# Patient Record
Sex: MALE | Race: BLACK OR AFRICAN AMERICAN | NOT HISPANIC OR LATINO | Employment: OTHER | URBAN - METROPOLITAN AREA
[De-identification: names, ages, dates, MRNs, and addresses within clinical notes are randomized per-mention and may not be internally consistent; named-entity substitution may affect disease eponyms.]

---

## 2019-12-03 ENCOUNTER — TELEPHONE (OUTPATIENT)
Dept: FAMILY MEDICINE | Facility: CLINIC | Age: 64
End: 2019-12-03

## 2019-12-03 NOTE — TELEPHONE ENCOUNTER
Rescheduled patient for 12/10/19 at 1pm due to transportation issues.  Patient verbalized understanding.

## 2019-12-03 NOTE — TELEPHONE ENCOUNTER
----- Message from Analisa Kwan sent at 12/2/2019  1:17 PM CST -----  Contact: [phone]/self  Patient is requesting to come to the office at 9am. Transportation doesn't  until 8:30. Please call him to confirm or reschedule a sooner appointment. Thanks

## 2019-12-10 ENCOUNTER — OFFICE VISIT (OUTPATIENT)
Dept: FAMILY MEDICINE | Facility: CLINIC | Age: 64
End: 2019-12-10
Payer: MEDICARE

## 2019-12-10 VITALS
WEIGHT: 180.75 LBS | BODY MASS INDEX: 26.77 KG/M2 | DIASTOLIC BLOOD PRESSURE: 80 MMHG | HEIGHT: 69 IN | OXYGEN SATURATION: 96 % | SYSTOLIC BLOOD PRESSURE: 128 MMHG | HEART RATE: 75 BPM

## 2019-12-10 DIAGNOSIS — I10 ESSENTIAL HYPERTENSION: ICD-10-CM

## 2019-12-10 DIAGNOSIS — Z11.4 SCREENING FOR HIV (HUMAN IMMUNODEFICIENCY VIRUS): ICD-10-CM

## 2019-12-10 DIAGNOSIS — K21.9 GASTROESOPHAGEAL REFLUX DISEASE WITHOUT ESOPHAGITIS: ICD-10-CM

## 2019-12-10 DIAGNOSIS — I25.10 CORONARY ARTERY DISEASE INVOLVING NATIVE HEART WITHOUT ANGINA PECTORIS, UNSPECIFIED VESSEL OR LESION TYPE: Primary | ICD-10-CM

## 2019-12-10 DIAGNOSIS — Z72.0 TOBACCO ABUSE: ICD-10-CM

## 2019-12-10 DIAGNOSIS — E11.65 TYPE 2 DIABETES MELLITUS WITH HYPERGLYCEMIA, WITHOUT LONG-TERM CURRENT USE OF INSULIN: ICD-10-CM

## 2019-12-10 DIAGNOSIS — E78.49 OTHER HYPERLIPIDEMIA: ICD-10-CM

## 2019-12-10 DIAGNOSIS — B19.10 HEPATITIS B INFECTION WITHOUT DELTA AGENT WITHOUT HEPATIC COMA, UNSPECIFIED CHRONICITY: ICD-10-CM

## 2019-12-10 DIAGNOSIS — F20.9 SCHIZOPHRENIA, UNSPECIFIED TYPE: ICD-10-CM

## 2019-12-10 DIAGNOSIS — I42.9 CARDIOMYOPATHY, UNSPECIFIED TYPE: ICD-10-CM

## 2019-12-10 DIAGNOSIS — Z79.899 MEDICATION MANAGEMENT: ICD-10-CM

## 2019-12-10 DIAGNOSIS — Z12.5 SCREENING FOR MALIGNANT NEOPLASM OF PROSTATE: ICD-10-CM

## 2019-12-10 DIAGNOSIS — J43.2 CENTRILOBULAR EMPHYSEMA: ICD-10-CM

## 2019-12-10 DIAGNOSIS — Z11.59 ENCOUNTER FOR HEPATITIS C SCREENING TEST FOR LOW RISK PATIENT: ICD-10-CM

## 2019-12-10 DIAGNOSIS — I50.9 CONGESTIVE HEART FAILURE, UNSPECIFIED HF CHRONICITY, UNSPECIFIED HEART FAILURE TYPE: ICD-10-CM

## 2019-12-10 DIAGNOSIS — F12.10 MARIJUANA ABUSE: ICD-10-CM

## 2019-12-10 DIAGNOSIS — Z99.81 REQUIRES CONTINUOUS AT HOME SUPPLEMENTAL OXYGEN: ICD-10-CM

## 2019-12-10 PROCEDURE — 99499 UNLISTED E&M SERVICE: CPT | Mod: S$GLB,,, | Performed by: FAMILY MEDICINE

## 2019-12-10 PROCEDURE — 99499 RISK ADDL DX/OHS AUDIT: ICD-10-PCS | Mod: S$GLB,,, | Performed by: FAMILY MEDICINE

## 2019-12-10 PROCEDURE — 3008F PR BODY MASS INDEX (BMI) DOCUMENTED: ICD-10-PCS | Mod: CPTII,S$GLB,, | Performed by: FAMILY MEDICINE

## 2019-12-10 PROCEDURE — 99204 PR OFFICE/OUTPT VISIT, NEW, LEVL IV, 45-59 MIN: ICD-10-PCS | Mod: S$GLB,,, | Performed by: FAMILY MEDICINE

## 2019-12-10 PROCEDURE — 3074F SYST BP LT 130 MM HG: CPT | Mod: CPTII,S$GLB,, | Performed by: FAMILY MEDICINE

## 2019-12-10 PROCEDURE — 3079F DIAST BP 80-89 MM HG: CPT | Mod: CPTII,S$GLB,, | Performed by: FAMILY MEDICINE

## 2019-12-10 PROCEDURE — 3008F BODY MASS INDEX DOCD: CPT | Mod: CPTII,S$GLB,, | Performed by: FAMILY MEDICINE

## 2019-12-10 PROCEDURE — 3079F PR MOST RECENT DIASTOLIC BLOOD PRESSURE 80-89 MM HG: ICD-10-PCS | Mod: CPTII,S$GLB,, | Performed by: FAMILY MEDICINE

## 2019-12-10 PROCEDURE — 99999 PR PBB SHADOW E&M-EST. PATIENT-LVL IV: ICD-10-PCS | Mod: PBBFAC,,, | Performed by: FAMILY MEDICINE

## 2019-12-10 PROCEDURE — 3074F PR MOST RECENT SYSTOLIC BLOOD PRESSURE < 130 MM HG: ICD-10-PCS | Mod: CPTII,S$GLB,, | Performed by: FAMILY MEDICINE

## 2019-12-10 PROCEDURE — 99204 OFFICE O/P NEW MOD 45 MIN: CPT | Mod: S$GLB,,, | Performed by: FAMILY MEDICINE

## 2019-12-10 PROCEDURE — 99999 PR PBB SHADOW E&M-EST. PATIENT-LVL IV: CPT | Mod: PBBFAC,,, | Performed by: FAMILY MEDICINE

## 2019-12-10 RX ORDER — RISPERIDONE 2 MG/1
2 TABLET ORAL 2 TIMES DAILY
COMMUNITY
Start: 2018-12-27 | End: 2020-03-09 | Stop reason: SDUPTHER

## 2019-12-10 RX ORDER — BUDESONIDE AND FORMOTEROL FUMARATE DIHYDRATE 160; 4.5 UG/1; UG/1
1 AEROSOL RESPIRATORY (INHALATION) EVERY 12 HOURS
COMMUNITY
End: 2019-12-17 | Stop reason: ALTCHOICE

## 2019-12-10 RX ORDER — TIOTROPIUM BROMIDE 18 UG/1
18 CAPSULE ORAL; RESPIRATORY (INHALATION) DAILY
Qty: 1 BOX | Refills: 5 | Status: SHIPPED | OUTPATIENT
Start: 2019-12-10 | End: 2019-12-17 | Stop reason: ALTCHOICE

## 2019-12-10 RX ORDER — BISOPROLOL FUMARATE 5 MG/1
2.5 TABLET, FILM COATED ORAL DAILY
COMMUNITY
Start: 2018-02-26 | End: 2019-12-16 | Stop reason: SDUPTHER

## 2019-12-10 RX ORDER — ALBUTEROL SULFATE 0.83 MG/ML
2.5 SOLUTION RESPIRATORY (INHALATION) EVERY 6 HOURS PRN
COMMUNITY
End: 2020-03-26 | Stop reason: SDUPTHER

## 2019-12-10 RX ORDER — IPRATROPIUM BROMIDE 0.5 MG/2.5ML
SOLUTION RESPIRATORY (INHALATION)
COMMUNITY
Start: 2017-11-08 | End: 2020-02-28

## 2019-12-10 RX ORDER — TIOTROPIUM BROMIDE 18 UG/1
18 CAPSULE ORAL; RESPIRATORY (INHALATION) DAILY
COMMUNITY
End: 2019-12-10 | Stop reason: SDUPTHER

## 2019-12-10 RX ORDER — LISINOPRIL AND HYDROCHLOROTHIAZIDE 20; 25 MG/1; MG/1
TABLET ORAL
COMMUNITY
Start: 2019-01-22 | End: 2019-12-16 | Stop reason: SDUPTHER

## 2019-12-10 RX ORDER — ALBUTEROL SULFATE 90 UG/1
2 AEROSOL, METERED RESPIRATORY (INHALATION) EVERY 6 HOURS PRN
Qty: 18 G | Refills: 5 | Status: SHIPPED | OUTPATIENT
Start: 2019-12-10 | End: 2020-03-30 | Stop reason: SDUPTHER

## 2019-12-10 RX ORDER — ASPIRIN 81 MG/1
81 TABLET ORAL DAILY
COMMUNITY
End: 2019-12-16 | Stop reason: SDUPTHER

## 2019-12-10 RX ORDER — GLIPIZIDE 10 MG/1
10 TABLET ORAL 2 TIMES DAILY WITH MEALS
COMMUNITY
Start: 2017-04-13 | End: 2020-01-20 | Stop reason: SDUPTHER

## 2019-12-10 RX ORDER — NITROGLYCERIN 0.4 MG/1
TABLET SUBLINGUAL
COMMUNITY
Start: 2017-04-13 | End: 2019-12-10

## 2019-12-10 RX ORDER — ISOSORBIDE MONONITRATE 30 MG/1
TABLET, EXTENDED RELEASE ORAL
COMMUNITY
Start: 2017-11-08 | End: 2019-12-16 | Stop reason: SDUPTHER

## 2019-12-10 RX ORDER — TRAZODONE HYDROCHLORIDE 100 MG/1
100 TABLET ORAL NIGHTLY
COMMUNITY
End: 2020-03-09 | Stop reason: SDUPTHER

## 2019-12-10 NOTE — PROGRESS NOTES
"Subjective:       Patient ID: Paresh Senior is a 64 y.o. male.    Chief Complaint: Establish Care    65 yo M pt, new to me, with PMH significant for CAD, CHF, Cardiomyopathy, COPD on supplemental home O2 at 2L NC, HTN, HLD, DM-2, Schizophrenia,GERD, Tobacco abuse, and Marijuana abuse. He presents to [facility]. He was previously followed at [facility]; last visit 6-8 weeks ago. [facility]. States that he was looking for "a new face" in medicine. He is not followed by any medical specialists. He reports that he needs medication refills today, but he is unsure of current medication regiment. He does know for sure that he needs ventolin and tiotropium filled.     Review of Systems   Constitutional: Negative for activity change, appetite change, chills, fever and unexpected weight change.   HENT: Negative for congestion, postnasal drip, rhinorrhea, sneezing and sore throat.    Eyes: Negative for redness, itching and visual disturbance.   Respiratory: Negative for cough, shortness of breath and wheezing.    Cardiovascular: Negative for chest pain.   Gastrointestinal: Positive for abdominal pain (Groin pain). Negative for constipation, diarrhea, nausea and vomiting.   Genitourinary: Negative for difficulty urinating, discharge, dysuria, frequency and urgency.   Skin: Negative for rash.   Neurological: Negative for dizziness, syncope, weakness and headaches.   Psychiatric/Behavioral: Negative for dysphoric mood and suicidal ideas. The patient is not nervous/anxious.          Past Medical History:   Diagnosis Date    COPD (chronic obstructive pulmonary disease)     Coronary artery disease     Hypercholesterolemia     Hypertension        Patient Active Problem List   Diagnosis    Syncope    Marijuana abuse    Tobacco abuse    CAD (coronary artery disease)    HTN (hypertension)    Cardiomyopathy    Congestive heart failure    Centrilobular emphysema    Type 2 " "diabetes mellitus with hyperglycemia, without long-term current use of insulin    Gastroesophageal reflux disease without esophagitis    Schizophrenia       Past Surgical History:   Procedure Laterality Date    COLONOSCOPY  02/2016    Advised repeat in 3 years    CORONARY STENT PLACEMENT  2013       No family history on file.    Social History     Tobacco Use   Smoking Status Current Every Day Smoker    Packs/day: 0.75    Years: 50.00    Pack years: 37.50       Social History     Social History Narrative    Tobacco Use: Currently smoking 1 cigar every other day. Former cigarette use; initiated at age 8yo, last use 11/2019 (age 65 yo), using 0.5ppd       Objective:        Vitals:    12/10/19 1313   BP: 128/80   Pulse: 75   SpO2: 96%   Weight: 82 kg (180 lb 12.4 oz)   Height: 5' 9" (1.753 m)       Physical Exam   Constitutional: He is oriented to person, place, and time. He appears well-developed and well-nourished. No distress.   O2 nasal cannula in place   HENT:   Head: Normocephalic and atraumatic.   Right Ear: External ear normal.   Left Ear: External ear normal.   Mouth/Throat: Mucous membranes are normal.   Eyes: Conjunctivae and EOM are normal. No scleral icterus.   Neck: Normal range of motion.   Cardiovascular: Normal rate, regular rhythm and normal heart sounds. Exam reveals no gallop and no friction rub.   No murmur heard.  Pulmonary/Chest: Effort normal and breath sounds normal. No respiratory distress. He has no wheezes. He has no rales.   Musculoskeletal: Normal range of motion. He exhibits no edema, tenderness or deformity.   Neurological: He is alert and oriented to person, place, and time. No cranial nerve deficit. Gait normal.   Skin: Skin is warm and dry. No rash noted. No erythema.   Psychiatric: He has a normal mood and affect. His behavior is normal.       Assessment:       1. Coronary artery disease involving native heart without angina pectoris, unspecified vessel or lesion type    2. " Cardiomyopathy, unspecified type    3. Congestive heart failure, unspecified HF chronicity, unspecified heart failure type    4. Centrilobular emphysema    5. Requires continuous at home supplemental oxygen    6. Essential hypertension    7. Other hyperlipidemia    8. Type 2 diabetes mellitus with hyperglycemia, without long-term current use of insulin    9. Gastroesophageal reflux disease without esophagitis    10. Schizophrenia, unspecified type    11. Tobacco abuse    12. Marijuana abuse    13. Hepatitis B infection without delta agent without hepatic coma, unspecified chronicity    14. Screening for HIV (human immunodeficiency virus)    15. Encounter for hepatitis C screening test for low risk patient    16. Medication management    17. Screening for malignant neoplasm of prostate        Plan:       Paresh was seen today for \[facility] [place] [facility]\"" care.    Diagnoses and all orders for this visit:    Coronary artery disease involving native heart without angina pectoris, unspecified vessel or lesion type  -     Ambulatory referral to Cardiology    Cardiomyopathy, unspecified type    Congestive heart failure, unspecified HF chronicity, unspecified heart failure type    Centrilobular emphysema  -     albuterol (VENTOLIN HFA) 90 mcg/actuation inhaler; Inhale 2 puffs into the lungs every 6 (six) hours as needed for Wheezing. Rescue  -     Ambulatory referral to Pulmonology  -     tiotropium (SPIRIVA) 18 mcg inhalation capsule; Inhale 1 capsule (18 mcg total) into the lungs once daily. Controller    Requires continuous at home supplemental oxygen    Essential hypertension  -     CBC auto differential; Future  -     Comprehensive metabolic panel; Future  -     Lipid panel; Future    Other hyperlipidemia    Type 2 diabetes mellitus with hyperglycemia, without long-term current use of insulin  -     CBC auto differential; Future  -     Comprehensive metabolic panel; Future  -     Hemoglobin A1c; Future    Gastroesophageal reflux  disease without esophagitis    Schizophrenia, unspecified type  -     Ambulatory referral to Psychiatry    Tobacco abuse    Marijuana abuse    Hepatitis B infection without delta agent without hepatic coma, unspecified chronicity  -     Hepatitis B surface antigen; Future  -     Hepatitis B surface antibody; Future  -     Hepatitis B core antibody, total; Future  -     HEPATITIS B E ANTIGEN; Future  -     HEPATITIS B VIRAL DNA, QUANTITATIVE; Future    Screening for HIV (human immunodeficiency virus)  -     HIV 1/2 Ag/Ab (4th Gen); Future    Encounter for hepatitis C screening test for low risk patient  -     Hepatitis C antibody; Future    Medication management  -     TSH; Future  -     Vitamin D; Future    Screening for malignant neoplasm of prostate  -     PSA, Screening; Future    Patient is not sure of chronic medications. Will bring in physical bottles during 2 week f/u. RTC for fasting labs as above. Address HM over subsequent visits.     CAD/CHF/Cardiomyopathy  --Med reconciliation on f/u visit   --Plan to schedule 2d echo on f/u visit  --Referred to cardiology    COPD on supplemental home O2 at 2L NC  --Previously followed by pulmonolgy (Dr. Sweta Hatch MD)--last visit 2/2018  --CT chest 2/20/2018 showed centrilobular emphysematous changes throughout upper lobe  --Continue Symbicort 160/4.5 1 puff BID, Spiriva 18 mcg daily, and albuterol prn   --Refer to pulmonology. Looks as if he was also referred to pulmonary rehab in the past.      HTN  --BP well-controlled today  --Med reconciliation on f/u visit     HLD  (?) taking rosuvastatin 20 mg hs.   --Med reconciliation on f/u visit     DM-2  Last A1c approx 9 per pt. Documented last A1c 9.0 11/2017 based on previous records   (?) taking glipizide 10 mg BID    --Med reconciliation on f/u visit     Schizophrenia  --Med reconciliation on f/u visit   --Refer to psychiatry  --Pt did express interest in lowering quetiapine dose (?200 mg vs 300 mg) as this  effects him throughout the day    GERD  (?) untreated  --Med reconciliation on f/u visit     Tobacco abuse  Pt previously smoking 0.5ppd x 57 years; quit cigarette use 11/2019  Now smoking 1 cigar every other day.  Will discuss referral to tobacco cessation program    Marijuana abuse  Assess h/o on f/u. Also documented h/o crack use in previous records.        Flu vaccine UTD for 2019/2020 season  [facility]; recommended following  1.  Colonoscopy due.  Most recent was to 2016; 3 polyps resected.  Advised repeat colonoscopy in 3 years (2019)  2.  Diabetic eye exam due.  3.  Evaluated by Dr. Skelton at [facility] on 01/16/2018 for hep B surface antigen positive. (?due for f/u visit)---obtaining serologies with fasting labs.    Follow up in about 2 weeks (around 12/24/2019) for Med reconciliation with med bottles in office; lab review; assess groin pain. .

## 2019-12-12 ENCOUNTER — TELEPHONE (OUTPATIENT)
Dept: PULMONOLOGY | Facility: CLINIC | Age: 64
End: 2019-12-12

## 2019-12-12 DIAGNOSIS — J44.9 CHRONIC OBSTRUCTIVE PULMONARY DISEASE, UNSPECIFIED COPD TYPE: Primary | ICD-10-CM

## 2019-12-13 ENCOUNTER — LAB VISIT (OUTPATIENT)
Dept: LAB | Facility: HOSPITAL | Age: 64
End: 2019-12-13
Attending: FAMILY MEDICINE
Payer: MEDICARE

## 2019-12-13 DIAGNOSIS — Z11.59 ENCOUNTER FOR HEPATITIS C SCREENING TEST FOR LOW RISK PATIENT: ICD-10-CM

## 2019-12-13 DIAGNOSIS — Z79.899 MEDICATION MANAGEMENT: ICD-10-CM

## 2019-12-13 DIAGNOSIS — Z12.11 COLON CANCER SCREENING: ICD-10-CM

## 2019-12-13 DIAGNOSIS — Z12.5 SCREENING FOR MALIGNANT NEOPLASM OF PROSTATE: ICD-10-CM

## 2019-12-13 DIAGNOSIS — Z11.4 SCREENING FOR HIV (HUMAN IMMUNODEFICIENCY VIRUS): ICD-10-CM

## 2019-12-13 DIAGNOSIS — E11.9 TYPE 2 DIABETES MELLITUS WITHOUT COMPLICATION, UNSPECIFIED WHETHER LONG TERM INSULIN USE: ICD-10-CM

## 2019-12-13 DIAGNOSIS — E11.65 TYPE 2 DIABETES MELLITUS WITH HYPERGLYCEMIA, WITHOUT LONG-TERM CURRENT USE OF INSULIN: ICD-10-CM

## 2019-12-13 DIAGNOSIS — I10 ESSENTIAL HYPERTENSION: ICD-10-CM

## 2019-12-13 DIAGNOSIS — B19.10 HEPATITIS B INFECTION WITHOUT DELTA AGENT WITHOUT HEPATIC COMA, UNSPECIFIED CHRONICITY: ICD-10-CM

## 2019-12-13 LAB
25(OH)D3+25(OH)D2 SERPL-MCNC: 27 NG/ML (ref 30–96)
ALBUMIN SERPL BCP-MCNC: 3.7 G/DL (ref 3.5–5.2)
ALP SERPL-CCNC: 72 U/L (ref 55–135)
ALT SERPL W/O P-5'-P-CCNC: 21 U/L (ref 10–44)
ANION GAP SERPL CALC-SCNC: 10 MMOL/L (ref 8–16)
AST SERPL-CCNC: 23 U/L (ref 10–40)
BASOPHILS # BLD AUTO: 0.04 K/UL (ref 0–0.2)
BASOPHILS NFR BLD: 0.5 % (ref 0–1.9)
BILIRUB SERPL-MCNC: 0.4 MG/DL (ref 0.1–1)
BUN SERPL-MCNC: 13 MG/DL (ref 8–23)
CALCIUM SERPL-MCNC: 9.7 MG/DL (ref 8.7–10.5)
CHLORIDE SERPL-SCNC: 107 MMOL/L (ref 95–110)
CHOLEST SERPL-MCNC: 228 MG/DL (ref 120–199)
CHOLEST/HDLC SERPL: 6.5 {RATIO} (ref 2–5)
CO2 SERPL-SCNC: 25 MMOL/L (ref 23–29)
COMPLEXED PSA SERPL-MCNC: 3.2 NG/ML (ref 0–4)
CREAT SERPL-MCNC: 1.6 MG/DL (ref 0.5–1.4)
DIFFERENTIAL METHOD: NORMAL
EOSINOPHIL # BLD AUTO: 0.2 K/UL (ref 0–0.5)
EOSINOPHIL NFR BLD: 2.6 % (ref 0–8)
ERYTHROCYTE [DISTWIDTH] IN BLOOD BY AUTOMATED COUNT: 12.8 % (ref 11.5–14.5)
EST. GFR  (AFRICAN AMERICAN): 52 ML/MIN/1.73 M^2
EST. GFR  (NON AFRICAN AMERICAN): 45 ML/MIN/1.73 M^2
ESTIMATED AVG GLUCOSE: 140 MG/DL (ref 68–131)
GLUCOSE SERPL-MCNC: 130 MG/DL (ref 70–110)
HBA1C MFR BLD HPLC: 6.5 % (ref 4–5.6)
HBV CORE AB SERPL QL IA: NEGATIVE
HBV SURFACE AB SER-ACNC: NEGATIVE M[IU]/ML
HBV SURFACE AG SERPL QL IA: NEGATIVE
HCT VFR BLD AUTO: 43.6 % (ref 40–54)
HCV AB SERPL QL IA: NEGATIVE
HDLC SERPL-MCNC: 35 MG/DL (ref 40–75)
HDLC SERPL: 15.4 % (ref 20–50)
HGB BLD-MCNC: 14.9 G/DL (ref 14–18)
HIV 1+2 AB+HIV1 P24 AG SERPL QL IA: NEGATIVE
LDLC SERPL CALC-MCNC: 154.8 MG/DL (ref 63–159)
LYMPHOCYTES # BLD AUTO: 1.7 K/UL (ref 1–4.8)
LYMPHOCYTES NFR BLD: 19.8 % (ref 18–48)
MCH RBC QN AUTO: 30.8 PG (ref 27–31)
MCHC RBC AUTO-ENTMCNC: 34.2 G/DL (ref 32–36)
MCV RBC AUTO: 90 FL (ref 82–98)
MONOCYTES # BLD AUTO: 0.5 K/UL (ref 0.3–1)
MONOCYTES NFR BLD: 6 % (ref 4–15)
NEUTROPHILS # BLD AUTO: 6.2 K/UL (ref 1.8–7.7)
NEUTROPHILS NFR BLD: 71.1 % (ref 38–73)
NONHDLC SERPL-MCNC: 193 MG/DL
PLATELET # BLD AUTO: 276 K/UL (ref 150–350)
PMV BLD AUTO: 9.3 FL (ref 9.2–12.9)
POTASSIUM SERPL-SCNC: 4.7 MMOL/L (ref 3.5–5.1)
PROT SERPL-MCNC: 7.1 G/DL (ref 6–8.4)
RBC # BLD AUTO: 4.84 M/UL (ref 4.6–6.2)
SODIUM SERPL-SCNC: 142 MMOL/L (ref 136–145)
TRIGL SERPL-MCNC: 191 MG/DL (ref 30–150)
TSH SERPL DL<=0.005 MIU/L-ACNC: 2.14 UIU/ML (ref 0.4–4)
WBC # BLD AUTO: 8.68 K/UL (ref 3.9–12.7)

## 2019-12-13 PROCEDURE — 83036 HEMOGLOBIN GLYCOSYLATED A1C: CPT

## 2019-12-13 PROCEDURE — 86704 HEP B CORE ANTIBODY TOTAL: CPT

## 2019-12-13 PROCEDURE — 85025 COMPLETE CBC W/AUTO DIFF WBC: CPT

## 2019-12-13 PROCEDURE — 87517 HEPATITIS B DNA QUANT: CPT

## 2019-12-13 PROCEDURE — 86703 HIV-1/HIV-2 1 RESULT ANTBDY: CPT

## 2019-12-13 PROCEDURE — 87340 HEPATITIS B SURFACE AG IA: CPT

## 2019-12-13 PROCEDURE — 80061 LIPID PANEL: CPT

## 2019-12-13 PROCEDURE — 86803 HEPATITIS C AB TEST: CPT

## 2019-12-13 PROCEDURE — 80053 COMPREHEN METABOLIC PANEL: CPT

## 2019-12-13 PROCEDURE — 84443 ASSAY THYROID STIM HORMONE: CPT

## 2019-12-13 PROCEDURE — 82306 VITAMIN D 25 HYDROXY: CPT

## 2019-12-13 PROCEDURE — 86706 HEP B SURFACE ANTIBODY: CPT

## 2019-12-13 PROCEDURE — 87350 HEPATITIS BE AG IA: CPT

## 2019-12-13 PROCEDURE — 36415 COLL VENOUS BLD VENIPUNCTURE: CPT

## 2019-12-13 PROCEDURE — 84153 ASSAY OF PSA TOTAL: CPT

## 2019-12-16 ENCOUNTER — OFFICE VISIT (OUTPATIENT)
Dept: CARDIOLOGY | Facility: CLINIC | Age: 64
End: 2019-12-16
Payer: MEDICARE

## 2019-12-16 VITALS
DIASTOLIC BLOOD PRESSURE: 68 MMHG | WEIGHT: 180 LBS | OXYGEN SATURATION: 95 % | BODY MASS INDEX: 26.66 KG/M2 | HEIGHT: 69 IN | SYSTOLIC BLOOD PRESSURE: 112 MMHG | HEART RATE: 98 BPM

## 2019-12-16 DIAGNOSIS — E78.2 MIXED HYPERLIPIDEMIA: ICD-10-CM

## 2019-12-16 DIAGNOSIS — I34.0 MITRAL VALVE INSUFFICIENCY, UNSPECIFIED ETIOLOGY: ICD-10-CM

## 2019-12-16 DIAGNOSIS — Z72.0 TOBACCO ABUSE: ICD-10-CM

## 2019-12-16 DIAGNOSIS — I73.9 CLAUDICATION: ICD-10-CM

## 2019-12-16 DIAGNOSIS — J43.2 CENTRILOBULAR EMPHYSEMA: ICD-10-CM

## 2019-12-16 DIAGNOSIS — I25.10 CORONARY ARTERY DISEASE INVOLVING NATIVE HEART WITHOUT ANGINA PECTORIS, UNSPECIFIED VESSEL OR LESION TYPE: Primary | ICD-10-CM

## 2019-12-16 DIAGNOSIS — I10 ESSENTIAL HYPERTENSION: ICD-10-CM

## 2019-12-16 PROCEDURE — 3074F SYST BP LT 130 MM HG: CPT | Mod: CPTII,S$GLB,, | Performed by: INTERNAL MEDICINE

## 2019-12-16 PROCEDURE — 99499 UNLISTED E&M SERVICE: CPT | Mod: S$GLB,,, | Performed by: INTERNAL MEDICINE

## 2019-12-16 PROCEDURE — 99999 PR PBB SHADOW E&M-EST. PATIENT-LVL IV: ICD-10-PCS | Mod: PBBFAC,,, | Performed by: INTERNAL MEDICINE

## 2019-12-16 PROCEDURE — 99214 OFFICE O/P EST MOD 30 MIN: CPT | Mod: 25,S$GLB,, | Performed by: INTERNAL MEDICINE

## 2019-12-16 PROCEDURE — 99999 PR PBB SHADOW E&M-EST. PATIENT-LVL IV: CPT | Mod: PBBFAC,,, | Performed by: INTERNAL MEDICINE

## 2019-12-16 PROCEDURE — 93000 ELECTROCARDIOGRAM COMPLETE: CPT | Mod: S$GLB,,, | Performed by: INTERNAL MEDICINE

## 2019-12-16 PROCEDURE — 93000 EKG 12-LEAD: ICD-10-PCS | Mod: S$GLB,,, | Performed by: INTERNAL MEDICINE

## 2019-12-16 PROCEDURE — 3078F DIAST BP <80 MM HG: CPT | Mod: CPTII,S$GLB,, | Performed by: INTERNAL MEDICINE

## 2019-12-16 PROCEDURE — 3008F BODY MASS INDEX DOCD: CPT | Mod: CPTII,S$GLB,, | Performed by: INTERNAL MEDICINE

## 2019-12-16 PROCEDURE — 3078F PR MOST RECENT DIASTOLIC BLOOD PRESSURE < 80 MM HG: ICD-10-PCS | Mod: CPTII,S$GLB,, | Performed by: INTERNAL MEDICINE

## 2019-12-16 PROCEDURE — 99499 RISK ADDL DX/OHS AUDIT: ICD-10-PCS | Mod: S$GLB,,, | Performed by: INTERNAL MEDICINE

## 2019-12-16 PROCEDURE — 3008F PR BODY MASS INDEX (BMI) DOCUMENTED: ICD-10-PCS | Mod: CPTII,S$GLB,, | Performed by: INTERNAL MEDICINE

## 2019-12-16 PROCEDURE — 99214 PR OFFICE/OUTPT VISIT, EST, LEVL IV, 30-39 MIN: ICD-10-PCS | Mod: 25,S$GLB,, | Performed by: INTERNAL MEDICINE

## 2019-12-16 PROCEDURE — 3074F PR MOST RECENT SYSTOLIC BLOOD PRESSURE < 130 MM HG: ICD-10-PCS | Mod: CPTII,S$GLB,, | Performed by: INTERNAL MEDICINE

## 2019-12-16 RX ORDER — ASPIRIN 81 MG/1
81 TABLET ORAL DAILY
Qty: 90 TABLET | Refills: 3 | Status: SHIPPED | OUTPATIENT
Start: 2019-12-16 | End: 2020-12-15

## 2019-12-16 RX ORDER — ISOSORBIDE MONONITRATE 30 MG/1
30 TABLET, EXTENDED RELEASE ORAL DAILY
Qty: 90 TABLET | Refills: 3 | Status: SHIPPED | OUTPATIENT
Start: 2019-12-16 | End: 2020-03-26 | Stop reason: SDUPTHER

## 2019-12-16 RX ORDER — ROSUVASTATIN CALCIUM 40 MG/1
40 TABLET, COATED ORAL NIGHTLY
Qty: 90 TABLET | Refills: 3 | Status: SHIPPED | OUTPATIENT
Start: 2019-12-16 | End: 2020-03-26 | Stop reason: SDUPTHER

## 2019-12-16 RX ORDER — BISOPROLOL FUMARATE 5 MG/1
2.5 TABLET, FILM COATED ORAL DAILY
Qty: 45 TABLET | Refills: 3 | Status: SHIPPED | OUTPATIENT
Start: 2019-12-16 | End: 2020-03-26 | Stop reason: SDUPTHER

## 2019-12-16 RX ORDER — LISINOPRIL AND HYDROCHLOROTHIAZIDE 20; 25 MG/1; MG/1
1 TABLET ORAL DAILY
Qty: 90 TABLET | Refills: 3 | Status: ON HOLD | OUTPATIENT
Start: 2019-12-16 | End: 2020-03-05 | Stop reason: HOSPADM

## 2019-12-16 NOTE — PROGRESS NOTES
Subjective:   @Patient ID:  Paresh Senior is a 64 y.o. male who presents for evaluation of CAD, HTN, HLP.      HPI:     Patient here for first visit. He haven't seen a cardiologist for long time.   He denies any chest pain or palpitations.   He stated that he is compliant with his medication but he ran out of his medication and hasn't been taking his statin.     Has COPD and on home O2 24/7 also uses walker while walking. His legs gets tired and fatigue.     He lives by himself. He is cutting down smoking and now smokes Cigar, one a day.     Pertinent medical hx: CAD s/p PCI, DM type II, COPD on home O2 2l, HLP, reported Cardiomyopathy, Schizophrenia,GERD, Tobacco abuse, and Marijuana abuse.      Prior cardiovascular  Hx  --------------------------------    - Hx of PCI to LCX/OM2/LPLS in 1/2014 at [facility] for  MI thrombotic 100% LCX with BMS 2.25x18 stent with final kissing balloon angioplasty.  Co-dominant.  LM normal, LAD luminal irregularities, RCA luminal irregularities.         - ECHO 1/2014   EF 50%, Moderate MR, Left atrial enlargement.     - EKG 11/2019  SR, no acute ST-T wave changes       Patient Active Problem List    Diagnosis Date Noted    Mixed hyperlipidemia 12/16/2019    Cardiomyopathy 12/10/2019    Congestive heart failure 12/10/2019    Centrilobular emphysema 12/10/2019    Type 2 diabetes mellitus with hyperglycemia, without long-term current use of insulin 12/10/2019    Gastroesophageal reflux disease without esophagitis 12/10/2019    Schizophrenia 12/10/2019    Syncope 11/25/2014    Marijuana abuse 11/25/2014    Tobacco abuse 11/25/2014    CAD (coronary artery disease) 11/25/2014    HTN (hypertension) 11/25/2014           Left Arm BP - Sitting: (P) 112/67        LAST HbA1c  Lab Results   Component Value Date    HGBA1C 6.5 (H) 12/13/2019       Lipid panel  Lab Results   Component Value Date    CHOL 228 (H) 12/13/2019    CHOL 143 11/25/2014     Lab Results   Component Value Date    HDL 35  (L) 12/13/2019    HDL 26 (L) 11/25/2014     Lab Results   Component Value Date    LDLCALC 154.8 12/13/2019    LDLCALC 82.6 11/25/2014     Lab Results   Component Value Date    TRIG 191 (H) 12/13/2019    TRIG 172 (H) 11/25/2014     Lab Results   Component Value Date    CHOLHDL 15.4 (L) 12/13/2019    CHOLHDL 18.2 (L) 11/25/2014            Review of Systems   Constitution: Negative for chills and fever.   HENT: Negative for hearing loss and nosebleeds.    Eyes: Negative for blurred vision.   Cardiovascular: Negative for chest pain and palpitations.   Respiratory:        As in HPI   Hematologic/Lymphatic: Negative for bleeding problem.   Skin: Negative for itching.   Musculoskeletal: Positive for muscle weakness. Negative for falls.   Gastrointestinal: Negative for abdominal pain and hematochezia.   Genitourinary: Negative for hematuria.   Neurological: Negative for dizziness and loss of balance.   Psychiatric/Behavioral: Negative for altered mental status and depression.       Objective:   Physical Exam   Constitutional: He is oriented to person, place, and time. He appears well-developed and well-nourished.   HENT:   Head: Normocephalic and atraumatic.   Eyes: Conjunctivae are normal.   Neck: Neck supple. Carotid bruit is not present.   Cardiovascular: Normal rate, regular rhythm and normal heart sounds. Exam reveals no gallop and no friction rub.   No murmur heard.  Pulmonary/Chest: Effort normal and breath sounds normal. No stridor. No respiratory distress. He has no wheezes.   Neurological: He is alert and oriented to person, place, and time.   Skin: Skin is warm and dry.   Psychiatric: He has a normal mood and affect. His behavior is normal.       Assessment:     1. Coronary artery disease involving native heart without angina pectoris, unspecified vessel or lesion type    2. Tobacco abuse    3. Claudication    4. Mitral valve insufficiency, unspecified etiology    5. Essential hypertension    6. Centrilobular  emphysema    7. Mixed hyperlipidemia        Plan:   - Check Echo  - Check JOHN  - Increase Crestor to 40 mg qhs and repeat level next visit.  - Continue BB/ACEI  - Lifestyle modification  - Refer to smoking cessation program.     Pertinent cardiac images and EKG reviewed independently.    Continue with current medical plan and lifestyle changes.  Return sooner for concerns or questions. If symptoms persist go to the ED  I have reviewed all pertinent data including patient's medical history in detail and updated the computerized patient record.     Orders Placed This Encounter   Procedures    Ambulatory referral to Smoking Cessation Program     Referral Priority:   Routine     Referral Type:   Consultation     Referral Reason:   Specialty Services Required     Requested Specialty:   CTTS     Number of Visits Requested:   1    CV Ankle Brachial Indices W Stress W Toe Tracings     Standing Status:   Future     Standing Expiration Date:   12/16/2020    CV Segmental Pressures Low Ext W Stress     Standing Status:   Future     Standing Expiration Date:   12/16/2020    IN OFFICE EKG 12-LEAD (to Muse)     Order Specific Question:   Diagnosis     Answer:   CAD (coronary artery disease) [390797]    Echo Color Flow Doppler? Yes     Standing Status:   Future     Standing Expiration Date:   12/16/2020     Order Specific Question:   Color Flow Doppler?     Answer:   Yes       Follow up as scheduled.     He expressed verbal understanding and agreed with the plan    Patient's Medications   New Prescriptions    ROSUVASTATIN (CRESTOR) 40 MG TAB    Take 1 tablet (40 mg total) by mouth every evening.   Previous Medications    ALBUTEROL (PROVENTIL) 2.5 MG /3 ML (0.083 %) NEBULIZER SOLUTION    Take 2.5 mg by nebulization every 6 (six) hours as needed for Wheezing or Shortness of Breath. Rescue    ALBUTEROL (VENTOLIN HFA) 90 MCG/ACTUATION INHALER    Inhale 2 puffs into the lungs every 6 (six) hours as needed for Wheezing. Rescue     BENZTROPINE (COGENTIN) 0.5 MG TABLET    Take 0.5 mg by mouth 2 (two) times daily.     BUDESONIDE-FORMOTEROL 160-4.5 MCG (SYMBICORT) 160-4.5 MCG/ACTUATION HFAA    Inhale 1 puff into the lungs every 12 (twelve) hours. Controller    GLIPIZIDE (GLUCOTROL) 10 MG TABLET    10 mg 2 (two) times daily with meals.     IPRATROPIUM (ATROVENT) 0.02 % NEBULIZER SOLUTION    Take by nebulization.     MAG HYDROX/ALUMINUM HYD/SIMETH (ANTACID ORAL)    Take by mouth.    QUETIAPINE (SEROQUEL) 300 MG TAB    Take 300 mg by mouth every evening.     RISPERIDONE (RISPERDAL) 2 MG TABLET    Take 2 mg by mouth 2 (two) times daily.     TIOTROPIUM (SPIRIVA) 18 MCG INHALATION CAPSULE    Inhale 1 capsule (18 mcg total) into the lungs once daily. Controller    TRAZODONE (DESYREL) 100 MG TABLET    Take 100 mg by mouth every evening.   Modified Medications    Modified Medication Previous Medication    ASPIRIN (ECOTRIN) 81 MG EC TABLET aspirin (ECOTRIN) 81 MG EC tablet       Take 1 tablet (81 mg total) by mouth once daily.    Take 81 mg by mouth once daily.    BISOPROLOL (ZEBETA) 5 MG TABLET bisoprolol (ZEBETA) 5 MG tablet       Take 0.5 tablets (2.5 mg total) by mouth once daily.    Take 2.5 mg by mouth once daily.     ISOSORBIDE MONONITRATE (IMDUR) 30 MG 24 HR TABLET isosorbide mononitrate (IMDUR) 30 MG 24 hr tablet       Take 1 tablet (30 mg total) by mouth once daily.    Take by mouth.    LISINOPRIL-HYDROCHLOROTHIAZIDE (PRINZIDE,ZESTORETIC) 20-25 MG TAB lisinopril-hydrochlorothiazide (PRINZIDE,ZESTORETIC) 20-25 mg Tab       Take 1 tablet by mouth once daily.    Take by mouth.   Discontinued Medications    METOPROLOL TARTRATE (LOPRESSOR) 25 MG TABLET    Take 25 mg by mouth 2 (two) times daily.    ROSUVASTATIN 20 MG CPSP    Take 40 mg by mouth every evening.

## 2019-12-16 NOTE — LETTER
December 16, 2019      Fernando Carmona MD  [address]  [address]  [place] [address]           [facility]  [address]  [facility] [place] [address]  Phone: [phone]          Patient: Paresh Senior   MR Number: [id number]   YOB: 1955   Date of Visit: 12/16/2019       Dear Dr. Fernando Carmona:    Thank you for referring Paresh Senior to me for evaluation. Attached you will find relevant portions of my assessment and plan of care.    If you have questions, please do not hesitate to call me. I look forward to following Paresh Senior along with you.    Sincerely,    Gibran Thrasher MD    Enclosure  CC:  No Recipients    If you would like to receive this communication electronically, please contact [email or web address] or [phone] to request more information on [employer] access.    For providers and/or their staff who would like to refer a patient to [facility], please contact us through our one-stop-shop provider referral line, [facility], at [phone].    If you feel you have received this communication in error or would no longer like to receive these types of communications, please e-mail [email or web address]

## 2019-12-17 ENCOUNTER — HOSPITAL ENCOUNTER (OUTPATIENT)
Dept: PULMONOLOGY | Facility: CLINIC | Age: 64
Discharge: HOME | End: 2019-12-17
Payer: MEDICARE

## 2019-12-17 ENCOUNTER — HOSPITAL ENCOUNTER (OUTPATIENT)
Dept: RADIOLOGY | Facility: HOSPITAL | Age: 64
Discharge: HOME | End: 2019-12-17
Attending: INTERNAL MEDICINE
Payer: MEDICARE

## 2019-12-17 ENCOUNTER — OFFICE VISIT (OUTPATIENT)
Dept: PULMONOLOGY | Facility: CLINIC | Age: 64
End: 2019-12-17
Payer: MEDICARE

## 2019-12-17 ENCOUNTER — TELEPHONE (OUTPATIENT)
Dept: CARDIOLOGY | Facility: CLINIC | Age: 64
End: 2019-12-17

## 2019-12-17 VITALS
HEART RATE: 94 BPM | SYSTOLIC BLOOD PRESSURE: 126 MMHG | OXYGEN SATURATION: 96 % | HEIGHT: 68 IN | BODY MASS INDEX: 27.28 KG/M2 | DIASTOLIC BLOOD PRESSURE: 68 MMHG | WEIGHT: 180 LBS

## 2019-12-17 DIAGNOSIS — Z72.0 TOBACCO ABUSE: ICD-10-CM

## 2019-12-17 DIAGNOSIS — J44.9 CHRONIC OBSTRUCTIVE PULMONARY DISEASE, UNSPECIFIED COPD TYPE: ICD-10-CM

## 2019-12-17 DIAGNOSIS — J44.9 CHRONIC OBSTRUCTIVE PULMONARY DISEASE, UNSPECIFIED COPD TYPE: Primary | ICD-10-CM

## 2019-12-17 LAB
DLCO ADJ PRE: 10.83 ML/(MIN*MMHG) (ref 19.85–33.7)
DLCO SINGLE BREATH LLN: 19.85
DLCO SINGLE BREATH PRE REF: 40.8 %
DLCO SINGLE BREATH REF: 26.77
DLCOC SBVA LLN: 2.74
DLCOC SBVA PRE REF: 57.7 %
DLCOC SBVA REF: 3.97
DLCOC SINGLE BREATH LLN: 19.85
DLCOC SINGLE BREATH PRE REF: 40.4 %
DLCOC SINGLE BREATH REF: 26.77
DLCOCSBVAULN: 5.2
DLCOCSINGLEBREATHULN: 33.7
DLCOSINGLEBREATHULN: 33.7
DLCOVA LLN: 2.74
DLCOVA PRE REF: 58.2 %
DLCOVA PRE: 2.31 ML/(MIN*MMHG*L) (ref 2.74–5.2)
DLCOVA REF: 3.97
DLCOVAULN: 5.2
DLVAADJ PRE: 2.29 ML/(MIN*MMHG*L) (ref 2.74–5.2)
FEF 25 75 LLN: 0.89
FEF 25 75 PRE REF: 21.7 %
FEF 25 75 REF: 2.27
FEV05 LLN: 1.43
FEV05 REF: 2.57
FEV1 FVC LLN: 65
FEV1 FVC PRE REF: 62.2 %
FEV1 FVC REF: 78
FEV1 LLN: 1.95
FEV1 PRE REF: 45.6 %
FEV1 REF: 2.74
FVC LLN: 2.59
FVC PRE REF: 73.3 %
FVC REF: 3.53
HBV DNA SERPL NAA+PROBE-ACNC: <10 IU/ML
HBV DNA SERPL NAA+PROBE-LOG IU: <1 LOG (10) IU/ML
HBV DNA SERPL QL NAA+PROBE: NOT DETECTED
IVC PRE: 2.74 L (ref 2.59–4.47)
IVC SINGLE BREATH LLN: 2.59
IVC SINGLE BREATH PRE REF: 77.5 %
IVC SINGLE BREATH REF: 3.53
IVCSINGLEBREATHULN: 4.47
PEF LLN: 5.28
PEF PRE REF: 50.5 %
PEF REF: 7.79
PHYSICIAN COMMENT: ABNORMAL
PRE DLCO: 10.92 ML/(MIN*MMHG) (ref 19.85–33.7)
PRE FEF 25 75: 0.49 L/S (ref 0.89–3.65)
PRE FET 100: 7.28 SEC
PRE FEV05 REF: 32.6 %
PRE FEV1 FVC: 48.38 % (ref 65.49–89.99)
PRE FEV1: 1.25 L (ref 1.95–3.54)
PRE FEV5: 0.84 L (ref 1.43–3.7)
PRE FVC: 2.59 L (ref 2.59–4.47)
PRE PEF: 3.93 L/S (ref 5.28–10.31)
VA PRE: 4.74 L (ref 6.59–6.59)
VA SINGLE BREATH LLN: 6.59
VA SINGLE BREATH PRE REF: 71.9 %
VA SINGLE BREATH REF: 6.59
VASINGLEBREATHULN: 6.59

## 2019-12-17 PROCEDURE — 99204 PR OFFICE/OUTPT VISIT, NEW, LEVL IV, 45-59 MIN: ICD-10-PCS | Mod: 25,S$GLB,, | Performed by: NURSE PRACTITIONER

## 2019-12-17 PROCEDURE — 99999 PR PBB SHADOW E&M-EST. PATIENT-LVL III: CPT | Mod: PBBFAC,,, | Performed by: NURSE PRACTITIONER

## 2019-12-17 PROCEDURE — 99999 PR PBB SHADOW E&M-EST. PATIENT-LVL III: ICD-10-PCS | Mod: PBBFAC,,, | Performed by: NURSE PRACTITIONER

## 2019-12-17 PROCEDURE — 94729 DIFFUSING CAPACITY: CPT | Mod: S$GLB,,, | Performed by: INTERNAL MEDICINE

## 2019-12-17 PROCEDURE — 94010 BREATHING CAPACITY TEST: CPT | Mod: S$GLB,,, | Performed by: INTERNAL MEDICINE

## 2019-12-17 PROCEDURE — 94729 PR C02/MEMBANE DIFFUSE CAPACITY: ICD-10-PCS | Mod: S$GLB,,, | Performed by: INTERNAL MEDICINE

## 2019-12-17 PROCEDURE — 71046 XR CHEST PA AND LATERAL: ICD-10-PCS | Mod: 26,,, | Performed by: RADIOLOGY

## 2019-12-17 PROCEDURE — 71046 X-RAY EXAM CHEST 2 VIEWS: CPT | Mod: TC,FY

## 2019-12-17 PROCEDURE — 99204 OFFICE O/P NEW MOD 45 MIN: CPT | Mod: 25,S$GLB,, | Performed by: NURSE PRACTITIONER

## 2019-12-17 PROCEDURE — 71046 X-RAY EXAM CHEST 2 VIEWS: CPT | Mod: 26,,, | Performed by: RADIOLOGY

## 2019-12-17 PROCEDURE — 94010 BREATHING CAPACITY TEST: ICD-10-PCS | Mod: S$GLB,,, | Performed by: INTERNAL MEDICINE

## 2019-12-17 NOTE — PROGRESS NOTES
Subjective:       Patient ID: Paresh Senior is a 64 y.o. male.    Chief Complaint: Emphysema    HPI:   Paresh Senior is a 64 y.o. male with hx of DM II, HTN, CAD , HLD, CHF, Schizopherina, COPD and active smoker who present for COPD evaluation and est care within the [facility] system. He explains previously  following at [facility].     Patient  experiences chronic mMRC 3 symptoms of dyspnea. Notes to have no chronic cough. Explains had 1 COPD exacerbations in the last one year; 1 hospitalizations for respiratory problems. Denies hx of intubation. He reports taking Symbicort 2 times a week and not using Spiriva. Reports daily use of albuterol nebs or HFA pump. For appx 1 year, he reports using oxygen to assist in breathing efforts. He tells has not followed with pulmonary rehab. He explains has followed with Cardiology yesterday and has upcoming procedures including ECHO.     Currently, he reports smoking 1 cigar a day. Explains quit smoking cigarettes 3 months ago. Discloses having 30-40 pack year hx. HE wears supplemental oxygen with activity and he express aware never to smoke while using oxygen. Tells he follows with smoking cessation at the [facility].     Review of Systems   Constitutional: Negative for fever, chills, weight loss and night sweats.   HENT: Negative for postnasal drip, rhinorrhea, trouble swallowing and congestion.    Respiratory: Positive for shortness of breath. Negative for cough, sputum production, choking, chest tightness, wheezing, dyspnea on extertion and use of rescue inhaler.    Cardiovascular: Negative for chest pain and leg swelling.   Musculoskeletal: Negative for joint swelling.   Skin: Negative for rash.   Gastrointestinal: Negative for acid reflux.   Neurological: Negative for dizziness and light-headedness.   Hematological: Negative for adenopathy.   Psychiatric/Behavioral: The patient is nervous/anxious.        Social History     Tobacco Use    Smoking status: Current Every Day  Smoker     Packs/day: 0.75     Years: 50.00     Pack years: 37.50   Substance Use Topics    Alcohol use: Yes     Comment: 2-3beers/day     Review of patient's allergies indicates:  No Known Allergies  Past Medical History:   Diagnosis Date    COPD (chronic obstructive pulmonary disease)     Coronary artery disease     Hypercholesterolemia     Hypertension      Past Surgical History:   Procedure Laterality Date    COLONOSCOPY  02/2016    Advised repeat in 3 years    CORONARY STENT PLACEMENT  2013     Current Outpatient Medications on File Prior to Visit   Medication Sig    albuterol (PROVENTIL) 2.5 mg /3 mL (0.083 %) nebulizer solution Take 2.5 mg by nebulization every 6 (six) hours as needed for Wheezing or Shortness of Breath. Rescue    albuterol (VENTOLIN HFA) 90 mcg/actuation inhaler Inhale 2 puffs into the lungs every 6 (six) hours as needed for Wheezing. Rescue    aspirin (ECOTRIN) 81 MG EC tablet Take 1 tablet (81 mg total) by mouth once daily.    benztropine (COGENTIN) 0.5 MG tablet Take 0.5 mg by mouth 2 (two) times daily.     bisoprolol (ZEBETA) 5 MG tablet Take 0.5 tablets (2.5 mg total) by mouth once daily.    glipiZIDE (GLUCOTROL) 10 MG tablet 10 mg 2 (two) times daily with meals.     ipratropium (ATROVENT) 0.02 % nebulizer solution Take by nebulization.     isosorbide mononitrate (IMDUR) 30 MG 24 hr tablet Take 1 tablet (30 mg total) by mouth once daily.    lisinopril-hydrochlorothiazide (PRINZIDE,ZESTORETIC) 20-25 mg Tab Take 1 tablet by mouth once daily.    mag hydrox/aluminum hyd/simeth (ANTACID ORAL) Take by mouth.    QUEtiapine (SEROQUEL) 300 MG Tab Take 300 mg by mouth every evening.     risperiDONE (RISPERDAL) 2 MG tablet Take 2 mg by mouth 2 (two) times daily.     rosuvastatin (CRESTOR) 40 MG Tab Take 1 tablet (40 mg total) by mouth every evening.    traZODone (DESYREL) 100 MG tablet Take 100 mg by mouth every evening.    [DISCONTINUED] budesonide-formoterol 160-4.5 mcg  "(SYMBICORT) 160-4.5 mcg/actuation HFAA Inhale 1 puff into the lungs every 12 (twelve) hours. Controller    [DISCONTINUED] tiotropium (SPIRIVA) 18 mcg inhalation capsule Inhale 1 capsule (18 mcg total) into the lungs once daily. Controller     No current facility-administered medications on file prior to visit.      Objective:      Vitals:    12/17/19 1029   BP: 126/68   BP Location: Left arm   Patient Position: Sitting   Pulse: 94   SpO2: 96%  Comment: 2.0   Weight: 81.6 kg (180 lb)   Height: 5' 8" (1.727 m)     Physical Exam   Constitutional: He is oriented to person, place, and time. He appears well-developed and well-nourished. No distress.   HENT:   Head: Normocephalic.   Neck: Normal range of motion. Neck supple.   Cardiovascular: Normal rate, regular rhythm and normal heart sounds.   Pulmonary/Chest: Normal expansion, effort normal and breath sounds normal. No respiratory distress. He has no wheezes. He has no rhonchi.   Using portable oxygen.    Abdominal: Soft. Bowel sounds are normal. There is no hepatosplenomegaly.   Musculoskeletal: Normal range of motion. He exhibits no edema.   Lymphadenopathy: No supraclavicular adenopathy is present.     He has no cervical adenopathy.   Neurological: He is alert and oriented to person, place, and time.   Using walker with ambulation.    Skin: Skin is warm and dry. Nails show no clubbing.   Psychiatric: He has a normal mood and affect. His behavior is normal.   Vitals reviewed.    Personal Diagnostic Review    Pulmonary function tests 12/17/2019: FEV1: 1.25  (46 % predicted), FVC:  2.56 (73 % predicted), FEV1/FVC:  48, DLCO: 10.9 (41 % predicted)    CXR 12/17/2019-  FINDINGS:  The cardiac silhouette is normal in size.  Trachea is midline.  Calcification at the aortic arch.  The hilar and mediastinal contours are unremarkable.    Mild hyperexpansion of bilateral lungs, possibly reflecting underlying COPD.  Band like opacities in the medial lower lung zones, likely " atelectasis versus scarring.  No new consolidation.  Normal appearance of pulmonary vasculature.  No pneumothorax.    Multilevel degenerative changes the spine with a few prominent anterior bridging osteophytes.  No acute fracture.      Assessment:     Problem List Items Addressed This Visit        Pulmonary    Chronic obstructive pulmonary disease - Primary    Overview     Pulmonary function tests 12/17/2019: FEV1: 1.25  (46 % predicted), FVC:  2.56 (73 % predicted), FEV1/FVC:  48, DLCO: 10.9 (41 % predicted)           Current Assessment & Plan     Will need to optimize COPD- not well controlled. Concerns patient having difficulty adhering to inhaler regimen. He is not using daily controller inhaler. GOLD- D MMRC-3. FEV1 1.25 (46% of predicted). Has decreased DLCO-ECHO has been order per cardiology.     Plan:  -Discussed at length on smoking cessation including cigars  -Will obtain previous records from [facility]  -Stop Spiriva (LAMA)  and Symbicort (ICS/LABA) because of difficult inhaler regimen.   -Start Trelegy (ICS/LABA/LAMA) one puff once a day. This medication is easier for compliance.   -Continue supplemental oxygen as prescribed. We discussed the dangers of actively smoking and wearing oxygen. Risk could include burns, combustion or death- he verbalizes understating.   -Will refer to pulmonary rehab. Patient is willing and agrees.   -Will discuss obtaining CT of chest on next visitation after patient has optimized therapy and followed with cardiology testing including Echo.   -Follow up in 2 months.            Relevant Medications    fluticasone-umeclidin-vilanter (TRELEGY ELLIPTA) 100-62.5-25 mcg DsDv    Other Relevant Orders    Ambulatory Referral to Pulmonary Rehab       Other    Tobacco abuse    Current Assessment & Plan     Cigarette Counseling    I have counseled pt for 3-5 minutes regarding cigarette cessation.  This has included the need to stop smoking as well as strategies, including but not  "limited to "cold turkey", CHANTIX (including risks and benefits of deanna drug), nicotine replacement and WELLBUTRIN.             Follow up in 2 months.       "

## 2019-12-17 NOTE — LETTER
December 17, 2019      Fernando Carmona MD  [address]  [address]  [facility] [address]           [facility]  [address]  [place] [address]  Phone: [phone]          Patient: Paresh Senior   MR Number: [id number]   YOB: 1955   Date of Visit: 12/17/2019       Dear Dr. Fernando Carmona:    Thank you for referring Paresh Senior to me for evaluation. Attached you will find relevant portions of my assessment and plan of care.    If you have questions, please do not hesitate to call me. I look forward to following Paresh Senior along with you.    Sincerely,    Nadine Raza, NP    Enclosure  CC:  No Recipients    If you would like to receive this communication electronically, please contact [email or web address] or [phone] to request more information on [employer] access.    For providers and/or their staff who would like to refer a patient to [facility], please contact us through our one-stop-shop provider referral line, [facility], at [phone].    If you feel you have received this communication in error or would no longer like to receive these types of communications, please e-mail [email or web address]

## 2019-12-17 NOTE — ASSESSMENT & PLAN NOTE
Will need to optimize COPD- not well controlled. Concerns patient having difficulty adhering to inhaler regimen. He is not using daily controller inhaler. GOLD- D MMRC-3. FEV1 1.25 (46% of predicted). Has decreased DLCO-ECHO has been order per cardiology.     Plan:  -Discussed at length on smoking cessation including cigars  -Will obtain previous records from [facility] [place]  -Stop Spiriva (LAMA)  and Symbicort (ICS/LABA) because of difficult inhaler regimen.   -Start Trelegy (ICS/LABA/LAMA) one puff once a day. This medication is easier for compliance.   -Continue supplemental oxygen as prescribed. We discussed the dangers of actively smoking and wearing oxygen. Risk could include burns, combustion or death- he verbalizes understating.   -Will refer to pulmonary rehab. Patient is willing and agrees.   -Will discuss obtaining CT of chest on next visitation after patient has optimized therapy and followed with cardiology testing including Echo.   -Follow up in 2 months.

## 2019-12-17 NOTE — ASSESSMENT & PLAN NOTE
"Cigarette Counseling    I have counseled pt for 3-5 minutes regarding cigarette cessation.  This has included the need to stop smoking as well as strategies, including but not limited to "cold turkey", CHANTIX (including risks and benefits of deanna drug), nicotine replacement and WELLBUTRIN.    "

## 2019-12-17 NOTE — PROGRESS NOTES
Subjective:       Patient ID: Paresh Senior is a 64 y.o. male.    Chief Complaint: Emphysema    HPI  Review of Systems    Objective:      Physical Exam  Personal Diagnostic Review  {Pulmonary diagnostics:95986}  No flowsheet data found.      Assessment:       No diagnosis found.    Outpatient Encounter Medications as of 12/17/2019   Medication Sig Dispense Refill    albuterol (PROVENTIL) 2.5 mg /3 mL (0.083 %) nebulizer solution Take 2.5 mg by nebulization every 6 (six) hours as needed for Wheezing or Shortness of Breath. Rescue      albuterol (VENTOLIN HFA) 90 mcg/actuation inhaler Inhale 2 puffs into the lungs every 6 (six) hours as needed for Wheezing. Rescue 18 g 5    aspirin (ECOTRIN) 81 MG EC tablet Take 1 tablet (81 mg total) by mouth once daily. 90 tablet 3    benztropine (COGENTIN) 0.5 MG tablet Take 0.5 mg by mouth 2 (two) times daily.       bisoprolol (ZEBETA) 5 MG tablet Take 0.5 tablets (2.5 mg total) by mouth once daily. 45 tablet 3    budesonide-formoterol 160-4.5 mcg (SYMBICORT) 160-4.5 mcg/actuation HFAA Inhale 1 puff into the lungs every 12 (twelve) hours. Controller      glipiZIDE (GLUCOTROL) 10 MG tablet 10 mg 2 (two) times daily with meals.       ipratropium (ATROVENT) 0.02 % nebulizer solution Take by nebulization.       isosorbide mononitrate (IMDUR) 30 MG 24 hr tablet Take 1 tablet (30 mg total) by mouth once daily. 90 tablet 3    lisinopril-hydrochlorothiazide (PRINZIDE,ZESTORETIC) 20-25 mg Tab Take 1 tablet by mouth once daily. 90 tablet 3    mag hydrox/aluminum hyd/simeth (ANTACID ORAL) Take by mouth.      QUEtiapine (SEROQUEL) 300 MG Tab Take 300 mg by mouth every evening.       risperiDONE (RISPERDAL) 2 MG tablet Take 2 mg by mouth 2 (two) times daily.       rosuvastatin (CRESTOR) 40 MG Tab Take 1 tablet (40 mg total) by mouth every evening. 90 tablet 3    tiotropium (SPIRIVA) 18 mcg inhalation capsule Inhale 1 capsule (18 mcg total) into the lungs once daily. Controller  1 Box 5    traZODone (DESYREL) 100 MG tablet Take 100 mg by mouth every evening.       No facility-administered encounter medications on file as of 12/17/2019.      No orders of the defined types were placed in this encounter.      Plan:       ***

## 2019-12-18 LAB — HBV E AG SERPL QL IA: NORMAL

## 2019-12-18 NOTE — PROGRESS NOTES
Hep B serologies negative. Offer Hep B immunization on f/u visit  HIV NR   Hep C NR  Vit D 27  --Advised OTC Vitamin D3 1000 IU daily  eGFR 52. CKD III range. Plan to repeat in 3 months to determine if this is an acute vs chronic change  , , HDL 35, .8. Continue rosuvastatin 40 mg qhs   PSA 3.2--normal   TSH 2.140 (N)   A1c 6.5--DM-2 at target  CBC wnl     D/W pt in office  MD Reynaldo

## 2019-12-20 DIAGNOSIS — Z12.11 COLON CANCER SCREENING: ICD-10-CM

## 2019-12-31 ENCOUNTER — HOSPITAL ENCOUNTER (OUTPATIENT)
Dept: CARDIOLOGY | Facility: HOSPITAL | Age: 64
Discharge: HOME | End: 2019-12-31
Attending: INTERNAL MEDICINE
Payer: MEDICARE

## 2019-12-31 ENCOUNTER — TELEPHONE (OUTPATIENT)
Dept: CARDIOLOGY | Facility: CLINIC | Age: 64
End: 2019-12-31

## 2019-12-31 DIAGNOSIS — I34.0 MITRAL VALVE INSUFFICIENCY, UNSPECIFIED ETIOLOGY: ICD-10-CM

## 2019-12-31 DIAGNOSIS — I73.9 CLAUDICATION: ICD-10-CM

## 2019-12-31 DIAGNOSIS — I25.10 CORONARY ARTERY DISEASE INVOLVING NATIVE HEART WITHOUT ANGINA PECTORIS, UNSPECIFIED VESSEL OR LESION TYPE: ICD-10-CM

## 2019-12-31 LAB
AORTIC ROOT ANNULUS: 2.34 CM
AORTIC VALVE CUSP SEPERATION: 2.02 CM
ASCENDING AORTA: 2.37 CM
AV INDEX (PROSTH): 0.8
AV MEAN GRADIENT: 3 MMHG
AV PEAK GRADIENT: 6 MMHG
AV VALVE AREA: 2.85 CM2
AV VELOCITY RATIO: 0.84
CV ECHO LV RWT: 0.37 CM
DOP CALC AO PEAK VEL: 1.2 M/S
DOP CALC AO VTI: 24.28 CM
DOP CALC LVOT AREA: 3.6 CM2
DOP CALC LVOT DIAMETER: 2.13 CM
DOP CALC LVOT PEAK VEL: 1.01 M/S
DOP CALC LVOT STROKE VOLUME: 69.31 CM3
DOP CALCLVOT PEAK VEL VTI: 19.46 CM
E WAVE DECELERATION TIME: 135.73 MSEC
E/A RATIO: 1.05
ECHO LV POSTERIOR WALL: 0.97 CM (ref 0.6–1.1)
FRACTIONAL SHORTENING: 10 % (ref 28–44)
INTERVENTRICULAR SEPTUM: 1.18 CM (ref 0.6–1.1)
LA MAJOR: 4.62 CM
LA MINOR: 5.11 CM
LA WIDTH: 3.49 CM
LEFT ATRIUM SIZE: 3.85 CM
LEFT ATRIUM VOLUME: 55.42 CM3
LEFT INTERNAL DIMENSION IN SYSTOLE: 4.74 CM (ref 2.1–4)
LEFT VENTRICLE DIASTOLIC VOLUME: 133.44 ML
LEFT VENTRICLE SYSTOLIC VOLUME: 104.45 ML
LEFT VENTRICULAR INTERNAL DIMENSION IN DIASTOLE: 5.27 CM (ref 3.5–6)
LEFT VENTRICULAR MASS: 218.71 G
MV PEAK A VEL: 0.86 M/S
MV PEAK E VEL: 0.9 M/S
PULM VEIN S/D RATIO: 1.26
PV PEAK D VEL: 0.43 M/S
PV PEAK S VEL: 0.54 M/S
RA MAJOR: 4.18 CM
RA PRESSURE: 3 MMHG
RA WIDTH: 3.29 CM
RIGHT VENTRICULAR END-DIASTOLIC DIMENSION: 2.81 CM
STJ: 3 CM
TRICUSPID ANNULAR PLANE SYSTOLIC EXCURSION: 1.89 CM

## 2019-12-31 PROCEDURE — 93924 LWR XTR VASC STDY BILAT: CPT | Mod: 26,,, | Performed by: INTERNAL MEDICINE

## 2019-12-31 PROCEDURE — 93924 CV US ANKLE BRACHIAL INDICES W STRESS W TOE TRACINGS (CUPID ONLY): ICD-10-PCS | Mod: 26,,, | Performed by: INTERNAL MEDICINE

## 2019-12-31 PROCEDURE — 93306 ECHO (CUPID ONLY): ICD-10-PCS | Mod: 26,,, | Performed by: INTERNAL MEDICINE

## 2019-12-31 PROCEDURE — 93306 TTE W/DOPPLER COMPLETE: CPT

## 2019-12-31 PROCEDURE — 93306 TTE W/DOPPLER COMPLETE: CPT | Mod: 26,,, | Performed by: INTERNAL MEDICINE

## 2019-12-31 PROCEDURE — 93924 LWR XTR VASC STDY BILAT: CPT | Mod: 50

## 2019-12-31 NOTE — TELEPHONE ENCOUNTER
I called patient to let him know that the echo result is ok. No major findings. Patient understood

## 2019-12-31 NOTE — TELEPHONE ENCOUNTER
----- Message from Gibran Thrasher MD sent at 12/31/2019  1:32 PM CST -----  Please let him know that the echo result is ok. No major findings

## 2020-01-07 LAB
ABI POST MINUTES1: 2 MIN
ABI POST MINUTES2: 3 MIN
IMMEDIATE ARM BP: 165 MMHG
IMMEDIATE LEFT ABI: 1.18
IMMEDIATE LEFT TIBIAL: 195 MMHG
IMMEDIATE RIGHT ABI: 1.09
IMMEDIATE RIGHT TIBIAL: 180 MMHG
LEFT ABI: 1.1
LEFT ARM BP: 141 MMHG
LEFT DORSALIS PEDIS: 155 MMHG
LEFT POSTERIOR TIBIAL: 153 MMHG
LEFT TBI: 0.84
LEFT TOE PRESSURE: 118 MMHG
POST1 ARM BP: 165 MMHG
POST1 LEFT ABI: 1.05
POST1 LEFT TIBIAL: 174 MMHG
POST1 RIGHT ABI: 1.06
POST1 RIGHT TIBIAL: 175 MMHG
POST2 ARM BP: 130 MMHG
POST2 LEFT ABI: 1.35
POST2 LEFT TIBIAL: 175 MMHG
POST2 RIGHT ABI: 1.28
POST2 RIGHT TIBIAL: 166 MMHG
RIGHT ABI: 1.09
RIGHT ARM BP: 138 MMHG
RIGHT DORSALIS PEDIS: 154 MMHG
RIGHT POSTERIOR TIBIAL: 144 MMHG
RIGHT TBI: 0.75
RIGHT TOE PRESSURE: 106 MMHG
TREADMILL SPEED: 1 MPH
TREADMILL TIME: 2.3 MIN

## 2020-01-08 ENCOUNTER — TELEPHONE (OUTPATIENT)
Dept: PULMONOLOGY | Facility: CLINIC | Age: 65
End: 2020-01-08

## 2020-01-08 ENCOUNTER — TELEPHONE (OUTPATIENT)
Dept: CARDIOLOGY | Facility: CLINIC | Age: 65
End: 2020-01-08

## 2020-01-08 NOTE — TELEPHONE ENCOUNTER
I called the patient to let him know  that the test on the lower extremities is ok. It shows adequate circulation. Patient understood

## 2020-01-08 NOTE — TELEPHONE ENCOUNTER
----- Message from Gibran Thrasher MD sent at 1/8/2020  7:55 AM CST -----  Please let the patient know that the test on the lower extremities is ok. It shows adequate circulation. Thanks.

## 2020-01-08 NOTE — TELEPHONE ENCOUNTER
----- Message from Mireille Farrar sent at 1/8/2020  8:34 AM CST -----  Contact: Faiza Tom [phone]  States they are waiting on the authorization for patient states they need it before scheduling apt please call back to discuss     Fax number [phone]

## 2020-01-08 NOTE — TELEPHONE ENCOUNTER
A voicemail was left at 9:09am. Faiza was informed that the MNF was completed and [facility] on 12/18 and a confirmation that it was received at 3:33pm, also informed Faiza that I would refax the Medical Necessity Form to [facility]. My name and a call back number was also left for Faiza to return my call at the office.

## 2020-01-08 NOTE — PROGRESS NOTES
Please let the patient know that the test on the lower extremities is ok. It shows adequate circulation. Thanks.

## 2020-01-13 ENCOUNTER — TELEPHONE (OUTPATIENT)
Dept: FAMILY MEDICINE | Facility: CLINIC | Age: 65
End: 2020-01-13

## 2020-01-13 NOTE — TELEPHONE ENCOUNTER
----- Message from Melony Dinh sent at 1/13/2020  3:23 PM CST -----  Contact: Self [phone]  Patient is calling to see if he can be seen this week due to a possible Lung infection and severe cough. Please advice

## 2020-01-13 NOTE — TELEPHONE ENCOUNTER
Called patient in regards to message left. Patient stated that he has been coughing and has a productive cough with green sputum. Patient stated that he can only come to the office on Friday (1/17/2020),due to transportation limitations. Patient stated that it would take 3 days for his transportation to change locations. Patient also stated would rather not go to any other location. Attempted to get patient in to see one of the physicians in office. Referred patient to urgent care. Patient verbalized understanding, and will see urgent care as soon as possible.

## 2020-01-20 ENCOUNTER — OFFICE VISIT (OUTPATIENT)
Dept: FAMILY MEDICINE | Facility: CLINIC | Age: 65
End: 2020-01-20
Payer: MEDICARE

## 2020-01-20 VITALS
OXYGEN SATURATION: 95 % | SYSTOLIC BLOOD PRESSURE: 134 MMHG | WEIGHT: 176.38 LBS | BODY MASS INDEX: 26.73 KG/M2 | DIASTOLIC BLOOD PRESSURE: 78 MMHG | HEIGHT: 68 IN | HEART RATE: 90 BPM

## 2020-01-20 DIAGNOSIS — E55.9 VITAMIN D DEFICIENCY: ICD-10-CM

## 2020-01-20 DIAGNOSIS — I50.9 CONGESTIVE HEART FAILURE, UNSPECIFIED HF CHRONICITY, UNSPECIFIED HEART FAILURE TYPE: ICD-10-CM

## 2020-01-20 DIAGNOSIS — F14.90 CRACK COCAINE USE: ICD-10-CM

## 2020-01-20 DIAGNOSIS — N28.9 RENAL INSUFFICIENCY: ICD-10-CM

## 2020-01-20 DIAGNOSIS — J44.9 CHRONIC OBSTRUCTIVE PULMONARY DISEASE, UNSPECIFIED COPD TYPE: ICD-10-CM

## 2020-01-20 DIAGNOSIS — K21.9 GASTROESOPHAGEAL REFLUX DISEASE WITHOUT ESOPHAGITIS: ICD-10-CM

## 2020-01-20 DIAGNOSIS — I42.9 CARDIOMYOPATHY, UNSPECIFIED TYPE: ICD-10-CM

## 2020-01-20 DIAGNOSIS — Z86.010 HISTORY OF COLON POLYPS: ICD-10-CM

## 2020-01-20 DIAGNOSIS — F20.9 SCHIZOPHRENIA, UNSPECIFIED TYPE: ICD-10-CM

## 2020-01-20 DIAGNOSIS — Z72.0 TOBACCO ABUSE: ICD-10-CM

## 2020-01-20 DIAGNOSIS — I25.10 CORONARY ARTERY DISEASE INVOLVING NATIVE HEART WITHOUT ANGINA PECTORIS, UNSPECIFIED VESSEL OR LESION TYPE: ICD-10-CM

## 2020-01-20 DIAGNOSIS — F12.10 MARIJUANA ABUSE: ICD-10-CM

## 2020-01-20 DIAGNOSIS — Z99.81 ON HOME OXYGEN THERAPY: ICD-10-CM

## 2020-01-20 DIAGNOSIS — E78.2 MIXED HYPERLIPIDEMIA: ICD-10-CM

## 2020-01-20 DIAGNOSIS — E11.65 TYPE 2 DIABETES MELLITUS WITH HYPERGLYCEMIA, WITHOUT LONG-TERM CURRENT USE OF INSULIN: ICD-10-CM

## 2020-01-20 DIAGNOSIS — L30.9 DERMATITIS: Primary | ICD-10-CM

## 2020-01-20 DIAGNOSIS — Z12.11 SCREEN FOR COLON CANCER: ICD-10-CM

## 2020-01-20 DIAGNOSIS — R35.0 URINARY FREQUENCY: ICD-10-CM

## 2020-01-20 DIAGNOSIS — I10 ESSENTIAL HYPERTENSION: ICD-10-CM

## 2020-01-20 PROCEDURE — 3078F DIAST BP <80 MM HG: CPT | Mod: CPTII,S$GLB,, | Performed by: FAMILY MEDICINE

## 2020-01-20 PROCEDURE — G0008 FLU VACCINE (QUAD) GREATER THAN OR EQUAL TO 3YO PRESERVATIVE FREE IM: ICD-10-PCS | Mod: S$GLB,,, | Performed by: FAMILY MEDICINE

## 2020-01-20 PROCEDURE — G0010 HEPATITIS B VACCINE ADULT IM: ICD-10-PCS | Mod: S$GLB,,, | Performed by: FAMILY MEDICINE

## 2020-01-20 PROCEDURE — 99999 PR PBB SHADOW E&M-EST. PATIENT-LVL IV: ICD-10-PCS | Mod: PBBFAC,,, | Performed by: FAMILY MEDICINE

## 2020-01-20 PROCEDURE — G0008 ADMIN INFLUENZA VIRUS VAC: HCPCS | Mod: S$GLB,,, | Performed by: FAMILY MEDICINE

## 2020-01-20 PROCEDURE — 3044F PR MOST RECENT HEMOGLOBIN A1C LEVEL <7.0%: ICD-10-PCS | Mod: CPTII,S$GLB,, | Performed by: FAMILY MEDICINE

## 2020-01-20 PROCEDURE — G0010 ADMIN HEPATITIS B VACCINE: HCPCS | Mod: S$GLB,,, | Performed by: FAMILY MEDICINE

## 2020-01-20 PROCEDURE — 3075F SYST BP GE 130 - 139MM HG: CPT | Mod: CPTII,S$GLB,, | Performed by: FAMILY MEDICINE

## 2020-01-20 PROCEDURE — 90746 HEPATITIS B VACCINE ADULT IM: ICD-10-PCS | Mod: S$GLB,,, | Performed by: FAMILY MEDICINE

## 2020-01-20 PROCEDURE — 3075F PR MOST RECENT SYSTOLIC BLOOD PRESS GE 130-139MM HG: ICD-10-PCS | Mod: CPTII,S$GLB,, | Performed by: FAMILY MEDICINE

## 2020-01-20 PROCEDURE — 90686 IIV4 VACC NO PRSV 0.5 ML IM: CPT | Mod: S$GLB,,, | Performed by: FAMILY MEDICINE

## 2020-01-20 PROCEDURE — 3008F PR BODY MASS INDEX (BMI) DOCUMENTED: ICD-10-PCS | Mod: CPTII,S$GLB,, | Performed by: FAMILY MEDICINE

## 2020-01-20 PROCEDURE — 99214 OFFICE O/P EST MOD 30 MIN: CPT | Mod: 25,S$GLB,, | Performed by: FAMILY MEDICINE

## 2020-01-20 PROCEDURE — 3008F BODY MASS INDEX DOCD: CPT | Mod: CPTII,S$GLB,, | Performed by: FAMILY MEDICINE

## 2020-01-20 PROCEDURE — 90746 HEPB VACCINE 3 DOSE ADULT IM: CPT | Mod: S$GLB,,, | Performed by: FAMILY MEDICINE

## 2020-01-20 PROCEDURE — 99999 PR PBB SHADOW E&M-EST. PATIENT-LVL IV: CPT | Mod: PBBFAC,,, | Performed by: FAMILY MEDICINE

## 2020-01-20 PROCEDURE — 99214 PR OFFICE/OUTPT VISIT, EST, LEVL IV, 30-39 MIN: ICD-10-PCS | Mod: 25,S$GLB,, | Performed by: FAMILY MEDICINE

## 2020-01-20 PROCEDURE — 3078F PR MOST RECENT DIASTOLIC BLOOD PRESSURE < 80 MM HG: ICD-10-PCS | Mod: CPTII,S$GLB,, | Performed by: FAMILY MEDICINE

## 2020-01-20 PROCEDURE — 90686 FLU VACCINE (QUAD) GREATER THAN OR EQUAL TO 3YO PRESERVATIVE FREE IM: ICD-10-PCS | Mod: S$GLB,,, | Performed by: FAMILY MEDICINE

## 2020-01-20 PROCEDURE — 3044F HG A1C LEVEL LT 7.0%: CPT | Mod: CPTII,S$GLB,, | Performed by: FAMILY MEDICINE

## 2020-01-20 RX ORDER — TRIAMCINOLONE ACETONIDE 1 MG/G
OINTMENT TOPICAL 2 TIMES DAILY
Qty: 30 G | Refills: 0 | Status: ON HOLD | OUTPATIENT
Start: 2020-01-20 | End: 2020-08-25 | Stop reason: HOSPADM

## 2020-01-20 RX ORDER — VIT C/E/ZN/COPPR/LUTEIN/ZEAXAN 250MG-90MG
1000 CAPSULE ORAL DAILY
Refills: 0 | COMMUNITY
Start: 2020-01-20

## 2020-01-20 RX ORDER — OMEPRAZOLE 20 MG/1
20 CAPSULE, DELAYED RELEASE ORAL DAILY
COMMUNITY
End: 2020-03-26 | Stop reason: SDUPTHER

## 2020-01-20 RX ORDER — GLIPIZIDE 10 MG/1
10 TABLET ORAL 2 TIMES DAILY WITH MEALS
Qty: 180 TABLET | Refills: 3 | Status: ON HOLD | OUTPATIENT
Start: 2020-01-20 | End: 2020-01-29 | Stop reason: SDUPTHER

## 2020-01-20 RX ORDER — BENZTROPINE MESYLATE 0.5 MG/1
0.5 TABLET ORAL 2 TIMES DAILY
Qty: 180 TABLET | Refills: 0 | Status: SHIPPED | OUTPATIENT
Start: 2020-01-20 | End: 2020-03-26 | Stop reason: SDUPTHER

## 2020-01-20 NOTE — PROGRESS NOTES
Subjective:       Patient ID: Paresh Senior is a 64 y.o. male.    Chief Complaint: Follow-up and Medication Refill (Glipizide, Benztropine, ipratropium Bromide 0.2%, Ventolin HFA 90mcg, Meter and supplies)    63 yo M pt, recently established with me, with PMH significant for CAD, CHF, Cardiomyopathy, COPD on supplemental home O2 at 2L NC, HTN, HLD, DM-2, Schizophrenia,GERD, Tobacco abuse, Marijuana abuse, and Crack abuse He presents for f/u visit and lab review. Since last visit he has been evaluated by Cardiology and pulmonology. He has not been scheduled with psychiatry.     Labs 12/13/2019  Hep B serologies negative.  HIV NR   Hep C NR  Vit D 27  eGFR 52.   , , HDL 35, .8.  PSA 3.2  TSH 2.140 (N)   A1c 6.5  CBC wnl     Acute complaints:  1. Reports pruritic rash to BLEs x several weeks.  Reports some improvement with over-the-counter Neosporin.    2.  Also reports urinary frequency.  Occasionally has weak stream at the start of urination.  No dribbling denies dysuria, hematuria, urinary urgency, abdominal pain, back pain, nausea, fevers, or chills    Review of Systems   Constitutional: Negative for activity change, appetite change, chills, fever and unexpected weight change.   HENT: Negative for congestion, sneezing and sore throat.    Eyes: Negative for redness, itching and visual disturbance.   Respiratory: Negative for cough, shortness of breath and wheezing.    Cardiovascular: Negative for chest pain.   Gastrointestinal: Negative for abdominal pain, constipation, diarrhea, nausea and vomiting.   Genitourinary: Positive for difficulty urinating and frequency. Negative for discharge, dysuria, enuresis, flank pain, hematuria, penile pain, penile swelling, scrotal swelling and urgency.   Skin: Positive for rash.   Neurological: Negative for dizziness, syncope, weakness and headaches.   Psychiatric/Behavioral: Negative for dysphoric mood and suicidal ideas. The patient is not nervous/anxious.   "        Past Medical History:   Diagnosis Date    COPD (chronic obstructive pulmonary disease)     Coronary artery disease     Hypercholesterolemia     Hypertension        Patient Active Problem List   Diagnosis    Marijuana abuse    Tobacco abuse    CAD (coronary artery disease)    HTN (hypertension)    Cardiomyopathy    Congestive heart failure    Centrilobular emphysema    Type 2 diabetes mellitus with hyperglycemia, without long-term current use of insulin    Gastroesophageal reflux disease without esophagitis    Schizophrenia    Mixed hyperlipidemia    Chronic obstructive pulmonary disease    On home oxygen therapy    Crack cocaine use       Past Surgical History:   Procedure Laterality Date    COLONOSCOPY  02/2016    Advised repeat in 3 years    CORONARY STENT PLACEMENT  2013       No family history on file.    Social History     Tobacco Use   Smoking Status Current Every Day Smoker    Packs/day: 0.75    Years: 50.00    Pack years: 37.50       Social History     Social History Narrative    Tobacco Use: Currently smoking 1 cigar every other day. Former cigarette use; initiated at age 6yo, last use 11/2019 (age 63 yo), using 0.5ppd       Objective:        Vitals:    01/20/20 1406   BP: 134/78   Pulse: 90   SpO2: 95%   Weight: 80 kg (176 lb 5.9 oz)   Height: 5' 8" (1.727 m)             Physical Exam   Constitutional: He is oriented to person, place, and time. He appears well-developed and well-nourished. No distress.   Pt without use of supplemental O2 today   HENT:   Head: Normocephalic and atraumatic.   Right Ear: External ear normal.   Left Ear: External ear normal.   Mouth/Throat: Mucous membranes are normal.   Eyes: Conjunctivae and EOM are normal. No scleral icterus.   Neck: Normal range of motion.   Cardiovascular: Normal rate, regular rhythm and normal heart sounds. Exam reveals no gallop and no friction rub.   Pulses:       Dorsalis pedis pulses are 2+ on the right side, and 2+ on " the left side.   No murmur heard.  Pulmonary/Chest: Effort normal and breath sounds normal. No respiratory distress. He has no wheezes. He has no rales.   Musculoskeletal: Normal range of motion. He exhibits no edema, tenderness or deformity.        Right foot: There is normal range of motion and no deformity.        Left foot: There is normal range of motion and no deformity.   Feet:   Right Foot:   Protective Sensation: 10 sites tested. 10 sites sensed.   Skin Integrity: Negative for ulcer, callus or dry skin.   Left Foot:   Protective Sensation: 10 sites tested. 10 sites sensed.   Skin Integrity: Negative for ulcer, callus or dry skin. + mild onychomycosis to all toenails. + patchy dry skin to dorsum of right foot and right tibia.   Neurological: He is alert and oriented to person, place, and time. No cranial nerve deficit. Gait normal.   Skin: Skin is warm and dry. No rash noted. No erythema.   Psychiatric: He has a normal mood and affect. His behavior is normal.     Assessment:       1. Dermatitis    2. Urinary frequency    3. Coronary artery disease involving native heart without angina pectoris, unspecified vessel or lesion type    4. Congestive heart failure, unspecified HF chronicity, unspecified heart failure type    5. Cardiomyopathy, unspecified type    6. Chronic obstructive pulmonary disease, unspecified COPD type    7. On home oxygen therapy    8. Essential hypertension    9. Mixed hyperlipidemia    10. Type 2 diabetes mellitus with hyperglycemia, without long-term current use of insulin    11. Schizophrenia, unspecified type    12. Renal insufficiency    13. Vitamin D deficiency    14. Gastroesophageal reflux disease without esophagitis    15. Tobacco abuse    16. Marijuana abuse    17. Crack cocaine use    18. Screen for colon cancer    19. History of colon polyps        Plan:       Paresh was seen today for follow-up and medication refill.    Diagnoses and all orders for this  visit:    Dermatitis  -     triamcinolone acetonide 0.1% (KENALOG) 0.1 % ointment; Apply topically 2 (two) times daily. for 14 days    Urinary frequency  -     Ambulatory referral to Urology    Coronary artery disease involving native heart without angina pectoris, unspecified vessel or lesion type    Congestive heart failure, unspecified HF chronicity, unspecified heart failure type    Cardiomyopathy, unspecified type    Chronic obstructive pulmonary disease, unspecified COPD type    On home oxygen therapy    Essential hypertension    Mixed hyperlipidemia    Type 2 diabetes mellitus with hyperglycemia, without long-term current use of insulin  -     glipiZIDE (GLUCOTROL) 10 MG tablet; Take 1 tablet (10 mg total) by mouth 2 (two) times daily with meals.  -     blood sugar diagnostic Strp; 1 strip by Misc.(Non-Drug; Combo Route) route 2 (two) times daily.  -     Ambulatory referral to Optometry  -     Foot Exam Performed    Schizophrenia, unspecified type  -     benztropine (COGENTIN) 0.5 MG tablet; Take 1 tablet (0.5 mg total) by mouth 2 (two) times daily.    Renal insufficiency    Vitamin D deficiency  -     cholecalciferol, vitamin D3, (VITAMIN D3) 25 mcg (1,000 unit) capsule; Take 1 capsule (1,000 Units total) by mouth once daily.    Gastroesophageal reflux disease without esophagitis    Tobacco abuse    Marijuana abuse    Crack cocaine use    Screen for colon cancer  -     Case request GI: COLONOSCOPY    History of colon polyps  -     Case request GI: COLONOSCOPY    Other orders  -     (In Office Administered) Hepatitis B Vaccine (Adult) (IM)  -     Influenza - Quadrivalent (PF)    Dermatitis; RLE  --Likely 2/2 dry skin   --Tx with TAC 0.1% ointment BID x 2 weeks     Urinary Frequency  --Concerning for BPH  --Refer to urology     CAD/CHF/Cardiomyopathy  --Med reconciliation on f/u visit   --2D echo 12/31/2019:   · Normal left ventricular systolic function. The estimated ejection fraction is 55%  · Mild eccentric  left ventricular hypertrophy.  · Normal LV diastolic function.  · Mild mitral regurgitation.  · Local segmental wall motion abnormalities.  · Normal right ventricular systolic function.  · Normal central venous pressure (3 mm Hg).  Established care with [facility] 12/16/2019  Advised crestor 40 mg hs;  Continue BB/ACEI  - Lifestyle modification  - Refer to smoking cessation program.      COPD on supplemental home O2 at 2L NC  --[facility]  --CT chest 2/20/2018 showed centrilobular emphysematous changes throughout upper lobe. Planning for repeat CT chest in February 2019 with pulmonology  --Now on Trelegy  -- Referred to pulmonary rehab once again  --Due for pulm f/u 2/2020  --Advised to contact pulm regarding supplement O2 rx     HTN  --BP well-controlled today  --continue lisinopril hydrochlorothiazide 20/20 5 mg daily     HLD  , , HDL 35, .8 12/13/2019  Adherent with rosuvastatin 40 mg hs.     DM-2  Last A1c 6.5 12/13/2019   Adherent with glipizide 10 mg BID    Foot exam performed today 01/20/2020 notable for mild onychomycosis bilaterally, otherwise within normal limits  Optometry referral today 1/20/2020   Flu vaccine today 1/20/2020  Pneumovax-23 administered 7/28/2016  Continue ACEI, Statin, ASA     Schizophrenia  --Referred to psychiatry 12/10/2019. Advised to call to schedule intake/appt  --Continue quetiapine dose 300 mg hs + condition 0.5 mg b.i.d. + risperidone 2 mg b.i.d. + trazodone 100 mg HS    Renal Insufficiency   eGFR 52 12/13/2019    CKD III range. Plan to repeat in 3 months (3/2020) to determine if this is an acute vs chronic change    Vitamin D deficiency   Vit D 27 12/13/2019  --Advised OTC Vitamin D3 1000 IU daily     GERD  --Tx'd with omeprazole 20 mg daily     Tobacco abuse  Pt previously smoking 0.5ppd x 57 years; quit cigarette use 11/2019  Now smoking 1 cigar every other day.  Briefly counseled by pulmonology during  12/17/2019 visit  Discuss referral to tobacco cessation program over subsequent visit(s)      Substance abuse  --Marijuana, Smoking Crack   --reports substance use secondary to chronic bilateral hip pain. Will need to evaluate over subsequent visits     HM   Flu vaccine UTD for 2019/2020 season  Colonoscopy Referral Today 1/20/2020.  Most recent was to 2016; 3 polyps resected.  Advised repeat colonoscopy in 3 years (2019)  Diabetic eye exam due. Optometry referral today 1/20/2020   Evaluated by Dr. Skelton at [facility] on 01/16/2018 for hep B surface antigen positive. All hep B serologies including DNA viral load negative 12/13/2019. Will start Hep B vaccine series today 1/20/2020. Hep B #2 due in 4 weeks (2/20/2020). Hep B#3 due in 5/20/2020     PSA 3.2--normal 12/13/19  TSH 2.140 (N)  12/13/19  HIV NR  12/13/19  Hep C NR  12/13/19  CBC wnl  12/13/19  Needs tdap and shingrix from pharmacy    Follow up in about 4 weeks (around 2/17/2020).

## 2020-01-21 ENCOUNTER — TELEPHONE (OUTPATIENT)
Dept: FAMILY MEDICINE | Facility: CLINIC | Age: 65
End: 2020-01-21

## 2020-01-21 PROBLEM — Z99.81 ON HOME OXYGEN THERAPY: Status: ACTIVE | Noted: 2020-01-21

## 2020-01-21 PROBLEM — F14.90 CRACK COCAINE USE: Status: ACTIVE | Noted: 2020-01-21

## 2020-01-21 NOTE — TELEPHONE ENCOUNTER
Called patient to schedule 40 minute follow up appointment.  Will mail copy of appointment to address on file.

## 2020-01-24 ENCOUNTER — TELEPHONE (OUTPATIENT)
Dept: PULMONOLOGY | Facility: CLINIC | Age: 65
End: 2020-01-24

## 2020-01-24 NOTE — TELEPHONE ENCOUNTER
----- Message from Porsche Hart sent at 1/24/2020  9:29 AM CST -----  Contact: Faiza golden/ [facility]  t el:    [phone]    Caller is asking what is the status of the Authorization for this pt..    Requested it on 12/18.   Pls call.

## 2020-01-24 NOTE — TELEPHONE ENCOUNTER
Spoke with Faiza [facility], I have informed her that on 12/18/19 patient Mr. Parrish MANTILLA was faxed over to [facility] and I've received a confirmation time that it was received. She has verbalize that she understand and will contact [facility]

## 2020-01-27 ENCOUNTER — HOSPITAL ENCOUNTER (INPATIENT)
Facility: HOSPITAL | Age: 65
LOS: 4 days | Discharge: HOME HEALTH | DRG: 639 | End: 2020-01-31
Attending: EMERGENCY MEDICINE | Admitting: INTERNAL MEDICINE
Payer: MEDICARE

## 2020-01-27 ENCOUNTER — PATIENT OUTREACH (OUTPATIENT)
Dept: ADMINISTRATIVE | Facility: OTHER | Age: 65
End: 2020-01-27

## 2020-01-27 DIAGNOSIS — E11.65 TYPE 2 DIABETES MELLITUS WITH HYPERGLYCEMIA, WITHOUT LONG-TERM CURRENT USE OF INSULIN: ICD-10-CM

## 2020-01-27 DIAGNOSIS — R33.9 URINE RETENTION: ICD-10-CM

## 2020-01-27 DIAGNOSIS — M25.562 PAIN IN BOTH KNEES, UNSPECIFIED CHRONICITY: ICD-10-CM

## 2020-01-27 DIAGNOSIS — E16.2 HYPOGLYCEMIA: Primary | ICD-10-CM

## 2020-01-27 DIAGNOSIS — N40.0 BENIGN PROSTATIC HYPERPLASIA, UNSPECIFIED WHETHER LOWER URINARY TRACT SYMPTOMS PRESENT: ICD-10-CM

## 2020-01-27 DIAGNOSIS — M25.561 PAIN IN BOTH KNEES, UNSPECIFIED CHRONICITY: ICD-10-CM

## 2020-01-27 PROBLEM — N18.2 STAGE 2 CHRONIC KIDNEY DISEASE: Status: ACTIVE | Noted: 2020-01-27

## 2020-01-27 LAB
ALBUMIN SERPL BCP-MCNC: 4 G/DL (ref 3.5–5.2)
ALP SERPL-CCNC: 74 U/L (ref 55–135)
ALT SERPL W/O P-5'-P-CCNC: 28 U/L (ref 10–44)
ANION GAP SERPL CALC-SCNC: 10 MMOL/L (ref 8–16)
AST SERPL-CCNC: 51 U/L (ref 10–40)
BACTERIA #/AREA URNS AUTO: NORMAL /HPF
BASOPHILS # BLD AUTO: 0.05 K/UL (ref 0–0.2)
BASOPHILS NFR BLD: 0.3 % (ref 0–1.9)
BILIRUB SERPL-MCNC: 0.4 MG/DL (ref 0.1–1)
BILIRUB UR QL STRIP: NEGATIVE
BUN SERPL-MCNC: 26 MG/DL (ref 8–23)
CALCIUM SERPL-MCNC: 9.6 MG/DL (ref 8.7–10.5)
CHLORIDE SERPL-SCNC: 107 MMOL/L (ref 95–110)
CLARITY UR REFRACT.AUTO: CLEAR
CO2 SERPL-SCNC: 22 MMOL/L (ref 23–29)
COLOR UR AUTO: YELLOW
CREAT SERPL-MCNC: 1.7 MG/DL (ref 0.5–1.4)
DIFFERENTIAL METHOD: ABNORMAL
EOSINOPHIL # BLD AUTO: 0.2 K/UL (ref 0–0.5)
EOSINOPHIL NFR BLD: 1.7 % (ref 0–8)
ERYTHROCYTE [DISTWIDTH] IN BLOOD BY AUTOMATED COUNT: 12.8 % (ref 11.5–14.5)
EST. GFR  (AFRICAN AMERICAN): 48.2 ML/MIN/1.73 M^2
EST. GFR  (NON AFRICAN AMERICAN): 41.7 ML/MIN/1.73 M^2
ESTIMATED AVG GLUCOSE: 140 MG/DL (ref 68–131)
GLUCOSE SERPL-MCNC: 88 MG/DL (ref 70–110)
GLUCOSE UR QL STRIP: NEGATIVE
HBA1C MFR BLD HPLC: 6.5 % (ref 4–5.6)
HCT VFR BLD AUTO: 43.1 % (ref 40–54)
HGB BLD-MCNC: 14.3 G/DL (ref 14–18)
HGB UR QL STRIP: ABNORMAL
HYALINE CASTS UR QL AUTO: 1 /LPF
IMM GRANULOCYTES # BLD AUTO: 0.04 K/UL (ref 0–0.04)
IMM GRANULOCYTES NFR BLD AUTO: 0.3 % (ref 0–0.5)
KETONES UR QL STRIP: NEGATIVE
LEUKOCYTE ESTERASE UR QL STRIP: NEGATIVE
LYMPHOCYTES # BLD AUTO: 1.3 K/UL (ref 1–4.8)
LYMPHOCYTES NFR BLD: 9.4 % (ref 18–48)
MAGNESIUM SERPL-MCNC: 2.4 MG/DL (ref 1.6–2.6)
MCH RBC QN AUTO: 30.5 PG (ref 27–31)
MCHC RBC AUTO-ENTMCNC: 33.2 G/DL (ref 32–36)
MCV RBC AUTO: 92 FL (ref 82–98)
MICROSCOPIC COMMENT: NORMAL
MONOCYTES # BLD AUTO: 1 K/UL (ref 0.3–1)
MONOCYTES NFR BLD: 7 % (ref 4–15)
NEUTROPHILS # BLD AUTO: 11.6 K/UL (ref 1.8–7.7)
NEUTROPHILS NFR BLD: 81.3 % (ref 38–73)
NITRITE UR QL STRIP: NEGATIVE
NRBC BLD-RTO: 0 /100 WBC
PH UR STRIP: 5 [PH] (ref 5–8)
PLATELET # BLD AUTO: 273 K/UL (ref 150–350)
PMV BLD AUTO: 10 FL (ref 9.2–12.9)
POCT GLUCOSE: 112 MG/DL (ref 70–110)
POCT GLUCOSE: 121 MG/DL (ref 70–110)
POCT GLUCOSE: 180 MG/DL (ref 70–110)
POCT GLUCOSE: 41 MG/DL (ref 70–110)
POCT GLUCOSE: 41 MG/DL (ref 70–110)
POCT GLUCOSE: 59 MG/DL (ref 70–110)
POCT GLUCOSE: 59 MG/DL (ref 70–110)
POCT GLUCOSE: 62 MG/DL (ref 70–110)
POCT GLUCOSE: 78 MG/DL (ref 70–110)
POCT GLUCOSE: 92 MG/DL (ref 70–110)
POTASSIUM SERPL-SCNC: 3.6 MMOL/L (ref 3.5–5.1)
PROT SERPL-MCNC: 7.5 G/DL (ref 6–8.4)
PROT UR QL STRIP: ABNORMAL
RBC # BLD AUTO: 4.69 M/UL (ref 4.6–6.2)
RBC #/AREA URNS AUTO: 2 /HPF (ref 0–4)
SODIUM SERPL-SCNC: 139 MMOL/L (ref 136–145)
SP GR UR STRIP: 1.01 (ref 1–1.03)
URN SPEC COLLECT METH UR: ABNORMAL
WBC # BLD AUTO: 14.3 K/UL (ref 3.9–12.7)
WBC #/AREA URNS AUTO: 1 /HPF (ref 0–5)

## 2020-01-27 PROCEDURE — 85025 COMPLETE CBC W/AUTO DIFF WBC: CPT

## 2020-01-27 PROCEDURE — 83036 HEMOGLOBIN GLYCOSYLATED A1C: CPT

## 2020-01-27 PROCEDURE — 82962 GLUCOSE BLOOD TEST: CPT

## 2020-01-27 PROCEDURE — 96365 THER/PROPH/DIAG IV INF INIT: CPT

## 2020-01-27 PROCEDURE — 94761 N-INVAS EAR/PLS OXIMETRY MLT: CPT

## 2020-01-27 PROCEDURE — 25000003 PHARM REV CODE 250: Performed by: INTERNAL MEDICINE

## 2020-01-27 PROCEDURE — 80053 COMPREHEN METABOLIC PANEL: CPT

## 2020-01-27 PROCEDURE — 99223 1ST HOSP IP/OBS HIGH 75: CPT | Mod: ,,, | Performed by: INTERNAL MEDICINE

## 2020-01-27 PROCEDURE — 99285 PR EMERGENCY DEPT VISIT,LEVEL V: ICD-10-PCS | Mod: ,,, | Performed by: EMERGENCY MEDICINE

## 2020-01-27 PROCEDURE — 94640 AIRWAY INHALATION TREATMENT: CPT

## 2020-01-27 PROCEDURE — 27000221 HC OXYGEN, UP TO 24 HOURS

## 2020-01-27 PROCEDURE — 99223 PR INITIAL HOSPITAL CARE,LEVL III: ICD-10-PCS | Mod: ,,, | Performed by: INTERNAL MEDICINE

## 2020-01-27 PROCEDURE — 83735 ASSAY OF MAGNESIUM: CPT

## 2020-01-27 PROCEDURE — 99285 EMERGENCY DEPT VISIT HI MDM: CPT | Mod: 25

## 2020-01-27 PROCEDURE — 25000242 PHARM REV CODE 250 ALT 637 W/ HCPCS: Performed by: INTERNAL MEDICINE

## 2020-01-27 PROCEDURE — 81001 URINALYSIS AUTO W/SCOPE: CPT

## 2020-01-27 PROCEDURE — 11000001 HC ACUTE MED/SURG PRIVATE ROOM

## 2020-01-27 PROCEDURE — 25000003 PHARM REV CODE 250: Performed by: PHYSICIAN ASSISTANT

## 2020-01-27 PROCEDURE — 99285 EMERGENCY DEPT VISIT HI MDM: CPT | Mod: ,,, | Performed by: EMERGENCY MEDICINE

## 2020-01-27 RX ORDER — DEXTROSE MONOHYDRATE 100 MG/ML
1000 INJECTION, SOLUTION INTRAVENOUS
Status: COMPLETED | OUTPATIENT
Start: 2020-01-27 | End: 2020-01-27

## 2020-01-27 RX ORDER — IBUPROFEN 200 MG
24 TABLET ORAL
Status: DISCONTINUED | OUTPATIENT
Start: 2020-01-27 | End: 2020-01-28

## 2020-01-27 RX ORDER — GLUCAGON 1 MG
1 KIT INJECTION
Status: DISCONTINUED | OUTPATIENT
Start: 2020-01-27 | End: 2020-01-28

## 2020-01-27 RX ORDER — ROSUVASTATIN CALCIUM 20 MG/1
40 TABLET, COATED ORAL NIGHTLY
Status: DISCONTINUED | OUTPATIENT
Start: 2020-01-27 | End: 2020-01-31 | Stop reason: HOSPADM

## 2020-01-27 RX ORDER — BENZTROPINE MESYLATE 0.5 MG/1
0.5 TABLET ORAL 2 TIMES DAILY
Status: DISCONTINUED | OUTPATIENT
Start: 2020-01-27 | End: 2020-01-31 | Stop reason: HOSPADM

## 2020-01-27 RX ORDER — PANTOPRAZOLE SODIUM 40 MG/1
40 TABLET, DELAYED RELEASE ORAL DAILY
Status: DISCONTINUED | OUTPATIENT
Start: 2020-01-28 | End: 2020-01-31 | Stop reason: HOSPADM

## 2020-01-27 RX ORDER — ONDANSETRON 4 MG/1
4 TABLET, ORALLY DISINTEGRATING ORAL EVERY 6 HOURS PRN
Status: DISCONTINUED | OUTPATIENT
Start: 2020-01-27 | End: 2020-01-31 | Stop reason: HOSPADM

## 2020-01-27 RX ORDER — TIOTROPIUM BROMIDE 18 UG/1
1 CAPSULE ORAL; RESPIRATORY (INHALATION) DAILY
Status: DISCONTINUED | OUTPATIENT
Start: 2020-01-27 | End: 2020-01-31 | Stop reason: HOSPADM

## 2020-01-27 RX ORDER — IBUPROFEN 200 MG
16 TABLET ORAL
Status: DISCONTINUED | OUTPATIENT
Start: 2020-01-27 | End: 2020-01-28

## 2020-01-27 RX ORDER — SODIUM CHLORIDE 0.9 % (FLUSH) 0.9 %
10 SYRINGE (ML) INJECTION
Status: DISCONTINUED | OUTPATIENT
Start: 2020-01-27 | End: 2020-01-31 | Stop reason: HOSPADM

## 2020-01-27 RX ORDER — ISOSORBIDE MONONITRATE 30 MG/1
30 TABLET, EXTENDED RELEASE ORAL DAILY
Status: DISCONTINUED | OUTPATIENT
Start: 2020-01-28 | End: 2020-01-31 | Stop reason: HOSPADM

## 2020-01-27 RX ORDER — ACETAMINOPHEN 325 MG/1
650 TABLET ORAL EVERY 8 HOURS PRN
Status: DISCONTINUED | OUTPATIENT
Start: 2020-01-27 | End: 2020-01-27

## 2020-01-27 RX ORDER — IPRATROPIUM BROMIDE AND ALBUTEROL SULFATE 2.5; .5 MG/3ML; MG/3ML
3 SOLUTION RESPIRATORY (INHALATION)
Status: DISCONTINUED | OUTPATIENT
Start: 2020-01-27 | End: 2020-01-31 | Stop reason: HOSPADM

## 2020-01-27 RX ORDER — FLUTICASONE FUROATE AND VILANTEROL 100; 25 UG/1; UG/1
1 POWDER RESPIRATORY (INHALATION) DAILY
Status: DISCONTINUED | OUTPATIENT
Start: 2020-01-27 | End: 2020-01-31 | Stop reason: HOSPADM

## 2020-01-27 RX ORDER — ALBUTEROL SULFATE 90 UG/1
2 AEROSOL, METERED RESPIRATORY (INHALATION) EVERY 6 HOURS PRN
Status: DISCONTINUED | OUTPATIENT
Start: 2020-01-27 | End: 2020-01-27

## 2020-01-27 RX ORDER — ASPIRIN 81 MG/1
81 TABLET ORAL DAILY
Status: DISCONTINUED | OUTPATIENT
Start: 2020-01-28 | End: 2020-01-31 | Stop reason: HOSPADM

## 2020-01-27 RX ORDER — ACETAMINOPHEN 500 MG
500 TABLET ORAL EVERY 6 HOURS PRN
Status: DISCONTINUED | OUTPATIENT
Start: 2020-01-27 | End: 2020-01-31 | Stop reason: HOSPADM

## 2020-01-27 RX ORDER — IBUPROFEN 200 MG
16 TABLET ORAL
Status: COMPLETED | OUTPATIENT
Start: 2020-01-27 | End: 2020-01-27

## 2020-01-27 RX ORDER — DEXTROSE MONOHYDRATE 100 MG/ML
INJECTION, SOLUTION INTRAVENOUS CONTINUOUS
Status: DISCONTINUED | OUTPATIENT
Start: 2020-01-27 | End: 2020-01-28

## 2020-01-27 RX ORDER — AMOXICILLIN 250 MG
1 CAPSULE ORAL 2 TIMES DAILY
Status: DISCONTINUED | OUTPATIENT
Start: 2020-01-27 | End: 2020-01-31 | Stop reason: HOSPADM

## 2020-01-27 RX ORDER — LISINOPRIL AND HYDROCHLOROTHIAZIDE 10; 12.5 MG/1; MG/1
2 TABLET ORAL DAILY
Status: DISCONTINUED | OUTPATIENT
Start: 2020-01-27 | End: 2020-01-29

## 2020-01-27 RX ORDER — RISPERIDONE 1 MG/1
2 TABLET ORAL 2 TIMES DAILY
Status: DISCONTINUED | OUTPATIENT
Start: 2020-01-27 | End: 2020-01-31 | Stop reason: HOSPADM

## 2020-01-27 RX ORDER — SODIUM CHLORIDE 0.9 % (FLUSH) 0.9 %
5 SYRINGE (ML) INJECTION
Status: DISCONTINUED | OUTPATIENT
Start: 2020-01-27 | End: 2020-01-31 | Stop reason: HOSPADM

## 2020-01-27 RX ORDER — LISINOPRIL AND HYDROCHLOROTHIAZIDE 10; 12.5 MG/1; MG/1
2 TABLET ORAL DAILY
Status: DISCONTINUED | OUTPATIENT
Start: 2020-01-28 | End: 2020-01-27

## 2020-01-27 RX ADMIN — Medication 16 G: at 06:01

## 2020-01-27 RX ADMIN — Medication 16 G: at 03:01

## 2020-01-27 RX ADMIN — ROSUVASTATIN CALCIUM 40 MG: 20 TABLET, FILM COATED ORAL at 10:01

## 2020-01-27 RX ADMIN — DEXTROSE: 10 SOLUTION INTRAVENOUS at 05:01

## 2020-01-27 RX ADMIN — DEXTROSE 250 ML: 10 SOLUTION INTRAVENOUS at 02:01

## 2020-01-27 RX ADMIN — IPRATROPIUM BROMIDE AND ALBUTEROL SULFATE 3 ML: .5; 3 SOLUTION RESPIRATORY (INHALATION) at 06:01

## 2020-01-27 RX ADMIN — BENZTROPINE MESYLATE 0.5 MG: 0.5 TABLET ORAL at 10:01

## 2020-01-27 RX ADMIN — LISINOPRIL AND HYDROCHLOROTHIAZIDE 2 TABLET: 12.5; 1 TABLET ORAL at 10:01

## 2020-01-27 RX ADMIN — SENNOSIDES AND DOCUSATE SODIUM 1 TABLET: 8.6; 5 TABLET ORAL at 10:01

## 2020-01-27 RX ADMIN — RISPERIDONE 2 MG: 1 TABLET ORAL at 10:01

## 2020-01-27 RX ADMIN — DEXTROSE 1000 ML: 10 SOLUTION INTRAVENOUS at 02:01

## 2020-01-27 NOTE — ED NOTES
Pt sitting up eating food, Nannette, PA aware pt cbg 41, pt awake and talking able to eat fine. Will let pt eat and recheck cbg. Call light within reach will continue to monitor.

## 2020-01-27 NOTE — PROGRESS NOTES
Care Everywhere: updated  Immunization: updated  Health Maintenance: updated  Media Review: reviewed for possible outside colonoscopy report  Legacy Review: n/a  Order placed: n/a  Upcoming appts:optometry [facility] 1/28/2020

## 2020-01-27 NOTE — ED PROVIDER NOTES
Encounter Date: 1/27/2020       History     Chief Complaint   Patient presents with    Hypoglycemia     CBG 40 on EMS arrival amp of D50 given repeat      64-year-old male with a history of diabetes and COPD presents after feeling off this morning at home.  His mother called EMS and they found him to have a low blood glucose.  He was given IV glucose and improved immediately.  He feels better now.  Patient states he took his glipizide last night and this morning, but did not eat.  This has happened to him previously.  Associated dysuria.  Patient denies nausea, vomiting, diarrhea, fever, cough, shortness of breath, chest pain, or abdominal pain.  A ten point review of systems was completed and is negative except as documented above.  Patient denies any other acute medical complaint.  The patients available PMH, PSH, Social History, medications, allergies, and triage vital signs were reviewed just prior to their medical evaluation.        Review of patient's allergies indicates:  No Known Allergies  Past Medical History:   Diagnosis Date    COPD (chronic obstructive pulmonary disease)     Coronary artery disease     Hypercholesterolemia     Hypertension      Past Surgical History:   Procedure Laterality Date    COLONOSCOPY  02/2016    Advised repeat in 3 years    CORONARY STENT PLACEMENT  2013     No family history on file.  Social History     Tobacco Use    Smoking status: Current Every Day Smoker     Packs/day: 0.75     Years: 50.00     Pack years: 37.50   Substance Use Topics    Alcohol use: Yes     Comment: 2-3beers/day    Drug use: Yes     Comment: 3-4 marijuana joints per day     Review of Systems   Constitutional: Negative for fever.   HENT: Negative for sore throat.    Eyes: Negative for visual disturbance.   Respiratory: Negative for cough and shortness of breath.    Cardiovascular: Negative for chest pain.   Gastrointestinal: Negative for abdominal pain, diarrhea, nausea and vomiting.    Genitourinary: Positive for dysuria.   Musculoskeletal: Negative for neck pain.   Skin: Negative for rash and wound.   Allergic/Immunologic: Negative for immunocompromised state.   Neurological: Negative for syncope.   Psychiatric/Behavioral: Positive for confusion.       Physical Exam     Initial Vitals [01/27/20 1009]   BP Pulse Resp Temp SpO2   (!) 176/96 95 18 98.8 °F (37.1 °C) 99 %      MAP       --         Physical Exam    Nursing note and vitals reviewed.  Constitutional: He appears well-developed and well-nourished. He is not diaphoretic. No distress.   HENT:   Head: Normocephalic and atraumatic.   Nose: Nose normal.   Eyes: Conjunctivae are normal. Right eye exhibits no discharge. Left eye exhibits no discharge.   Neck: Normal range of motion. Neck supple.   Cardiovascular: Normal rate, regular rhythm and normal heart sounds. Exam reveals no gallop and no friction rub.    No murmur heard.  Pulmonary/Chest: Breath sounds normal. No respiratory distress. He has no wheezes. He has no rhonchi. He has no rales.   Abdominal: Soft. He exhibits no distension. There is no tenderness. There is no rebound and no guarding.   Musculoskeletal: Normal range of motion. He exhibits no edema or tenderness.   Neurological: He is alert and oriented to person, place, and time. GCS score is 15. GCS eye subscore is 4. GCS verbal subscore is 5. GCS motor subscore is 6.   Skin: Skin is warm and dry. No rash noted. No erythema.   Psychiatric: He has a normal mood and affect. His behavior is normal. Judgment and thought content normal.         ED Course   Procedures  Labs Reviewed   CBC W/ AUTO DIFFERENTIAL - Abnormal; Notable for the following components:       Result Value    WBC 14.30 (*)     Gran # (ANC) 11.6 (*)     Gran% 81.3 (*)     Lymph% 9.4 (*)     All other components within normal limits   COMPREHENSIVE METABOLIC PANEL - Abnormal; Notable for the following components:    CO2 22 (*)     BUN, Bld 26 (*)     Creatinine 1.7  (*)     AST 51 (*)     eGFR if  48.2 (*)     eGFR if non  41.7 (*)     All other components within normal limits   URINALYSIS, REFLEX TO URINE CULTURE - Abnormal; Notable for the following components:    Protein, UA 1+ (*)     Occult Blood UA 1+ (*)     All other components within normal limits    Narrative:     Preferred Collection Type->Urine, Clean Catch   MAGNESIUM   URINALYSIS MICROSCOPIC    Narrative:     Preferred Collection Type->Urine, Clean Catch   POCT GLUCOSE MONITORING CONTINUOUS          Imaging Results    None          Medical Decision Making:   History:   Old Medical Records: I decided to obtain old medical records.  Clinical Tests:   Lab Tests: Ordered and Reviewed  ED Management:  64-year-old male with diabetes presents with hypoglycemia.  Vitals with hypertension.  Physical exam benign.  Labs unremarkable.  Serial glucose checks in the ED with levels of 88 and 92.  He tolerated meal.  Given long acting oral hypoglycemic will admit to ED OU for at least 8 hours of glucose monitoring.  Treat with IV dextrose if he drops below 70.  Patient should be given a full meal.  He may be discharged if no further hypoglycemic episodes occur.  Did bedside teaching.  All questions answered.  Patient acknowledges understanding.  Other:   I have discussed this case with another health care provider.       <> Summary of the Discussion: ED OU PA                                 Clinical Impression:   Final diagnoses:  [E16.2] Hypoglycemia (Primary)    Disposition:   Disposition: Placed in Observation  Condition: Stable      Level of Complexity:  High, level 5.               Pawan Cedeño MD  01/27/20 2345

## 2020-01-27 NOTE — ED NOTES
Respiratory Therapy called and notified of nebulizer treatment ordered, pt is not short of breath right now.

## 2020-01-27 NOTE — ED NOTES
Pt to ED observation 2 for continued monitoring, pt meal tray ordered. Provider at bedside. Call light within reach.

## 2020-01-27 NOTE — ED TRIAGE NOTES
Pt brought in [facility]  For  Hypoglycemia on ems arrival pt cbg was 40. Pt denies eating this morning. Pt received amp d50 prior to arrival.

## 2020-01-27 NOTE — H&P
ED Observation Unit  History and Physical      I assumed care of this patient from the Main ED at onset of observation time, 12:12p on 01/27/2020.       History of Present Illness:    64-year-old male with a history of diabetes and COPD presents after feeling off this morning at home.  His mother called EMS and they found him to have a low blood glucose of 40.  He was given IV glucose and improved immediately.  He feels better now.  Patient states he took his glipizide last night and this morning, but did not eat.  This has happened to him previously.  Associated dysuria.  Patient denies nausea, vomiting, diarrhea, fever, cough, shortness of breath, chest pain, or abdominal pain.      I reviewed the ED Provider Note dated 1/27/20 prior to my evaluation of this patient.  I reviewed all labs and imaging performed in the Main ED, prior to patient being placed in Observation. Patient was placed in the ED Observation Unit for Hypoglycemia.  Plan for q1hr glucose checks.    PMHx   Past Medical History:   Diagnosis Date    COPD (chronic obstructive pulmonary disease)     Coronary artery disease     Hypercholesterolemia     Hypertension       Past Surgical History:   Procedure Laterality Date    COLONOSCOPY  02/2016    Advised repeat in 3 years    CORONARY STENT PLACEMENT  2013        Family Hx   Family History   Problem Relation Age of Onset    No Known Problems Mother     Mental illness Brother     Diabetes Grandchild         Social Hx   Social History     Socioeconomic History    Marital status: Single     Spouse name: Not on file    Number of children: Not on file    Years of education: Not on file    Highest education level: Not on file   Occupational History    Not on file   Social Needs    Financial resource strain: Not on file    Food insecurity:     Worry: Not on file     Inability: Not on file    Transportation needs:     Medical: Not on file     Non-medical: Not on file   Tobacco Use    Smoking  status: Current Every Day Smoker     Packs/day: 0.75     Years: 50.00     Pack years: 37.50   Substance and Sexual Activity    Alcohol use: Yes     Comment: 2-3beers/day    Drug use: Yes     Comment: 3-4 marijuana joints per day    Sexual activity: Not on file   Lifestyle    Physical activity:     Days per week: Not on file     Minutes per session: Not on file    Stress: Not on file   Relationships    Social connections:     Talks on phone: Not on file     Gets together: Not on file     Attends Buddhist service: Not on file     Active member of club or organization: Not on file     Attends meetings of clubs or organizations: Not on file     Relationship status: Not on file   Other Topics Concern    Not on file   Social History Narrative    Tobacco Use: Currently smoking 1 cigar every other day. Former cigarette use; initiated at age 8yo, last use 11/2019 (age 65 yo), using 0.5ppd        Vital Signs   Vitals:    01/27/20 1054 01/27/20 1154 01/27/20 1219 01/27/20 1223   BP: (!) 185/88 (!) 186/93  (!) 179/85   Pulse: 96 84 84 90   Resp:       Temp:       TempSrc:       SpO2: 95% 98% 98% 98%        Review of Systems  Review of Systems   Constitutional: Negative for chills, fever and malaise/fatigue.   HENT: Negative for sore throat.    Respiratory: Negative for cough and shortness of breath.    Cardiovascular: Negative for chest pain.   Gastrointestinal: Negative for abdominal pain, constipation, diarrhea, nausea and vomiting.   Genitourinary: Positive for frequency.   Musculoskeletal: Negative for myalgias.   Neurological: Negative for headaches.        +altered mental status (resolved)   Psychiatric/Behavioral: The patient is not nervous/anxious.        Physical Exam  Physical Exam   Constitutional: He is oriented to person, place, and time and well-developed, well-nourished, and in no distress. No distress.   HENT:   Head: Normocephalic and atraumatic.   Neck: Normal range of motion. Neck supple.    Cardiovascular: Normal rate and regular rhythm. Exam reveals no gallop and no friction rub.   No murmur heard.  Pulmonary/Chest: Effort normal and breath sounds normal. No respiratory distress. He has no wheezes. He has no rales.   Abdominal: Soft. Bowel sounds are normal. He exhibits no distension. There is no tenderness. There is no rebound and no guarding.   Musculoskeletal: Normal range of motion.   Neurological: He is alert and oriented to person, place, and time. GCS score is 15.   Skin: Skin is warm and dry. He is not diaphoretic.   Psychiatric: Affect normal.   Nursing note and vitals reviewed.      Medications:   Scheduled Meds:   [START ON 1/28/2020] aspirin  81 mg Oral Daily    [START ON 1/28/2020] bisoprolol  2.5 mg Oral Daily    dextrose 10 % in water (D10W)  1,000 mL Intravenous ED 1 Time    [START ON 1/28/2020] isosorbide mononitrate  30 mg Oral Daily    [START ON 1/28/2020] lisinopril-hydrochlorothiazide  2 tablet Oral Daily    risperiDONE  2 mg Oral BID    rosuvastatin  40 mg Oral QHS     Continuous Infusions:  PRN Meds:.      Assessment/Plan:  65 y/o M with history of DM, HTN, CAD, COPD placed in the EDOU for hypoglycemia. Took his glipizide last night and this morning, did not eat. Became altered this morning, was noted to be hypoglycemic. Given amp D50 by EMS, mental status returned to baseline. While in the ED he was stable glucose was normal. Placed in the EDOU for glucose monitoring.    UA with no evidence of infection.  Leukocytosis noted with WBC 14.30. Cr 1.7 (was 1.6 when last checked). Mag normal.     Upon arrival to the EDOU Glucose 41. He was awake and alert. Given lunch, crackers, juice.    Repeat glucose 61.     Glucose repeated 30minutes later and was 41 again. Given D10 bolus, glucose tablets. Will start on D10 drip for recurrent hypoglycemia.     Will upgrade to inpatient on  service.     Case was discussed with the ED provider, Dr Cedeño and [facility].

## 2020-01-27 NOTE — ED NOTES
Pt placed on cardiac, bp and o2 monitor.    LOC: The patient is awake, alert, and aware of environment. The patient is oriented x 3 and speaking appropriately.   APPEARANCE: No acute distress noted.   PSYCHOSOCIAL: Patient is calm and cooperative.   SKIN: The skin is warm, dry.   RESPIRATORY: Airway is open and patent. Bilateral chest rise and fall. Respirations are spontaneous, even and unlabored. Normal effort and rate noted. No accessory muscle use noted.   CARDIAC: Patient has a normal rate and rhythm. Denies chest pain or SOB.   ABDOMEN: Soft and non tender to palpation. No distention noted.   URINARY:  Voids independently.   NEUROLOGIC: Eyes open spontaneously. Speech clear. Tolerating saliva secretions well. Able to follow commands, demonstrating ability to actively and appropriately communicate within context of current conversation. Symmetrical facial muscles. Moving all extremities well. Movement is purposeful.   MUSCULOSKELETAL: No obvious deformities noted.

## 2020-01-28 LAB
ALBUMIN SERPL BCP-MCNC: 3.7 G/DL (ref 3.5–5.2)
ANION GAP SERPL CALC-SCNC: 9 MMOL/L (ref 8–16)
BASOPHILS # BLD AUTO: 0.06 K/UL (ref 0–0.2)
BASOPHILS NFR BLD: 0.4 % (ref 0–1.9)
BUN SERPL-MCNC: 27 MG/DL (ref 8–23)
CALCIUM SERPL-MCNC: 9 MG/DL (ref 8.7–10.5)
CHLORIDE SERPL-SCNC: 103 MMOL/L (ref 95–110)
CO2 SERPL-SCNC: 25 MMOL/L (ref 23–29)
CREAT SERPL-MCNC: 1.7 MG/DL (ref 0.5–1.4)
DIFFERENTIAL METHOD: ABNORMAL
EOSINOPHIL # BLD AUTO: 0.5 K/UL (ref 0–0.5)
EOSINOPHIL NFR BLD: 3.7 % (ref 0–8)
ERYTHROCYTE [DISTWIDTH] IN BLOOD BY AUTOMATED COUNT: 12.7 % (ref 11.5–14.5)
EST. GFR  (AFRICAN AMERICAN): 48.2 ML/MIN/1.73 M^2
EST. GFR  (NON AFRICAN AMERICAN): 41.7 ML/MIN/1.73 M^2
GLUCOSE SERPL-MCNC: 170 MG/DL (ref 70–110)
HCT VFR BLD AUTO: 43.7 % (ref 40–54)
HGB BLD-MCNC: 15.1 G/DL (ref 14–18)
IMM GRANULOCYTES # BLD AUTO: 0.06 K/UL (ref 0–0.04)
IMM GRANULOCYTES NFR BLD AUTO: 0.4 % (ref 0–0.5)
LYMPHOCYTES # BLD AUTO: 2.6 K/UL (ref 1–4.8)
LYMPHOCYTES NFR BLD: 18.1 % (ref 18–48)
MAGNESIUM SERPL-MCNC: 2.3 MG/DL (ref 1.6–2.6)
MCH RBC QN AUTO: 31.3 PG (ref 27–31)
MCHC RBC AUTO-ENTMCNC: 34.6 G/DL (ref 32–36)
MCV RBC AUTO: 91 FL (ref 82–98)
MONOCYTES # BLD AUTO: 1.4 K/UL (ref 0.3–1)
MONOCYTES NFR BLD: 9.3 % (ref 4–15)
NEUTROPHILS # BLD AUTO: 9.9 K/UL (ref 1.8–7.7)
NEUTROPHILS NFR BLD: 68.1 % (ref 38–73)
NRBC BLD-RTO: 0 /100 WBC
PHOSPHATE SERPL-MCNC: 3.1 MG/DL (ref 2.7–4.5)
PLATELET # BLD AUTO: 281 K/UL (ref 150–350)
PMV BLD AUTO: 9.7 FL (ref 9.2–12.9)
POCT GLUCOSE: 188 MG/DL (ref 70–110)
POCT GLUCOSE: 208 MG/DL (ref 70–110)
POCT GLUCOSE: 219 MG/DL (ref 70–110)
POCT GLUCOSE: 81 MG/DL (ref 70–110)
POTASSIUM SERPL-SCNC: 4.1 MMOL/L (ref 3.5–5.1)
RBC # BLD AUTO: 4.83 M/UL (ref 4.6–6.2)
SODIUM SERPL-SCNC: 137 MMOL/L (ref 136–145)
WBC # BLD AUTO: 14.55 K/UL (ref 3.9–12.7)

## 2020-01-28 PROCEDURE — 85025 COMPLETE CBC W/AUTO DIFF WBC: CPT

## 2020-01-28 PROCEDURE — 11000001 HC ACUTE MED/SURG PRIVATE ROOM

## 2020-01-28 PROCEDURE — 99232 SBSQ HOSP IP/OBS MODERATE 35: CPT | Mod: ,,, | Performed by: INTERNAL MEDICINE

## 2020-01-28 PROCEDURE — 83735 ASSAY OF MAGNESIUM: CPT

## 2020-01-28 PROCEDURE — 99223 1ST HOSP IP/OBS HIGH 75: CPT | Mod: ,,, | Performed by: PSYCHIATRY & NEUROLOGY

## 2020-01-28 PROCEDURE — 36415 COLL VENOUS BLD VENIPUNCTURE: CPT

## 2020-01-28 PROCEDURE — 80069 RENAL FUNCTION PANEL: CPT

## 2020-01-28 PROCEDURE — 99232 PR SUBSEQUENT HOSPITAL CARE,LEVL II: ICD-10-PCS | Mod: ,,, | Performed by: INTERNAL MEDICINE

## 2020-01-28 PROCEDURE — 25000242 PHARM REV CODE 250 ALT 637 W/ HCPCS: Performed by: INTERNAL MEDICINE

## 2020-01-28 PROCEDURE — 25000003 PHARM REV CODE 250: Performed by: INTERNAL MEDICINE

## 2020-01-28 PROCEDURE — 94640 AIRWAY INHALATION TREATMENT: CPT

## 2020-01-28 PROCEDURE — 94761 N-INVAS EAR/PLS OXIMETRY MLT: CPT

## 2020-01-28 PROCEDURE — 99223 PR INITIAL HOSPITAL CARE,LEVL III: ICD-10-PCS | Mod: ,,, | Performed by: PSYCHIATRY & NEUROLOGY

## 2020-01-28 RX ORDER — BISACODYL 5 MG
10 TABLET, DELAYED RELEASE (ENTERIC COATED) ORAL 3 TIMES DAILY
Status: DISCONTINUED | OUTPATIENT
Start: 2020-01-28 | End: 2020-01-29

## 2020-01-28 RX ORDER — POLYETHYLENE GLYCOL 3350 17 G/17G
17 POWDER, FOR SOLUTION ORAL 2 TIMES DAILY
Status: DISCONTINUED | OUTPATIENT
Start: 2020-01-28 | End: 2020-01-31 | Stop reason: HOSPADM

## 2020-01-28 RX ORDER — IBUPROFEN 200 MG
16 TABLET ORAL
Status: DISCONTINUED | OUTPATIENT
Start: 2020-01-28 | End: 2020-01-31 | Stop reason: HOSPADM

## 2020-01-28 RX ORDER — GLUCAGON 1 MG
1 KIT INJECTION
Status: DISCONTINUED | OUTPATIENT
Start: 2020-01-28 | End: 2020-01-31 | Stop reason: HOSPADM

## 2020-01-28 RX ORDER — IBUPROFEN 200 MG
24 TABLET ORAL
Status: DISCONTINUED | OUTPATIENT
Start: 2020-01-28 | End: 2020-01-31 | Stop reason: HOSPADM

## 2020-01-28 RX ORDER — INSULIN ASPART 100 [IU]/ML
0-5 INJECTION, SOLUTION INTRAVENOUS; SUBCUTANEOUS
Status: DISCONTINUED | OUTPATIENT
Start: 2020-01-28 | End: 2020-01-31 | Stop reason: HOSPADM

## 2020-01-28 RX ORDER — TRAZODONE HYDROCHLORIDE 100 MG/1
100 TABLET ORAL NIGHTLY
Status: DISCONTINUED | OUTPATIENT
Start: 2020-01-28 | End: 2020-01-31 | Stop reason: HOSPADM

## 2020-01-28 RX ADMIN — BENZTROPINE MESYLATE 0.5 MG: 0.5 TABLET ORAL at 09:01

## 2020-01-28 RX ADMIN — RISPERIDONE 2 MG: 1 TABLET ORAL at 09:01

## 2020-01-28 RX ADMIN — TRAZODONE HYDROCHLORIDE 100 MG: 100 TABLET ORAL at 09:01

## 2020-01-28 RX ADMIN — PANTOPRAZOLE SODIUM 40 MG: 40 TABLET, DELAYED RELEASE ORAL at 09:01

## 2020-01-28 RX ADMIN — LISINOPRIL AND HYDROCHLOROTHIAZIDE 2 TABLET: 12.5; 1 TABLET ORAL at 09:01

## 2020-01-28 RX ADMIN — ACETAMINOPHEN 500 MG: 500 TABLET ORAL at 10:01

## 2020-01-28 RX ADMIN — ACETAMINOPHEN 500 MG: 500 TABLET ORAL at 09:01

## 2020-01-28 RX ADMIN — ROSUVASTATIN CALCIUM 40 MG: 20 TABLET, FILM COATED ORAL at 09:01

## 2020-01-28 RX ADMIN — SENNOSIDES AND DOCUSATE SODIUM 1 TABLET: 8.6; 5 TABLET ORAL at 09:01

## 2020-01-28 RX ADMIN — BISOPROLOL FUMARATE 2.5 MG: 5 TABLET ORAL at 11:01

## 2020-01-28 RX ADMIN — DEXTROSE: 10 SOLUTION INTRAVENOUS at 05:01

## 2020-01-28 RX ADMIN — QUETIAPINE FUMARATE 150 MG: 100 TABLET ORAL at 09:01

## 2020-01-28 RX ADMIN — BISACODYL 10 MG: 5 TABLET, COATED ORAL at 10:01

## 2020-01-28 RX ADMIN — POLYETHYLENE GLYCOL 3350 17 G: 17 POWDER, FOR SOLUTION ORAL at 10:01

## 2020-01-28 RX ADMIN — ISOSORBIDE MONONITRATE 30 MG: 30 TABLET, EXTENDED RELEASE ORAL at 09:01

## 2020-01-28 RX ADMIN — IPRATROPIUM BROMIDE AND ALBUTEROL SULFATE 3 ML: .5; 3 SOLUTION RESPIRATORY (INHALATION) at 11:01

## 2020-01-28 RX ADMIN — ASPIRIN 81 MG: 81 TABLET, COATED ORAL at 09:01

## 2020-01-28 RX ADMIN — POLYETHYLENE GLYCOL 3350 17 G: 17 POWDER, FOR SOLUTION ORAL at 05:01

## 2020-01-28 RX ADMIN — IPRATROPIUM BROMIDE AND ALBUTEROL SULFATE 3 ML: .5; 3 SOLUTION RESPIRATORY (INHALATION) at 03:01

## 2020-01-28 RX ADMIN — BISACODYL 10 MG: 5 TABLET, COATED ORAL at 02:01

## 2020-01-28 RX ADMIN — IPRATROPIUM BROMIDE AND ALBUTEROL SULFATE 3 ML: .5; 3 SOLUTION RESPIRATORY (INHALATION) at 07:01

## 2020-01-28 NOTE — PLAN OF CARE
01/28/20 1017   Discharge Assessment   Assessment Type Discharge Planning Assessment   Confirmed/corrected address and phone number on facesheet? Yes   Assessment information obtained from? Patient   Expected Length of Stay (days) 3   Communicated expected length of stay with patient/caregiver yes   Prior to hospitilization cognitive status: Alert/Oriented   Prior to hospitalization functional status: Independent   Current cognitive status: Alert/Oriented   Current Functional Status: Independent   Lives With alone   Able to Return to Prior Arrangements yes   Is patient able to care for self after discharge? Yes   Patient's perception of discharge disposition home or selfcare   Readmission Within the Last 30 Days no previous admission in last 30 days   Patient currently being followed by outpatient case management? No   Patient currently receives any other outside agency services? No   Equipment Currently Used at Home none   Do you have any problems affording any of your prescribed medications? No   Is the patient taking medications as prescribed? yes   Does the patient have transportation home? Yes   Transportation Anticipated family or friend will provide   Discharge Plan A Home   Discharge Plan B Home Health   DME Needed Upon Discharge  oxygen   Patient/Family in Agreement with Plan yes   Discussed discharge planning with patient. He states that he had home O2 until approx 2 months ago but company picked up machine and he no longer has home O2. He states that he believes he does need home O2. Discussed need for testing in order to qualify and O2 will be ordered prior to dc if he does qualify.

## 2020-01-28 NOTE — PROGRESS NOTES
[facility] Hospital Medicine  Progress Note    Patient Name: Paresh Senior  MRN: [id number]  Patient Class: IP- Inpatient   Admission Date: 1/27/2020  Length of Stay: 1 days  Attending Physician: Renetta Davenport MD  Primary Care Provider: Fernando Carmona MD    [facility] Medicine Team: [facility] HOSP MED D Renetta Davenport MD    Subjective:     Principal Problem:Hypoglycemia    Interval History: Patient complaints of bilateral LE pain. Patient missed Seroquel and Trazodone doses last night.    Review of Systems   Constitutional: Negative for fever.   Respiratory: Negative for shortness of breath.    Gastrointestinal: Positive for constipation. Negative for abdominal pain.     Objective:     Vital Signs (Most Recent):  Temp: 97.8 °F (36.6 °C) (01/28/20 1236)  Pulse: 95 (01/28/20 1236)  Resp: 18 (01/28/20 1236)  BP: 115/67 (01/28/20 1236)  SpO2: 95 % (01/28/20 1236) Vital Signs (24h Range):  Temp:  [97.8 °F (36.6 °C)-98.6 °F (37 °C)] 97.8 °F (36.6 °C)  Pulse:  [86-98] 95  Resp:  [16-18] 18  SpO2:  [92 %-99 %] 95 %  BP: (115-225)/() 115/67     Weight: 78.9 kg (173 lb 15.1 oz)  Body mass index is 26.45 kg/m².    Intake/Output Summary (Last 24 hours) at 1/28/2020 1321  Last data filed at 1/27/2020 2100  Gross per 24 hour   Intake 490 ml   Output 400 ml   Net 90 ml      Physical Exam   Eyes: Conjunctivae and lids are normal.   Cardiovascular: S1 normal and S2 normal.   Pulmonary/Chest: Effort normal and breath sounds normal.   Abdominal: Soft. Bowel sounds are normal. He exhibits distension. There is no tenderness.   Musculoskeletal: He exhibits no edema.   Neurological: He is alert. He is not disoriented.     Significant Labs:   A1C:   Recent Labs   Lab 12/13/19  0810 01/27/20  1020   HGBA1C 6.5* 6.5*     CBC:   Recent Labs   Lab 01/27/20  1020 01/28/20  0354   WBC 14.30* 14.55*   HGB 14.3 15.1   HCT 43.1 43.7    281     CMP:   Recent Labs   Lab 01/27/20  1020 01/28/20  0354    137    K 3.6 4.1    103   CO2 22* 25   GLU 88 170*   BUN 26* 27*   CREATININE 1.7* 1.7*   CALCIUM 9.6 9.0   PROT 7.5  --    ALBUMIN 4.0 3.7   BILITOT 0.4  --    ALKPHOS 74  --    AST 51*  --    ALT 28  --    ANIONGAP 10 9   EGFRNONAA 41.7* 41.7*     Magnesium:   Recent Labs   Lab 01/27/20  1020 01/28/20  0354   MG 2.4 2.3     POCT Glucose:   Recent Labs   Lab 01/27/20  2240 01/28/20  0920 01/28/20  1717   POCTGLUCOSE 112* 219* 208*     TSH:   Recent Labs   Lab 12/13/19  0810   TSH 2.140     Urine Studies:   Recent Labs   Lab 01/27/20  1029   COLORU Yellow   APPEARANCEUA Clear   PHUR 5.0   SPECGRAV 1.015   PROTEINUA 1+*   GLUCUA Negative   KETONESU Negative   BILIRUBINUA Negative   OCCULTUA 1+*   NITRITE Negative   LEUKOCYTESUR Negative   RBCUA 2   WBCUA 1   BACTERIA Rare   HYALINECASTS 1       Significant Imaging: CXR: I have reviewed all pertinent results/findings within the past 24 hours and my personal findings are:  No acute process    Assessment/Plan:      Active Diagnoses:    Diagnosis Date Noted POA    PRINCIPAL PROBLEM:  Hypoglycemia [E16.2] 01/27/2020 Yes    Stage 2 chronic kidney disease [N18.2] 01/27/2020 Yes    On home oxygen therapy [Z99.81] 01/21/2020 Not Applicable    Chronic obstructive pulmonary disease [J44.9]  Yes    Centrilobular emphysema [J43.2] 12/10/2019 Yes    Type 2 diabetes mellitus with hyperglycemia, without long-term current use of insulin [E11.65] 12/10/2019 Yes    Gastroesophageal reflux disease without esophagitis [K21.9] 12/10/2019 Yes    Schizophrenia [F20.9] 12/10/2019 Yes    Tobacco abuse [Z72.0] 11/25/2014 Yes    CAD (coronary artery disease) [I25.10] 11/25/2014 Yes    HTN (hypertension) [I10] 11/25/2014 Yes      Problems Resolved During this Admission:     Inpatient Medications prescribed for management of Current Problems:   Scheduled Meds:    albuterol-ipratropium  3 mL Nebulization Q4H WAKE    aspirin  81 mg Oral Daily    benztropine  0.5 mg Oral BID     bisoprolol  2.5 mg Oral Daily    fluticasone furoate-vilanterol  1 puff Inhalation Daily    isosorbide mononitrate  30 mg Oral Daily    lisinopril-hydrochlorothiazide  2 tablet Oral Daily    pantoprazole  40 mg Oral Daily    QUEtiapine  150 mg Oral QHS    risperiDONE  2 mg Oral BID    rosuvastatin  40 mg Oral QHS    senna-docusate 8.6-50 mg  1 tablet Oral BID    tiotropium  1 capsule Inhalation Daily    traZODone  100 mg Oral QHS     Continuous Infusions:   As Needed: acetaminophen, Dextrose 10% Bolus, Dextrose 10% Bolus, glucagon (human recombinant), glucose, glucose, insulin aspart U-100, ondansetron, sodium chloride 0.9%, sodium chloride 0.9%    Assessment and Plan by Problem     Hypoglycemia   Type 2 diabetes mellitus with hyperglycemia, without long-term current use of insulin   Stage 2 chronic kidney disease  Patient treated with SANDOVAL at home. sCr near baseline.  Concern for possible accidental SANDOVAL overdose or prolonged avoidance of food.  Continued current management with D10 and hypoglycemia protocol. BGs q 2 hours.  BGs stable on D10; discontinue D10 and monitor.      Schizophrenia  Concern for delusions interfering with normal eating pattern.  Psychiatry consulted.  Continuing home medications.      CAD (coronary artery disease)   HTN (hypertension)  Continuing home medications.      Chronic obstructive pulmonary disease   Centrilobular emphysema   Tobacco abuse  Takes only Trelogy for COPD control  Needs 6 min walk test for home O2 prior to discharge.      Gastroesophageal reflux disease without esophagitis  Continuing home PPI    Diet diabetic [facility]; 2000 Calorie    Significant LDAs:     Discharge plan and follow up  Home or Self Care    VTE Risk Mitigation (From admission, onward)         Ordered     IP VTE LOW RISK PATIENT  Once      01/27/20 1252                Renetta Davenport MD  Department of Hospital Medicine   [facility]

## 2020-01-28 NOTE — ASSESSMENT & PLAN NOTE
"ASSESSMENT     Paresh Senior is a 64 y.o. male with a past psychiatric history of Schizophrenia who presented to [facility] due to Hypoglycemia. Psychiatry was consulted for "possible sulfonylurea (glipizide) overdose, history of schizophrenia, current delusions about yeast and bread causing patient to be unable to eat".      IMPRESSION  Schizophrenia, chronic, well-controlled    RECOMMENDATION(S)    - Patient does not meet criteria for PEC or inpatient psychiatric admission at this time. Recommend to rescind PEC if one was placed. Patient is not currently an imminent danger to self or others and is not gravely disabled due to a psychiatric illness.  - Continue home medications: Risperdal 2mg BID, Cogentin 0.5mg BID, Seroquel 150mg qHS, trazodone 100mg qHS  - Will include resource sheet for pt to establish care with outpt psychiatrist        "
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
## 2020-01-28 NOTE — SUBJECTIVE & OBJECTIVE
Patient History           Medical as of 1/28/2020     Past Medical History     Diagnosis Date Comments Source    COPD (chronic obstructive pulmonary disease) -- -- Provider    Coronary artery disease -- -- Provider    Hypercholesterolemia -- -- Provider    Hypertension -- -- Provider                  Surgical as of 1/28/2020     Past Surgical History     Procedure Laterality Date Comments Source    CORONARY STENT PLACEMENT -- 2013 -- Provider    COLONOSCOPY -- 02/2016 Advised repeat in 3 years Provider                  Family as of 1/28/2020     Problem Relation Name Age of Onset Comments Source    No Known Problems Mother -- -- -- Provider    Mental illness Brother -- -- -- Provider    Diabetes Grandchild -- -- -- Provider            Tobacco Use as of 1/28/2020     Smoking Status Smoking Start Date Smoking Quit Date Packs/Day Years Used    Current Every Day Smoker -- -- 0.75 50.00    Types Comments Smokeless Tobacco Status Smokeless Tobacco Quit Date Source    -- -- Unknown -- Provider            Alcohol Use as of 1/28/2020     Alcohol Use Drinks/Week Alcohol/Week Comments Source    Yes -- -- 2-3beers/day Provider    Frequency Standard Drinks Binge Drinking        -- -- --              Drug Use as of 1/28/2020     Drug Use Types Frequency Comments Source    Yes -- -- 3-4 marijuana joints per day Provider            Sexual Activity as of 1/28/2020     Sexually Active Birth Control Partners Comments Source    Not Asked -- -- -- Provider            Activities of Daily Living as of 1/28/2020    None           Social Documentation as of 1/28/2020    Tobacco Use: Currently smoking 1 cigar every other day. Former cigarette use; initiated at age 6yo, last use 11/2019 (age 65 yo), using 0.5ppd    Source: Provider           Occupational as of 1/28/2020    None           Socioeconomic as of 1/28/2020     Marital Status Spouse Name Number of Children Years Education Education Level Preferred Language Ethnicity Race Source     Single -- -- -- -- English /Black Black or  --    Financial Resource Strain Food Insecurity: Worry Food Insecurity: Inability Transportation Needs: Medical Transportation Needs: Non-medical    -- -- -- -- --            Pertinent History     Question Response Comments    Lives with -- --    Place in Birth Order -- --    Lives in -- --    Number of Siblings -- --    Raised by -- --    Legal Involvement -- --    Childhood Trauma -- --    Criminal History of -- --    Financial Status -- --    Highest Level of Education -- --    Does patient have access to a firearm? -- --     Service -- --    Primary Leisure Activity -- --    Spirituality -- --        Past Medical History:   Diagnosis Date    COPD (chronic obstructive pulmonary disease)     Coronary artery disease     Hypercholesterolemia     Hypertension      Past Surgical History:   Procedure Laterality Date    COLONOSCOPY  02/2016    Advised repeat in 3 years    CORONARY STENT PLACEMENT  2013     Family History     Problem Relation (Age of Onset)    Diabetes Grandchild    Mental illness Brother    No Known Problems Mother        Tobacco Use    Smoking status: Current Every Day Smoker     Packs/day: 0.75     Years: 50.00     Pack years: 37.50   Substance and Sexual Activity    Alcohol use: Yes     Comment: 2-3beers/day    Drug use: Yes     Comment: 3-4 marijuana joints per day    Sexual activity: Not on file     Review of patient's allergies indicates:  No Known Allergies    No current facility-administered medications on file prior to encounter.      Current Outpatient Medications on File Prior to Encounter   Medication Sig    albuterol (PROVENTIL) 2.5 mg /3 mL (0.083 %) nebulizer solution Take 2.5 mg by nebulization every 6 (six) hours as needed for Wheezing or Shortness of Breath. Rescue    albuterol (VENTOLIN HFA) 90 mcg/actuation inhaler Inhale 2 puffs into the lungs every 6 (six) hours as needed for  Wheezing. Rescue    aspirin (ECOTRIN) 81 MG EC tablet Take 1 tablet (81 mg total) by mouth once daily.    benztropine (COGENTIN) 0.5 MG tablet Take 1 tablet (0.5 mg total) by mouth 2 (two) times daily.    bisoprolol (ZEBETA) 5 MG tablet Take 0.5 tablets (2.5 mg total) by mouth once daily.    blood sugar diagnostic Strp 1 strip by Misc.(Non-Drug; Combo Route) route 2 (two) times daily.    cholecalciferol, vitamin D3, (VITAMIN D3) 25 mcg (1,000 unit) capsule Take 1 capsule (1,000 Units total) by mouth once daily.    fluticasone-umeclidin-vilanter (TRELEGY ELLIPTA) 100-62.5-25 mcg DsDv Inhale 1 puff into the lungs once daily.    glipiZIDE (GLUCOTROL) 10 MG tablet Take 1 tablet (10 mg total) by mouth 2 (two) times daily with meals.    ipratropium (ATROVENT) 0.02 % nebulizer solution Take by nebulization.     isosorbide mononitrate (IMDUR) 30 MG 24 hr tablet Take 1 tablet (30 mg total) by mouth once daily.    lisinopril-hydrochlorothiazide (PRINZIDE,ZESTORETIC) 20-25 mg Tab Take 1 tablet by mouth once daily.    risperiDONE (RISPERDAL) 2 MG tablet Take 2 mg by mouth 2 (two) times daily.     rosuvastatin (CRESTOR) 40 MG Tab Take 1 tablet (40 mg total) by mouth every evening.    traZODone (DESYREL) 100 MG tablet Take 100 mg by mouth every evening.    mag hydrox/aluminum hyd/simeth (ANTACID ORAL) Take by mouth.    omeprazole (PRILOSEC) 20 MG capsule Take 20 mg by mouth once daily.    QUEtiapine (SEROQUEL) 300 MG Tab Take 300 mg by mouth every evening.     triamcinolone acetonide 0.1% (KENALOG) 0.1 % ointment Apply topically 2 (two) times daily. for 14 days     Psychotherapeutics (From admission, onward)    Start     Stop Route Frequency Ordered    01/27/20 2100  risperiDONE tablet 2 mg      -- Oral 2 times daily 01/27/20 2394        Review of Systems  Strengths and Liabilities: Strength: Patient accepts guidance/feedback, Strength: Patient is expressive/articulate., Strength: Patient has positive support  network., Strength: Patient has reasonable judgment., Liability: Patient has poor health.    Objective:     Vital Signs (Most Recent):  Temp: 98.6 °F (37 °C) (01/28/20 0000)  Pulse: 90 (01/28/20 0000)  Resp: 18 (01/28/20 0000)  BP: (!) 170/79 (01/28/20 0000)  SpO2: 98 % (01/28/20 0000) Vital Signs (24h Range):  Temp:  [98.2 °F (36.8 °C)-98.8 °F (37.1 °C)] 98.6 °F (37 °C)  Pulse:  [84-96] 90  Resp:  [18] 18  SpO2:  [95 %-99 %] 98 %  BP: (170-225)/() 170/79           There is no height or weight on file to calculate BMI.      Intake/Output Summary (Last 24 hours) at 1/28/2020 0136  Last data filed at 1/27/2020 2100  Gross per 24 hour   Intake 490 ml   Output 400 ml   Net 90 ml       Physical Exam   Psychiatric:   Mental Status Exam:  Appearance: unremarkable, age appropriate  Behavior/Cooperation: normal, friendly and cooperative  Speech: normal tone, normal rate, normal pitch, normal volume  Mood: fine  Affect: euthymic  Thought Process: normal and logical  Thought Content: normal, no suicidality, no homicidality, delusions, or paranoia   Orientation: grossly intact  Memory: Grossly intact  Attention Span/Concentration: Normal  Insight: fair  Judgment: fair    Truncal, hand and foot Tardive dyskinsia noted      Nursing note and vitals reviewed.       Significant Labs: All pertinent labs within the past 24 hours have been reviewed.    Significant Imaging: I have reviewed all pertinent imaging results/findings within the past 24 hours.

## 2020-01-28 NOTE — H&P
[facility]    History & Physical      Patient Name: Paresh Senior  MRN: [id number]  Admission Date: 1/27/2020  Attending Physician: Renetta Davenport MD   Primary Care Provider: Fernando Carmona MD    [facility] Medicine Team: [facility] HOSP MED D Renetta Davenport MD     Patient information was obtained from patient, past medical records and ER records.     Subjective:     Principal Problem:Hypoglycemia    Chief Complaint:   Chief Complaint   Patient presents with    Hypoglycemia     CBG 40 on EMS arrival amp of D50 given repeat         HPI:  64-year-old male with diabetes type 2, hypertension, CAD, COPD, and Schizophrenia presents after feeling off this morning at home.  His mother called EMS and they found him to have a low blood glucose.  He was given IV glucose by EMS and improved immediately. Patient reports that he took his glipizide last night and this morning, but did not eat since midnight because yeast were bothering him. This has happened to him previously, sometimes 3 times per month, but at least once per month. 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 Now sometimes, when he puts a piece of bread into his mouth, he cannot swallow it or anything else until his reaction to the yeast resolves. He reports eating bread frequently when he is not having a problem with the yeast. He also reports being out of his home O2 for 2 months.    Past Medical History:   Diagnosis Date    COPD (chronic obstructive pulmonary disease)     Coronary artery disease     Hypercholesterolemia     Hypertension        Past Surgical History:   Procedure Laterality Date    COLONOSCOPY  02/2016    Advised repeat in 3 years    CORONARY STENT PLACEMENT  2013       Review of patient's allergies indicates:  No Known Allergies    No current facility-administered medications on file prior to encounter.      Current Outpatient Medications on File Prior to Encounter    Medication Sig    albuterol (PROVENTIL) 2.5 mg /3 mL (0.083 %) nebulizer solution Take 2.5 mg by nebulization every 6 (six) hours as needed for Wheezing or Shortness of Breath. Rescue    albuterol (VENTOLIN HFA) 90 mcg/actuation inhaler Inhale 2 puffs into the lungs every 6 (six) hours as needed for Wheezing. Rescue    aspirin (ECOTRIN) 81 MG EC tablet Take 1 tablet (81 mg total) by mouth once daily.    benztropine (COGENTIN) 0.5 MG tablet Take 1 tablet (0.5 mg total) by mouth 2 (two) times daily.    bisoprolol (ZEBETA) 5 MG tablet Take 0.5 tablets (2.5 mg total) by mouth once daily.    blood sugar diagnostic Strp 1 strip by Misc.(Non-Drug; Combo Route) route 2 (two) times daily.    cholecalciferol, vitamin D3, (VITAMIN D3) 25 mcg (1,000 unit) capsule Take 1 capsule (1,000 Units total) by mouth once daily.    fluticasone-umeclidin-vilanter (TRELEGY ELLIPTA) 100-62.5-25 mcg DsDv Inhale 1 puff into the lungs once daily.    glipiZIDE (GLUCOTROL) 10 MG tablet Take 1 tablet (10 mg total) by mouth 2 (two) times daily with meals.    ipratropium (ATROVENT) 0.02 % nebulizer solution Take by nebulization.     isosorbide mononitrate (IMDUR) 30 MG 24 hr tablet Take 1 tablet (30 mg total) by mouth once daily.    lisinopril-hydrochlorothiazide (PRINZIDE,ZESTORETIC) 20-25 mg Tab Take 1 tablet by mouth once daily.    risperiDONE (RISPERDAL) 2 MG tablet Take 2 mg by mouth 2 (two) times daily.     rosuvastatin (CRESTOR) 40 MG Tab Take 1 tablet (40 mg total) by mouth every evening.    traZODone (DESYREL) 100 MG tablet Take 100 mg by mouth every evening.    mag hydrox/aluminum hyd/simeth (ANTACID ORAL) Take by mouth.    omeprazole (PRILOSEC) 20 MG capsule Take 20 mg by mouth once daily.    QUEtiapine (SEROQUEL) 300 MG Tab Take 300 mg by mouth every evening.     triamcinolone acetonide 0.1% (KENALOG) 0.1 % ointment Apply topically 2 (two) times daily. for 14 days     Family History     Problem Relation (Age of  Onset)    Diabetes Grandchild    Mental illness Brother    No Known Problems Mother        Tobacco Use    Smoking status: Current Every Day Smoker     Packs/day: 0.75     Years: 50.00     Pack years: 37.50   Substance and Sexual Activity    Alcohol use: Yes     Comment: 2-3beers/day    Drug use: Yes     Comment: 3-4 marijuana joints per day    Sexual activity: Not on file     Review of Systems   Constitutional: Positive for appetite change. Negative for fever.   HENT: Positive for trouble swallowing. Negative for dental problem and sore throat.    Eyes: Negative.    Respiratory: Negative.  Negative for shortness of breath.    Cardiovascular: Negative.    Gastrointestinal: Negative.  Negative for abdominal pain, nausea and vomiting.   Genitourinary: Positive for dysuria.   Musculoskeletal: Negative.    Skin: Negative.    Allergic/Immunologic: Negative for immunocompromised state.   Neurological: Negative.    Psychiatric/Behavioral: Negative for agitation and hallucinations.     Objective:     Vital Signs (Most Recent):  Temp: 98.2 °F (36.8 °C) (01/27/20 1847)  Pulse: 94 (01/27/20 1847)  Resp: 18 (01/27/20 1825)  BP: (!) 225/100 (01/27/20 1847)  SpO2: 97 % (01/27/20 1847) Vital Signs (24h Range):  Temp:  [98.2 °F (36.8 °C)-98.8 °F (37.1 °C)] 98.2 °F (36.8 °C)  Pulse:  [84-96] 94  Resp:  [18] 18  SpO2:  [95 %-99 %] 97 %  BP: (176-225)/() 225/100        There is no height or weight on file to calculate BMI.    Physical Exam   Constitutional: He appears well-developed. He does not appear ill.   HENT:   Head: Normocephalic.   Mouth/Throat: Mucous membranes are not pale and not cyanotic.   Eyes: Conjunctivae and lids are normal. Pupils are equal.   Neck: Neck supple.   Cardiovascular: S1 normal and S2 normal.   Pulmonary/Chest: Effort normal and breath sounds normal.   Abdominal: Soft. Bowel sounds are normal. He exhibits distension. There is no tenderness.   Musculoskeletal: He exhibits no edema.   Neurological:  He is alert. He is not disoriented.   Skin: Skin is warm and dry. No cyanosis. Nails show no clubbing.   Psychiatric: He has a normal mood and affect. His mood appears not anxious. He is not agitated. Thought content is delusional. Thought content is not paranoid.     Significant Labs:   A1C:   Recent Labs   Lab 12/13/19  0810 01/27/20  1020   HGBA1C 6.5* 6.5*     CBC:   Recent Labs   Lab 01/27/20  1020   WBC 14.30*   HGB 14.3   HCT 43.1        CMP:   Recent Labs   Lab 01/27/20  1020      K 3.6      CO2 22*   GLU 88   BUN 26*   CREATININE 1.7*   CALCIUM 9.6   PROT 7.5   ALBUMIN 4.0   BILITOT 0.4   ALKPHOS 74   AST 51*   ALT 28   ANIONGAP 10   EGFRNONAA 41.7*     Magnesium:   Recent Labs   Lab 01/27/20  1020   MG 2.4     POCT Glucose:   Recent Labs   Lab 01/27/20  1407 01/27/20  1433 01/27/20  1533   POCTGLUCOSE 41* 180* 59*     TSH:   Recent Labs   Lab 12/13/19  0810   TSH 2.140     Urine Studies:   Recent Labs   Lab 01/27/20  1029   COLORU Yellow   APPEARANCEUA Clear   PHUR 5.0   SPECGRAV 1.015   PROTEINUA 1+*   GLUCUA Negative   KETONESU Negative   BILIRUBINUA Negative   OCCULTUA 1+*   NITRITE Negative   LEUKOCYTESUR Negative   RBCUA 2   WBCUA 1   BACTERIA Rare   HYALINECASTS 1       Significant Imaging: CXR: I have reviewed all pertinent results/findings within the past 24 hours and my personal findings are:  unremarkable    Assessment/Plan:     Active Diagnoses:    Diagnosis Date Noted POA    PRINCIPAL PROBLEM:  Hypoglycemia [E16.2] 01/27/2020 Yes    Stage 2 chronic kidney disease [N18.2] 01/27/2020 Yes    On home oxygen therapy [Z99.81] 01/21/2020 Not Applicable    Chronic obstructive pulmonary disease [J44.9]  Yes    Centrilobular emphysema [J43.2] 12/10/2019 Yes    Type 2 diabetes mellitus with hyperglycemia, without long-term current use of insulin [E11.65] 12/10/2019 Yes    Gastroesophageal reflux disease without esophagitis [K21.9] 12/10/2019 Yes    Schizophrenia [F20.9]  12/10/2019 Yes    Tobacco abuse [Z72.0] 11/25/2014 Yes    CAD (coronary artery disease) [I25.10] 11/25/2014 Yes    HTN (hypertension) [I10] 11/25/2014 Yes      Problems Resolved During this Admission:     Overview / Hospital Course:       Inpatient Medications prescribed for management of Current Problems:   Scheduled Meds:    albuterol-ipratropium  3 mL Nebulization Q4H WAKE    [START ON 1/28/2020] aspirin  81 mg Oral Daily    benztropine  0.5 mg Oral BID    [START ON 1/28/2020] bisoprolol  2.5 mg Oral Daily    fluticasone furoate-vilanterol  1 puff Inhalation Daily    [START ON 1/28/2020] isosorbide mononitrate  30 mg Oral Daily    lisinopril-hydrochlorothiazide  2 tablet Oral Daily    [START ON 1/28/2020] pantoprazole  40 mg Oral Daily    risperiDONE  2 mg Oral BID    rosuvastatin  40 mg Oral QHS    senna-docusate 8.6-50 mg  1 tablet Oral BID    tiotropium  1 capsule Inhalation Daily     Continuous Infusions:    dextrose 10 % in water (D10W) 100 mL/hr at 01/27/20 1712     As Needed: acetaminophen, Dextrose 10% Bolus, Dextrose 10% Bolus, glucagon (human recombinant), glucose, glucose, ondansetron, sodium chloride 0.9%, sodium chloride 0.9%    Assessment and Plan by Problem     Hypoglycemia   Type 2 diabetes mellitus with hyperglycemia, without long-term current use of insulin   Stage 2 chronic kidney disease  Patient treated with SANDOVAL at home. sCr near baseline.  Concern for possible accidental SANDOVAL overdose or prolonged avoidance of food.  Continuing current management with D10 and hypoglycemia protocol. BGs q 2 hours.     Schizophrenia  Concern for delusions interfering with normal eating pattern.  Psychiatry consulted.  Continuing home medications.      CAD (coronary artery disease)   HTN (hypertension)  Continuing home medications.     Chronic obstructive pulmonary disease   Centrilobular emphysema   Tobacco abuse  Takes only Trelogy for COPD control  Needs 6 min walk test for home O2  prior to discharge.     Gastroesophageal reflux disease without esophagitis  Continuing home PPI    Diet diabetic [facility]; 2000 Calorie    Significant LDAs:     HIGH RISK CONDITION(S):   Patient is currently on drug therapy requiring intensive monitoring for toxicity: Glipizide     Discharge plan and follow up  Home or Self Care    VTE Risk Mitigation (From admission, onward)         Ordered     IP VTE LOW RISK PATIENT  Once      01/27/20 9080                Renetta Davenport MD  Department of Hospital Medicine   [facility]
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
## 2020-01-29 PROBLEM — R33.9 URINE RETENTION: Status: ACTIVE | Noted: 2020-01-29

## 2020-01-29 PROBLEM — E16.2 HYPOGLYCEMIA: Status: RESOLVED | Noted: 2020-01-27 | Resolved: 2020-01-29

## 2020-01-29 PROBLEM — M25.561 BILATERAL KNEE PAIN: Status: ACTIVE | Noted: 2020-01-29

## 2020-01-29 PROBLEM — M25.562 BILATERAL KNEE PAIN: Status: ACTIVE | Noted: 2020-01-29

## 2020-01-29 LAB
ALBUMIN SERPL BCP-MCNC: 3.4 G/DL (ref 3.5–5.2)
ANION GAP SERPL CALC-SCNC: 10 MMOL/L (ref 8–16)
ANION GAP SERPL CALC-SCNC: 9 MMOL/L (ref 8–16)
APPEARANCE FLD: NORMAL
BASOPHILS # BLD AUTO: 0.03 K/UL (ref 0–0.2)
BASOPHILS NFR BLD: 0.2 % (ref 0–1.9)
BODY FLD TYPE: NORMAL
BUN SERPL-MCNC: 38 MG/DL (ref 8–23)
BUN SERPL-MCNC: 58 MG/DL (ref 8–23)
CALCIUM SERPL-MCNC: 8 MG/DL (ref 8.7–10.5)
CALCIUM SERPL-MCNC: 8.8 MG/DL (ref 8.7–10.5)
CHLORIDE SERPL-SCNC: 101 MMOL/L (ref 95–110)
CHLORIDE SERPL-SCNC: 101 MMOL/L (ref 95–110)
CO2 SERPL-SCNC: 21 MMOL/L (ref 23–29)
CO2 SERPL-SCNC: 22 MMOL/L (ref 23–29)
COLOR FLD: NORMAL
CREAT SERPL-MCNC: 3 MG/DL (ref 0.5–1.4)
CREAT SERPL-MCNC: 4.3 MG/DL (ref 0.5–1.4)
CRP SERPL-MCNC: 80.5 MG/L (ref 0–8.2)
CRYSTALS FLD MICRO: NEGATIVE
CRYSTALS FLD MICRO: NEGATIVE
DIFFERENTIAL METHOD: ABNORMAL
EOSINOPHIL # BLD AUTO: 0.4 K/UL (ref 0–0.5)
EOSINOPHIL NFR BLD: 2.7 % (ref 0–8)
EOSINOPHIL NFR FLD MANUAL: 2 %
ERYTHROCYTE [DISTWIDTH] IN BLOOD BY AUTOMATED COUNT: 12.6 % (ref 11.5–14.5)
ERYTHROCYTE [SEDIMENTATION RATE] IN BLOOD BY WESTERGREN METHOD: 24 MM/HR (ref 0–23)
EST. GFR  (AFRICAN AMERICAN): 15.7 ML/MIN/1.73 M^2
EST. GFR  (AFRICAN AMERICAN): 24.3 ML/MIN/1.73 M^2
EST. GFR  (NON AFRICAN AMERICAN): 13.6 ML/MIN/1.73 M^2
EST. GFR  (NON AFRICAN AMERICAN): 21 ML/MIN/1.73 M^2
GLUCOSE SERPL-MCNC: 157 MG/DL (ref 70–110)
GLUCOSE SERPL-MCNC: 213 MG/DL (ref 70–110)
GRAM STN SPEC: NORMAL
HCT VFR BLD AUTO: 39.6 % (ref 40–54)
HGB BLD-MCNC: 12.9 G/DL (ref 14–18)
IMM GRANULOCYTES # BLD AUTO: 0.05 K/UL (ref 0–0.04)
IMM GRANULOCYTES NFR BLD AUTO: 0.4 % (ref 0–0.5)
LYMPHOCYTES # BLD AUTO: 1.6 K/UL (ref 1–4.8)
LYMPHOCYTES NFR BLD: 11.3 % (ref 18–48)
LYMPHOCYTES NFR FLD MANUAL: 30 %
MAGNESIUM SERPL-MCNC: 2.5 MG/DL (ref 1.6–2.6)
MCH RBC QN AUTO: 30.1 PG (ref 27–31)
MCHC RBC AUTO-ENTMCNC: 32.6 G/DL (ref 32–36)
MCV RBC AUTO: 93 FL (ref 82–98)
MONOCYTES # BLD AUTO: 1.2 K/UL (ref 0.3–1)
MONOCYTES NFR BLD: 8.9 % (ref 4–15)
MONOS+MACROS NFR FLD MANUAL: 39 %
NEUTROPHILS # BLD AUTO: 10.6 K/UL (ref 1.8–7.7)
NEUTROPHILS NFR BLD: 76.5 % (ref 38–73)
NEUTROPHILS NFR FLD MANUAL: 29 %
NRBC BLD-RTO: 0 /100 WBC
PHOSPHATE SERPL-MCNC: 4.8 MG/DL (ref 2.7–4.5)
PLATELET # BLD AUTO: 259 K/UL (ref 150–350)
PMV BLD AUTO: 10.3 FL (ref 9.2–12.9)
POCT GLUCOSE: 173 MG/DL (ref 70–110)
POCT GLUCOSE: 182 MG/DL (ref 70–110)
POCT GLUCOSE: 184 MG/DL (ref 70–110)
POCT GLUCOSE: 203 MG/DL (ref 70–110)
POTASSIUM SERPL-SCNC: 3.8 MMOL/L (ref 3.5–5.1)
POTASSIUM SERPL-SCNC: 4.1 MMOL/L (ref 3.5–5.1)
RBC # BLD AUTO: 4.28 M/UL (ref 4.6–6.2)
SODIUM SERPL-SCNC: 131 MMOL/L (ref 136–145)
SODIUM SERPL-SCNC: 133 MMOL/L (ref 136–145)
WBC # BLD AUTO: 13.87 K/UL (ref 3.9–12.7)
WBC # FLD: 95 /CU MM

## 2020-01-29 PROCEDURE — 87086 URINE CULTURE/COLONY COUNT: CPT

## 2020-01-29 PROCEDURE — 89051 BODY FLUID CELL COUNT: CPT

## 2020-01-29 PROCEDURE — 27000221 HC OXYGEN, UP TO 24 HOURS

## 2020-01-29 PROCEDURE — 87206 SMEAR FLUORESCENT/ACID STAI: CPT | Mod: 91

## 2020-01-29 PROCEDURE — 36415 COLL VENOUS BLD VENIPUNCTURE: CPT

## 2020-01-29 PROCEDURE — 11000001 HC ACUTE MED/SURG PRIVATE ROOM

## 2020-01-29 PROCEDURE — 87070 CULTURE OTHR SPECIMN AEROBIC: CPT

## 2020-01-29 PROCEDURE — 94761 N-INVAS EAR/PLS OXIMETRY MLT: CPT

## 2020-01-29 PROCEDURE — 89060 EXAM SYNOVIAL FLUID CRYSTALS: CPT | Mod: 91

## 2020-01-29 PROCEDURE — 63600175 PHARM REV CODE 636 W HCPCS: Performed by: INTERNAL MEDICINE

## 2020-01-29 PROCEDURE — 81001 URINALYSIS AUTO W/SCOPE: CPT

## 2020-01-29 PROCEDURE — 25000242 PHARM REV CODE 250 ALT 637 W/ HCPCS: Performed by: INTERNAL MEDICINE

## 2020-01-29 PROCEDURE — 25000003 PHARM REV CODE 250: Performed by: INTERNAL MEDICINE

## 2020-01-29 PROCEDURE — 85025 COMPLETE CBC W/AUTO DIFF WBC: CPT

## 2020-01-29 PROCEDURE — 87116 MYCOBACTERIA CULTURE: CPT

## 2020-01-29 PROCEDURE — 99233 PR SUBSEQUENT HOSPITAL CARE,LEVL III: ICD-10-PCS | Mod: ,,, | Performed by: INTERNAL MEDICINE

## 2020-01-29 PROCEDURE — 80048 BASIC METABOLIC PNL TOTAL CA: CPT

## 2020-01-29 PROCEDURE — 99233 SBSQ HOSP IP/OBS HIGH 50: CPT | Mod: ,,, | Performed by: INTERNAL MEDICINE

## 2020-01-29 PROCEDURE — 86140 C-REACTIVE PROTEIN: CPT

## 2020-01-29 PROCEDURE — 94640 AIRWAY INHALATION TREATMENT: CPT

## 2020-01-29 PROCEDURE — 80069 RENAL FUNCTION PANEL: CPT

## 2020-01-29 PROCEDURE — 87075 CULTR BACTERIA EXCEPT BLOOD: CPT

## 2020-01-29 PROCEDURE — 87205 SMEAR GRAM STAIN: CPT

## 2020-01-29 PROCEDURE — 87102 FUNGUS ISOLATION CULTURE: CPT

## 2020-01-29 PROCEDURE — 85652 RBC SED RATE AUTOMATED: CPT

## 2020-01-29 PROCEDURE — 83735 ASSAY OF MAGNESIUM: CPT

## 2020-01-29 RX ORDER — GLIPIZIDE 10 MG/1
10 TABLET ORAL
Qty: 180 TABLET | Refills: 3 | Status: SHIPPED | OUTPATIENT
Start: 2020-01-29 | End: 2020-02-04

## 2020-01-29 RX ORDER — TAMSULOSIN HYDROCHLORIDE 0.4 MG/1
0.4 CAPSULE ORAL DAILY
Status: DISCONTINUED | OUTPATIENT
Start: 2020-01-29 | End: 2020-01-31 | Stop reason: HOSPADM

## 2020-01-29 RX ORDER — SODIUM CHLORIDE 9 MG/ML
INJECTION, SOLUTION INTRAVENOUS CONTINUOUS
Status: DISCONTINUED | OUTPATIENT
Start: 2020-01-29 | End: 2020-01-29

## 2020-01-29 RX ORDER — POLYETHYLENE GLYCOL 3350 17 G/17G
17 POWDER, FOR SOLUTION ORAL 2 TIMES DAILY PRN
Qty: 510 G | Refills: 1 | Status: ON HOLD | OUTPATIENT
Start: 2020-01-29 | End: 2021-09-07 | Stop reason: SDUPTHER

## 2020-01-29 RX ORDER — HYDROCODONE BITARTRATE AND ACETAMINOPHEN 5; 325 MG/1; MG/1
1 TABLET ORAL EVERY 6 HOURS PRN
Status: DISCONTINUED | OUTPATIENT
Start: 2020-01-29 | End: 2020-01-31 | Stop reason: HOSPADM

## 2020-01-29 RX ADMIN — TAMSULOSIN HYDROCHLORIDE 0.4 MG: 0.4 CAPSULE ORAL at 03:01

## 2020-01-29 RX ADMIN — TRAZODONE HYDROCHLORIDE 100 MG: 100 TABLET ORAL at 09:01

## 2020-01-29 RX ADMIN — IPRATROPIUM BROMIDE AND ALBUTEROL SULFATE 3 ML: .5; 3 SOLUTION RESPIRATORY (INHALATION) at 01:01

## 2020-01-29 RX ADMIN — BISOPROLOL FUMARATE 2.5 MG: 5 TABLET ORAL at 09:01

## 2020-01-29 RX ADMIN — PANTOPRAZOLE SODIUM 40 MG: 40 TABLET, DELAYED RELEASE ORAL at 09:01

## 2020-01-29 RX ADMIN — IPRATROPIUM BROMIDE AND ALBUTEROL SULFATE 3 ML: .5; 3 SOLUTION RESPIRATORY (INHALATION) at 08:01

## 2020-01-29 RX ADMIN — BENZTROPINE MESYLATE 0.5 MG: 0.5 TABLET ORAL at 09:01

## 2020-01-29 RX ADMIN — TIOTROPIUM BROMIDE 18 MCG: 18 CAPSULE ORAL; RESPIRATORY (INHALATION) at 09:01

## 2020-01-29 RX ADMIN — IPRATROPIUM BROMIDE AND ALBUTEROL SULFATE 3 ML: .5; 3 SOLUTION RESPIRATORY (INHALATION) at 04:01

## 2020-01-29 RX ADMIN — SENNOSIDES AND DOCUSATE SODIUM 1 TABLET: 8.6; 5 TABLET ORAL at 09:01

## 2020-01-29 RX ADMIN — RISPERIDONE 2 MG: 1 TABLET ORAL at 09:01

## 2020-01-29 RX ADMIN — ASPIRIN 81 MG: 81 TABLET, COATED ORAL at 09:01

## 2020-01-29 RX ADMIN — LISINOPRIL AND HYDROCHLOROTHIAZIDE 2 TABLET: 12.5; 1 TABLET ORAL at 09:01

## 2020-01-29 RX ADMIN — IPRATROPIUM BROMIDE AND ALBUTEROL SULFATE 3 ML: .5; 3 SOLUTION RESPIRATORY (INHALATION) at 06:01

## 2020-01-29 RX ADMIN — FLUTICASONE FUROATE AND VILANTEROL TRIFENATATE 1 PUFF: 100; 25 POWDER RESPIRATORY (INHALATION) at 09:01

## 2020-01-29 RX ADMIN — SODIUM CHLORIDE: 0.9 INJECTION, SOLUTION INTRAVENOUS at 10:01

## 2020-01-29 RX ADMIN — ACETAMINOPHEN 500 MG: 500 TABLET ORAL at 09:01

## 2020-01-29 RX ADMIN — ROSUVASTATIN CALCIUM 20 MG: 20 TABLET, FILM COATED ORAL at 09:01

## 2020-01-29 RX ADMIN — ISOSORBIDE MONONITRATE 30 MG: 30 TABLET, EXTENDED RELEASE ORAL at 09:01

## 2020-01-29 NOTE — HPI
Paresh Senior is a 64 y.o. male with PMH COPD, CAD, HLD, HTN, and diabetes who presents with bilateral knee pain for 1 day.  Patient is currently admitted for hypoglycemia after taking too much of his self on urea medicine.  He states that he has not had any trauma to the knees.  He has not been able to ambulate without severe pain over the past day.  He states that the right knee hurts worse than the left.  He denies any numbness or tingling.  He denies any other MSK pains or paresthesias.  He does not take any anticoagulation at baseline.  He walks without a walker or any other assistive devices at baseline.

## 2020-01-29 NOTE — PROGRESS NOTES
[facility] Hospital Medicine  Progress Note    Patient Name: Paresh Senior  MRN: [id number]  Patient Class: IP- Inpatient   Admission Date: 1/27/2020  Length of Stay: 2 days  Attending Physician: Renetta Davenport MD  Primary Care Provider: Fernando Carmona MD    [facility] Medicine Team: [facility] Renetta Davenport MD    Subjective:     Principal Problem:Hypoglycemia    Interval History: Patient complaints of bilateral knee pain. Having difficulty standing.  Bladder scan with almost 1 L of urine in bladder.  Elevation in serum creatinine noted.    Review of Systems   Constitutional: Negative for fever.   Respiratory: Negative for shortness of breath.    Gastrointestinal: Negative for abdominal pain.     Objective:     Vital Signs (Most Recent):  Temp: 97.2 °F (36.2 °C) (01/29/20 1140)  Pulse: 85 (01/29/20 1140)  Resp: 18 (01/29/20 1140)  BP: (!) 93/52 (01/29/20 1140)  SpO2: (!) 93 % (01/29/20 1140) Vital Signs (24h Range):  Temp:  [96 °F (35.6 °C)-100.1 °F (37.8 °C)] 97.2 °F (36.2 °C)  Pulse:  [81-97] 85  Resp:  [16-18] 18  SpO2:  [91 %-94 %] 93 %  BP: ()/(52-67) 93/52     Weight: 78.9 kg (173 lb 15.1 oz)  Body mass index is 26.45 kg/m².    Intake/Output Summary (Last 24 hours) at 1/29/2020 1252  Last data filed at 1/29/2020 1100  Gross per 24 hour   Intake --   Output 1800 ml   Net -1800 ml      Physical Exam   Eyes: Conjunctivae and lids are normal.   Cardiovascular: S1 normal and S2 normal.   Pulmonary/Chest: Effort normal and breath sounds normal.   Abdominal: Soft. Bowel sounds are normal. He exhibits distension. There is no tenderness.   Musculoskeletal: He exhibits no edema.        Right knee: He exhibits erythema. Tenderness found.        Left knee: He exhibits swelling and erythema. Tenderness found.   Neurological: He is alert. He is not disoriented.     Significant Labs:   A1C:   Recent Labs   Lab 12/13/19  0810 01/27/20  1020   HGBA1C 6.5* 6.5*     CBC:   Recent Labs    Lab 01/28/20  0354 01/29/20  0500   WBC 14.55* 13.87*   HGB 15.1 12.9*   HCT 43.7 39.6*    259     CMP:   Recent Labs   Lab 01/28/20  0354 01/29/20  0500 01/29/20  2109    133* 131*   K 4.1 4.1 3.8    101 101   CO2 25 22* 21*   * 157* 213*   BUN 27* 38* 58*   CREATININE 1.7* 3.0* 4.3*   CALCIUM 9.0 8.8 8.0*   ALBUMIN 3.7 3.4*  --    ANIONGAP 9 10 9   EGFRNONAA 41.7* 21.0* 13.6*     Magnesium:   Recent Labs   Lab 01/28/20  0354 01/29/20  0500   MG 2.3 2.5     POCT Glucose:   Recent Labs   Lab 01/28/20  2204 01/29/20  0133 01/29/20  0820   POCTGLUCOSE 188* 173* 182*     TSH:   Recent Labs   Lab 12/13/19  0810   TSH 2.140     Significant Imaging: CXR: I have reviewed all pertinent results/findings within the past 24 hours and my personal findings are:  No acute process    Assessment/Plan:      Active Diagnoses:    Diagnosis Date Noted POA    Stage 2 chronic kidney disease [N18.2] 01/27/2020 Yes    On home oxygen therapy [Z99.81] 01/21/2020 Not Applicable    Chronic obstructive pulmonary disease [J44.9]  Yes    Centrilobular emphysema [J43.2] 12/10/2019 Yes    Type 2 diabetes mellitus with hyperglycemia, without long-term current use of insulin [E11.65] 12/10/2019 Yes    Gastroesophageal reflux disease without esophagitis [K21.9] 12/10/2019 Yes    Schizophrenia [F20.9] 12/10/2019 Yes    Tobacco abuse [Z72.0] 11/25/2014 Yes    CAD (coronary artery disease) [I25.10] 11/25/2014 Yes    HTN (hypertension) [I10] 11/25/2014 Yes      Problems Resolved During this Admission:    Diagnosis Date Noted Date Resolved POA    PRINCIPAL PROBLEM:  Hypoglycemia [E16.2] 01/27/2020 01/29/2020 Yes     Inpatient Medications prescribed for management of Current Problems:   Scheduled Meds:    albuterol-ipratropium  3 mL Nebulization Q4H WAKE    aspirin  81 mg Oral Daily    benztropine  0.5 mg Oral BID    bisoprolol  2.5 mg Oral Daily    fluticasone furoate-vilanterol  1 puff Inhalation Daily     isosorbide mononitrate  30 mg Oral Daily    lisinopril-hydrochlorothiazide  2 tablet Oral Daily    pantoprazole  40 mg Oral Daily    polyethylene glycol  17 g Oral BID    QUEtiapine  150 mg Oral QHS    risperiDONE  2 mg Oral BID    rosuvastatin  40 mg Oral QHS    senna-docusate 8.6-50 mg  1 tablet Oral BID    tamsulosin  0.4 mg Oral Daily    tiotropium  1 capsule Inhalation Daily    traZODone  100 mg Oral QHS     Continuous Infusions:   As Needed: acetaminophen, Dextrose 10% Bolus, Dextrose 10% Bolus, glucagon (human recombinant), glucose, glucose, insulin aspart U-100, ondansetron, sodium chloride 0.9%, sodium chloride 0.9%    Assessment and Plan by Problem     Hypoglycemia   Type 2 diabetes mellitus with hyperglycemia, without long-term current use of insulin   Stage 2 chronic kidney disease with acute renal insufficiency, urinary retention  Patient treated with SANDOVAL at home. sCr near baseline on admission.  Concern for possible accidental SANDOVAL overdose or prolonged avoidance of food.  Continued management with D10 and hypoglycemia protocol. BGs q 2 hours.  BGs stable on D10; discontinued D10 and monitored.  Noted to have urinary retention and elevated sCr. Saldivar placed, Flomax started, renal US ordered, Nephrology consulted, ACE-I discontinued.      Schizophrenia  Concern for delusions interfering with normal eating pattern.  Psychiatry consulted.  Continuing home medications.      CAD (coronary artery disease)   HTN (hypertension)  Continuing home medications.  Lisinopril-HCTZ held due to CHELE      Chronic obstructive pulmonary disease   Centrilobular emphysema   Tobacco abuse  Takes only Trelogy for COPD control  Needs 6 min walk test for home O2 prior to discharge.      Gastroesophageal reflux disease without esophagitis  Continuing home PPI     Bilateral Knee pain  Knees warm and erythematous.   Ortho consulted. No evidence of septic joints or gout.  Does not look like typical cellulitis.  Checking procalcitonin, CRP, ESR.  Mild leukocytosis. Afebrile. Consider antibiotics.     Diet diabetic [facility]; 2000 Calorie    Significant LDAs:     Discharge plan and follow up  Home or Self Care    VTE Risk Mitigation (From admission, onward)         Ordered     IP VTE LOW RISK PATIENT  Once      01/27/20 1252                Renetta Davenport MD  Department of Hospital Medicine   [facility]

## 2020-01-29 NOTE — SUBJECTIVE & OBJECTIVE
Past Medical History:   Diagnosis Date    COPD (chronic obstructive pulmonary disease)     Coronary artery disease     Hypercholesterolemia     Hypertension        Past Surgical History:   Procedure Laterality Date    COLONOSCOPY  02/2016    Advised repeat in 3 years    CORONARY STENT PLACEMENT  2013       Review of patient's allergies indicates:  No Known Allergies    Current Facility-Administered Medications   Medication    acetaminophen tablet 500 mg    albuterol-ipratropium 2.5 mg-0.5 mg/3 mL nebulizer solution 3 mL    aspirin EC tablet 81 mg    benztropine tablet 0.5 mg    bisoprolol split tablet 2.5 mg    dextrose 10% (D10W) Bolus    dextrose 10% (D10W) Bolus    fluticasone furoate-vilanterol 100-25 mcg/dose diskus inhaler 1 puff    glucagon (human recombinant) injection 1 mg    glucose chewable tablet 16 g    glucose chewable tablet 24 g    insulin aspart U-100 pen 0-5 Units    isosorbide mononitrate 24 hr tablet 30 mg    lisinopril-hydrochlorothiazide 10-12.5 mg per tablet 2 tablet    ondansetron disintegrating tablet 4 mg    pantoprazole EC tablet 40 mg    polyethylene glycol packet 17 g    QUEtiapine tablet 150 mg    risperiDONE tablet 2 mg    rosuvastatin tablet 40 mg    senna-docusate 8.6-50 mg per tablet 1 tablet    sodium chloride 0.9% flush 10 mL    sodium chloride 0.9% flush 5 mL    tamsulosin 24 hr capsule 0.4 mg    tiotropium inhalation capsule 18 mcg    traZODone tablet 100 mg     Family History     Problem Relation (Age of Onset)    Diabetes Grandchild    Mental illness Brother    No Known Problems Mother        Tobacco Use    Smoking status: Current Every Day Smoker     Packs/day: 0.75     Years: 50.00     Pack years: 37.50   Substance and Sexual Activity    Alcohol use: Yes     Comment: 2-3beers/day    Drug use: Yes     Comment: 3-4 marijuana joints per day    Sexual activity: Not on file     ROS   Per primary team note   Objective:     Vital Signs (Most  "Recent):  Temp: 98.6 °F (37 °C) (01/29/20 1535)  Pulse: 88 (01/29/20 1634)  Resp: 16 (01/29/20 1634)  BP: (!) 110/57 (01/29/20 1535)  SpO2: (!) 94 % (01/29/20 1634) Vital Signs (24h Range):  Temp:  [96 °F (35.6 °C)-100.1 °F (37.8 °C)] 98.6 °F (37 °C)  Pulse:  [81-97] 88  Resp:  [16-18] 16  SpO2:  [91 %-97 %] 94 %  BP: ()/(52-67) 110/57     Weight: 78.9 kg (173 lb 15.1 oz)  Height: 5' 8" (172.7 cm)  Body mass index is 26.45 kg/m².      Intake/Output Summary (Last 24 hours) at 1/29/2020 1724  Last data filed at 1/29/2020 1100  Gross per 24 hour   Intake --   Output 1300 ml   Net -1300 ml       Ortho/SPM Exam   BLE:  Skin intact throughout, various scars present over bilateral anterior knees which he states is from childhood cuts and scrapes, denies any surgeries  No effusion around the knee and suprapatellar region  No ecchymosis, erythema, or signs of cellulitis  No TTP around the knee  No joint line tenderness  No tenderness to palpation of proximal, middle, or distal aspects of femur, tibia, or fibula  No tenderness to palpation of foot  ROM at knee limited due to pain      Flexion: 80 degrees      Extension: 0 degrees  Full painless ROM of hip and ankle  No laxity with varus or valgus stress at knee  Negative Anterior/Posterior Drawer tests  Compartments soft  SILT Sa/Aguero/DP/SP/T  Motor intact EHL/FHL/TA/Gastroc  2+ DP  Brisk capillary refill       Significant Labs:   CBC:   Recent Labs   Lab 01/28/20  0354 01/29/20  0500   WBC 14.55* 13.87*   HGB 15.1 12.9*   HCT 43.7 39.6*    259     CMP:   Recent Labs   Lab 01/28/20  0354 01/29/20  0500    133*   K 4.1 4.1    101   CO2 25 22*   * 157*   BUN 27* 38*   CREATININE 1.7* 3.0*   CALCIUM 9.0 8.8   ALBUMIN 3.7 3.4*   ANIONGAP 9 10   EGFRNONAA 41.7* 21.0*     Recent Labs   Lab 01/29/20  0500 01/29/20  1312   WBC 13.87*  --    SEDRATE  --  24*   CRP  --  80.5*          Significant Imaging: I have reviewed all pertinent imaging " results/findings.   X-rays of bilateral knees show no fracture or significant signs of arthritis    Procedure Note:  Bilateral knee aspiration  Patient was explained risks, benefits, and alternatives to treatment and verbalized consent to proceed. Time out was performed and patient name, , site, and procedure were confirmed. Skin was sterilely prepared with alcohol solution. 18 gauge needle on a 60 cc syringe was used for aspiration through anterolateral approach.  To cc of clear colored fluid collected from bilateral knees. Fluid and cultures were obtained and sent for analysis. Aspiration site was covered with a bandaid.    Right knee Joint Fluid Analysis:  WBC: 59  Segs: 29%  Crystals: negative  Gram Stain: negative  Cultures pending    Left knee Joint Fluid Analysis:  WBC:  To little fluid to obtain  Segs:  To little fluid to obtain  Crystals: negative  Gram Stain: negative  Cultures pending

## 2020-01-29 NOTE — PLAN OF CARE
01/29/20 0847   Post-Acute Status   Post-Acute Authorization E   [facility] Status Awaiting Internal medical Clearance

## 2020-01-29 NOTE — ASSESSMENT & PLAN NOTE
Paresh Senior is a 64 y.o. male with bilateral atraumatic knee pain. Aspirations of bilateral knees were performed at bedside and fluid was sent for analysis.  Fluid analysis was not suggestive for gout or infection.  His inflammatory markers are slightly increased.  There is no concern at this time for septic arthritis of either knee.  Patient can weight bear as tolerated.  Focus should remain on pain control.  Suggest PT/OT daily.

## 2020-01-30 LAB
ALBUMIN SERPL BCP-MCNC: 2.9 G/DL (ref 3.5–5.2)
ANION GAP SERPL CALC-SCNC: 12 MMOL/L (ref 8–16)
BACTERIA #/AREA URNS AUTO: ABNORMAL /HPF
BASOPHILS # BLD AUTO: 0.03 K/UL (ref 0–0.2)
BASOPHILS NFR BLD: 0.3 % (ref 0–1.9)
BILIRUB UR QL STRIP: NEGATIVE
BUN SERPL-MCNC: 57 MG/DL (ref 8–23)
CALCIUM SERPL-MCNC: 8.2 MG/DL (ref 8.7–10.5)
CHLORIDE SERPL-SCNC: 101 MMOL/L (ref 95–110)
CLARITY UR REFRACT.AUTO: ABNORMAL
CO2 SERPL-SCNC: 20 MMOL/L (ref 23–29)
COLOR UR AUTO: YELLOW
CREAT SERPL-MCNC: 3.4 MG/DL (ref 0.5–1.4)
DIFFERENTIAL METHOD: ABNORMAL
EOSINOPHIL # BLD AUTO: 0.4 K/UL (ref 0–0.5)
EOSINOPHIL NFR BLD: 3.8 % (ref 0–8)
ERYTHROCYTE [DISTWIDTH] IN BLOOD BY AUTOMATED COUNT: 12.4 % (ref 11.5–14.5)
EST. GFR  (AFRICAN AMERICAN): 20.8 ML/MIN/1.73 M^2
EST. GFR  (NON AFRICAN AMERICAN): 18 ML/MIN/1.73 M^2
GLUCOSE SERPL-MCNC: 144 MG/DL (ref 70–110)
GLUCOSE UR QL STRIP: NEGATIVE
HCT VFR BLD AUTO: 34.7 % (ref 40–54)
HGB BLD-MCNC: 11.6 G/DL (ref 14–18)
HGB UR QL STRIP: ABNORMAL
HYALINE CASTS UR QL AUTO: 18 /LPF
IMM GRANULOCYTES # BLD AUTO: 0.02 K/UL (ref 0–0.04)
IMM GRANULOCYTES NFR BLD AUTO: 0.2 % (ref 0–0.5)
KETONES UR QL STRIP: NEGATIVE
LEUKOCYTE ESTERASE UR QL STRIP: ABNORMAL
LYMPHOCYTES # BLD AUTO: 1.7 K/UL (ref 1–4.8)
LYMPHOCYTES NFR BLD: 15.9 % (ref 18–48)
MAGNESIUM SERPL-MCNC: 2.5 MG/DL (ref 1.6–2.6)
MCH RBC QN AUTO: 30.7 PG (ref 27–31)
MCHC RBC AUTO-ENTMCNC: 33.4 G/DL (ref 32–36)
MCV RBC AUTO: 92 FL (ref 82–98)
MICROSCOPIC COMMENT: ABNORMAL
MONOCYTES # BLD AUTO: 1.2 K/UL (ref 0.3–1)
MONOCYTES NFR BLD: 11.1 % (ref 4–15)
NEUTROPHILS # BLD AUTO: 7.4 K/UL (ref 1.8–7.7)
NEUTROPHILS NFR BLD: 68.7 % (ref 38–73)
NITRITE UR QL STRIP: NEGATIVE
NRBC BLD-RTO: 0 /100 WBC
PATH INTERP FLD-IMP: NORMAL
PATH INTERP FLD-IMP: NORMAL
PH UR STRIP: 5 [PH] (ref 5–8)
PHOSPHATE SERPL-MCNC: 5.4 MG/DL (ref 2.7–4.5)
PLATELET # BLD AUTO: 241 K/UL (ref 150–350)
PMV BLD AUTO: 10 FL (ref 9.2–12.9)
POCT GLUCOSE: 198 MG/DL (ref 70–110)
POCT GLUCOSE: 210 MG/DL (ref 70–110)
POTASSIUM SERPL-SCNC: 3.8 MMOL/L (ref 3.5–5.1)
PROCALCITONIN SERPL IA-MCNC: 0.14 NG/ML
PROT UR QL STRIP: NEGATIVE
RBC # BLD AUTO: 3.78 M/UL (ref 4.6–6.2)
RBC #/AREA URNS AUTO: 42 /HPF (ref 0–4)
SODIUM SERPL-SCNC: 133 MMOL/L (ref 136–145)
SP GR UR STRIP: 1.01 (ref 1–1.03)
SQUAMOUS #/AREA URNS AUTO: 1 /HPF
URN SPEC COLLECT METH UR: ABNORMAL
WBC # BLD AUTO: 10.8 K/UL (ref 3.9–12.7)
WBC #/AREA URNS AUTO: 59 /HPF (ref 0–5)

## 2020-01-30 PROCEDURE — 94761 N-INVAS EAR/PLS OXIMETRY MLT: CPT

## 2020-01-30 PROCEDURE — 84145 PROCALCITONIN (PCT): CPT

## 2020-01-30 PROCEDURE — 94640 AIRWAY INHALATION TREATMENT: CPT

## 2020-01-30 PROCEDURE — 83735 ASSAY OF MAGNESIUM: CPT

## 2020-01-30 PROCEDURE — 11000001 HC ACUTE MED/SURG PRIVATE ROOM

## 2020-01-30 PROCEDURE — 36415 COLL VENOUS BLD VENIPUNCTURE: CPT

## 2020-01-30 PROCEDURE — 97165 OT EVAL LOW COMPLEX 30 MIN: CPT

## 2020-01-30 PROCEDURE — 25000242 PHARM REV CODE 250 ALT 637 W/ HCPCS: Performed by: INTERNAL MEDICINE

## 2020-01-30 PROCEDURE — 80069 RENAL FUNCTION PANEL: CPT

## 2020-01-30 PROCEDURE — 85025 COMPLETE CBC W/AUTO DIFF WBC: CPT

## 2020-01-30 PROCEDURE — 99231 SBSQ HOSP IP/OBS SF/LOW 25: CPT | Mod: ,,, | Performed by: INTERNAL MEDICINE

## 2020-01-30 PROCEDURE — 25000003 PHARM REV CODE 250: Performed by: INTERNAL MEDICINE

## 2020-01-30 PROCEDURE — 97161 PT EVAL LOW COMPLEX 20 MIN: CPT

## 2020-01-30 PROCEDURE — 99231 PR SUBSEQUENT HOSPITAL CARE,LEVL I: ICD-10-PCS | Mod: ,,, | Performed by: INTERNAL MEDICINE

## 2020-01-30 RX ADMIN — TRAZODONE HYDROCHLORIDE 100 MG: 100 TABLET ORAL at 09:01

## 2020-01-30 RX ADMIN — BISOPROLOL FUMARATE 2.5 MG: 5 TABLET ORAL at 09:01

## 2020-01-30 RX ADMIN — HYDROCODONE BITARTRATE AND ACETAMINOPHEN 1 TABLET: 5; 325 TABLET ORAL at 09:01

## 2020-01-30 RX ADMIN — TIOTROPIUM BROMIDE 18 MCG: 18 CAPSULE ORAL; RESPIRATORY (INHALATION) at 09:01

## 2020-01-30 RX ADMIN — ROSUVASTATIN CALCIUM 40 MG: 20 TABLET, FILM COATED ORAL at 09:01

## 2020-01-30 RX ADMIN — IPRATROPIUM BROMIDE AND ALBUTEROL SULFATE 3 ML: .5; 3 SOLUTION RESPIRATORY (INHALATION) at 03:01

## 2020-01-30 RX ADMIN — SENNOSIDES AND DOCUSATE SODIUM 1 TABLET: 8.6; 5 TABLET ORAL at 09:01

## 2020-01-30 RX ADMIN — IPRATROPIUM BROMIDE AND ALBUTEROL SULFATE 3 ML: .5; 3 SOLUTION RESPIRATORY (INHALATION) at 08:01

## 2020-01-30 RX ADMIN — PANTOPRAZOLE SODIUM 40 MG: 40 TABLET, DELAYED RELEASE ORAL at 09:01

## 2020-01-30 RX ADMIN — BENZTROPINE MESYLATE 0.5 MG: 0.5 TABLET ORAL at 09:01

## 2020-01-30 RX ADMIN — FLUTICASONE FUROATE AND VILANTEROL TRIFENATATE 1 PUFF: 100; 25 POWDER RESPIRATORY (INHALATION) at 09:01

## 2020-01-30 RX ADMIN — IPRATROPIUM BROMIDE AND ALBUTEROL SULFATE 3 ML: .5; 3 SOLUTION RESPIRATORY (INHALATION) at 07:01

## 2020-01-30 RX ADMIN — IPRATROPIUM BROMIDE AND ALBUTEROL SULFATE 3 ML: .5; 3 SOLUTION RESPIRATORY (INHALATION) at 01:01

## 2020-01-30 RX ADMIN — ISOSORBIDE MONONITRATE 30 MG: 30 TABLET, EXTENDED RELEASE ORAL at 09:01

## 2020-01-30 RX ADMIN — ASPIRIN 81 MG: 81 TABLET, COATED ORAL at 09:01

## 2020-01-30 RX ADMIN — RISPERIDONE 2 MG: 1 TABLET ORAL at 09:01

## 2020-01-30 RX ADMIN — HYDROCODONE BITARTRATE AND ACETAMINOPHEN 1 TABLET: 5; 325 TABLET ORAL at 12:01

## 2020-01-30 RX ADMIN — QUETIAPINE FUMARATE 150 MG: 100 TABLET ORAL at 09:01

## 2020-01-30 RX ADMIN — TAMSULOSIN HYDROCHLORIDE 0.4 MG: 0.4 CAPSULE ORAL at 09:01

## 2020-01-30 NOTE — CONSULTS
[facility]  Orthopedics  Consult Note    Patient Name: Paresh Senior  MRN: [id number]  Admission Date: 1/27/2020  Hospital Length of Stay: 2 days  Attending Provider: Renetta Davenport MD  Primary Care Provider: Fernando Carmona MD         Inpatient consult to Orthopedic Surgery  Consult performed by: Parrish Lai MD  Consult ordered by: Renetta Davenport MD        Subjective:     Principal Problem:Hypoglycemia    Chief Complaint:   Chief Complaint   Patient presents with    Hypoglycemia     CBG 40 on EMS arrival amp of D50 given repeat         HPI: Paresh Senior is a 64 y.o. male with PMH COPD, CAD, HLD, HTN, and diabetes who presents with bilateral knee pain for 1 day.  Patient is currently admitted for hypoglycemia after taking too much of his self on urea medicine.  He states that he has not had any trauma to the knees.  He has not been able to ambulate without severe pain over the past day.  He states that the right knee hurts worse than the left.  He denies any numbness or tingling.  He denies any other MSK pains or paresthesias.  He does not take any anticoagulation at baseline.  He walks without a walker or any other assistive devices at baseline.      Past Medical History:   Diagnosis Date    COPD (chronic obstructive pulmonary disease)     Coronary artery disease     Hypercholesterolemia     Hypertension        Past Surgical History:   Procedure Laterality Date    COLONOSCOPY  02/2016    Advised repeat in 3 years    CORONARY STENT PLACEMENT  2013       Review of patient's allergies indicates:  No Known Allergies    Current Facility-Administered Medications   Medication    acetaminophen tablet 500 mg    albuterol-ipratropium 2.5 mg-0.5 mg/3 mL nebulizer solution 3 mL    aspirin EC tablet 81 mg    benztropine tablet 0.5 mg    bisoprolol split tablet 2.5 mg    dextrose 10% (D10W) Bolus    dextrose 10% (D10W) Bolus    fluticasone furoate-vilanterol 100-25  "mcg/dose diskus inhaler 1 puff    glucagon (human recombinant) injection 1 mg    glucose chewable tablet 16 g    glucose chewable tablet 24 g    insulin aspart U-100 pen 0-5 Units    isosorbide mononitrate 24 hr tablet 30 mg    lisinopril-hydrochlorothiazide 10-12.5 mg per tablet 2 tablet    ondansetron disintegrating tablet 4 mg    pantoprazole EC tablet 40 mg    polyethylene glycol packet 17 g    QUEtiapine tablet 150 mg    risperiDONE tablet 2 mg    rosuvastatin tablet 40 mg    senna-docusate 8.6-50 mg per tablet 1 tablet    sodium chloride 0.9% flush 10 mL    sodium chloride 0.9% flush 5 mL    tamsulosin 24 hr capsule 0.4 mg    tiotropium inhalation capsule 18 mcg    traZODone tablet 100 mg     Family History     Problem Relation (Age of Onset)    Diabetes Grandchild    Mental illness Brother    No Known Problems Mother        Tobacco Use    Smoking status: Current Every Day Smoker     Packs/day: 0.75     Years: 50.00     Pack years: 37.50   Substance and Sexual Activity    Alcohol use: Yes     Comment: 2-3beers/day    Drug use: Yes     Comment: 3-4 marijuana joints per day    Sexual activity: Not on file     ROS   Per primary team note   Objective:     Vital Signs (Most Recent):  Temp: 98.6 °F (37 °C) (01/29/20 1535)  Pulse: 88 (01/29/20 1634)  Resp: 16 (01/29/20 1634)  BP: (!) 110/57 (01/29/20 1535)  SpO2: (!) 94 % (01/29/20 1634) Vital Signs (24h Range):  Temp:  [96 °F (35.6 °C)-100.1 °F (37.8 °C)] 98.6 °F (37 °C)  Pulse:  [81-97] 88  Resp:  [16-18] 16  SpO2:  [91 %-97 %] 94 %  BP: ()/(52-67) 110/57     Weight: 78.9 kg (173 lb 15.1 oz)  Height: 5' 8" (172.7 cm)  Body mass index is 26.45 kg/m².      Intake/Output Summary (Last 24 hours) at 1/29/2020 1724  Last data filed at 1/29/2020 1100  Gross per 24 hour   Intake --   Output 1300 ml   Net -1300 ml       Ortho/SPM Exam   BLE:  Skin intact throughout, various scars present over bilateral anterior knees which he states is from " childhood cuts and scrapes, denies any surgeries  No effusion around the knee and suprapatellar region  No ecchymosis, erythema, or signs of cellulitis  No TTP around the knee  No joint line tenderness  No tenderness to palpation of proximal, middle, or distal aspects of femur, tibia, or fibula  No tenderness to palpation of foot  ROM at knee limited due to pain      Flexion: 80 degrees      Extension: 0 degrees  Full painless ROM of hip and ankle  No laxity with varus or valgus stress at knee  Negative Anterior/Posterior Drawer tests  Compartments soft  SILT Sa/Aguero/DP/SP/T  Motor intact EHL/FHL/TA/Gastroc  2+ DP  Brisk capillary refill       Significant Labs:   CBC:   Recent Labs   Lab 20  0354 20  0500   WBC 14.55* 13.87*   HGB 15.1 12.9*   HCT 43.7 39.6*    259     CMP:   Recent Labs   Lab 20  0354 20  0500    133*   K 4.1 4.1    101   CO2 25 22*   * 157*   BUN 27* 38*   CREATININE 1.7* 3.0*   CALCIUM 9.0 8.8   ALBUMIN 3.7 3.4*   ANIONGAP 9 10   EGFRNONAA 41.7* 21.0*     Recent Labs   Lab 20  0500 20  1312   WBC 13.87*  --    SEDRATE  --  24*   CRP  --  80.5*          Significant Imaging: I have reviewed all pertinent imaging results/findings.   X-rays of bilateral knees show no fracture or significant signs of arthritis    Procedure Note:  Bilateral knee aspiration  Patient was explained risks, benefits, and alternatives to treatment and verbalized consent to proceed. Time out was performed and patient name, , site, and procedure were confirmed. Skin was sterilely prepared with alcohol solution. 18 gauge needle on a 60 cc syringe was used for aspiration through anterolateral approach.  To cc of clear colored fluid collected from bilateral knees. Fluid and cultures were obtained and sent for analysis. Aspiration site was covered with a bandaid.    Right knee Joint Fluid Analysis:  WBC: 95  Segs: 29%  Crystals: negative  Gram Stain: negative  Cultures  pending    Left knee Joint Fluid Analysis:  WBC:  To little fluid to obtain  Segs:  To little fluid to obtain  Crystals: negative  Gram Stain: negative  Cultures pending        Assessment/Plan:     Bilateral knee pain  Paresh Senior is a 64 y.o. male with bilateral atraumatic knee pain. Aspirations of bilateral knees were performed at bedside and fluid was sent for analysis.  Fluid analysis was not suggestive for gout or infection.  His inflammatory markers are slightly increased.  There is no concern at this time for septic arthritis of either knee.  Patient can weight bear as tolerated.  Focus should remain on pain control.  Suggest PT/OT daily.        Parrish Lai MD  Orthopedics  [facility]

## 2020-01-30 NOTE — PT/OT/SLP EVAL
Physical Therapy Evaluation    Patient Name:  Paresh Senior   MRN:  [id number]    Recommendations:     Discharge Recommendations:  home health PT   Discharge Equipment Recommendations: none   Barriers to discharge: None    Assessment:     Paresh Senior is a 64 y.o. male admitted with a medical diagnosis of Hypoglycemia.  He presents with the following impairments/functional limitations:  weakness, impaired endurance, gait instability. Pt performed functional mobility on this date near baseline limited primarily by decreased endurance. Pt reports at baseline he amb w/Rollator household distances and his brother assist w/IADLs. Pt would benefit from continued skilled acute PT 3x/wk to improve functional mobility.  Recommending pt receive PT services in HHPT setting following discharge from hospital once medically cleared.     Rehab Prognosis: Fair; patient would benefit from acute skilled PT services to address these deficits and reach maximum level of function.    Recent Surgery: * No surgery found *      Plan:     During this hospitalization, patient to be seen 3 x/week to address the identified rehab impairments via gait training, therapeutic activities, therapeutic exercises, neuromuscular re-education and progress toward the following goals:    · Plan of Care Expires:  02/29/20    Subjective     Chief Complaint: none noted   Patient/Family Comments/goals: Pt pleasant and willing to participate with therapy on this date.    Pain/Comfort:  ·      Patients cultural, spiritual, Jehovah's witness conflicts given the current situation: no    Living Environment:  Pt lives alone in a first floor apartment w/3 [address].   Prior to admission, patients level of function was amb w/Rollator household distances and had his brother perform IADLs.  Equipment used at home: rollator, shower chair.  DME owned (not currently used): none.  Upon discharge, patient will have assistance from brother.    Objective:     Communicated with NSG prior to  session.  Patient found amb in room with    upon PT entry to room.    General Precautions: Standard, fall   Orthopedic Precautions:N/A   Braces: N/A     Exams:  · Gross Motor Coordination:  WFL    Functional Mobility:  · Transfers:     · Sit to Stand:  stand by assistance with no AD  · Gait: 15ft SBA without AD  · Balance: SBA      Therapeutic Activities and Exercises:   - Pt educated on:   -PT roles, expectations, and POC    -Safety with mobility   -Benefits of OOB activities to increase strength and functional mobility    -Performing ther ex for increasing LE ROM and strength   -Discharge recommendations     AM-PAC 6 CLICK MOBILITY  Total Score:18     Patient left sitting EOB with all lines intact and call button in reach.    GOALS:   Multidisciplinary Problems     Physical Therapy Goals        Problem: Physical Therapy Goal    Goal Priority Disciplines Outcome Goal Variances Interventions   Physical Therapy Goal     PT, PT/OT Ongoing, Progressing     Description:  Goals to be met by: 2020    Patient will increase functional independence with mobility by performin. Supine to sit with Modified [place]  2. Sit to supine with Modified [place]  3. Sit to stand transfer with Modified [place]  4. Gait  x 30 feet with Modified [place] using LRAD  5. Lower extremity exercise program x15 reps per handout, with independence                     History:     Past Medical History:   Diagnosis Date    COPD (chronic obstructive pulmonary disease)     Coronary artery disease     Hypercholesterolemia     Hypertension        Past Surgical History:   Procedure Laterality Date    COLONOSCOPY  2016    Advised repeat in 3 years    CORONARY STENT PLACEMENT         Time Tracking:     PT Received On: 20  PT Start Time: 905     PT Stop Time: 913  PT Total Time (min): 8 min     Billable Minutes: Evaluation 8      Rj Avila, PT  2020

## 2020-01-30 NOTE — PLAN OF CARE
01/30/20 0899   Post-Acute Status   Post-Acute Authorization HME   Home Health/Hospice Status Awaiting Internal Medical Clearance

## 2020-01-30 NOTE — PLAN OF CARE
Eval completed and POC established     Irving Avila, PT, DPT  2020         Problem: Physical Therapy Goal  Goal: Physical Therapy Goal  Description  Goals to be met by: 2020    Patient will increase functional independence with mobility by performin. Supine to sit with Modified [place]  2. Sit to supine with Modified [place]  3. Sit to stand transfer with Modified [place]  4. Gait  x 30 feet with Modified [place] using LRAD  5. Lower extremity exercise program x15 reps per handout, with independence    Outcome: Ongoing, Progressing

## 2020-01-30 NOTE — PLAN OF CARE
Problem: Occupational Therapy Goal  Goal: Occupational Therapy Goal  Outcome: Met  Evaluation/Discharge only. No acute OT needs at this time. No goals established. Re-consult if situation changes.    JAMES Vogt  1/30/2020

## 2020-01-30 NOTE — PLAN OF CARE
Problem: Fall Injury Risk  Goal: Absence of Fall and Fall-Related Injury  Outcome: Ongoing, Progressing     Problem: Adult Inpatient Plan of Care  Goal: Plan of Care Review  Outcome: Ongoing, Progressing  Goal: Patient-Specific Goal (Individualization)  Outcome: Ongoing, Progressing  Goal: Absence of Hospital-Acquired Illness or Injury  Outcome: Ongoing, Progressing  Goal: Optimal Comfort and Wellbeing  Outcome: Ongoing, Progressing  Goal: Readiness for Transition of Care  Outcome: Ongoing, Progressing  Goal: Rounds/Family Conference  Outcome: Ongoing, Progressing   AAOx3. POC reviewed with family. Vitals stable. Medications given as ordered. Saldivar started after bladder scan showed urinary retention.

## 2020-01-30 NOTE — PROGRESS NOTES
[facility] Hospital Medicine  Progress Note    Patient Name: Paresh Senior  MRN: [id number]  Patient Class: IP- Inpatient   Admission Date: 1/27/2020  Length of Stay: 3 days  Attending Physician: Jhon Tucker MD  Primary Care Provider: Fernando Carmona MD    [facility] Medicine Team: [facility]       HPI:  64-year-old male with diabetes type 2, hypertension, CAD, COPD, and Schizophrenia presents after feeling off this morning at home.  His mother called EMS and they found him to have a low blood glucose.  He was given IV glucose by EMS and improved immediately. Patient reports that he took his glipizide last night and this morning, but did not eat since midnight because yeast were bothering him. This has happened to him previously, sometimes 3 times per month, but at least once per month. 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 Now sometimes, when he puts a piece of bread into his mouth, he cannot swallow it or anything else until his reaction to the yeast resolves. He reports eating bread frequently when he is not having a problem with the yeast. He also reports being out of his home O2 for 2 months.    Subjective:     Principal Problem:Hypoglycemia    Interval History: Patient lying in bed, no acute distress. Reports continued b/l knee pain. Denies fever, weakness, dizziness, chest pain, SOB.     Review of Systems   Constitutional: Negative for fever.   Eyes: Negative for visual disturbance.   Respiratory: Negative for shortness of breath.    Cardiovascular: Negative for chest pain and leg swelling.   Gastrointestinal: Negative for abdominal distention, abdominal pain, nausea and vomiting.   Genitourinary: Negative for dysuria.   Neurological: Negative for dizziness and weakness.     Objective:     Vital Signs (Most Recent):  Temp: 97.2 °F (36.2 °C) (01/30/20 0432)  Pulse: 81 (01/30/20 0822)  Resp: 18 (01/30/20 0822)  BP: 126/67  (01/30/20 0432)  SpO2: 96 % (01/30/20 0822) Vital Signs (24h Range):  Temp:  [97.2 °F (36.2 °C)-98.6 °F (37 °C)] 97.2 °F (36.2 °C)  Pulse:  [81-92] 81  Resp:  [16-18] 18  SpO2:  [90 %-100 %] 96 %  BP: ()/(52-67) 126/67     Weight: 78.9 kg (173 lb 15.1 oz)  Body mass index is 26.45 kg/m².    Intake/Output Summary (Last 24 hours) at 1/30/2020 0847  Last data filed at 1/29/2020 1100  Gross per 24 hour   Intake --   Output 1300 ml   Net -1300 ml      Physical Exam   Constitutional: He appears well-developed.   HENT:   Head: Normocephalic.   Eyes: Conjunctivae and lids are normal.   Neck: Neck supple.   Cardiovascular: Normal rate, regular rhythm, S1 normal and S2 normal.   Pulmonary/Chest: Effort normal and breath sounds normal.   Abdominal: Soft. Bowel sounds are normal. He exhibits distension. There is no tenderness.   Musculoskeletal: Normal range of motion. He exhibits no edema.        Right knee: He exhibits erythema. Tenderness found.        Left knee: He exhibits swelling and erythema. Tenderness found.   Neurological: He is alert. He is not disoriented.   Skin: Skin is warm.   Psychiatric: He has a normal mood and affect.     Significant Labs:   A1C:   Recent Labs   Lab 12/13/19  0810 01/27/20  1020   HGBA1C 6.5* 6.5*     CBC:   Recent Labs   Lab 01/29/20  0500 01/30/20  0343   WBC 13.87* 10.80   HGB 12.9* 11.6*   HCT 39.6* 34.7*    241     CMP:   Recent Labs   Lab 01/29/20  0500 01/29/20  2109 01/30/20  0343   * 131* 133*   K 4.1 3.8 3.8    101 101   CO2 22* 21* 20*   * 213* 144*   BUN 38* 58* 57*   CREATININE 3.0* 4.3* 3.4*   CALCIUM 8.8 8.0* 8.2*   ALBUMIN 3.4*  --  2.9*   ANIONGAP 10 9 12   EGFRNONAA 21.0* 13.6* 18.0*     Magnesium:   Recent Labs   Lab 01/29/20  0500 01/30/20  0343   MG 2.5 2.5     POCT Glucose:   Recent Labs   Lab 01/29/20  0820 01/29/20  1526 01/29/20  2253   POCTGLUCOSE 182* 184* 203*     TSH:   Recent Labs   Lab 12/13/19  0810   TSH 2.140     Significant  Imaging: CXR: I have reviewed all pertinent results/findings within the past 24 hours and my personal findings are:  No acute process    Assessment/Plan:      Active Diagnoses:    Diagnosis Date Noted POA    Bilateral knee pain [M25.561, M25.562] 01/29/2020 No    Urine retention [R33.9] 01/29/2020 Yes    Stage 2 chronic kidney disease [N18.2] 01/27/2020 Yes    On home oxygen therapy [Z99.81] 01/21/2020 Not Applicable    Chronic obstructive pulmonary disease [J44.9]  Yes    Centrilobular emphysema [J43.2] 12/10/2019 Yes    Type 2 diabetes mellitus with hyperglycemia, without long-term current use of insulin [E11.65] 12/10/2019 Yes    Gastroesophageal reflux disease without esophagitis [K21.9] 12/10/2019 Yes    Schizophrenia [F20.9] 12/10/2019 Yes    Tobacco abuse [Z72.0] 11/25/2014 Yes    CAD (coronary artery disease) [I25.10] 11/25/2014 Yes    HTN (hypertension) [I10] 11/25/2014 Yes      Problems Resolved During this Admission:    Diagnosis Date Noted Date Resolved POA    PRINCIPAL PROBLEM:  Hypoglycemia [E16.2] 01/27/2020 01/29/2020 Yes     Inpatient Medications prescribed for management of Current Problems:   Scheduled Meds:    albuterol-ipratropium  3 mL Nebulization Q4H WAKE    aspirin  81 mg Oral Daily    benztropine  0.5 mg Oral BID    bisoprolol  2.5 mg Oral Daily    fluticasone furoate-vilanterol  1 puff Inhalation Daily    isosorbide mononitrate  30 mg Oral Daily    pantoprazole  40 mg Oral Daily    polyethylene glycol  17 g Oral BID    QUEtiapine  150 mg Oral QHS    risperiDONE  2 mg Oral BID    rosuvastatin  40 mg Oral QHS    senna-docusate 8.6-50 mg  1 tablet Oral BID    tamsulosin  0.4 mg Oral Daily    tiotropium  1 capsule Inhalation Daily    traZODone  100 mg Oral QHS     Continuous Infusions:   As Needed: acetaminophen, Dextrose 10% Bolus, Dextrose 10% Bolus, glucagon (human recombinant), glucose, glucose, HYDROcodone-acetaminophen, insulin aspart U-100, ondansetron,  sodium chloride 0.9%, sodium chloride 0.9%    Assessment and Plan:    Hypoglycemia  Type 2 diabetes mellitus with hyperglycemia, without long-term current use of insulin  Stage 2 chronic kidney disease with acute renal insufficiency, urinary retention  - Patient treated with SANDOVAL at home. sCr near baseline on admission.  - Concern for possible accidental SANDOVAL overdose or prolonged avoidance of food.  - completed D10 and hypoglycemia protocol.   - educated patient on taking glipizide once a day instead of BID upon discharge   - Noted to have urinary retention and elevated sCr. Dillon placed  renal US showed prostamegaly and no hydro.   - on Flomax   - Scr on 1/29: 4.3. Downtrended after dillon placement   - Scr: 4.3 --> 3.4 (b/l Scr: 1.7)  - voiding trial once Scr at baseline   - resume ACE once CHELE resolves      Schizophrenia  - Concern for delusions interfering with normal eating pattern.  - Psychiatry consulted. apprec recs   - Continuing home medications.     CAD (coronary artery disease)  HTN (hypertension)  - Continuing home medications.  - Lisinopril-HCTZ held due to CHELE     Chronic obstructive pulmonary disease  Centrilobular emphysema  Tobacco abuse  - Takes only Trelogy for COPD control  - 6 min walk test showed 95% O2 sat      Gastroesophageal reflux disease without esophagitis  - Continuing home PPI    Bilateral Knee pain  - Knees warm and erythematous.   - Ortho consulted. No evidence of septic joints or gout after aspiration.  - Does not look like typical cellulitis. Normal procalcitonin, elevated CRP, ESR.  - Leukocytosis resolved  - outpatient rheumatology     Debility   - plan to discharge tomorrow with home health     Diet diabetic [facility]; 2000 Calorie    Significant LDAs:     Discharge plan and follow up  Home or Self Care    VTE Risk Mitigation (From admission, onward)         Ordered     IP VTE LOW RISK PATIENT  Once      01/27/20 1252              Time spent in care of patient: > 35 minutes      Jhon Tucker MD  Department of Hospital Medicine   [facility]

## 2020-01-31 VITALS
SYSTOLIC BLOOD PRESSURE: 114 MMHG | RESPIRATION RATE: 18 BRPM | HEIGHT: 68 IN | OXYGEN SATURATION: 93 % | DIASTOLIC BLOOD PRESSURE: 58 MMHG | TEMPERATURE: 97 F | BODY MASS INDEX: 26.8 KG/M2 | WEIGHT: 176.81 LBS | HEART RATE: 73 BPM

## 2020-01-31 LAB
ALBUMIN SERPL BCP-MCNC: 2.9 G/DL (ref 3.5–5.2)
ANION GAP SERPL CALC-SCNC: 9 MMOL/L (ref 8–16)
BACTERIA UR CULT: NO GROWTH
BASOPHILS # BLD AUTO: 0.04 K/UL (ref 0–0.2)
BASOPHILS NFR BLD: 0.5 % (ref 0–1.9)
BUN SERPL-MCNC: 37 MG/DL (ref 8–23)
CALCIUM SERPL-MCNC: 8.7 MG/DL (ref 8.7–10.5)
CHLORIDE SERPL-SCNC: 106 MMOL/L (ref 95–110)
CO2 SERPL-SCNC: 24 MMOL/L (ref 23–29)
CREAT SERPL-MCNC: 1.6 MG/DL (ref 0.5–1.4)
DIFFERENTIAL METHOD: ABNORMAL
EOSINOPHIL # BLD AUTO: 0.4 K/UL (ref 0–0.5)
EOSINOPHIL NFR BLD: 4.6 % (ref 0–8)
ERYTHROCYTE [DISTWIDTH] IN BLOOD BY AUTOMATED COUNT: 12.6 % (ref 11.5–14.5)
EST. GFR  (AFRICAN AMERICAN): 51.9 ML/MIN/1.73 M^2
EST. GFR  (NON AFRICAN AMERICAN): 44.9 ML/MIN/1.73 M^2
GLUCOSE SERPL-MCNC: 130 MG/DL (ref 70–110)
HCT VFR BLD AUTO: 35.8 % (ref 40–54)
HGB BLD-MCNC: 11.9 G/DL (ref 14–18)
IMM GRANULOCYTES # BLD AUTO: 0.02 K/UL (ref 0–0.04)
IMM GRANULOCYTES NFR BLD AUTO: 0.2 % (ref 0–0.5)
LYMPHOCYTES # BLD AUTO: 1.8 K/UL (ref 1–4.8)
LYMPHOCYTES NFR BLD: 20.2 % (ref 18–48)
MAGNESIUM SERPL-MCNC: 2.5 MG/DL (ref 1.6–2.6)
MCH RBC QN AUTO: 30.4 PG (ref 27–31)
MCHC RBC AUTO-ENTMCNC: 33.2 G/DL (ref 32–36)
MCV RBC AUTO: 92 FL (ref 82–98)
MONOCYTES # BLD AUTO: 0.9 K/UL (ref 0.3–1)
MONOCYTES NFR BLD: 10.7 % (ref 4–15)
NEUTROPHILS # BLD AUTO: 5.5 K/UL (ref 1.8–7.7)
NEUTROPHILS NFR BLD: 63.8 % (ref 38–73)
NRBC BLD-RTO: 0 /100 WBC
PHOSPHATE SERPL-MCNC: 3.9 MG/DL (ref 2.7–4.5)
PLATELET # BLD AUTO: 260 K/UL (ref 150–350)
PMV BLD AUTO: 10 FL (ref 9.2–12.9)
POCT GLUCOSE: 200 MG/DL (ref 70–110)
POCT GLUCOSE: 209 MG/DL (ref 70–110)
POCT GLUCOSE: 222 MG/DL (ref 70–110)
POCT GLUCOSE: 225 MG/DL (ref 70–110)
POTASSIUM SERPL-SCNC: 4.3 MMOL/L (ref 3.5–5.1)
RBC # BLD AUTO: 3.91 M/UL (ref 4.6–6.2)
SODIUM SERPL-SCNC: 139 MMOL/L (ref 136–145)
WBC # BLD AUTO: 8.66 K/UL (ref 3.9–12.7)

## 2020-01-31 PROCEDURE — 99239 PR HOSPITAL DISCHARGE DAY,>30 MIN: ICD-10-PCS | Mod: ,,, | Performed by: INTERNAL MEDICINE

## 2020-01-31 PROCEDURE — 99239 HOSP IP/OBS DSCHRG MGMT >30: CPT | Mod: ,,, | Performed by: INTERNAL MEDICINE

## 2020-01-31 PROCEDURE — 94761 N-INVAS EAR/PLS OXIMETRY MLT: CPT

## 2020-01-31 PROCEDURE — 94640 AIRWAY INHALATION TREATMENT: CPT

## 2020-01-31 PROCEDURE — 36415 COLL VENOUS BLD VENIPUNCTURE: CPT

## 2020-01-31 PROCEDURE — 83735 ASSAY OF MAGNESIUM: CPT

## 2020-01-31 PROCEDURE — 99900035 HC TECH TIME PER 15 MIN (STAT)

## 2020-01-31 PROCEDURE — 85025 COMPLETE CBC W/AUTO DIFF WBC: CPT

## 2020-01-31 PROCEDURE — 27000221 HC OXYGEN, UP TO 24 HOURS

## 2020-01-31 PROCEDURE — 25000242 PHARM REV CODE 250 ALT 637 W/ HCPCS: Performed by: INTERNAL MEDICINE

## 2020-01-31 PROCEDURE — 80069 RENAL FUNCTION PANEL: CPT

## 2020-01-31 PROCEDURE — 25000003 PHARM REV CODE 250: Performed by: INTERNAL MEDICINE

## 2020-01-31 RX ORDER — TAMSULOSIN HYDROCHLORIDE 0.4 MG/1
0.4 CAPSULE ORAL DAILY
Qty: 30 CAPSULE | Refills: 11 | Status: SHIPPED | OUTPATIENT
Start: 2020-02-01 | End: 2020-07-17

## 2020-01-31 RX ADMIN — SENNOSIDES AND DOCUSATE SODIUM 1 TABLET: 8.6; 5 TABLET ORAL at 09:01

## 2020-01-31 RX ADMIN — ISOSORBIDE MONONITRATE 30 MG: 30 TABLET, EXTENDED RELEASE ORAL at 09:01

## 2020-01-31 RX ADMIN — BENZTROPINE MESYLATE 0.5 MG: 0.5 TABLET ORAL at 09:01

## 2020-01-31 RX ADMIN — TIOTROPIUM BROMIDE 18 MCG: 18 CAPSULE ORAL; RESPIRATORY (INHALATION) at 09:01

## 2020-01-31 RX ADMIN — HYDROCODONE BITARTRATE AND ACETAMINOPHEN 1 TABLET: 5; 325 TABLET ORAL at 09:01

## 2020-01-31 RX ADMIN — IPRATROPIUM BROMIDE AND ALBUTEROL SULFATE 3 ML: .5; 3 SOLUTION RESPIRATORY (INHALATION) at 12:01

## 2020-01-31 RX ADMIN — TAMSULOSIN HYDROCHLORIDE 0.4 MG: 0.4 CAPSULE ORAL at 09:01

## 2020-01-31 RX ADMIN — PANTOPRAZOLE SODIUM 40 MG: 40 TABLET, DELAYED RELEASE ORAL at 09:01

## 2020-01-31 RX ADMIN — ASPIRIN 81 MG: 81 TABLET, COATED ORAL at 09:01

## 2020-01-31 RX ADMIN — FLUTICASONE FUROATE AND VILANTEROL TRIFENATATE 1 PUFF: 100; 25 POWDER RESPIRATORY (INHALATION) at 09:01

## 2020-01-31 RX ADMIN — BISOPROLOL FUMARATE 2.5 MG: 5 TABLET ORAL at 09:01

## 2020-01-31 RX ADMIN — IPRATROPIUM BROMIDE AND ALBUTEROL SULFATE 3 ML: .5; 3 SOLUTION RESPIRATORY (INHALATION) at 07:01

## 2020-01-31 RX ADMIN — RISPERIDONE 2 MG: 1 TABLET ORAL at 09:01

## 2020-01-31 NOTE — NURSING
Pt adequate for discharge. Pt to go home with dillon catheter. Dillon catheter and leg bag in place. Discharge instructions and education reviewed with patient, pt verbalized understanding. Dillon care demonstrated to patient. Pt verbalized understanding. PIV removed. Pt ambulated off unit with his brother. Pt showed no signs of distress.

## 2020-01-31 NOTE — PLAN OF CARE
Meds given as ordered tolerated well. No complaints of pain. No signs or symptoms of distress/discomfort noted. Up in chair. Ambulated independently.Aox4. Safety maintained. Vitals stable. Will continue to monitor.

## 2020-01-31 NOTE — PLAN OF CARE
[facility]    HOME HEALTH ORDERS  FACE TO FACE ENCOUNTER    Patient Name: Paresh Senior  YOB: 1955    PCP: Fernando Carmona MD   PCP Address: [address] / [place] [address]  PCP Phone Number: [phone]  PCP Fax: [phone]    Encounter Date: 01/31/2020    Admit to Home Health    Diagnoses:  Active Hospital Problems    Diagnosis  POA    Bilateral knee pain [M25.561, M25.562]  No    Urine retention [R33.9]  Yes    Stage 2 chronic kidney disease [N18.2]  Yes    On home oxygen therapy [Z99.81]  Not Applicable    Chronic obstructive pulmonary disease [J44.9]  Yes     Pulmonary function tests 12/17/2019: FEV1: 1.25  (46 % predicted), FVC:  2.56 (73 % predicted), FEV1/FVC:  48, DLCO: 10.9 (41 % predicted)        Centrilobular emphysema [J43.2]  Yes    Type 2 diabetes mellitus with hyperglycemia, without long-term current use of insulin [E11.65]  Yes    Gastroesophageal reflux disease without esophagitis [K21.9]  Yes    Schizophrenia [F20.9]  Yes    Tobacco abuse [Z72.0]  Yes    CAD (coronary artery disease) [I25.10]  Yes    HTN (hypertension) [I10]  Yes      Resolved Hospital Problems    Diagnosis Date Resolved POA    *Hypoglycemia [E16.2] 01/29/2020 Yes       Future Appointments   Date Time Provider Department Center   2/6/2020  1:20 PM Haydee Bolton MD [facility] UROLOGY [place] [facility]   2/18/2020 10:20 AM Fernando Carmona MD [facility] MED [place] [facility]     Follow-up Information     Fernando Carmona MD. Schedule an appointment as soon as possible for a visit on 2/18/2020.    Specialty:  Family Medicine  Why:  For discharge from hospital follow up appointment 10:20  Contact information:  [address]  [address]  [place] [address]  [phone]                     I have seen and examined this patient face to face today. My clinical findings that support the need for the home health skilled services and home bound status are the  following:  Weakness/numbness causing balance and gait disturbance due to Coronary Heart Disease and Weakness/Debility making it taxing to leave home.  Patient with medication mismanagement issues requiring home bound status as evidenced by  Poor understanding of medication regimen/dosage and Uncontrolled hyperglycemic/hypoglycemic events.    Allergies:Review of patient's allergies indicates:  No Known Allergies    Diet: diabetic diet: 2000 calorie    Activities: activity as tolerated    Nursing:   SN to complete comprehensive assessment including routine vital signs. Instruct on disease process and s/s of complications to report to MD. Review/verify medication list sent home with the patient at time of discharge  and instruct patient/caregiver as needed. Frequency may be adjusted depending on start of care date.    Notify MD if SBP > 160 or < 90; DBP > 90 or < 50; HR > 120 or < 50; Temp > 101    Patient will be discharge with dillon and leg bag. He will maintain dillon until he is re-evaluated by urology at his scheduled outpatient appointment on 2/6/2020.      CONSULTS:    Physical Therapy to evaluate and treat. Evaluate for home safety and equipment needs; Establish/upgrade home exercise program. Perform / instruct on therapeutic exercises, gait training, transfer training, and Range of Motion.  Occupational Therapy to evaluate and treat. Evaluate home environment for safety and equipment needs. Perform/Instruct on transfers, ADL training, ROM, and therapeutic exercises.    MISCELLANEOUS CARE:  Diabetic Care:   SN to perform and educate Diabetic management with blood glucose monitoring:, Fingerstick blood sugar a.m. and p.m. and Report CBG < 60 or > 350 to physician.  COPD: Please ensure patient has a functioning nebulizer and provide education on its usage.  If patient has increased cough or symptoms, please initiate COPD protocol including  schedule appointment with PCP or pulmonologist within 24 hours    WOUND  CARE ORDERS  n/a      Medications: Review discharge medications with patient and family and provide education.      Current Discharge Medication List      START taking these medications    Details   polyethylene glycol (GLYCOLAX) 17 gram/dose powder Mix 1 capful (17 g) with liquid and take by mouth 2 (two) times daily as needed (constipation).  Qty: 510 g, Refills: 1      tamsulosin (FLOMAX) 0.4 mg Cap Take 1 capsule (0.4 mg total) by mouth once daily.  Qty: 30 capsule, Refills: 11         CONTINUE these medications which have CHANGED    Details   glipiZIDE (GLUCOTROL) 10 MG tablet Take 1 tablet (10 mg total) by mouth daily with breakfast.  Qty: 180 tablet, Refills: 3    Associated Diagnoses: Type 2 diabetes mellitus with hyperglycemia, without long-term current use of insulin         CONTINUE these medications which have NOT CHANGED    Details   albuterol (PROVENTIL) 2.5 mg /3 mL (0.083 %) nebulizer solution Take 2.5 mg by nebulization every 6 (six) hours as needed for Wheezing or Shortness of Breath. Rescue      albuterol (VENTOLIN HFA) 90 mcg/actuation inhaler Inhale 2 puffs into the lungs every 6 (six) hours as needed for Wheezing. Rescue  Qty: 18 g, Refills: 5    Associated Diagnoses: Centrilobular emphysema      aspirin (ECOTRIN) 81 MG EC tablet Take 1 tablet (81 mg total) by mouth once daily.  Qty: 90 tablet, Refills: 3    Associated Diagnoses: Coronary artery disease involving native heart without angina pectoris, unspecified vessel or lesion type      benztropine (COGENTIN) 0.5 MG tablet Take 1 tablet (0.5 mg total) by mouth 2 (two) times daily.  Qty: 180 tablet, Refills: 0    Associated Diagnoses: Schizophrenia, unspecified type      bisoprolol (ZEBETA) 5 MG tablet Take 0.5 tablets (2.5 mg total) by mouth once daily.  Qty: 45 tablet, Refills: 3    Associated Diagnoses: Coronary artery disease involving native heart without angina pectoris, unspecified vessel or lesion type      blood sugar diagnostic Strp  1 strip by Misc.(Non-Drug; Combo Route) route 2 (two) times daily.  Qty: 100 each, Refills: 5    Associated Diagnoses: Type 2 diabetes mellitus with hyperglycemia, without long-term current use of insulin      cholecalciferol, vitamin D3, (VITAMIN D3) 25 mcg (1,000 unit) capsule Take 1 capsule (1,000 Units total) by mouth once daily.  Refills: 0    Associated Diagnoses: Vitamin D deficiency      fluticasone-umeclidin-vilanter (TRELEGY ELLIPTA) 100-62.5-25 mcg DsDv Inhale 1 puff into the lungs once daily.  Qty: 60 each, Refills: 6    Associated Diagnoses: Chronic obstructive pulmonary disease, unspecified COPD type      ipratropium (ATROVENT) 0.02 % nebulizer solution Take by nebulization.       isosorbide mononitrate (IMDUR) 30 MG 24 hr tablet Take 1 tablet (30 mg total) by mouth once daily.  Qty: 90 tablet, Refills: 3    Associated Diagnoses: Coronary artery disease involving native heart without angina pectoris, unspecified vessel or lesion type      lisinopril-hydrochlorothiazide (PRINZIDE,ZESTORETIC) 20-25 mg Tab Take 1 tablet by mouth once daily.  Qty: 90 tablet, Refills: 3    Associated Diagnoses: Coronary artery disease involving native heart without angina pectoris, unspecified vessel or lesion type      risperiDONE (RISPERDAL) 2 MG tablet Take 2 mg by mouth 2 (two) times daily.       rosuvastatin (CRESTOR) 40 MG Tab Take 1 tablet (40 mg total) by mouth every evening.  Qty: 90 tablet, Refills: 3    Associated Diagnoses: Coronary artery disease involving native heart without angina pectoris, unspecified vessel or lesion type      traZODone (DESYREL) 100 MG tablet Take 100 mg by mouth every evening.      mag hydrox/aluminum hyd/simeth (ANTACID ORAL) Take by mouth.      omeprazole (PRILOSEC) 20 MG capsule Take 20 mg by mouth once daily.      QUEtiapine (SEROQUEL) 300 MG Tab Take 150 mg by mouth every evening.       triamcinolone acetonide 0.1% (KENALOG) 0.1 % ointment Apply topically 2 (two) times daily. for 14  days  Qty: 30 g, Refills: 0    Associated Diagnoses: Dermatitis             I certify that this patient is confined to his home and needs intermittent skilled nursing care, physical therapy and occupational therapy.

## 2020-01-31 NOTE — PLAN OF CARE
Plan of care discussed with patient. Pt able to make needs known. Saldivar care provided. BG monitored, pt refused sliding scaled insulin. Pt had c/o pain, prn pain medication administered pt tolerated well. Patient is free of fall/trauma/injury. All questions addressed. Will continue to monitor

## 2020-01-31 NOTE — PLAN OF CARE
01/31/20 0841   Post-Acute Status   Post-Acute Authorization Home Health/Hospice   Home Health/Hospice Status Awaiting Internal Medical Clearance     Faxed HH orders to [facility]

## 2020-01-31 NOTE — DISCHARGE INSTRUCTIONS
[facility] Outpatient Clinics  - [facility]: [address], [phone]  - [facility]: [address], [phone]  - [facility]: [address], [phone]  - [facility]: [address]  [facility], [phone]  - [facility] ([facility]): [address], [phone]  - [facility] ([facility]): [address] [facility] [address], [phone]  - [facility]): [phone]  - [facility] [phone]     [facility] Private Outpatient Clinics  - [facility]: [address] 4th floor, [phone]  - [facility]: [address] [facility] ([facility], 3rd Floor), [address], [phone]  - [facility]: [address] [facility], [phone], [phone]  - [facility]: [address], [address], [phone]

## 2020-01-31 NOTE — DISCHARGE SUMMARY
[facility] Hospital Medicine  Discharge Summary      Patient Name: Paresh Senior  MRN: [id number]  Admission Date: 1/27/2020  Hospital Length of Stay: 4 days  Discharge Date and Time: 1/31/2020  5:24 PM  Attending Physician: Jhon Tucker MD   Discharging Provider: Jhon Tucker MD  Primary Care Provider: Fernando Carmona MD    [facility] Medicine Team: [facility] MED D Jhon Tucker MD    HPI:      64-year-old male with diabetes type 2, hypertension, CAD, COPD, and Schizophrenia presents after feeling off this morning at home.  His mother called EMS and they found him to have a low blood glucose.  He was given IV glucose by EMS and improved immediately. Patient reports that he took his glipizide last night and this morning, but did not eat since midnight because yeast were bothering him. This has happened to him previously, sometimes 3 times per month, but at least once per month. 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 Now sometimes, when he puts a piece of bread into his mouth, he cannot swallow it or anything else until his reaction to the yeast resolves. He reports eating bread frequently when he is not having a problem with the yeast. He also reports being out of his home O2 for 2 months.      * No surgery found *      Hospital Course:     Hypoglycemia  Type 2 diabetes mellitus with hyperglycemia, without long-term current use of insulin  Stage 2 chronic kidney disease with acute renal insufficiency, urinary retention  - Patient treated with SANDOVAL at home. sCr near baseline on admission.  - Concern for possible accidental SANDOVAL overdose or prolonged avoidance of food.  - completed D10 and hypoglycemia protocol.   - educated patient on taking glipizide once a day instead of BID upon discharge   - Noted to have urinary retention and elevated sCr. Saldivar placed  renal US showed prostatomegaly and no hydro.   - on Flomax   - Scr on 1/29: 4.3.  Downtrended after dillon placement   - Scr: 4.3 --> 3.4 (b/l Scr: 1.7)  - failed voiding trial (bladder scan 360 cc) on 1/31 and re-inserted dillon. Discharging patient with dillon and leg bag. Patient has scheduled outpatient urology on 2/6/20.  - resumed ACE once CHELE resolved      Schizophrenia  - Concern for delusions interfering with normal eating pattern.  - Psychiatry consulted. apprec recs   - Continuing home medications.     CAD (coronary artery disease)  HTN (hypertension)  - Continuing home medications.  - Lisinopril-HCTZ held due to CHELE     Chronic obstructive pulmonary disease  Centrilobular emphysema  Tobacco abuse  - Takes only Trelogy for COPD control  - 6 min walk test showed 95% O2 sat      Gastroesophageal reflux disease without esophagitis  - Continuing home PPI     Bilateral Knee pain  - Knees warm and erythematous.   - Ortho consulted. No evidence of septic joints or gout after aspiration.  - Does not look like typical cellulitis. Normal procalcitonin, elevated CRP, ESR.  - Leukocytosis resolved  - outpatient rheumatology      Debility   - plan to discharge tomorrow with home health        Consults:   Consults (From admission, onward)        Status Ordering Provider     Inpatient consult to Orthopedic Surgery  Once     Provider:  (Not yet assigned)    SUSAN Curtis     Inpatient consult to Psychiatry  Once     Provider:  (Not yet assigned)    SUSAN Curtis          Final Active Diagnoses:    Diagnosis Date Noted POA    Bilateral knee pain [M25.561, M25.562] 01/29/2020 No    Urine retention [R33.9] 01/29/2020 Yes    Stage 2 chronic kidney disease [N18.2] 01/27/2020 Yes    On home oxygen therapy [Z99.81] 01/21/2020 Not Applicable    Chronic obstructive pulmonary disease [J44.9]  Yes    Centrilobular emphysema [J43.2] 12/10/2019 Yes    Type 2 diabetes mellitus with hyperglycemia, without long-term current use of insulin [E11.65] 12/10/2019 Yes    Gastroesophageal  reflux disease without esophagitis [K21.9] 12/10/2019 Yes    Schizophrenia [F20.9] 12/10/2019 Yes    Tobacco abuse [Z72.0] 11/25/2014 Yes    CAD (coronary artery disease) [I25.10] 11/25/2014 Yes    HTN (hypertension) [I10] 11/25/2014 Yes      Problems Resolved During this Admission:    Diagnosis Date Noted Date Resolved POA    PRINCIPAL PROBLEM:  Hypoglycemia [E16.2] 01/27/2020 01/29/2020 Yes      Discharged Condition: good    Disposition: Home-Health Care [facility]    Follow Up:  Follow-up Information     Fernando Carmona MD. Schedule an appointment as soon as possible for a visit in 1 week.    Specialty:  Family Medicine  Why:  For discharge from hospital follow up  Contact information:  [address]  [address]  [place] [address] [phone]                 Patient Instructions:   No discharge procedures on file.  Medications:  Reconciled Home Medications:      Medication List      START taking these medications    polyethylene glycol 17 gram/dose powder  Commonly known as:  GLYCOLAX  Mix 1 capful (17 g) with liquid and take by mouth 2 (two) times daily as needed (constipation).     tamsulosin 0.4 mg Cap  Commonly known as:  FLOMAX  Take 1 capsule (0.4 mg total) by mouth once daily.        CHANGE how you take these medications    glipiZIDE 10 MG tablet  Commonly known as:  GLUCOTROL  Take 1 tablet (10 mg total) by mouth daily with breakfast.  What changed:  when to take this        CONTINUE taking these medications    * albuterol 2.5 mg /3 mL (0.083 %) nebulizer solution  Commonly known as:  PROVENTIL  Take 2.5 mg by nebulization every 6 (six) hours as needed for Wheezing or Shortness of Breath. Rescue     * albuterol 90 mcg/actuation inhaler  Commonly known as:  Ventolin HFA  Inhale 2 puffs into the lungs every 6 (six) hours as needed for Wheezing. Rescue     ANTACID ORAL  Take by mouth.     aspirin 81 MG EC tablet  Commonly known as:  ECOTRIN  Take 1 tablet (81 mg total) by mouth once daily.      benztropine 0.5 MG tablet  Commonly known as:  COGENTIN  Take 1 tablet (0.5 mg total) by mouth 2 (two) times daily.     bisoprolol 5 MG tablet  Commonly known as:  ZEBETA  Take 0.5 tablets (2.5 mg total) by mouth once daily.     blood sugar diagnostic Strp  1 strip by Misc.(Non-Drug; Combo Route) route 2 (two) times daily.     cholecalciferol (vitamin D3) 25 mcg (1,000 unit) capsule  Commonly known as:  VITAMIN D3  Take 1 capsule (1,000 Units total) by mouth once daily.     fluticasone-umeclidin-vilanter 100-62.5-25 mcg Dsdv  Commonly known as:  Trelegy Ellipta  Inhale 1 puff into the lungs once daily.     ipratropium 0.02 % nebulizer solution  Commonly known as:  ATROVENT  Take by nebulization.     isosorbide mononitrate 30 MG 24 hr tablet  Commonly known as:  IMDUR  Take 1 tablet (30 mg total) by mouth once daily.     lisinopril-hydrochlorothiazide 20-25 mg Tab  Commonly known as:  PRINZIDE,ZESTORETIC  Take 1 tablet by mouth once daily.     omeprazole 20 MG capsule  Commonly known as:  PRILOSEC  Take 20 mg by mouth once daily.     QUEtiapine 300 MG Tab  Commonly known as:  SEROQUEL  Take 150 mg by mouth every evening.     risperiDONE 2 MG tablet  Commonly known as:  RISPERDAL  Take 2 mg by mouth 2 (two) times daily.     rosuvastatin 40 MG Tab  Commonly known as:  CRESTOR  Take 1 tablet (40 mg total) by mouth every evening.     traZODone 100 MG tablet  Commonly known as:  DESYREL  Take 100 mg by mouth every evening.     triamcinolone acetonide 0.1% 0.1 % ointment  Commonly known as:  KENALOG  Apply topically 2 (two) times daily. for 14 days         * This list has 2 medication(s) that are the same as other medications prescribed for you. Read the directions carefully, and ask your doctor or other care provider to review them with you.                Significant Diagnostic Studies: Labs:   CMP   Recent Labs   Lab 01/31/20  0418      K 4.3      CO2 24   *   BUN 37*   CREATININE 1.6*   CALCIUM 8.7    ALBUMIN 2.9*   ANIONGAP 9   ESTGFRAFRICA 51.9*   EGFRNONAA 44.9*   , CBC   Recent Labs   Lab 01/31/20  0418   WBC 8.66   HGB 11.9*   HCT 35.8*      , INR No results found for: INR, PROTIME, Troponin No results for input(s): TROPONINI in the last 168 hours. and A1C:   Recent Labs   Lab 12/13/19  0810 01/27/20  1020   HGBA1C 6.5* 6.5*       CXR (1/29):    No active cardiac or pulmonary findings, chest is similar to December 17, 2019.       Renal US (1/29):  Impression       Mild loss of corticomedullary distinction which may be seen with chronic medical renal disease.    Bilateral renal simple cysts.    Prostatomegaly.       Xray knee (1/29):  FINDINGS:  Right knee:    No fracture.  Preserved tibiofemoral and patellofemoral articulations.  Minimal spurring about tibial spines and anterior superior patella.  No suprapatellar bursal effusion.  Vascular calcification noted.    Left knee:    No fracture.  Preserved tibiofemoral and patellofemoral articulations.  Minimal spurring about the anterior superior patella.  No suprapatellar bursal effusion.  Vascular calcification noted.      Impression       No acute fractures, mild arthritic findings as described.         Pending Diagnostic Studies:     None        Indwelling Lines/Drains at time of discharge:   Lines/Drains/Airways     Drain                 Urethral Catheter 01/29/20 1629 16 Fr. 1 day                Time spent on the discharge of patient: 45 minutes  Patient was seen and examined on the date of discharge and determined to be suitable for discharge.         Jhon Tucker MD  Department of Hospital Medicine  [facility]

## 2020-01-31 NOTE — NURSING
Saldivar catheter removed per MD order. 200ml urine in drainage bag upon removal. Pt tolerated well, shows no signs of distress. Will continue to monitor.

## 2020-01-31 NOTE — NURSING
Pt bladder scanned results showing 385ml. MD notified. Saldivar catheter will be re-inserted per MD order d/t urinary retention.

## 2020-01-31 NOTE — NURSING
Saldivar catheter re-inserted. 400ml output upon insertion. Pt shows no signs of distress. Will continue to monitor.

## 2020-02-01 LAB
BACTERIA SPEC AEROBE CULT: NO GROWTH
BACTERIA SPEC AEROBE CULT: NO GROWTH

## 2020-02-04 ENCOUNTER — PATIENT OUTREACH (OUTPATIENT)
Dept: ADMINISTRATIVE | Facility: CLINIC | Age: 65
End: 2020-02-04

## 2020-02-04 ENCOUNTER — TELEPHONE (OUTPATIENT)
Dept: FAMILY MEDICINE | Facility: CLINIC | Age: 65
End: 2020-02-04

## 2020-02-04 ENCOUNTER — PATIENT OUTREACH (OUTPATIENT)
Dept: ADMINISTRATIVE | Facility: HOSPITAL | Age: 65
End: 2020-02-04

## 2020-02-04 DIAGNOSIS — E11.65 TYPE 2 DIABETES MELLITUS WITH HYPERGLYCEMIA, WITHOUT LONG-TERM CURRENT USE OF INSULIN: ICD-10-CM

## 2020-02-04 RX ORDER — GLIPIZIDE 5 MG/1
TABLET ORAL
Qty: 180 TABLET | OUTPATIENT
Start: 2020-02-04

## 2020-02-04 RX ORDER — GLIPIZIDE 5 MG/1
5 TABLET ORAL 2 TIMES DAILY WITH MEALS
Qty: 60 TABLET | Refills: 0 | Status: ON HOLD | OUTPATIENT
Start: 2020-02-04 | End: 2020-03-05 | Stop reason: SDUPTHER

## 2020-02-04 NOTE — PROGRESS NOTES
Pt states that when he takes Glipizide 10 mg his blood sugars drop as low as 51 about 30 mins after taking med. States that the highest his blood sugar was on yesterday was 127+. Advised pt to monitor blood sugar and hold med if blood sugar too low until speaking to PCP. Message sent to PCP.

## 2020-02-04 NOTE — TELEPHONE ENCOUNTER
----- Message from Shira Sky LPN sent at 2/4/2020 10:34 AM CST -----  FYI. Will call patient to get further details.  ----- Message -----  From: Valery Prabhakar LPN  Sent: 2/4/2020   9:25 AM CST  To: [facility] Staff    Spoke with patient, states that when he takes Glipizide 10 mg his blood sugars drop as low as 51 about 30 mins after taking med. States that the highest his blood sugar was on yesterday was 127+. Advised pt to monitor blood sugar and hold med if blood sugar too low until speaking to PCP. Please contact patient to discuss as soon as possible.      Respectfully,  Valery Prabhakar LPN  [facility]    [email or web address]       Please do not reply to this message, as this inbox is not routinely monitored.

## 2020-02-04 NOTE — PATIENT INSTRUCTIONS
Hypoglycemia, Oral Diabetic Medicine  You have been treated for low blood sugar (hypoglycemia) caused by your diabetes medicine. Oral diabetes medicines include:  · Glyburide  · Glipizide  · Acarbose  · Metformin  · Pioglitazone  · Sitagliptin  · Canigliflozin  · Colesevelam  Low blood sugar can occur when you continue to take your usual dose of diabetes medicine but you skip meals or dont eat the amount of food that your body is used to. It can also result from taking too much diabetes medicine.  Other factors that can lower blood sugar while taking diabetes medicine include intense exercise, strong emotions, alcohol use, tobacco, caffeine, and certain medicines. These medicines include:  · Aspirin  · Haloperidol  · Propoxyphene  · Chlorpromazine  · Propranolol  · Disopyramide  · Lisinopril (and other angiotensin-converting enzyme inhibitors)  In addition, some over-the-counter cough and cold products contain alcohol, which can affect your blood sugar levels.  A class of medicines called beta-blockers is used for high blood pressure, rapid heart rate, and other conditions. Beta-blockers may prevent the early symptoms of low blood sugar. If you are taking a beta-blocker, you might not realize that your blood sugar is getting low. If you take a beta-blocker, talk to your healthcare provider about switching to a different class. The beta-blocker class includes:  · Propranolol  · Atenolol  · Metoprolol  · Nadolol  · Labetalol  · Carvedilol  Home care  · During the next 24 hours rest and eat frequent small meals. This will help prevent the return of low blood sugar.  · Learn the signals your body gives as your blood sugar drops (see below).  If symptoms of hypoglycemia return  · Keep a source of fast-acting sugar with you. At the first sign of low blood sugar, eat or drink 15 to 20 grams of fast-acting sugar. Examples include:  ¨ 3 to 4 glucose tablets (found at most drugstores)  ¨ 4 ounces of regular soda  ¨ 4  ounces of fruit juice  ¨ 2 tablespoons of raisins  ¨ 1 tablespoon of honey  · For other sources, check the sugar content on the nutrition label to figure out how much you need to eat or drink to get at least 15 grams of sugar.  · Check your blood sugar 15 minutes after treating yourself. If it is still low, take another 15 to 20 grams of fast-acting sugar. Test again in 15 minutes. If it remains low, call your healthcare provider or go to an emergency room.  · Once blood sugar returns to normal, eat a snack or meal to keep your blood sugar in a safe range.  In the future, if you are unable to eat your normal amount due to illness or vomiting, stop taking your diabetes medicine and contact your healthcare provider.  Wear a medical alert bracelet or necklace, or carry a card in your wallet explaining that you have diabetes. If you have a severe hypoglycemic reaction and cannot give this information, it will help medical personnel provide proper care.  Follow-up care  Follow up with your healthcare provider, or as advised.  If you have the equipment to monitor your blood sugar, do so at least twice a day--before breakfast and before dinner. Do this for the next 5 days. See your healthcare provider during the next week to review these records. This will help determine if you need an adjustment in your diabetes medicine.  For more information about diabetes, contact the American Diabetes Association, at www.diabetes.org.  When to seek medical advice  Call your healthcare provider right away if any of these symptoms of low blood sugar occur.  · Fatigue  · Headache  · Shakes  · Excess sweating  · Hunger  · Feeling anxious or restless  · Vision changes  · Drowsiness  · Weakness  · Confusion  · Personality changes  · Seizure or loss of consciousness  Date Last Reviewed: 6/1/2016 © 2000-2020 [employer]. [address] [place] [address], [place] [address]. All rights reserved. This information is not intended as a  substitute for professional medical care. Always follow your healthcare professional's instructions.

## 2020-02-05 ENCOUNTER — TELEPHONE (OUTPATIENT)
Dept: PULMONOLOGY | Facility: CLINIC | Age: 65
End: 2020-02-05

## 2020-02-05 ENCOUNTER — PATIENT OUTREACH (OUTPATIENT)
Dept: ADMINISTRATIVE | Facility: OTHER | Age: 65
End: 2020-02-05

## 2020-02-05 LAB
BACTERIA SPEC ANAEROBE CULT: NORMAL
BACTERIA SPEC ANAEROBE CULT: NORMAL

## 2020-02-05 PROCEDURE — G0180 MD CERTIFICATION HHA PATIENT: HCPCS | Mod: ,,, | Performed by: INTERNAL MEDICINE

## 2020-02-05 PROCEDURE — G0180 PR HOME HEALTH MD CERTIFICATION: ICD-10-PCS | Mod: ,,, | Performed by: INTERNAL MEDICINE

## 2020-02-05 NOTE — TELEPHONE ENCOUNTER
Spoke with Sondra from [facility], she was informed that Mr. Senior have not completed any of Pulmonary Rehab sessions at [facility] location. She has verbalized that she understand.

## 2020-02-05 NOTE — TELEPHONE ENCOUNTER
----- Message from Jessy Lu sent at 2/5/2020 11:57 AM CST -----  Contact: [facility]  [phone]  Need additional info on Pt Pul Rehab already before she change to EJ

## 2020-02-11 ENCOUNTER — PATIENT OUTREACH (OUTPATIENT)
Dept: ADMINISTRATIVE | Facility: OTHER | Age: 65
End: 2020-02-11

## 2020-02-12 NOTE — PROGRESS NOTES
Subjective:       Patient ID: Paresh Senior is a 64 y.o. male.    Chief Complaint: Hospital follow-up    This is a 64 y.o.  male patient that is new to me but not new to the system.  The patient is referred to me by Dr. Carmona for urinary retention. Hx of CKD2, CHF, 2DM, GERD and HTN. The patient was hospitalized on 1/27/2020 for hypoglycemia. During admission, patient had difficulty urinating, a bladder scan was completed and showed almost 1L in bladder. A dillon was placed and Flomax was started. Renal ultrasound revealed simple cysts and prostatomegaly.  A voiding trial was attempted but patient was unable to urinate, bladder scan revealed 385mL and dillon was reinserted. Patient was discharged with dillon.      Patient is here today for a voiding trial. He reports that he has never experienced urinary retention in the past. He was experiencing a weaker stream with some urgency and frequency. He has been taking Flomax since discharge. Constipation has improved with the use of stool softeners and fiber. Last BM was yesterday. He has been hydrating well.        LAST PSA  Lab Results   Component Value Date    PSA 3.2 12/13/2019       Lab Results   Component Value Date    CREATININE 1.6 (H) 01/31/2020       ---  Past Medical History:   Diagnosis Date    COPD (chronic obstructive pulmonary disease)     Coronary artery disease     Hypercholesterolemia     Hypertension        Past Surgical History:   Procedure Laterality Date    COLONOSCOPY  02/2016    Advised repeat in 3 years    CORONARY STENT PLACEMENT  2013       Family History   Problem Relation Age of Onset    No Known Problems Mother     Mental illness Brother     Diabetes Grandchild        Social History     Tobacco Use    Smoking status: Current Every Day Smoker     Packs/day: 0.75     Years: 50.00     Pack years: 37.50     Types: Cigars    Smokeless tobacco: Never Used   Substance Use Topics    Alcohol use: Yes     Comment: 2-3beers/day    Drug  "use: Yes     Types: Cocaine, "Crack" cocaine     Comment: 3-4 marijuana joints per day       Current Outpatient Medications on File Prior to Visit   Medication Sig Dispense Refill    albuterol (PROVENTIL) 2.5 mg /3 mL (0.083 %) nebulizer solution Take 2.5 mg by nebulization every 6 (six) hours as needed for Wheezing or Shortness of Breath. Rescue      albuterol (VENTOLIN HFA) 90 mcg/actuation inhaler Inhale 2 puffs into the lungs every 6 (six) hours as needed for Wheezing. Rescue 18 g 5    aspirin (ECOTRIN) 81 MG EC tablet Take 1 tablet (81 mg total) by mouth once daily. 90 tablet 3    benztropine (COGENTIN) 0.5 MG tablet Take 1 tablet (0.5 mg total) by mouth 2 (two) times daily. 180 tablet 0    bisoprolol (ZEBETA) 5 MG tablet Take 0.5 tablets (2.5 mg total) by mouth once daily. 45 tablet 3    blood sugar diagnostic Strp 1 strip by Misc.(Non-Drug; Combo Route) route 2 (two) times daily. 100 each 5    cholecalciferol, vitamin D3, (VITAMIN D3) 25 mcg (1,000 unit) capsule Take 1 capsule (1,000 Units total) by mouth once daily.  0    fluticasone-umeclidin-vilanter (TRELEGY ELLIPTA) 100-62.5-25 mcg DsDv Inhale 1 puff into the lungs once daily. 60 each 6    glipiZIDE (GLUCOTROL) 5 MG tablet Take 1 tablet (5 mg total) by mouth 2 (two) times daily with meals. 60 tablet 0    ipratropium (ATROVENT) 0.02 % nebulizer solution Take by nebulization.       isosorbide mononitrate (IMDUR) 30 MG 24 hr tablet Take 1 tablet (30 mg total) by mouth once daily. 90 tablet 3    lisinopril-hydrochlorothiazide (PRINZIDE,ZESTORETIC) 20-25 mg Tab Take 1 tablet by mouth once daily. 90 tablet 3    mag hydrox/aluminum hyd/simeth (ANTACID ORAL) Take by mouth.      omeprazole (PRILOSEC) 20 MG capsule Take 20 mg by mouth once daily.      polyethylene glycol (GLYCOLAX) 17 gram/dose powder Mix 1 capful (17 g) with liquid and take by mouth 2 (two) times daily as needed (constipation). 510 g 1    QUEtiapine (SEROQUEL) 300 MG Tab Take 150 " mg by mouth every evening.       risperiDONE (RISPERDAL) 2 MG tablet Take 2 mg by mouth 2 (two) times daily.       rosuvastatin (CRESTOR) 40 MG Tab Take 1 tablet (40 mg total) by mouth every evening. 90 tablet 3    tamsulosin (FLOMAX) 0.4 mg Cap Take 1 capsule (0.4 mg total) by mouth once daily. 30 capsule 11    traZODone (DESYREL) 100 MG tablet Take 100 mg by mouth every evening.      triamcinolone acetonide 0.1% (KENALOG) 0.1 % ointment Apply topically 2 (two) times daily. for 14 days 30 g 0     No current facility-administered medications on file prior to visit.        Review of patient's allergies indicates:  No Known Allergies    Review of Systems   Constitutional: Negative for activity change, chills and fever.   Eyes: Negative for visual disturbance.   Respiratory: Negative for shortness of breath.    Cardiovascular: Negative for chest pain.   Gastrointestinal: Negative for abdominal distention.   Genitourinary: Negative for difficulty urinating and hematuria.   Musculoskeletal: Negative for gait problem.   Skin: Negative for color change.   Neurological: Negative for facial asymmetry.   Psychiatric/Behavioral: Negative for agitation and confusion.       Objective:      Physical Exam   Constitutional: He is oriented to person, place, and time. He appears well-developed and well-nourished.   HENT:   Head: Normocephalic and atraumatic.   Eyes: EOM are normal.   Neck: Normal range of motion. Neck supple.   Cardiovascular: Intact distal pulses.   Pulmonary/Chest: Effort normal.   Abdominal: He exhibits no distension.   Genitourinary:   Genitourinary Comments: Saldivar in place draining clear yellow urine in leg bag   Musculoskeletal: Normal range of motion.   Neurological: He is alert and oriented to person, place, and time.   Skin: Skin is warm and dry.   Psychiatric: He has a normal mood and affect.       Assessment:       1. Urinary retention    2. BPH with obstruction/lower urinary tract symptoms         Plan:         1. 300 mL of sterile saline was instilled in bladder, balloon was deflated and catheter removed, pt tolerated well. Pt was unable to urinate in the clinic. He stayed around the clinic and ate and hydrated but returned to clinic a few hours later still unable to urinate. Bladder scan revealed 373mL. I gave patient the option of hanging around the clinic a few more hours to urinate vs replacing dillon. Discussed with him that I do not want to let him go home without urinating here because he uses set-up transportation, so if he is unable to urine later today, he would have to call EMS to take him the ED. Patient agreeable to plan.   2. 16 [place] dillon placed in sterile fashion without difficulty. Balloon inflated with 10cc and dillon was connected to leg bag with clear yellow urine. Patient tolerated well.   3. Started on finasteride. He will return for repeat voiding trial.   4. Discussed with patient that he is taking Benztropine which has a known side effect of urinary retention. Patient reports he has been taking this medication for many years, therefore I do not think this is causing his retention.       Urinary retention  -     POCT Bladder Scan    BPH with obstruction/lower urinary tract symptoms  -     finasteride (PROSCAR) 5 mg tablet; Take 1 tablet (5 mg total) by mouth once daily.  Dispense: 30 tablet; Refill: 0

## 2020-02-13 ENCOUNTER — OFFICE VISIT (OUTPATIENT)
Dept: UROLOGY | Facility: CLINIC | Age: 65
End: 2020-02-13
Payer: MEDICARE

## 2020-02-13 VITALS
HEIGHT: 68 IN | WEIGHT: 176 LBS | BODY MASS INDEX: 26.67 KG/M2 | SYSTOLIC BLOOD PRESSURE: 125 MMHG | DIASTOLIC BLOOD PRESSURE: 66 MMHG | TEMPERATURE: 98 F | HEART RATE: 94 BPM

## 2020-02-13 DIAGNOSIS — N40.1 BPH WITH OBSTRUCTION/LOWER URINARY TRACT SYMPTOMS: ICD-10-CM

## 2020-02-13 DIAGNOSIS — N13.8 BPH WITH OBSTRUCTION/LOWER URINARY TRACT SYMPTOMS: ICD-10-CM

## 2020-02-13 DIAGNOSIS — R33.9 URINARY RETENTION: Primary | ICD-10-CM

## 2020-02-13 LAB — POC RESIDUAL URINE VOLUME: 373 ML (ref 0–100)

## 2020-02-13 PROCEDURE — 99204 PR OFFICE/OUTPT VISIT, NEW, LEVL IV, 45-59 MIN: ICD-10-PCS | Mod: 25,S$GLB,, | Performed by: NURSE PRACTITIONER

## 2020-02-13 PROCEDURE — 99999 PR PBB SHADOW E&M-EST. PATIENT-LVL IV: CPT | Mod: PBBFAC,,, | Performed by: NURSE PRACTITIONER

## 2020-02-13 PROCEDURE — 99204 OFFICE O/P NEW MOD 45 MIN: CPT | Mod: 25,S$GLB,, | Performed by: NURSE PRACTITIONER

## 2020-02-13 PROCEDURE — 3008F BODY MASS INDEX DOCD: CPT | Mod: CPTII,S$GLB,, | Performed by: NURSE PRACTITIONER

## 2020-02-13 PROCEDURE — 3078F PR MOST RECENT DIASTOLIC BLOOD PRESSURE < 80 MM HG: ICD-10-PCS | Mod: CPTII,S$GLB,, | Performed by: NURSE PRACTITIONER

## 2020-02-13 PROCEDURE — 51702 INSERT TEMP BLADDER CATH: CPT | Mod: S$GLB,,, | Performed by: NURSE PRACTITIONER

## 2020-02-13 PROCEDURE — 51798 US URINE CAPACITY MEASURE: CPT | Mod: S$GLB,,, | Performed by: NURSE PRACTITIONER

## 2020-02-13 PROCEDURE — 51798 POCT BLADDER SCAN: ICD-10-PCS | Mod: S$GLB,,, | Performed by: NURSE PRACTITIONER

## 2020-02-13 PROCEDURE — 3074F PR MOST RECENT SYSTOLIC BLOOD PRESSURE < 130 MM HG: ICD-10-PCS | Mod: CPTII,S$GLB,, | Performed by: NURSE PRACTITIONER

## 2020-02-13 PROCEDURE — 3008F PR BODY MASS INDEX (BMI) DOCUMENTED: ICD-10-PCS | Mod: CPTII,S$GLB,, | Performed by: NURSE PRACTITIONER

## 2020-02-13 PROCEDURE — 51702 PR INSERTION OF TEMPORARY INDWELLING BLADDER CATHETER, SIMPLE: ICD-10-PCS | Mod: S$GLB,,, | Performed by: NURSE PRACTITIONER

## 2020-02-13 PROCEDURE — 3078F DIAST BP <80 MM HG: CPT | Mod: CPTII,S$GLB,, | Performed by: NURSE PRACTITIONER

## 2020-02-13 PROCEDURE — 3074F SYST BP LT 130 MM HG: CPT | Mod: CPTII,S$GLB,, | Performed by: NURSE PRACTITIONER

## 2020-02-13 PROCEDURE — 99999 PR PBB SHADOW E&M-EST. PATIENT-LVL IV: ICD-10-PCS | Mod: PBBFAC,,, | Performed by: NURSE PRACTITIONER

## 2020-02-13 RX ORDER — FINASTERIDE 5 MG/1
5 TABLET, FILM COATED ORAL DAILY
Qty: 30 TABLET | Refills: 0 | Status: SHIPPED | OUTPATIENT
Start: 2020-02-13 | End: 2020-07-17

## 2020-02-13 NOTE — PATIENT INSTRUCTIONS
1. Continue Tamsulosin (Flomax), drinking lots of fluids and working on having one soft bowel movement a day.   2. I have added in Finasteride (Proscar) to the Pharmacy here at [place] for you to start taking.  3. Will have you follow-up for a repeat voiding trial.

## 2020-02-13 NOTE — LETTER
February 13, 2020      Fernando Carmona MD  [address]  [address]  [facility] [address]           [place] [facility]  [address], [address]  [facility] [address]  Phone: [phone]          Patient: Paresh Senior   MR Number: [id number]   YOB: 1955   Date of Visit: 2/13/2020       Dear Dr. Fernando Carmona:    Thank you for referring Paresh Senior to me for evaluation. Attached you will find relevant portions of my assessment and plan of care.    If you have questions, please do not hesitate to call me. I look forward to following Paresh Senior along with you.    Sincerely,    Ella Garrett, NP    Enclosure  CC:  No Recipients    If you would like to receive this communication electronically, please contact [email or web address] or [phone] to request more information on [facility] [employer] access.    For providers and/or their staff who would like to refer a patient to [facility], please contact us through our one-stop-shop provider referral line, [facility] , at [phone].    If you feel you have received this communication in error or would no longer like to receive these types of communications, please e-mail [email or web address]

## 2020-02-14 ENCOUNTER — LAB VISIT (OUTPATIENT)
Dept: LAB | Facility: HOSPITAL | Age: 65
End: 2020-02-14
Attending: FAMILY MEDICINE
Payer: MEDICARE

## 2020-02-14 DIAGNOSIS — Z12.11 COLON CANCER SCREENING: ICD-10-CM

## 2020-02-14 PROCEDURE — 82274 ASSAY TEST FOR BLOOD FECAL: CPT

## 2020-02-18 ENCOUNTER — OFFICE VISIT (OUTPATIENT)
Dept: FAMILY MEDICINE | Facility: CLINIC | Age: 65
End: 2020-02-18
Payer: MEDICARE

## 2020-02-18 VITALS
DIASTOLIC BLOOD PRESSURE: 68 MMHG | WEIGHT: 175.25 LBS | HEART RATE: 95 BPM | BODY MASS INDEX: 26.56 KG/M2 | SYSTOLIC BLOOD PRESSURE: 112 MMHG | HEIGHT: 68 IN | OXYGEN SATURATION: 95 %

## 2020-02-18 DIAGNOSIS — E78.2 MIXED HYPERLIPIDEMIA: ICD-10-CM

## 2020-02-18 DIAGNOSIS — F20.9 SCHIZOPHRENIA, UNSPECIFIED TYPE: ICD-10-CM

## 2020-02-18 DIAGNOSIS — N28.9 RENAL INSUFFICIENCY: ICD-10-CM

## 2020-02-18 DIAGNOSIS — I10 ESSENTIAL HYPERTENSION: ICD-10-CM

## 2020-02-18 DIAGNOSIS — I50.9 CONGESTIVE HEART FAILURE, UNSPECIFIED HF CHRONICITY, UNSPECIFIED HEART FAILURE TYPE: ICD-10-CM

## 2020-02-18 DIAGNOSIS — M25.551 BILATERAL HIP PAIN: ICD-10-CM

## 2020-02-18 DIAGNOSIS — I25.10 CORONARY ARTERY DISEASE INVOLVING NATIVE HEART WITHOUT ANGINA PECTORIS, UNSPECIFIED VESSEL OR LESION TYPE: ICD-10-CM

## 2020-02-18 DIAGNOSIS — J43.9 PULMONARY EMPHYSEMA, UNSPECIFIED EMPHYSEMA TYPE: ICD-10-CM

## 2020-02-18 DIAGNOSIS — I42.9 CARDIOMYOPATHY, UNSPECIFIED TYPE: ICD-10-CM

## 2020-02-18 DIAGNOSIS — E11.65 TYPE 2 DIABETES MELLITUS WITH HYPERGLYCEMIA, WITHOUT LONG-TERM CURRENT USE OF INSULIN: ICD-10-CM

## 2020-02-18 DIAGNOSIS — E55.9 VITAMIN D DEFICIENCY: ICD-10-CM

## 2020-02-18 DIAGNOSIS — K21.9 GASTROESOPHAGEAL REFLUX DISEASE WITHOUT ESOPHAGITIS: ICD-10-CM

## 2020-02-18 DIAGNOSIS — Z72.0 TOBACCO ABUSE: ICD-10-CM

## 2020-02-18 DIAGNOSIS — J44.9 CHRONIC OBSTRUCTIVE PULMONARY DISEASE, UNSPECIFIED COPD TYPE: Primary | ICD-10-CM

## 2020-02-18 DIAGNOSIS — F14.90 CRACK COCAINE USE: ICD-10-CM

## 2020-02-18 DIAGNOSIS — F12.10 MARIJUANA ABUSE: ICD-10-CM

## 2020-02-18 DIAGNOSIS — M25.552 BILATERAL HIP PAIN: ICD-10-CM

## 2020-02-18 DIAGNOSIS — R33.9 URINARY RETENTION: ICD-10-CM

## 2020-02-18 PROCEDURE — 3074F PR MOST RECENT SYSTOLIC BLOOD PRESSURE < 130 MM HG: ICD-10-PCS | Mod: CPTII,S$GLB,, | Performed by: FAMILY MEDICINE

## 2020-02-18 PROCEDURE — 99214 OFFICE O/P EST MOD 30 MIN: CPT | Mod: S$GLB,,, | Performed by: FAMILY MEDICINE

## 2020-02-18 PROCEDURE — 3074F SYST BP LT 130 MM HG: CPT | Mod: CPTII,S$GLB,, | Performed by: FAMILY MEDICINE

## 2020-02-18 PROCEDURE — 99999 PR PBB SHADOW E&M-EST. PATIENT-LVL V: ICD-10-PCS | Mod: PBBFAC,,, | Performed by: FAMILY MEDICINE

## 2020-02-18 PROCEDURE — 3008F PR BODY MASS INDEX (BMI) DOCUMENTED: ICD-10-PCS | Mod: CPTII,S$GLB,, | Performed by: FAMILY MEDICINE

## 2020-02-18 PROCEDURE — 3078F DIAST BP <80 MM HG: CPT | Mod: CPTII,S$GLB,, | Performed by: FAMILY MEDICINE

## 2020-02-18 PROCEDURE — 3008F BODY MASS INDEX DOCD: CPT | Mod: CPTII,S$GLB,, | Performed by: FAMILY MEDICINE

## 2020-02-18 PROCEDURE — 99214 PR OFFICE/OUTPT VISIT, EST, LEVL IV, 30-39 MIN: ICD-10-PCS | Mod: S$GLB,,, | Performed by: FAMILY MEDICINE

## 2020-02-18 PROCEDURE — 3078F PR MOST RECENT DIASTOLIC BLOOD PRESSURE < 80 MM HG: ICD-10-PCS | Mod: CPTII,S$GLB,, | Performed by: FAMILY MEDICINE

## 2020-02-18 PROCEDURE — 3044F PR MOST RECENT HEMOGLOBIN A1C LEVEL <7.0%: ICD-10-PCS | Mod: CPTII,S$GLB,, | Performed by: FAMILY MEDICINE

## 2020-02-18 PROCEDURE — 3044F HG A1C LEVEL LT 7.0%: CPT | Mod: CPTII,S$GLB,, | Performed by: FAMILY MEDICINE

## 2020-02-18 PROCEDURE — 99999 PR PBB SHADOW E&M-EST. PATIENT-LVL V: CPT | Mod: PBBFAC,,, | Performed by: FAMILY MEDICINE

## 2020-02-18 RX ORDER — LANCETS
EACH MISCELLANEOUS
Qty: 100 EACH | Refills: 5 | Status: SHIPPED | OUTPATIENT
Start: 2020-02-18

## 2020-02-18 RX ORDER — INSULIN PUMP SYRINGE, 3 ML
EACH MISCELLANEOUS
Qty: 1 EACH | Refills: 0 | Status: SHIPPED | OUTPATIENT
Start: 2020-02-18 | End: 2021-02-17

## 2020-02-18 RX ORDER — DICLOFENAC SODIUM 10 MG/G
4 GEL TOPICAL 4 TIMES DAILY
Qty: 100 G | Refills: 0 | Status: SHIPPED | OUTPATIENT
Start: 2020-02-18 | End: 2020-03-26 | Stop reason: SDUPTHER

## 2020-02-18 RX ORDER — ACETAMINOPHEN 500 MG
1000 TABLET ORAL EVERY 8 HOURS PRN
Qty: 60 TABLET | Refills: 2 | Status: SHIPPED | OUTPATIENT
Start: 2020-02-18 | End: 2020-10-29

## 2020-02-18 NOTE — PROGRESS NOTES
Subjective:       Patient ID: Paresh Senior is a 64 y.o. male.    Chief Complaint: Follow-up (also c/o hip pain and requesting new glucose meter)    63 yo M pt, recently established with me, with PMH significant for CAD, CHF, Cardiomyopathy, COPD on supplemental home O2 at 2L NC, HTN, HLD, DM-2, Schizophrenia,GERD, Tobacco abuse, Marijuana abuse, and Crack abuse He presents for f/u visit. He has a few acute issues that he would like addressed today:     1. States that he is still having difficulty getting his oxygen; has not reached out to pulmonology and has not scheduled a follow-up visit. He's brought in records from his previous pulmonologist. Documented that he was admitted 11/7/2017 and did not qualify for oxygen at discharge, but feels better on oxygen. Planned to perform repeat CPFT, chest CT, nocturnal pulse oximetry, and O2 desaturation. His resting O2 is 95%. Experiences dyspnea on exertion and SOB at night.    2. Also c/o ongoing bilateral hip pain (R>L) x several years. He was previously treated with tramadol. Has had no improvement with tylenol. Symptoms worse with prolonged sitting and sleeping. No redness, swelling, or warmth to hips.     Review of Systems   Constitutional: Negative for activity change, appetite change, chills, fever and unexpected weight change.   HENT: Negative for congestion, sneezing and sore throat.    Eyes: Negative for redness, itching and visual disturbance.   Respiratory: Positive for shortness of breath. Negative for cough and wheezing.    Cardiovascular: Negative for chest pain.   Gastrointestinal: Negative for abdominal pain, constipation, diarrhea, nausea and vomiting.   Genitourinary: Negative for difficulty urinating, discharge, dysuria, frequency and urgency.   Musculoskeletal: Positive for arthralgias (Hip Pain.).   Skin: Negative for rash.   Neurological: Negative for dizziness, syncope, weakness and headaches.   Psychiatric/Behavioral: Negative for dysphoric mood  "and suicidal ideas. The patient is not nervous/anxious.          Past Medical History:   Diagnosis Date    COPD (chronic obstructive pulmonary disease)     Coronary artery disease     Hypercholesterolemia     Hypertension        Patient Active Problem List   Diagnosis    Marijuana abuse    Tobacco abuse    CAD (coronary artery disease)    HTN (hypertension)    Cardiomyopathy    Congestive heart failure    Centrilobular emphysema    Type 2 diabetes mellitus with hyperglycemia, without long-term current use of insulin    Gastroesophageal reflux disease without esophagitis    Schizophrenia    Mixed hyperlipidemia    Chronic obstructive pulmonary disease    On home oxygen therapy    Crack cocaine use    Stage 2 chronic kidney disease    Bilateral knee pain    Urine retention    Renal insufficiency    Vitamin D deficiency       Past Surgical History:   Procedure Laterality Date    COLONOSCOPY  02/2016    Advised repeat in 3 years    CORONARY STENT PLACEMENT  2013       Family History   Problem Relation Age of Onset    No Known Problems Mother     Mental illness Brother     Diabetes Grandchild        Social History     Tobacco Use   Smoking Status Current Every Day Smoker    Packs/day: 0.75    Years: 50.00    Pack years: 37.50    Types: Cigars   Smokeless Tobacco Never Used       Social History     Social History Narrative    Tobacco Use: Currently smoking 1 cigar every other day. Former cigarette use; initiated at age 6yo, last use 11/2019 (age 63 yo), using 0.5ppd       Objective:        Vitals:    02/18/20 1017   BP: 112/68   Pulse: 95   SpO2: 95%   Weight: 79.5 kg (175 lb 4.3 oz)   Height: 5' 8" (1.727 m)     Physical Exam   Constitutional: He is oriented to person, place, and time. He appears well-developed and well-nourished. No distress.   Pt without use of supplemental O2 today   HENT:   Head: Normocephalic and atraumatic.   Right Ear: External ear normal.   Left Ear: External ear " normal.   Mouth/Throat: Mucous membranes are normal.   Eyes: Conjunctivae and EOM are normal. No scleral icterus.   Neck: Normal range of motion.   Cardiovascular: Normal rate, regular rhythm and normal heart sounds. Exam reveals no gallop and no friction rub.   No murmur heard.  Pulmonary/Chest: Effort normal and breath sounds normal. No respiratory distress. He has no wheezes. He has no rales.   Musculoskeletal: Normal range of motion. He exhibits no edema, tenderness or deformity.   Neurological: He is alert and oriented to person, place, and time. No cranial nerve deficit. Gait normal.   Skin: Skin is warm and dry. No rash noted. No erythema.   Psychiatric: He has a normal mood and affect. His behavior is normal.     Assessment:       1. Chronic obstructive pulmonary disease, unspecified COPD type    2. Pulmonary emphysema, unspecified emphysema type    3. Bilateral hip pain    4. Type 2 diabetes mellitus with hyperglycemia, without long-term current use of insulin    5. Urinary retention    6. Coronary artery disease involving native heart without angina pectoris, unspecified vessel or lesion type    7. Cardiomyopathy, unspecified type    8. Congestive heart failure, unspecified HF chronicity, unspecified heart failure type    9. Essential hypertension    10. Mixed hyperlipidemia    11. Schizophrenia, unspecified type    12. Renal insufficiency    13. Vitamin D deficiency    14. Gastroesophageal reflux disease without esophagitis    15. Tobacco abuse    16. Crack cocaine use    17. Marijuana abuse        Plan:       Paresh was seen today for follow-up.    Diagnoses and all orders for this visit:    Chronic obstructive pulmonary disease, unspecified COPD type  -     Ambulatory referral/consult to Pulmonology; Future  -     Home O2 eval (desaturation screen)  -     CT Chest Without Contrast; Future    Pulmonary emphysema, unspecified emphysema type  -     CT Chest Without Contrast; Future    Bilateral hip pain  -      diclofenac sodium (VOLTAREN) 1 % Gel; Apply 4 g topically 4 (four) times daily.  -     X-Ray Hips Bilateral 2 View Incl AP Pelvis; Future  -     acetaminophen (TYLENOL) 500 MG tablet; Take 2 tablets (1,000 mg total) by mouth every 8 (eight) hours as needed for Pain.    Type 2 diabetes mellitus with hyperglycemia, without long-term current use of insulin  -     blood-glucose meter kit; To check BG 3 times daily, to use with insurance preferred meter  -     lancets Misc; To check BG 3 times daily, to use with insurance preferred meter  -     blood sugar diagnostic Strp; To check BG 3 times daily, to use with insurance preferred meter    Urinary retention    Coronary artery disease involving native heart without angina pectoris, unspecified vessel or lesion type    Cardiomyopathy, unspecified type    Congestive heart failure, unspecified HF chronicity, unspecified heart failure type    Essential hypertension    Mixed hyperlipidemia    Schizophrenia, unspecified type    Renal insufficiency    Vitamin D deficiency    Gastroesophageal reflux disease without esophagitis    Tobacco abuse    Crack cocaine use    Marijuana abuse    COPD    --Previously taking supplemental home O2 at 2L NC  --Established care with [facility] pulmonology 12/17/2019  --CT chest 2/20/2018 showed centrilobular emphysematous changes throughout upper lobe. Planning for repeat CT chest in February 2020 with pulmonology [Will schedule both today]  --Continue Trelegy  --Previously referred to pulmonary rehab  --Refer for Home O2 desaturation  -- 6MWT next visit     Bilateral Hip Pain   --Likely OA   --Refer for x-rays of the hip  --Tx with Voltaren 1% gel QID; acetaminophen 1g q6 hrs    DM-2  Last A1c 6.5 12/13/2019   Adherent with glipizide 10 mg BID    Foot exam performed  01/20/2020 notable for mild onychomycosis bilaterally, otherwise within normal limits  Optometry referral placed 1/20/2020   Flu vaccine  1/20/2020  Pneumovax-23 administered  7/28/2016; PCV-13 DUE  Continue ACEI, Statin, ASA  Rx'd glucometer    Urinary Retention  --Evaluated by urology on 2/13/2020--Failed voiding trial. Discharged from office with 16 Stateless dillon and connected to leg bag with clear yellow urine. Adherent finasteride 5 mg daily.     CAD/CHF/Cardiomyopathy  --Med reconciliation on f/u visit   --2D echo 12/31/2019:   · Normal left ventricular systolic function. The estimated ejection fraction is 55%  · Mild eccentric left ventricular hypertrophy.  · Normal LV diastolic function.  · Mild mitral regurgitation.  · Local segmental wall motion abnormalities.  · Normal right ventricular systolic function.  · Normal central venous pressure (3 mm Hg).  Established care with [facility] cardiology 12/16/2019  Advised crestor 40 mg hs;  Continue BB/ACEI  - Lifestyle modification  - Refer to smoking cessation program.      HTN  --BP well-controlled today  --continue lisinopril hydrochlorothiazide 20/25 mg daily     HLD  , , HDL 35, .8 12/13/2019  Adherent with rosuvastatin 40 mg hs.     Schizophrenia  --Referred to psychiatry 12/10/2019. Advised to call to schedule intake/appt  --Continue quetiapine dose 300 mg hs + condition 0.5 mg b.i.d. + risperidone 2 mg b.i.d. + trazodone 100 mg HS    Renal Insufficiency   eGFR 52 12/13/2019    CKD III range. Plan to repeat in 3 months (3/2020) to determine if this is an acute vs chronic change    Vitamin D deficiency   Vit D 27 12/13/2019  --Advised OTC Vitamin D3 1000 IU daily     GERD  --Tx'd with omeprazole 20 mg daily     Tobacco abuse  Pt previously smoking 0.5ppd x 57 years; quit cigarette use 11/2019  Now smoking 1 cigar every other day.  Briefly counseled by pulmonology during 12/17/2019 visit  Discuss referral to tobacco cessation program over subsequent visit(s)      Substance abuse  --Marijuana, Smoking Crack   --reports substance use secondary to chronic bilateral hip pain. Will need to evaluate over subsequent  visits        Flu vaccine UTD for 2019/2020 season  Colonoscopy Referral Placed 1/20/2020.  Most recent was to 2016; 3 polyps resected.  Advised repeat colonoscopy in 3 years (2019)  Diabetic eye exam due. Optometry referral Placed 1/20/2020   Evaluated by Dr. Skelton at [facility] on 01/16/2018 for hep B surface antigen positive. All hep B serologies including DNA viral load negative 12/13/2019. Will start Hep B vaccine series today 1/20/2020. Hep B #2 due in 4 weeks (2/20/2020). Hep B#3 due in 5/20/2020     PSA 3.2--normal 12/13/19  TSH 2.140 (N)  12/13/19  HIV NR  12/13/19  Hep C NR  12/13/19  CBC wnl  12/13/19  Needs tdap and shingrix from pharmacy    Follow up in about 4 weeks (around 3/17/2020).     Previous Visit(s)  =============  Dermatitis; RLE  --Likely 2/2 dry skin   --Tx with TAC 0.1% ointment BID x 2 weeks

## 2020-02-19 ENCOUNTER — TELEPHONE (OUTPATIENT)
Dept: GASTROENTEROLOGY | Facility: CLINIC | Age: 65
End: 2020-02-19

## 2020-02-19 ENCOUNTER — TELEPHONE (OUTPATIENT)
Dept: FAMILY MEDICINE | Facility: CLINIC | Age: 65
End: 2020-02-19

## 2020-02-19 RX ORDER — SODIUM, POTASSIUM,MAG SULFATES 17.5-3.13G
1 SOLUTION, RECONSTITUTED, ORAL ORAL DAILY
Qty: 1 KIT | Refills: 0 | Status: SHIPPED | OUTPATIENT
Start: 2020-02-19 | End: 2020-02-21

## 2020-02-19 NOTE — TELEPHONE ENCOUNTER
Colonoscopy Referral   Referring Physician: Dr. Carmona  Date: 3/18/2020  Reason for Referral: Colon Screening    Family History of: None  Colon polyp: None   Relationship/Age of Onset: None    Colon cancer:  None  Relationship/Age of Onset: None     Hemoccults Done: NO     Iron deficient: NO    On Blood Thinner: NO    Valvular heart disease/valve replacement: NO    Anemia Present: NO    On NSAID: YES  Lung disease: NO  Kidney disease: YES    Hx of polyps:YES  Hx of colon cancer: NO    Previous colon evalations: YES  When:   Where:   Pertinent symptoms:     Patient was scheduled for colonoscopy on 3/18/2020 with Dr. Velez at [facility]. Suprep instructions were reviewed with patient.

## 2020-02-19 NOTE — TELEPHONE ENCOUNTER
SUPREP Instructions    You are scheduled for a colonoscopy with Dr. Velez on 3/18/2020 at [facility]  To ensure that your test is accurate and complete, you MUST follow these instructions listed below.  If you have any questions, please call our office at [phone].  Plan on being at the hospital for your procedure for 3-4 hours.    1.  Follow a CLEAR LIQUID DIET for the entire day before your scheduled colonoscopy.  This means no solid food the entire day starting when you wake.  You may have as much of the clear liquids as you want throughout the day.   CLEAR LIQUID DIET:   - Avoid Red, Orange, Purple, and/or Blue food coloring   - NO DAIRY   - You can have:  Coffee with sugar (no creamer), tea, water, soda, apple or white grape juice, chicken or beef broth/bouillon (no meat, noodles, or veggies), green/yellow popsicles, green/yellow Jell-O, lemonade.    2.  AT 5 pm the evening before your colonoscopy, POUR ONE (1) BOTTLE OF SUPREP INTO THE MIXING CONTAINER, PROVIDED INSIDE THE BOX.  ADD WATER TO THE LINE ON THE CONTAINER AND MIX IT WELL.  DRINK THE ENTIRE CONTAINER AND THEN DRINK TWO (2) MORE CONTAINERS OF WATER OVER THE NEXT 1 HOUR.  This is sometimes easier to drink if this solution is cold, so you can mix the solution 20 minutes ahead of time and place in the refrigerator prior to drinking.  You have to drink the solution within 30-45 minutes of mixing it.  Do NOT put this solution over ice.  It IS ok to drink with a straw.    3.  The endoscopy department will call you 1 day before your colonoscopy to tell you the exact time to arrive, AND to tell you the exact time to drink the 2nd portion of your prep (which will be FIVE HOURS BEFORE YOUR ARRIVAL TIME).  At this time given to you, POUR ONE (1) BOTTLE OF SUPREP INTO THE MIXING CONTAINER, PROVIDED INSIDE THE BOX.  ADD WATER TO THE LINE ON THE CONTAINER AND MIX IT WELL.  DRINK THE ENTIRE CONTAINER AND THEN DRINK TWO (2) MORE CONTAINERS OF WATER OVER THE  NEXT 1 HOUR.  This is sometimes easier to drink if this solution is cold, so you can mix the solution 20 minutes ahead of time and place in the refrigerator prior to drinking.  You have to drink the solution within 30-45 minutes of mixing it.  Do NOT put this solution over ice.  It IS ok to drink with a straw.  Once this is complete, you may not have ANYTHING else by mouth!    4.  You must have someone with you to DRIVE YOU HOME since you will be receiving IV sedation for the colonoscopy.    5.  It is ok to take your heart, blood pressure, and seizure medications in the morning of your test with a SIP of water.  Hold other medications until after your procedure.  Do NOT have anything else to eat or drink the morning of your colonoscopy.  It is ok to brush your teeth.    6.  If you are on blood thinners THAT YOU HAVE BEEN INSTRUCTED TO HOLD BY YOUR DOCTOR FOR THIS PROCEDURE, then do NOT take this the morning of your colonoscopy.  Do NOT stop these medications on your own, they must be approved to be held by your doctor.  Your colonoscopy can NOT be done if you are on these medications.  Examples of blood thinners include: Coumadin, Aggrenox, Plavix, Pradaxa, Reapro, Pletal, Xarelto, Ticagrelor, Brilinta, Eliquis, and high dose aspirin (325 mg).  You do not have to stop baby aspirin 81 mg.    7.  IF YOU ARE DIABETIC:  NO INSULIN OR ORAL MEDICATIONS THE MORNING OF THE COLONOSCOPY.  TAKE ONLY HALF THE DOSE OF YOUR INSULIN THE DAY BEFORE THE COLONOSCOPY.  DO NOT TAKE ANY ORAL DIABETIC MEDICATIONS THE DAY BEFORE THE COLONOSCOPY.  IF YOU ARE AN INSULIN DEPENDENT DIABETIC WITH UNSTABLE BLOOD SUGARS, NOTIFY YOUR PRIMARY CARE PHYSICIAN FOR INSTRUCTIONS.

## 2020-02-19 NOTE — TELEPHONE ENCOUNTER
----- Message from Ginette Shin sent at 2/19/2020 10:32 AM CST -----  Contact: [phone]/self  Patient requesting to speak with you regarding his diabetics testing supply not being covered by the insurance. Please advise.

## 2020-02-20 ENCOUNTER — PATIENT OUTREACH (OUTPATIENT)
Dept: ADMINISTRATIVE | Facility: OTHER | Age: 65
End: 2020-02-20

## 2020-02-20 NOTE — PROGRESS NOTES
Chart reviewed.   Requested updates from Care Everywhere.  Immunizations reconciled.    updated.  Colonoscopy scheduled 03/18/2020 with Dr. Velez

## 2020-02-21 ENCOUNTER — OFFICE VISIT (OUTPATIENT)
Dept: UROLOGY | Facility: CLINIC | Age: 65
End: 2020-02-21
Payer: MEDICARE

## 2020-02-21 VITALS
BODY MASS INDEX: 26.67 KG/M2 | HEIGHT: 68 IN | DIASTOLIC BLOOD PRESSURE: 53 MMHG | HEART RATE: 94 BPM | SYSTOLIC BLOOD PRESSURE: 92 MMHG | WEIGHT: 176 LBS | TEMPERATURE: 99 F

## 2020-02-21 DIAGNOSIS — R33.9 URINARY RETENTION: Primary | ICD-10-CM

## 2020-02-21 LAB — POC RESIDUAL URINE VOLUME: 491 ML (ref 0–100)

## 2020-02-21 PROCEDURE — 3074F SYST BP LT 130 MM HG: CPT | Mod: CPTII,S$GLB,, | Performed by: NURSE PRACTITIONER

## 2020-02-21 PROCEDURE — 51798 POCT BLADDER SCAN: ICD-10-PCS | Mod: S$GLB,,, | Performed by: NURSE PRACTITIONER

## 2020-02-21 PROCEDURE — 51798 US URINE CAPACITY MEASURE: CPT | Mod: S$GLB,,, | Performed by: NURSE PRACTITIONER

## 2020-02-21 PROCEDURE — 3078F DIAST BP <80 MM HG: CPT | Mod: CPTII,S$GLB,, | Performed by: NURSE PRACTITIONER

## 2020-02-21 PROCEDURE — 3008F PR BODY MASS INDEX (BMI) DOCUMENTED: ICD-10-PCS | Mod: CPTII,S$GLB,, | Performed by: NURSE PRACTITIONER

## 2020-02-21 PROCEDURE — 3008F BODY MASS INDEX DOCD: CPT | Mod: CPTII,S$GLB,, | Performed by: NURSE PRACTITIONER

## 2020-02-21 PROCEDURE — 51700 IRRIGATION OF BLADDER: CPT | Mod: S$GLB,,, | Performed by: NURSE PRACTITIONER

## 2020-02-21 PROCEDURE — 3074F PR MOST RECENT SYSTOLIC BLOOD PRESSURE < 130 MM HG: ICD-10-PCS | Mod: CPTII,S$GLB,, | Performed by: NURSE PRACTITIONER

## 2020-02-21 PROCEDURE — 3078F PR MOST RECENT DIASTOLIC BLOOD PRESSURE < 80 MM HG: ICD-10-PCS | Mod: CPTII,S$GLB,, | Performed by: NURSE PRACTITIONER

## 2020-02-21 PROCEDURE — 99213 OFFICE O/P EST LOW 20 MIN: CPT | Mod: 25,S$GLB,, | Performed by: NURSE PRACTITIONER

## 2020-02-21 PROCEDURE — 99999 PR PBB SHADOW E&M-EST. PATIENT-LVL V: CPT | Mod: PBBFAC,,, | Performed by: NURSE PRACTITIONER

## 2020-02-21 PROCEDURE — 51700 PR IRRIGATION, BLADDER: ICD-10-PCS | Mod: S$GLB,,, | Performed by: NURSE PRACTITIONER

## 2020-02-21 PROCEDURE — 99213 PR OFFICE/OUTPT VISIT, EST, LEVL III, 20-29 MIN: ICD-10-PCS | Mod: 25,S$GLB,, | Performed by: NURSE PRACTITIONER

## 2020-02-21 PROCEDURE — 99999 PR PBB SHADOW E&M-EST. PATIENT-LVL V: ICD-10-PCS | Mod: PBBFAC,,, | Performed by: NURSE PRACTITIONER

## 2020-02-21 NOTE — PROGRESS NOTES
Subjective:       Patient ID: Paresh Senior is a 64 y.o. male.    Chief Complaint: Other (voiding trial)    This is a 64 y.o.  male patient that is new to me but not new to the system.  The patient is referred to me by Dr. Carmona for urinary retention. Hx of CKD2, CHF, 2DM, GERD and HTN. The patient was hospitalized on 1/27/2020 for hypoglycemia. During admission, patient had difficulty urinating, a bladder scan was completed and showed almost 1L in bladder. A dillon was placed and Flomax was started. Renal ultrasound revealed simple cysts and prostatomegaly.  A voiding trial was attempted but patient was unable to urinate, bladder scan revealed 385mL and dillon was reinserted. Patient was discharged with dillon.        2/14/2020  Patient is here today for a voiding trial. He reports that he has never experienced urinary retention in the past. He was experiencing a weaker stream with some urgency and frequency. He has been taking Flomax since discharge. Constipation has improved with the use of stool softeners and fiber. Last BM was yesterday. He has been hydrating well.       He is here today for repeat voiding trial. Compliant with flomax and finasteride. No constipation. Hydrating well. Catheter has been draining well. No complaints.       LAST PSA  Lab Results   Component Value Date    PSA 3.2 12/13/2019       Lab Results   Component Value Date    CREATININE 1.6 (H) 01/31/2020     ---  Past Medical History:   Diagnosis Date    COPD (chronic obstructive pulmonary disease)     Coronary artery disease     Hypercholesterolemia     Hypertension        Past Surgical History:   Procedure Laterality Date    COLONOSCOPY  02/2016    Advised repeat in 3 years    CORONARY STENT PLACEMENT  2013       Family History   Problem Relation Age of Onset    No Known Problems Mother     Mental illness Brother     Diabetes Grandchild        Social History     Tobacco Use    Smoking status: Current Every Day Smoker      "Packs/day: 0.75     Years: 50.00     Pack years: 37.50     Types: Cigars    Smokeless tobacco: Never Used   Substance Use Topics    Alcohol use: Yes     Comment: 2-3beers/day    Drug use: Yes     Types: Cocaine, "Crack" cocaine     Comment: 3-4 marijuana joints per day       Current Outpatient Medications on File Prior to Visit   Medication Sig Dispense Refill    acetaminophen (TYLENOL) 500 MG tablet Take 2 tablets (1,000 mg total) by mouth every 8 (eight) hours as needed for Pain. 60 tablet 2    albuterol (PROVENTIL) 2.5 mg /3 mL (0.083 %) nebulizer solution Take 2.5 mg by nebulization every 6 (six) hours as needed for Wheezing or Shortness of Breath. Rescue      albuterol (VENTOLIN HFA) 90 mcg/actuation inhaler Inhale 2 puffs into the lungs every 6 (six) hours as needed for Wheezing. Rescue 18 g 5    aspirin (ECOTRIN) 81 MG EC tablet Take 1 tablet (81 mg total) by mouth once daily. 90 tablet 3    benztropine (COGENTIN) 0.5 MG tablet Take 1 tablet (0.5 mg total) by mouth 2 (two) times daily. 180 tablet 0    bisoprolol (ZEBETA) 5 MG tablet Take 0.5 tablets (2.5 mg total) by mouth once daily. 45 tablet 3    blood sugar diagnostic Strp To check BG 3 times daily, to use with insurance preferred meter 100 each 5    blood-glucose meter kit To check BG 3 times daily, to use with insurance preferred meter 1 each 0    cholecalciferol, vitamin D3, (VITAMIN D3) 25 mcg (1,000 unit) capsule Take 1 capsule (1,000 Units total) by mouth once daily.  0    diclofenac sodium (VOLTAREN) 1 % Gel Apply 4 g topically 4 (four) times daily. 100 g 0    finasteride (PROSCAR) 5 mg tablet Take 1 tablet (5 mg total) by mouth once daily. 30 tablet 0    fluticasone-umeclidin-vilanter (TRELEGY ELLIPTA) 100-62.5-25 mcg DsDv Inhale 1 puff into the lungs once daily. 60 each 6    glipiZIDE (GLUCOTROL) 5 MG tablet Take 1 tablet (5 mg total) by mouth 2 (two) times daily with meals. 60 tablet 0    ipratropium (ATROVENT) 0.02 % nebulizer " solution Take by nebulization.       isosorbide mononitrate (IMDUR) 30 MG 24 hr tablet Take 1 tablet (30 mg total) by mouth once daily. 90 tablet 3    lancets Misc To check BG 3 times daily, to use with insurance preferred meter 100 each 5    lisinopril-hydrochlorothiazide (PRINZIDE,ZESTORETIC) 20-25 mg Tab Take 1 tablet by mouth once daily. 90 tablet 3    mag hydrox/aluminum hyd/simeth (ANTACID ORAL) Take by mouth.      omeprazole (PRILOSEC) 20 MG capsule Take 20 mg by mouth once daily.      polyethylene glycol (GLYCOLAX) 17 gram/dose powder Mix 1 capful (17 g) with liquid and take by mouth 2 (two) times daily as needed (constipation). 510 g 1    QUEtiapine (SEROQUEL) 300 MG Tab Take 150 mg by mouth every evening.       risperiDONE (RISPERDAL) 2 MG tablet Take 2 mg by mouth 2 (two) times daily.       rosuvastatin (CRESTOR) 40 MG Tab Take 1 tablet (40 mg total) by mouth every evening. 90 tablet 3    sodium,potassium,mag sulfates (SUPREP BOWEL PREP KIT) 17.5-3.13-1.6 gram SolR Take 177 mLs by mouth once daily. for 2 days 1 kit 0    tamsulosin (FLOMAX) 0.4 mg Cap Take 1 capsule (0.4 mg total) by mouth once daily. 30 capsule 11    traZODone (DESYREL) 100 MG tablet Take 100 mg by mouth every evening.      triamcinolone acetonide 0.1% (KENALOG) 0.1 % ointment Apply topically 2 (two) times daily. for 14 days 30 g 0     No current facility-administered medications on file prior to visit.        Review of patient's allergies indicates:  No Known Allergies    Review of Systems   Constitutional: Negative for activity change and fever.   Eyes: Negative for visual disturbance.   Respiratory: Negative for shortness of breath.    Cardiovascular: Negative for chest pain.   Gastrointestinal: Negative for abdominal distention.   Genitourinary: Negative for difficulty urinating.   Musculoskeletal: Negative for gait problem.   Skin: Negative for color change.   Neurological: Negative for facial asymmetry.    Psychiatric/Behavioral: Negative for agitation and confusion.       Objective:      Physical Exam   Constitutional: He appears well-developed and well-nourished.   HENT:   Head: Normocephalic and atraumatic.   Eyes: EOM are normal.   Neck: Normal range of motion. Neck supple.   Pulmonary/Chest: Effort normal.   Abdominal: He exhibits no distension.   Genitourinary:   Genitourinary Comments: Catheter in place, clear yellow urine in leg bag   Musculoskeletal: Normal range of motion.   Neurological: He is alert.   Skin: Skin is warm and dry.   Psychiatric: He has a normal mood and affect.       Assessment:       1. Urinary retention        Plan:         1. 400 mL of sterile saline was instilled in bladder, balloon was deflated and catheter removed, pt tolerated well. Patient initially voided around 25mL but was eventually unable to urinate after several hours. He returned back to the clinic. Bladder scan revealed 491mL. Patient stated he did not have an urge to urinate. Will refer to Dr. Aguayo as this is his second failed voiding trial.   2. 18 fr catheter inserted in sterile fashion without difficulty. Patient tolerated well. 500mL drained from catheter.         Urinary retention  -     POCT Bladder Scan

## 2020-02-21 NOTE — LETTER
February 21, 2020      Fernando Carmona MD  [address]  [address]  [facility] [address]           [facility]  [address] [facility] [place]  [facility] [address]  Phone: [phone]          Patient: Paresh Senior   MR Number: [id number]   YOB: 1955   Date of Visit: 2/21/2020       Dear Dr. Fernando Carmona:    Thank you for referring Paresh Senior to me for evaluation. Attached you will find relevant portions of my assessment and plan of care.    If you have questions, please do not hesitate to call me. I look forward to following Paresh Senior along with you.    Sincerely,    Ella Garrett, NP    Enclosure  CC:  No Recipients    If you would like to receive this communication electronically, please contact [email or web address] or [phone] to request more information on [employer] Link access.    For providers and/or their staff who would like to refer a patient to [facility], please contact us through our one-stop-shop provider referral line, [facility] , at [phone].    If you feel you have received this communication in error or would no longer like to receive these types of communications, please e-mail [email or web address]

## 2020-02-21 NOTE — Clinical Note
Hi Dr. Aguayo,    I met this patient as a hospital follow-up for urinary retention. He just failed his second voiding trial. He states he never had urinary retention issues prior to this but it sounds like he was having some BPH symptoms prior so I went ahead and started on finasteride as well. Of note, he does take Cogentin, he has been on for 10+ years so I did not think this would be the main cause of his retention? I was going to have my staff schedule him with you for possible urodynamics.Thanks for your help,Ella Garrett

## 2020-02-26 ENCOUNTER — HOSPITAL ENCOUNTER (OUTPATIENT)
Dept: RADIOLOGY | Facility: HOSPITAL | Age: 65
Discharge: HOME | End: 2020-02-26
Attending: FAMILY MEDICINE
Payer: MEDICARE

## 2020-02-26 DIAGNOSIS — M25.552 BILATERAL HIP PAIN: ICD-10-CM

## 2020-02-26 DIAGNOSIS — J43.9 PULMONARY EMPHYSEMA, UNSPECIFIED EMPHYSEMA TYPE: ICD-10-CM

## 2020-02-26 DIAGNOSIS — J44.9 CHRONIC OBSTRUCTIVE PULMONARY DISEASE, UNSPECIFIED COPD TYPE: ICD-10-CM

## 2020-02-26 DIAGNOSIS — M25.551 BILATERAL HIP PAIN: ICD-10-CM

## 2020-02-26 PROBLEM — E55.9 VITAMIN D DEFICIENCY: Status: ACTIVE | Noted: 2020-02-26

## 2020-02-26 PROBLEM — N28.9 RENAL INSUFFICIENCY: Status: ACTIVE | Noted: 2020-02-26

## 2020-02-26 LAB — HEMOCCULT STL QL IA: NEGATIVE

## 2020-02-26 PROCEDURE — 71250 CT CHEST WITHOUT CONTRAST: ICD-10-PCS | Mod: 26,,, | Performed by: RADIOLOGY

## 2020-02-26 PROCEDURE — 73521 XR HIPS BILATERAL 2 VIEW INCL AP PELVIS: ICD-10-PCS | Mod: 26,,, | Performed by: RADIOLOGY

## 2020-02-26 PROCEDURE — 71250 CT THORAX DX C-: CPT | Mod: TC

## 2020-02-26 PROCEDURE — 71250 CT THORAX DX C-: CPT | Mod: 26,,, | Performed by: RADIOLOGY

## 2020-02-26 PROCEDURE — 73521 X-RAY EXAM HIPS BI 2 VIEWS: CPT | Mod: TC,FY

## 2020-02-26 PROCEDURE — 73521 X-RAY EXAM HIPS BI 2 VIEWS: CPT | Mod: 26,,, | Performed by: RADIOLOGY

## 2020-02-27 ENCOUNTER — TELEPHONE (OUTPATIENT)
Dept: PULMONOLOGY | Facility: CLINIC | Age: 65
End: 2020-02-27

## 2020-02-27 ENCOUNTER — EXTERNAL HOME HEALTH (OUTPATIENT)
Dept: HOME HEALTH SERVICES | Facility: HOSPITAL | Age: 65
End: 2020-02-27
Payer: MEDICARE

## 2020-02-27 NOTE — TELEPHONE ENCOUNTER
I have spoke with Maliha from [facility], she was informed that I will forward this message to Nadine so that he is aware that patient Mr. Senior was approved to start but when she called the patient to set up an appointment, the patient refused. He said he didn't have transportation and wasn't going to get any.

## 2020-02-27 NOTE — TELEPHONE ENCOUNTER
----- Message from Supriya Pickard sent at 2/27/2020 10:33 AM CST -----  Contact: Maliha with [facility]  Called to let the doctor know she got everything approved but when she called the PT to set up an appointment, the PT refused. He said he didn't have transportation and wasn't going to get any.     Callback: [phone]

## 2020-02-28 ENCOUNTER — TELEPHONE (OUTPATIENT)
Dept: UROLOGY | Facility: CLINIC | Age: 65
End: 2020-02-28

## 2020-02-28 DIAGNOSIS — J44.9 CHRONIC OBSTRUCTIVE PULMONARY DISEASE, UNSPECIFIED COPD TYPE: Primary | ICD-10-CM

## 2020-02-28 RX ORDER — FLUTICASONE PROPIONATE AND SALMETEROL 100; 50 UG/1; UG/1
1 POWDER RESPIRATORY (INHALATION) 2 TIMES DAILY
Qty: 60 EACH | Refills: 6 | Status: SHIPPED | OUTPATIENT
Start: 2020-02-28 | End: 2020-03-16 | Stop reason: ALTCHOICE

## 2020-02-28 NOTE — TELEPHONE ENCOUNTER
Spoke with patient an schedule an appointment for 1 week, patient voice his understanding. Will mail appointment out.

## 2020-02-28 NOTE — TELEPHONE ENCOUNTER
----- Message from Ella Garrett NP sent at 2/28/2020  3:12 PM CST -----  I received a message from pulmonary that a medication they started him on has a side effect of urinary retention, so they stopped the medication. He would like to try another voiding trial. Can you please call him and see if he can come later next week, possibly Thursday morning. Thank you!

## 2020-03-01 ENCOUNTER — HOSPITAL ENCOUNTER (INPATIENT)
Facility: HOSPITAL | Age: 65
LOS: 4 days | Discharge: HOME HEALTH | DRG: 698 | End: 2020-03-05
Attending: EMERGENCY MEDICINE | Admitting: INTERNAL MEDICINE
Payer: MEDICARE

## 2020-03-01 DIAGNOSIS — N17.9 AKI (ACUTE KIDNEY INJURY): ICD-10-CM

## 2020-03-01 DIAGNOSIS — J44.9 CHRONIC OBSTRUCTIVE PULMONARY DISEASE, UNSPECIFIED COPD TYPE: ICD-10-CM

## 2020-03-01 DIAGNOSIS — N17.9 ACUTE KIDNEY INJURY: ICD-10-CM

## 2020-03-01 DIAGNOSIS — F20.9 SCHIZOPHRENIA, UNSPECIFIED TYPE: ICD-10-CM

## 2020-03-01 DIAGNOSIS — M25.551 RIGHT HIP PAIN: ICD-10-CM

## 2020-03-01 DIAGNOSIS — R07.9 CHEST PAIN: ICD-10-CM

## 2020-03-01 DIAGNOSIS — M10.9 ACUTE GOUTY ARTHRITIS: ICD-10-CM

## 2020-03-01 DIAGNOSIS — E11.65 TYPE 2 DIABETES MELLITUS WITH HYPERGLYCEMIA, WITHOUT LONG-TERM CURRENT USE OF INSULIN: ICD-10-CM

## 2020-03-01 DIAGNOSIS — R33.9 URINE RETENTION: ICD-10-CM

## 2020-03-01 DIAGNOSIS — R33.9 URINARY RETENTION: Primary | ICD-10-CM

## 2020-03-01 DIAGNOSIS — R30.9 URINARY PAIN: ICD-10-CM

## 2020-03-01 PROBLEM — N39.0 UTI (URINARY TRACT INFECTION): Status: ACTIVE | Noted: 2020-03-01

## 2020-03-01 LAB
ALBUMIN SERPL BCP-MCNC: 2.9 G/DL (ref 3.5–5.2)
ALP SERPL-CCNC: 82 U/L (ref 55–135)
ALT SERPL W/O P-5'-P-CCNC: 21 U/L (ref 10–44)
ANION GAP SERPL CALC-SCNC: 12 MMOL/L (ref 8–16)
AST SERPL-CCNC: 26 U/L (ref 10–40)
BACTERIA #/AREA URNS AUTO: ABNORMAL /HPF
BASOPHILS # BLD AUTO: 0.03 K/UL (ref 0–0.2)
BASOPHILS NFR BLD: 0.2 % (ref 0–1.9)
BILIRUB SERPL-MCNC: 0.3 MG/DL (ref 0.1–1)
BILIRUB UR QL STRIP: NEGATIVE
BUN SERPL-MCNC: 62 MG/DL (ref 8–23)
CALCIUM SERPL-MCNC: 8.7 MG/DL (ref 8.7–10.5)
CHLORIDE SERPL-SCNC: 103 MMOL/L (ref 95–110)
CLARITY UR REFRACT.AUTO: ABNORMAL
CO2 SERPL-SCNC: 23 MMOL/L (ref 23–29)
COLOR UR AUTO: ABNORMAL
CREAT SERPL-MCNC: 3.4 MG/DL (ref 0.5–1.4)
CREAT UR-MCNC: 111 MG/DL (ref 23–375)
DIFFERENTIAL METHOD: ABNORMAL
EOSINOPHIL # BLD AUTO: 0.2 K/UL (ref 0–0.5)
EOSINOPHIL NFR BLD: 1.3 % (ref 0–8)
ERYTHROCYTE [DISTWIDTH] IN BLOOD BY AUTOMATED COUNT: 13 % (ref 11.5–14.5)
EST. GFR  (AFRICAN AMERICAN): 20.8 ML/MIN/1.73 M^2
EST. GFR  (NON AFRICAN AMERICAN): 18 ML/MIN/1.73 M^2
GLUCOSE SERPL-MCNC: 124 MG/DL (ref 70–110)
GLUCOSE UR QL STRIP: NEGATIVE
HCT VFR BLD AUTO: 35.5 % (ref 40–54)
HGB BLD-MCNC: 11.8 G/DL (ref 14–18)
HGB UR QL STRIP: ABNORMAL
HYALINE CASTS UR QL AUTO: 1 /LPF
IMM GRANULOCYTES # BLD AUTO: 0.15 K/UL (ref 0–0.04)
IMM GRANULOCYTES NFR BLD AUTO: 0.9 % (ref 0–0.5)
KETONES UR QL STRIP: NEGATIVE
LACTATE SERPL-SCNC: 1 MMOL/L (ref 0.5–2.2)
LEUKOCYTE ESTERASE UR QL STRIP: ABNORMAL
LYMPHOCYTES # BLD AUTO: 1 K/UL (ref 1–4.8)
LYMPHOCYTES NFR BLD: 5.7 % (ref 18–48)
MCH RBC QN AUTO: 30.3 PG (ref 27–31)
MCHC RBC AUTO-ENTMCNC: 33.2 G/DL (ref 32–36)
MCV RBC AUTO: 91 FL (ref 82–98)
MICROSCOPIC COMMENT: ABNORMAL
MONOCYTES # BLD AUTO: 1.1 K/UL (ref 0.3–1)
MONOCYTES NFR BLD: 6.3 % (ref 4–15)
NEUTROPHILS # BLD AUTO: 15 K/UL (ref 1.8–7.7)
NEUTROPHILS NFR BLD: 85.6 % (ref 38–73)
NITRITE UR QL STRIP: NEGATIVE
NRBC BLD-RTO: 0 /100 WBC
OSMOLALITY UR: 530 MOSM/KG (ref 50–1200)
PH UR STRIP: 7 [PH] (ref 5–8)
PLATELET # BLD AUTO: 218 K/UL (ref 150–350)
PMV BLD AUTO: 9.9 FL (ref 9.2–12.9)
POCT GLUCOSE: 105 MG/DL (ref 70–110)
POCT GLUCOSE: 105 MG/DL (ref 70–110)
POCT GLUCOSE: 124 MG/DL (ref 70–110)
POTASSIUM SERPL-SCNC: 3.6 MMOL/L (ref 3.5–5.1)
PROCALCITONIN SERPL IA-MCNC: 2.72 NG/ML
PROT SERPL-MCNC: 6.7 G/DL (ref 6–8.4)
PROT UR QL STRIP: ABNORMAL
RBC # BLD AUTO: 3.89 M/UL (ref 4.6–6.2)
RBC #/AREA URNS AUTO: >100 /HPF (ref 0–4)
SODIUM SERPL-SCNC: 138 MMOL/L (ref 136–145)
SODIUM UR-SCNC: 66 MMOL/L (ref 20–250)
SP GR UR STRIP: 1.02 (ref 1–1.03)
TROPONIN I SERPL DL<=0.01 NG/ML-MCNC: 0.01 NG/ML (ref 0–0.03)
URN SPEC COLLECT METH UR: ABNORMAL
WBC # BLD AUTO: 17.45 K/UL (ref 3.9–12.7)
WBC #/AREA URNS AUTO: >100 /HPF (ref 0–5)
WBC CLUMPS UR QL AUTO: ABNORMAL

## 2020-03-01 PROCEDURE — 99223 1ST HOSP IP/OBS HIGH 75: CPT | Mod: AI,,, | Performed by: INTERNAL MEDICINE

## 2020-03-01 PROCEDURE — 80053 COMPREHEN METABOLIC PANEL: CPT

## 2020-03-01 PROCEDURE — 82570 ASSAY OF URINE CREATININE: CPT

## 2020-03-01 PROCEDURE — 99291 CRITICAL CARE FIRST HOUR: CPT

## 2020-03-01 PROCEDURE — 84145 PROCALCITONIN (PCT): CPT

## 2020-03-01 PROCEDURE — 85025 COMPLETE CBC W/AUTO DIFF WBC: CPT

## 2020-03-01 PROCEDURE — 63600175 PHARM REV CODE 636 W HCPCS: Performed by: EMERGENCY MEDICINE

## 2020-03-01 PROCEDURE — 94761 N-INVAS EAR/PLS OXIMETRY MLT: CPT

## 2020-03-01 PROCEDURE — 87040 BLOOD CULTURE FOR BACTERIA: CPT

## 2020-03-01 PROCEDURE — 94640 AIRWAY INHALATION TREATMENT: CPT

## 2020-03-01 PROCEDURE — 83605 ASSAY OF LACTIC ACID: CPT

## 2020-03-01 PROCEDURE — 87186 SC STD MICRODIL/AGAR DIL: CPT

## 2020-03-01 PROCEDURE — 84484 ASSAY OF TROPONIN QUANT: CPT

## 2020-03-01 PROCEDURE — 87086 URINE CULTURE/COLONY COUNT: CPT

## 2020-03-01 PROCEDURE — 87088 URINE BACTERIA CULTURE: CPT

## 2020-03-01 PROCEDURE — 25000242 PHARM REV CODE 250 ALT 637 W/ HCPCS: Performed by: INTERNAL MEDICINE

## 2020-03-01 PROCEDURE — 25000003 PHARM REV CODE 250: Performed by: INTERNAL MEDICINE

## 2020-03-01 PROCEDURE — 96361 HYDRATE IV INFUSION ADD-ON: CPT

## 2020-03-01 PROCEDURE — 51798 US URINE CAPACITY MEASURE: CPT

## 2020-03-01 PROCEDURE — 93005 ELECTROCARDIOGRAM TRACING: CPT

## 2020-03-01 PROCEDURE — 99291 PR CRITICAL CARE, E/M 30-74 MINUTES: ICD-10-PCS | Mod: ,,, | Performed by: EMERGENCY MEDICINE

## 2020-03-01 PROCEDURE — 11000001 HC ACUTE MED/SURG PRIVATE ROOM

## 2020-03-01 PROCEDURE — 83935 ASSAY OF URINE OSMOLALITY: CPT

## 2020-03-01 PROCEDURE — 63600175 PHARM REV CODE 636 W HCPCS: Performed by: INTERNAL MEDICINE

## 2020-03-01 PROCEDURE — 84300 ASSAY OF URINE SODIUM: CPT

## 2020-03-01 PROCEDURE — 87077 CULTURE AEROBIC IDENTIFY: CPT

## 2020-03-01 PROCEDURE — 93010 ELECTROCARDIOGRAM REPORT: CPT | Mod: ,,, | Performed by: INTERNAL MEDICINE

## 2020-03-01 PROCEDURE — 81001 URINALYSIS AUTO W/SCOPE: CPT

## 2020-03-01 PROCEDURE — 99223 PR INITIAL HOSPITAL CARE,LEVL III: ICD-10-PCS | Mod: AI,,, | Performed by: INTERNAL MEDICINE

## 2020-03-01 PROCEDURE — 99291 CRITICAL CARE FIRST HOUR: CPT | Mod: ,,, | Performed by: EMERGENCY MEDICINE

## 2020-03-01 PROCEDURE — 96365 THER/PROPH/DIAG IV INF INIT: CPT

## 2020-03-01 PROCEDURE — 93010 EKG 12-LEAD: ICD-10-PCS | Mod: ,,, | Performed by: INTERNAL MEDICINE

## 2020-03-01 RX ORDER — RISPERIDONE 1 MG/1
2 TABLET ORAL 2 TIMES DAILY
Status: DISCONTINUED | OUTPATIENT
Start: 2020-03-01 | End: 2020-03-05 | Stop reason: HOSPADM

## 2020-03-01 RX ORDER — IBUPROFEN 200 MG
16 TABLET ORAL
Status: DISCONTINUED | OUTPATIENT
Start: 2020-03-01 | End: 2020-03-05 | Stop reason: HOSPADM

## 2020-03-01 RX ORDER — IPRATROPIUM BROMIDE AND ALBUTEROL SULFATE 2.5; .5 MG/3ML; MG/3ML
3 SOLUTION RESPIRATORY (INHALATION)
Status: DISCONTINUED | OUTPATIENT
Start: 2020-03-01 | End: 2020-03-01

## 2020-03-01 RX ORDER — SODIUM CHLORIDE 0.9 % (FLUSH) 0.9 %
10 SYRINGE (ML) INJECTION
Status: DISCONTINUED | OUTPATIENT
Start: 2020-03-01 | End: 2020-03-01

## 2020-03-01 RX ORDER — HYDROCODONE BITARTRATE AND ACETAMINOPHEN 5; 325 MG/1; MG/1
1 TABLET ORAL EVERY 6 HOURS PRN
Status: DISCONTINUED | OUTPATIENT
Start: 2020-03-01 | End: 2020-03-01

## 2020-03-01 RX ORDER — BENZTROPINE MESYLATE 0.5 MG/1
0.5 TABLET ORAL 2 TIMES DAILY
Status: DISCONTINUED | OUTPATIENT
Start: 2020-03-01 | End: 2020-03-05 | Stop reason: HOSPADM

## 2020-03-01 RX ORDER — ACETAMINOPHEN 500 MG
500 TABLET ORAL EVERY 6 HOURS PRN
Status: DISCONTINUED | OUTPATIENT
Start: 2020-03-01 | End: 2020-03-05 | Stop reason: HOSPADM

## 2020-03-01 RX ORDER — INSULIN ASPART 100 [IU]/ML
0-5 INJECTION, SOLUTION INTRAVENOUS; SUBCUTANEOUS
Status: DISCONTINUED | OUTPATIENT
Start: 2020-03-01 | End: 2020-03-05 | Stop reason: HOSPADM

## 2020-03-01 RX ORDER — PANTOPRAZOLE SODIUM 40 MG/1
40 TABLET, DELAYED RELEASE ORAL DAILY
Status: DISCONTINUED | OUTPATIENT
Start: 2020-03-01 | End: 2020-03-05 | Stop reason: HOSPADM

## 2020-03-01 RX ORDER — CHOLECALCIFEROL (VITAMIN D3) 25 MCG
1000 TABLET ORAL DAILY
Status: DISCONTINUED | OUTPATIENT
Start: 2020-03-01 | End: 2020-03-05 | Stop reason: HOSPADM

## 2020-03-01 RX ORDER — SODIUM CHLORIDE 0.9 % (FLUSH) 0.9 %
10 SYRINGE (ML) INJECTION
Status: DISCONTINUED | OUTPATIENT
Start: 2020-03-01 | End: 2020-03-05 | Stop reason: HOSPADM

## 2020-03-01 RX ORDER — HEPARIN SODIUM 5000 [USP'U]/ML
5000 INJECTION, SOLUTION INTRAVENOUS; SUBCUTANEOUS EVERY 8 HOURS
Status: DISCONTINUED | OUTPATIENT
Start: 2020-03-01 | End: 2020-03-05 | Stop reason: HOSPADM

## 2020-03-01 RX ORDER — TRAZODONE HYDROCHLORIDE 100 MG/1
100 TABLET ORAL NIGHTLY
Status: DISCONTINUED | OUTPATIENT
Start: 2020-03-01 | End: 2020-03-05 | Stop reason: HOSPADM

## 2020-03-01 RX ORDER — IBUPROFEN 200 MG
24 TABLET ORAL
Status: DISCONTINUED | OUTPATIENT
Start: 2020-03-01 | End: 2020-03-05 | Stop reason: HOSPADM

## 2020-03-01 RX ORDER — GLUCAGON 1 MG
1 KIT INJECTION
Status: DISCONTINUED | OUTPATIENT
Start: 2020-03-01 | End: 2020-03-05 | Stop reason: HOSPADM

## 2020-03-01 RX ORDER — AMOXICILLIN 250 MG
1 CAPSULE ORAL 2 TIMES DAILY
Status: DISCONTINUED | OUTPATIENT
Start: 2020-03-01 | End: 2020-03-05 | Stop reason: HOSPADM

## 2020-03-01 RX ORDER — POLYETHYLENE GLYCOL 3350 17 G/17G
17 POWDER, FOR SOLUTION ORAL 2 TIMES DAILY PRN
Status: DISCONTINUED | OUTPATIENT
Start: 2020-03-01 | End: 2020-03-05 | Stop reason: HOSPADM

## 2020-03-01 RX ORDER — ASPIRIN 81 MG/1
81 TABLET ORAL DAILY
Status: DISCONTINUED | OUTPATIENT
Start: 2020-03-01 | End: 2020-03-01

## 2020-03-01 RX ORDER — FINASTERIDE 5 MG/1
5 TABLET, FILM COATED ORAL DAILY
Status: DISCONTINUED | OUTPATIENT
Start: 2020-03-01 | End: 2020-03-05 | Stop reason: HOSPADM

## 2020-03-01 RX ORDER — ISOSORBIDE MONONITRATE 30 MG/1
30 TABLET, EXTENDED RELEASE ORAL DAILY
Status: DISCONTINUED | OUTPATIENT
Start: 2020-03-01 | End: 2020-03-01

## 2020-03-01 RX ORDER — TALC
6 POWDER (GRAM) TOPICAL NIGHTLY PRN
Status: DISCONTINUED | OUTPATIENT
Start: 2020-03-01 | End: 2020-03-05 | Stop reason: HOSPADM

## 2020-03-01 RX ORDER — PANTOPRAZOLE SODIUM 40 MG/1
40 TABLET, DELAYED RELEASE ORAL DAILY
Status: DISCONTINUED | OUTPATIENT
Start: 2020-03-01 | End: 2020-03-01

## 2020-03-01 RX ORDER — HYDROCODONE BITARTRATE AND ACETAMINOPHEN 5; 325 MG/1; MG/1
1 TABLET ORAL EVERY 6 HOURS PRN
Status: DISCONTINUED | OUTPATIENT
Start: 2020-03-01 | End: 2020-03-05 | Stop reason: HOSPADM

## 2020-03-01 RX ORDER — ASPIRIN 81 MG/1
81 TABLET ORAL DAILY
Status: DISCONTINUED | OUTPATIENT
Start: 2020-03-01 | End: 2020-03-05 | Stop reason: HOSPADM

## 2020-03-01 RX ORDER — FLUTICASONE FUROATE AND VILANTEROL 100; 25 UG/1; UG/1
1 POWDER RESPIRATORY (INHALATION) DAILY
Status: DISCONTINUED | OUTPATIENT
Start: 2020-03-01 | End: 2020-03-05 | Stop reason: HOSPADM

## 2020-03-01 RX ORDER — POLYETHYLENE GLYCOL 3350 17 G/17G
17 POWDER, FOR SOLUTION ORAL 2 TIMES DAILY
Status: DISCONTINUED | OUTPATIENT
Start: 2020-03-01 | End: 2020-03-05 | Stop reason: HOSPADM

## 2020-03-01 RX ORDER — ROSUVASTATIN CALCIUM 20 MG/1
40 TABLET, COATED ORAL NIGHTLY
Status: DISCONTINUED | OUTPATIENT
Start: 2020-03-01 | End: 2020-03-05 | Stop reason: HOSPADM

## 2020-03-01 RX ORDER — ALBUTEROL SULFATE 2.5 MG/.5ML
2.5 SOLUTION RESPIRATORY (INHALATION) EVERY 6 HOURS PRN
Status: DISCONTINUED | OUTPATIENT
Start: 2020-03-01 | End: 2020-03-05 | Stop reason: HOSPADM

## 2020-03-01 RX ORDER — ONDANSETRON 2 MG/ML
4 INJECTION INTRAMUSCULAR; INTRAVENOUS EVERY 8 HOURS PRN
Status: DISCONTINUED | OUTPATIENT
Start: 2020-03-01 | End: 2020-03-05 | Stop reason: HOSPADM

## 2020-03-01 RX ORDER — ONDANSETRON 4 MG/1
4 TABLET, ORALLY DISINTEGRATING ORAL EVERY 6 HOURS PRN
Status: DISCONTINUED | OUTPATIENT
Start: 2020-03-01 | End: 2020-03-01

## 2020-03-01 RX ORDER — TAMSULOSIN HYDROCHLORIDE 0.4 MG/1
0.4 CAPSULE ORAL DAILY
Status: DISCONTINUED | OUTPATIENT
Start: 2020-03-01 | End: 2020-03-05 | Stop reason: HOSPADM

## 2020-03-01 RX ADMIN — SENNOSIDES AND DOCUSATE SODIUM 1 TABLET: 8.6; 5 TABLET ORAL at 10:03

## 2020-03-01 RX ADMIN — SODIUM CHLORIDE 2052 ML: 0.9 INJECTION, SOLUTION INTRAVENOUS at 07:03

## 2020-03-01 RX ADMIN — POLYETHYLENE GLYCOL 3350 17 G: 17 POWDER, FOR SOLUTION ORAL at 09:03

## 2020-03-01 RX ADMIN — CEFTRIAXONE 2 G: 2 INJECTION, SOLUTION INTRAVENOUS at 08:03

## 2020-03-01 RX ADMIN — HEPARIN SODIUM 5000 UNITS: 5000 INJECTION, SOLUTION INTRAVENOUS; SUBCUTANEOUS at 10:03

## 2020-03-01 RX ADMIN — RISPERIDONE 2 MG: 1 TABLET ORAL at 10:03

## 2020-03-01 RX ADMIN — ROSUVASTATIN CALCIUM 40 MG: 20 TABLET, FILM COATED ORAL at 10:03

## 2020-03-01 RX ADMIN — ALBUTEROL SULFATE 2.5 MG: 2.5 SOLUTION RESPIRATORY (INHALATION) at 05:03

## 2020-03-01 RX ADMIN — BENZTROPINE MESYLATE 0.5 MG: 0.5 TABLET ORAL at 09:03

## 2020-03-01 RX ADMIN — HYDROCODONE BITARTRATE AND ACETAMINOPHEN 1 TABLET: 5; 325 TABLET ORAL at 10:03

## 2020-03-01 RX ADMIN — TRAZODONE HYDROCHLORIDE 100 MG: 50 TABLET ORAL at 10:03

## 2020-03-01 RX ADMIN — HEPARIN SODIUM 5000 UNITS: 5000 INJECTION, SOLUTION INTRAVENOUS; SUBCUTANEOUS at 03:03

## 2020-03-01 RX ADMIN — QUETIAPINE FUMARATE 150 MG: 100 TABLET ORAL at 11:03

## 2020-03-01 NOTE — ED NOTES
Patients current dillon was an 18F, patient had plenty of sediment noted in tubing and bag, physician requested to change out dillon before collecting urine, will replace with a 18F.

## 2020-03-01 NOTE — ED NOTES
Patient currently resting on stretcher, able to voice all needs, patient updated on current status and voiced understanding, cardiac monitoring in progress, vss, will continue to monitor

## 2020-03-01 NOTE — ED TRIAGE NOTES
"Patient has dillon catheter placed on the 18th for urinary retention. Now complaining of bladder pain and dysuria. "It hurts when I pee." Parish any fever, CP, SOB.   "

## 2020-03-01 NOTE — H&P
[facility]  Emergency Medicine  History & Physical      Patient Name: Paresh Senior  MRN: [id number]  Admission Date: 3/1/2020  Attending Physician: Jhon Tucker MD   Primary Care Provider: Fernando Carmona MD    [facility] Medicine Team: [facility] HOSP MED D Jhon Tucker MD     Patient information was obtained from patient and ER records.     Subjective:     Principal Problem:UTI (urinary tract infection)    Chief Complaint:   Chief Complaint   Patient presents with    Dillon Catheter Problem     catheter placed on 2/18.  c/o pain with urination        HPI:     Mr. Senior is a 64-year-old male with a history of  diabetes type 2, hypertension, CAD, COPD, and Schizophrenia and recent urinary retention presenting with catheter pain, urinary pain and dysuria.  Patient states that he was seen by outpatient urology on 2/13 and 2/21 with continued urinary retention and failed voiding trials. Dillon was re-inserted and scheduled for repeat voiding trial. He was scheduled for follow up with Dr. Aguayo. He noted significant dysuria and worsening fatigue following dillon re-insertion on 2/21.  He now presents with urinary pain, dysuria but denies any associated fevers, chills, nausea, vomiting, diarrhea, back pain, abdominal pain, leg swelling, chest pain, lightheadedness or dizziness. He also has associated right hip pain but is able to ambulate and bear weight.      Seen by PCP on 2/18. Patient follows with pulmonary and anticholinergic agents were discontinued due to urinary retention. He was also approved to start pulmonary rehab.     Past Medical History:   Diagnosis Date    COPD (chronic obstructive pulmonary disease)     Coronary artery disease     Hypercholesterolemia     Hypertension        Past Surgical History:   Procedure Laterality Date    COLONOSCOPY  02/2016    Advised repeat in 3 years    CORONARY STENT PLACEMENT  2013       Review of patient's allergies indicates:   Allergen  Reactions    Trelegy ellipta [fluticasone-umeclidin-vilanter] Other (See Comments)     Possible urinary retention        No current facility-administered medications on file prior to encounter.      Current Outpatient Medications on File Prior to Encounter   Medication Sig    acetaminophen (TYLENOL) 500 MG tablet Take 2 tablets (1,000 mg total) by mouth every 8 (eight) hours as needed for Pain.    albuterol (PROVENTIL) 2.5 mg /3 mL (0.083 %) nebulizer solution Take 2.5 mg by nebulization every 6 (six) hours as needed for Wheezing or Shortness of Breath. Rescue    albuterol (VENTOLIN HFA) 90 mcg/actuation inhaler Inhale 2 puffs into the lungs every 6 (six) hours as needed for Wheezing. Rescue    aspirin (ECOTRIN) 81 MG EC tablet Take 1 tablet (81 mg total) by mouth once daily.    benztropine (COGENTIN) 0.5 MG tablet Take 1 tablet (0.5 mg total) by mouth 2 (two) times daily.    bisoprolol (ZEBETA) 5 MG tablet Take 0.5 tablets (2.5 mg total) by mouth once daily.    blood sugar diagnostic Strp To check BG 3 times daily, to use with insurance preferred meter    blood-glucose meter kit To check BG 3 times daily, to use with insurance preferred meter    cholecalciferol, vitamin D3, (VITAMIN D3) 25 mcg (1,000 unit) capsule Take 1 capsule (1,000 Units total) by mouth once daily.    diclofenac sodium (VOLTAREN) 1 % Gel Apply 4 g topically 4 (four) times daily.    finasteride (PROSCAR) 5 mg tablet Take 1 tablet (5 mg total) by mouth once daily.    fluticasone-salmeterol diskus inhaler 100-50 mcg Inhale 1 puff into the lungs 2 (two) times daily. Controller. Rinse mouth after use    glipiZIDE (GLUCOTROL) 5 MG tablet Take 1 tablet (5 mg total) by mouth 2 (two) times daily with meals.    isosorbide mononitrate (IMDUR) 30 MG 24 hr tablet Take 1 tablet (30 mg total) by mouth once daily.    lancets Misc To check BG 3 times daily, to use with insurance preferred meter    lisinopril-hydrochlorothiazide  "(PRINZIDE,ZESTORETIC) 20-25 mg Tab Take 1 tablet by mouth once daily.    mag hydrox/aluminum hyd/simeth (ANTACID ORAL) Take by mouth.    omeprazole (PRILOSEC) 20 MG capsule Take 20 mg by mouth once daily.    polyethylene glycol (GLYCOLAX) 17 gram/dose powder Mix 1 capful (17 g) with liquid and take by mouth 2 (two) times daily as needed (constipation).    QUEtiapine (SEROQUEL) 300 MG Tab Take 150 mg by mouth every evening.     risperiDONE (RISPERDAL) 2 MG tablet Take 2 mg by mouth 2 (two) times daily.     rosuvastatin (CRESTOR) 40 MG Tab Take 1 tablet (40 mg total) by mouth every evening.    tamsulosin (FLOMAX) 0.4 mg Cap Take 1 capsule (0.4 mg total) by mouth once daily.    traZODone (DESYREL) 100 MG tablet Take 100 mg by mouth every evening.    triamcinolone acetonide 0.1% (KENALOG) 0.1 % ointment Apply topically 2 (two) times daily. for 14 days     Family History     Problem Relation (Age of Onset)    Diabetes Grandchild    Mental illness Brother    No Known Problems Mother        Tobacco Use    Smoking status: Current Every Day Smoker     Packs/day: 0.75     Years: 50.00     Pack years: 37.50     Types: Cigars    Smokeless tobacco: Never Used   Substance and Sexual Activity    Alcohol use: Yes     Comment: 2-3beers/day    Drug use: Yes     Types: Cocaine, "Crack" cocaine     Comment: 3-4 marijuana joints per day    Sexual activity: Not Currently     Review of Systems   Constitutional: Positive for fatigue. Negative for chills and fever.   HENT: Negative for congestion.    Eyes: Negative for visual disturbance.   Respiratory: Negative for cough and shortness of breath.    Cardiovascular: Negative for chest pain and leg swelling.   Gastrointestinal: Negative for abdominal distention and abdominal pain.   Genitourinary: Positive for dysuria.   Skin: Negative for rash and wound.   Neurological: Negative for dizziness and weakness.   Psychiatric/Behavioral: Negative for confusion.     Objective: "     Vital Signs (Most Recent):  Temp: 98.2 °F (36.8 °C) (03/01/20 2019)  Pulse: 93 (03/01/20 2230)  Resp: 18 (03/01/20 2230)  BP: 133/69 (03/01/20 2230)  SpO2: 95 % (03/01/20 2230) Vital Signs (24h Range):  Temp:  [98.2 °F (36.8 °C)-99.5 °F (37.5 °C)] 98.2 °F (36.8 °C)  Pulse:  [] 93  Resp:  [18-24] 18  SpO2:  [91 %-97 %] 95 %  BP: ()/(53-84) 133/69     Weight: 80 kg (176 lb 5.9 oz)  Body mass index is 26.82 kg/m².    Physical Exam   Constitutional: He is oriented to person, place, and time. He appears well-developed.   HENT:   Head: Normocephalic and atraumatic.   Eyes: Pupils are equal, round, and reactive to light. Conjunctivae are normal.   Neck: Neck supple.   Cardiovascular: Normal rate and regular rhythm.   Pulmonary/Chest: Effort normal and breath sounds normal.   Abdominal: Soft. Bowel sounds are normal. He exhibits no distension. There is no tenderness.   Musculoskeletal: Normal range of motion.   Neurological: He is alert and oriented to person, place, and time.   Skin: Skin is warm.   Psychiatric: He has a normal mood and affect.         CRANIAL NERVES     CN III, IV, VI   Pupils are equal, round, and reactive to light.      Significant Labs:   A1C:   Recent Labs   Lab 12/13/19  0810 01/27/20  1020   HGBA1C 6.5* 6.5*     Blood Culture:   Recent Labs   Lab 03/01/20  0711 03/01/20  0721   LABBLOO No Growth to date No Growth to date     CBC:   Recent Labs   Lab 03/01/20  0721   WBC 17.45*   HGB 11.8*   HCT 35.5*        CMP:   Recent Labs   Lab 03/01/20  0721      K 3.6      CO2 23   *   BUN 62*   CREATININE 3.4*   CALCIUM 8.7   PROT 6.7   ALBUMIN 2.9*   BILITOT 0.3   ALKPHOS 82   AST 26   ALT 21   ANIONGAP 12   EGFRNONAA 18.0*     Lactic Acid:   Recent Labs   Lab 03/01/20  0721   LACTATE 1.0     Magnesium: No results for input(s): MG in the last 48 hours.  POCT Glucose:   Recent Labs   Lab 03/01/20  1204   POCTGLUCOSE 124*     Troponin:   Recent Labs   Lab  03/01/20  1201   TROPONINI 0.011     TSH:   Recent Labs   Lab 12/13/19  0810   TSH 2.140     Urine Culture: No results for input(s): LABURIN in the last 48 hours.  Urine Studies:   Recent Labs   Lab 03/01/20  0802   COLORU Red*   APPEARANCEUA Cloudy*   PHUR 7.0   SPECGRAV 1.020   PROTEINUA 2+*   GLUCUA Negative   KETONESU Negative   BILIRUBINUA Negative   OCCULTUA 3+*   NITRITE Negative   LEUKOCYTESUR 2+*   RBCUA >100*   WBCUA >100*   BACTERIA Many*   HYALINECASTS 1       Significant Imaging: I have reviewed and interpreted all pertinent imaging results/findings within the past 24 hours.    Assessment/Plan:     Active Diagnoses:    Diagnosis Date Noted POA    PRINCIPAL PROBLEM:  UTI (urinary tract infection) [N39.0] 03/01/2020 Unknown    CHELE (acute kidney injury) [N17.9] 03/01/2020 Yes    Urine retention [R33.9] 01/29/2020 Yes    Stage 2 chronic kidney disease [N18.2] 01/27/2020 Yes    Chronic obstructive pulmonary disease [J44.9]  Yes    Centrilobular emphysema [J43.2] 12/10/2019 Yes    Type 2 diabetes mellitus with hyperglycemia, without long-term current use of insulin [E11.65] 12/10/2019 Yes    CAD (coronary artery disease) [I25.10] 11/25/2014 Yes      Problems Resolved During this Admission:     PLAN:    Sepsis due to UTI  - afebrile, tachypenic and leukocytosis   - SBP 90s-100s  - hold home BP medications  - U/A positive for UTI  - CXR negative for infectious source  - lactate wnl   - followup ucx, bcx, procal   - continue with ceftriaxone (D1- 3/1)    CHELE on CKD  Urinary retention  - Scr on admit: 3.4 (b/l Scr: 1.7)  - failed voiding trial on 2/13 and 2/19 in urology clinic  - avoiding anticholinergic agents  - dillon exchanged in ED  - continue tamsulosin and finasteride   - resumed ACE once CHELE resolves     NIDDM type 2   - HA1c (1/27): 6.5  - SSI  - hold home glipizide   - SSI and POCT glucose ACHS    Schizophrenia  - Continuing home Risperdal, Seroquel and benztropine   - patient reports taking  benztropine for many years without concern for urinary retention      CAD (coronary artery disease)  HTN (hypertension)  - Continuing home medications.  - Lisinopril-HCTZ held due to CHELE     Chronic obstructive pulmonary disease  Centrilobular emphysema  Tobacco abuse  - off Trelogy   - on Advair  - on albuterol prn   - follows with outpatient pulm  - plan for outpatient pulm rehab     Gastroesophageal reflux disease without esophagitis  - Continuing home PPI     Bilateral Knee pain  Right hip pain  - Normal procalcitonin, elevated CRP, ESR in 2/2019.  - Xray right hip negative for fracture or dislocation  - outpatient rheumatology     VTE Risk Mitigation (From admission, onward)         Ordered     heparin (porcine) injection 5,000 Units  Every 8 hours      03/01/20 1042     IP VTE HIGH RISK PATIENT  Once      03/01/20 1042              Time spent in care of patient: > 45 minutes     Jhon Tucker MD  Department of Hospital Medicine   [facility]

## 2020-03-01 NOTE — ED NOTES
LOC: The patient is awake, alert and aware of environment with an appropriate affect, the patient is oriented x 3 and speaking appropriately.  APPEARANCE: Patient resting comfortably and in no acute distress, patient is clean and well groomed, patient's clothing is properly fastened.  SKIN: The skin is warm and dry, color consistent with ethnicity, patient has normal skin turgor and moist mucus membranes, skin intact, no breakdown or bruising noted.  MUSCULOSKELETAL: Patient moving all extremities spontaneously, no obvious swelling or deformities noted.  RESPIRATORY: Airway is open and patent, respirations are spontaneous, patient has a normal effort and rate, no accessory muscle use noted.  ABDOMEN: Soft and mild tenderness to palpation of lower abd,mild distention noted,    Patient states he had a dillon placed on 2/21 for the first time, patient states since then he has been having a burning sensation every time he urinates into the dillon, patient states he does think the color of his urine has gotten darker as well, no acute distress noted, cardiac monitoring in progress, vss,, call light within reach, will continue to monitor

## 2020-03-01 NOTE — ED PROVIDER NOTES
Encounter Date: 3/1/2020       History     Chief Complaint   Patient presents with    Saldivar Catheter Problem     catheter placed on 2/18.  c/o pain with urination     HPI   64-year-old male with a history of COPD, CAD, hypercholesterolemia, hypertension and recent urinary retention presenting with catheter pain, urinary pain and dysuria.  Per patient, he had a Saldivar catheter placed on February 18th for urinary retention and since then has been having pain with urination.  He attempted to have the catheter removed week later however secondary to bleeding and repeat urinary tension, had the catheter replaced.  He now presents with urinary pain, dysuria but denies any associated fevers, chills, nausea, vomiting, diarrhea, back pain, abdominal pain, leg swelling, chest pain, lightheadedness or dizziness.  Onset has been gradual, associated with urinary pain and dysuria.  He also has associated right hip pain but is able to ambulate and bear weight.  Denies any falls or trauma. Current hip pain 3/10, urinary pain 5/10.  No other aggravating or alleviating factors.    Review of patient's allergies indicates:   Allergen Reactions    Trelegy ellipta [fluticasone-umeclidin-vilanter] Other (See Comments)     Possible urinary retention      Past Medical History:   Diagnosis Date    COPD (chronic obstructive pulmonary disease)     Coronary artery disease     Hypercholesterolemia     Hypertension      Past Surgical History:   Procedure Laterality Date    COLONOSCOPY  02/2016    Advised repeat in 3 years    CORONARY STENT PLACEMENT  2013     Family History   Problem Relation Age of Onset    No Known Problems Mother     Mental illness Brother     Diabetes Grandchild      Social History     Tobacco Use    Smoking status: Current Every Day Smoker     Packs/day: 0.75     Years: 50.00     Pack years: 37.50     Types: Cigars    Smokeless tobacco: Never Used   Substance Use Topics    Alcohol use: Yes     Comment:  "2-3beers/day    Drug use: Yes     Types: Cocaine, "Crack" cocaine     Comment: 3-4 marijuana joints per day     Review of Systems   Constitutional: Negative for chills, fatigue and fever.   HENT: Negative for congestion, mouth sores and sore throat.    Eyes: Negative for photophobia and visual disturbance.   Respiratory: Negative for cough, chest tightness and shortness of breath.    Cardiovascular: Negative for chest pain and palpitations.   Gastrointestinal: Negative for abdominal distention, constipation, nausea and vomiting.   Endocrine: Negative for polyphagia and polyuria.   Genitourinary: Positive for dysuria and penile pain. Negative for difficulty urinating, enuresis, frequency, scrotal swelling and testicular pain.        Indwelling urinary catheter   Musculoskeletal: Negative for arthralgias, back pain, neck pain and neck stiffness.   Neurological: Negative for dizziness, syncope, facial asymmetry and light-headedness.   Psychiatric/Behavioral: Negative for agitation and hallucinations. The patient is not nervous/anxious.        Physical Exam     Initial Vitals [03/01/20 0619]   BP Pulse Resp Temp SpO2   109/60 93 (!) 24 99.5 °F (37.5 °C) (!) 91 %      MAP       --         Physical Exam    Nursing note and vitals reviewed.    Gen/Constitutional: Interactive. No acute distress  Head: Normocephalic, Atraumatic  Neck: supple, no masses or LAD  Eyes: PERRLA, conjunctiva clear  Ears, Nose and Throat: No rhinorrhea or stridor.  Cardiac:  Regular rate, Reg Rhythm, No murmur, rubs or gallops  Pulmonary: CTA Bilat, no wheezes, rhonchi, rales.  GI: Abdomen soft, non-tender, non-distended; no rebound or guarding  : No CVA tenderness. Indwelling Saldivar catheter in place with sediment, tenderness to palpation near the penis without signs of discharge  Musculoskeletal: Extremities warm, well perfused, no erythema, no edema  Skin: No rashes, no jaundice, no cyanosis  Neuro: Alert and Oriented x 3; No focal motor or " sensory deficits.    Psych: Normal affect      ED Course   Critical Care  Date/Time: 3/1/2020 10:02 AM  Performed by: Storm Hill DO  Authorized by: Storm Hill DO   Direct patient critical care time: 15 minutes  Additional history critical care time: 10 minutes  Ordering / reviewing critical care time: 15 minutes  Documentation critical care time: 7 minutes  Consulting other physicians critical care time: 5 minutes  Total critical care time (exclusive of procedural time) : 52 minutes  Critical care was necessary to treat or prevent imminent or life-threatening deterioration of the following conditions: dehydration, renal failure and sepsis.  Critical care was time spent personally by me on the following activities: blood draw for specimens, development of treatment plan with patient or surrogate, evaluation of patient's response to treatment, examination of patient, obtaining history from patient or surrogate, ordering and performing treatments and interventions, ordering and review of laboratory studies, ordering and review of radiographic studies, pulse oximetry, re-evaluation of patient's condition and review of old charts.        Labs Reviewed   CBC W/ AUTO DIFFERENTIAL - Abnormal; Notable for the following components:       Result Value    WBC 17.45 (*)     RBC 3.89 (*)     Hemoglobin 11.8 (*)     Hematocrit 35.5 (*)     Immature Granulocytes 0.9 (*)     Gran # (ANC) 15.0 (*)     Immature Grans (Abs) 0.15 (*)     Mono # 1.1 (*)     Gran% 85.6 (*)     Lymph% 5.7 (*)     All other components within normal limits   COMPREHENSIVE METABOLIC PANEL - Abnormal; Notable for the following components:    Glucose 124 (*)     BUN, Bld 62 (*)     Creatinine 3.4 (*)     Albumin 2.9 (*)     eGFR if  20.8 (*)     eGFR if non  18.0 (*)     All other components within normal limits   URINALYSIS, REFLEX TO URINE CULTURE - Abnormal; Notable for the following components:    Color, UA Red  (*)     Appearance, UA Cloudy (*)     Protein, UA 2+ (*)     Occult Blood UA 3+ (*)     Leukocytes, UA 2+ (*)     All other components within normal limits    Narrative:     Preferred Collection Type->Urine, Catheterized  ADD-ON ORDER UCRER #[id number], UROSM #[id number], UNAR #[id number]; PER   YOSELIN BRENNAN TRUNG,  03/01/2020  08:55    URINALYSIS MICROSCOPIC - Abnormal; Notable for the following components:    RBC, UA >100 (*)     WBC, UA >100 (*)     WBC Clumps, UA Many (*)     Bacteria Many (*)     All other components within normal limits    Narrative:     Preferred Collection Type->Urine, Catheterized  ADD-ON ORDER UCRER #[id number], UROSM #[id number], UNAR #[id number]; PER   YOSELIN NINO,  03/01/2020  08:55    CULTURE, BLOOD   CULTURE, BLOOD   CULTURE, URINE   LACTIC ACID, PLASMA   CREATININE, URINE, RANDOM   OSMOLALITY, URINE RANDOM   SODIUM, URINE, RANDOM   CREATININE, URINE, RANDOM    Narrative:     Preferred Collection Type->Urine, Catheterized  ADD-ON ORDER UCRER #[id number], UROSM #[id number], [facility]AR #[id number]; PER   YOSELIN NINO,  03/01/2020  08:55    OSMOLALITY, URINE RANDOM    Narrative:     Preferred Collection Type->Urine, Catheterized  ADD-ON ORDER UCRER #[id number], UROSM #[id number], [facility]AR #[id number]; PER   YOSELIN NINO,  03/01/2020  08:55    SODIUM, URINE, RANDOM    Narrative:     Preferred Collection Type->Urine, Catheterized  ADD-ON ORDER UCRER #[id number], UROSM #[id number], UNAR #[id number]; PER   YOSELIN MIKA TRUNG,  03/01/2020  08:55      EKG Readings: (Independently Interpreted)   Initial Reading: No STEMI. Previous EKG: Compared with most recent EKG Rhythm: Normal Sinus Rhythm. Heart Rate: 95. Ectopy: No Ectopy. Conduction: Normal. ST Segments: Non-Specific ST Segment Depression. Axis: Normal.       Imaging Results          X-Ray Hip 2 View Right (Final result)  Result time 03/01/20 07:38:23    Final result by Khushbu Hale MD (03/01/20 07:38:23)                 Impression:      No  radiographic evidence of acute fracture or dislocation.      Electronically signed by: Khushbu Hale MD  Date:    03/01/2020  Time:    07:38             Narrative:    EXAMINATION:  XR HIP 2 VIEW RIGHT    CLINICAL HISTORY:  Pain in right hip    TECHNIQUE:  AP view of the pelvis and frog leg lateral view of the right hip were performed.    COMPARISON:  None    FINDINGS:  There is no evidence of acute fracture or dislocation. The bilateral femoral heads appear well seated within the acetabula with mild degenerative arthrosis. The ilioischial and iliopectineal lines appear maintained.There is no significant pubic symphyseal or sacroiliac joint diastasis.The sacrum is partially obscured by overlying bowel gas.                               X-Ray Chest AP Portable (Final result)  Result time 03/01/20 07:45:58    Final result by Pawan Medina MD (03/01/20 07:45:58)                 Impression:      As above.      Electronically signed by: Pawan Medina  Date:    03/01/2020  Time:    07:45             Narrative:    EXAMINATION:  XR CHEST AP PORTABLE    CLINICAL HISTORY:  cough;    TECHNIQUE:  Single frontal view of the chest was performed.    COMPARISON:  01/28/2020    FINDINGS:  Cardiomediastinal silhouette is unchanged.  No confluent airspace opacity, large pleural effusion, or pneumothorax.  No acute osseous abnormality.                              X-Rays:   Independently Interpreted Readings:   Chest X-Ray: Normal heart size.  No infiltrates.  No acute abnormalities. No pneumothorax or free air     Medical Decision Making:   History:   Old Medical Records: I decided to obtain old medical records.  Old Records Summarized: records from clinic visits and records from previous admission(s).  Initial Assessment:   64-year-old male with a history of COPD, CAD, hypercholesterolemia, hypertension and recent urinary retention presenting with catheter pain, urinary pain and dysuria.  Differential Diagnosis:    Differential diagnosis includes but is not limited to:  UTI, pyelonephritis, urosepsis, hematuria, catheter pain, sepsis, intra-abdominal abscess, prostatitis, fracture    Emergent evaluation of patient presenting with Saldivar catheter pain, urinary pain and concern with dysuria.  He is initially afebrile, but complaining of urinary pain. He was brought in via EMS, with concern for hypotension.  On arrival is blood pressures are low at 109/58, without tachycardia and running a low-grade fever.  Septic workup initiated.  IV line placed, labs were drawn, UA obtained, ECG obtained, chest x-ray obtained and patient given IV fluids.  On further evaluation, patient complaining of right hip pain right hip x-ray obtained. No evidence of fracture or dislocation.  He has full range of motion, doubt acute septic arthritis.  Elevated leukocytosis to 17,000 with UA showing significant UTI.  Will treat with IV ceftriaxone 2 g in the ED.  IV fluid resuscitation for hypotension with systolic blood pressures in the 90s, 30 mils per kg IV fluid resuscitation administered.  On repeat evaluations pressures improved.  Remainder of labs with significant acute kidney injury with a drop in GFR from 44 to 18.  Urine studies sent, discussed case with hospital medicine team, patient be admitted for ongoing management of UTI with sepsis, and acute kidney injury. Please see critical care note above for critical care time given acute hypotension, sepsis and CHELE.  Patient agreeable with admission plan.    Complexity:  High - critical care                               Clinical Impression:       ICD-10-CM ICD-9-CM   1. Urinary retention R33.9 788.20   2. Urinary pain R30.9 788.1   3. Right hip pain M25.551 719.45   4. Acute kidney injury N17.9 584.9   5. CHELE (acute kidney injury) N17.9 584.9         Disposition:   Disposition: Admitted  Condition: Serious     ED Disposition Condition    Admit            Storm Hill DO, FAAEM  Emergency Staff  Physician   Dept of Emergency Medicine   [facility]  Spectralink: [phone]                 Storm Hill DO  03/01/20 1006

## 2020-03-02 ENCOUNTER — TELEPHONE (OUTPATIENT)
Dept: UROLOGY | Facility: CLINIC | Age: 65
End: 2020-03-02

## 2020-03-02 DIAGNOSIS — R33.9 URINARY RETENTION: Primary | ICD-10-CM

## 2020-03-02 LAB
ALBUMIN SERPL BCP-MCNC: 2.8 G/DL (ref 3.5–5.2)
ALP SERPL-CCNC: 93 U/L (ref 55–135)
ALT SERPL W/O P-5'-P-CCNC: 21 U/L (ref 10–44)
ANION GAP SERPL CALC-SCNC: 10 MMOL/L (ref 8–16)
AST SERPL-CCNC: 31 U/L (ref 10–40)
BASOPHILS # BLD AUTO: 0.03 K/UL (ref 0–0.2)
BASOPHILS NFR BLD: 0.2 % (ref 0–1.9)
BILIRUB SERPL-MCNC: 0.3 MG/DL (ref 0.1–1)
BUN SERPL-MCNC: 41 MG/DL (ref 8–23)
CALCIUM SERPL-MCNC: 8.7 MG/DL (ref 8.7–10.5)
CHLORIDE SERPL-SCNC: 107 MMOL/L (ref 95–110)
CO2 SERPL-SCNC: 23 MMOL/L (ref 23–29)
CREAT SERPL-MCNC: 2 MG/DL (ref 0.5–1.4)
DIFFERENTIAL METHOD: ABNORMAL
EOSINOPHIL # BLD AUTO: 0.3 K/UL (ref 0–0.5)
EOSINOPHIL NFR BLD: 2.6 % (ref 0–8)
ERYTHROCYTE [DISTWIDTH] IN BLOOD BY AUTOMATED COUNT: 13 % (ref 11.5–14.5)
EST. GFR  (AFRICAN AMERICAN): 39.6 ML/MIN/1.73 M^2
EST. GFR  (NON AFRICAN AMERICAN): 34.3 ML/MIN/1.73 M^2
GLUCOSE SERPL-MCNC: 98 MG/DL (ref 70–110)
HCT VFR BLD AUTO: 36.6 % (ref 40–54)
HGB BLD-MCNC: 12 G/DL (ref 14–18)
IMM GRANULOCYTES # BLD AUTO: 0.04 K/UL (ref 0–0.04)
IMM GRANULOCYTES NFR BLD AUTO: 0.3 % (ref 0–0.5)
LYMPHOCYTES # BLD AUTO: 1.8 K/UL (ref 1–4.8)
LYMPHOCYTES NFR BLD: 14.3 % (ref 18–48)
MAGNESIUM SERPL-MCNC: 2.4 MG/DL (ref 1.6–2.6)
MCH RBC QN AUTO: 30.1 PG (ref 27–31)
MCHC RBC AUTO-ENTMCNC: 32.8 G/DL (ref 32–36)
MCV RBC AUTO: 92 FL (ref 82–98)
MONOCYTES # BLD AUTO: 1.1 K/UL (ref 0.3–1)
MONOCYTES NFR BLD: 8.9 % (ref 4–15)
NEUTROPHILS # BLD AUTO: 9.1 K/UL (ref 1.8–7.7)
NEUTROPHILS NFR BLD: 73.7 % (ref 38–73)
NRBC BLD-RTO: 0 /100 WBC
PHOSPHATE SERPL-MCNC: 3.6 MG/DL (ref 2.7–4.5)
PLATELET # BLD AUTO: 243 K/UL (ref 150–350)
PMV BLD AUTO: 10.4 FL (ref 9.2–12.9)
POCT GLUCOSE: 158 MG/DL (ref 70–110)
POCT GLUCOSE: 203 MG/DL (ref 70–110)
POTASSIUM SERPL-SCNC: 4.3 MMOL/L (ref 3.5–5.1)
PROT SERPL-MCNC: 6.9 G/DL (ref 6–8.4)
RBC # BLD AUTO: 3.99 M/UL (ref 4.6–6.2)
SODIUM SERPL-SCNC: 140 MMOL/L (ref 136–145)
WBC # BLD AUTO: 12.31 K/UL (ref 3.9–12.7)

## 2020-03-02 PROCEDURE — 36415 COLL VENOUS BLD VENIPUNCTURE: CPT

## 2020-03-02 PROCEDURE — 99232 SBSQ HOSP IP/OBS MODERATE 35: CPT | Mod: ,,, | Performed by: INTERNAL MEDICINE

## 2020-03-02 PROCEDURE — 25000242 PHARM REV CODE 250 ALT 637 W/ HCPCS: Performed by: INTERNAL MEDICINE

## 2020-03-02 PROCEDURE — 11000001 HC ACUTE MED/SURG PRIVATE ROOM

## 2020-03-02 PROCEDURE — 97162 PT EVAL MOD COMPLEX 30 MIN: CPT

## 2020-03-02 PROCEDURE — 84100 ASSAY OF PHOSPHORUS: CPT

## 2020-03-02 PROCEDURE — 99232 PR SUBSEQUENT HOSPITAL CARE,LEVL II: ICD-10-PCS | Mod: ,,, | Performed by: INTERNAL MEDICINE

## 2020-03-02 PROCEDURE — 63600175 PHARM REV CODE 636 W HCPCS: Performed by: INTERNAL MEDICINE

## 2020-03-02 PROCEDURE — 80053 COMPREHEN METABOLIC PANEL: CPT

## 2020-03-02 PROCEDURE — 85025 COMPLETE CBC W/AUTO DIFF WBC: CPT

## 2020-03-02 PROCEDURE — 97165 OT EVAL LOW COMPLEX 30 MIN: CPT

## 2020-03-02 PROCEDURE — 25000003 PHARM REV CODE 250: Performed by: INTERNAL MEDICINE

## 2020-03-02 PROCEDURE — 94761 N-INVAS EAR/PLS OXIMETRY MLT: CPT

## 2020-03-02 PROCEDURE — 83735 ASSAY OF MAGNESIUM: CPT

## 2020-03-02 RX ORDER — LIDOCAINE 50 MG/G
1 PATCH TOPICAL
Status: DISCONTINUED | OUTPATIENT
Start: 2020-03-02 | End: 2020-03-05 | Stop reason: HOSPADM

## 2020-03-02 RX ORDER — ISOSORBIDE MONONITRATE 30 MG/1
30 TABLET, EXTENDED RELEASE ORAL DAILY
Status: DISCONTINUED | OUTPATIENT
Start: 2020-03-02 | End: 2020-03-05 | Stop reason: HOSPADM

## 2020-03-02 RX ORDER — VANCOMYCIN 1.75 GRAM/500 ML IN 0.9 % SODIUM CHLORIDE INTRAVENOUS
1750 ONCE
Status: COMPLETED | OUTPATIENT
Start: 2020-03-02 | End: 2020-03-03

## 2020-03-02 RX ORDER — VANCOMYCIN HCL IN 5 % DEXTROSE 1G/250ML
1000 PLASTIC BAG, INJECTION (ML) INTRAVENOUS
Status: DISCONTINUED | OUTPATIENT
Start: 2020-03-03 | End: 2020-03-03

## 2020-03-02 RX ADMIN — HEPARIN SODIUM 5000 UNITS: 5000 INJECTION, SOLUTION INTRAVENOUS; SUBCUTANEOUS at 06:03

## 2020-03-02 RX ADMIN — FINASTERIDE 5 MG: 5 TABLET, FILM COATED ORAL at 08:03

## 2020-03-02 RX ADMIN — BENZTROPINE MESYLATE 0.5 MG: 0.5 TABLET ORAL at 08:03

## 2020-03-02 RX ADMIN — HEPARIN SODIUM 5000 UNITS: 5000 INJECTION, SOLUTION INTRAVENOUS; SUBCUTANEOUS at 02:03

## 2020-03-02 RX ADMIN — HEPARIN SODIUM 5000 UNITS: 5000 INJECTION, SOLUTION INTRAVENOUS; SUBCUTANEOUS at 09:03

## 2020-03-02 RX ADMIN — ASPIRIN 81 MG: 81 TABLET, COATED ORAL at 08:03

## 2020-03-02 RX ADMIN — TRAZODONE HYDROCHLORIDE 100 MG: 50 TABLET ORAL at 09:03

## 2020-03-02 RX ADMIN — RISPERIDONE 2 MG: 1 TABLET ORAL at 09:03

## 2020-03-02 RX ADMIN — BISOPROLOL FUMARATE 2.5 MG: 5 TABLET ORAL at 08:03

## 2020-03-02 RX ADMIN — BENZTROPINE MESYLATE 0.5 MG: 0.5 TABLET ORAL at 09:03

## 2020-03-02 RX ADMIN — POLYETHYLENE GLYCOL 3350 17 G: 17 POWDER, FOR SOLUTION ORAL at 08:03

## 2020-03-02 RX ADMIN — CEFTRIAXONE 2 G: 2 INJECTION, SOLUTION INTRAVENOUS at 11:03

## 2020-03-02 RX ADMIN — TAMSULOSIN HYDROCHLORIDE 0.4 MG: 0.4 CAPSULE ORAL at 08:03

## 2020-03-02 RX ADMIN — SENNOSIDES AND DOCUSATE SODIUM 1 TABLET: 8.6; 5 TABLET ORAL at 08:03

## 2020-03-02 RX ADMIN — RISPERIDONE 2 MG: 1 TABLET ORAL at 08:03

## 2020-03-02 RX ADMIN — QUETIAPINE FUMARATE 150 MG: 100 TABLET ORAL at 09:03

## 2020-03-02 RX ADMIN — LIDOCAINE 1 PATCH: 50 PATCH TOPICAL at 08:03

## 2020-03-02 RX ADMIN — ROSUVASTATIN CALCIUM 40 MG: 20 TABLET, FILM COATED ORAL at 09:03

## 2020-03-02 RX ADMIN — VANCOMYCIN HYDROCHLORIDE 1750 MG: 100 INJECTION, POWDER, LYOPHILIZED, FOR SOLUTION INTRAVENOUS at 10:03

## 2020-03-02 RX ADMIN — PANTOPRAZOLE SODIUM 40 MG: 40 TABLET, DELAYED RELEASE ORAL at 08:03

## 2020-03-02 RX ADMIN — ISOSORBIDE MONONITRATE 30 MG: 30 TABLET, EXTENDED RELEASE ORAL at 08:03

## 2020-03-02 RX ADMIN — INSULIN ASPART 2 UNITS: 100 INJECTION, SOLUTION INTRAVENOUS; SUBCUTANEOUS at 12:03

## 2020-03-02 RX ADMIN — HYDROCODONE BITARTRATE AND ACETAMINOPHEN 1 TABLET: 5; 325 TABLET ORAL at 08:03

## 2020-03-02 RX ADMIN — FLUTICASONE FUROATE AND VILANTEROL TRIFENATATE 1 PUFF: 100; 25 POWDER RESPIRATORY (INHALATION) at 08:03

## 2020-03-02 RX ADMIN — SENNOSIDES AND DOCUSATE SODIUM 1 TABLET: 8.6; 5 TABLET ORAL at 09:03

## 2020-03-02 RX ADMIN — VITAMIN D, TAB 1000IU (100/BT) 1000 UNITS: 25 TAB at 08:03

## 2020-03-02 NOTE — PT/OT/SLP EVAL
Occupational Therapy   Evaluation    Name: Paresh Senior  MRN: [id number]  Admitting Diagnosis:  UTI (urinary tract infection)      Recommendations:     Discharge Recommendations: nursing facility, skilled  Discharge Equipment Recommendations:  (tbd)  Barriers to discharge:  Decreased caregiver support    Assessment:     Paresh Senior is a 64 y.o. male with a medical diagnosis of UTI (urinary tract infection).  He presents with impairments listed below. Pt displayed deficits for ADLs and mobility compared to baseline. Pt is unable to assume prior to life roles. Pt w/ decreased balance w/ oob activities causing pt to be an overall fall risk. Pt displayed global deconditioning requiring increased assist for ADLs and mobility at this time. Pt would benefit from skilled OT services to improve independence and overall occupational functioning.     Performance deficits affecting function: weakness, impaired endurance, impaired self care skills, impaired functional mobilty, gait instability, impaired balance, decreased lower extremity function, decreased safety awareness, impaired cardiopulmonary response to activity.      Rehab Prognosis: Good; patient would benefit from acute skilled OT services to address these deficits and reach maximum level of function.       Plan:     Patient to be seen 3 x/week to address the above listed problems via self-care/home management, therapeutic activities, therapeutic exercises  · Plan of Care Expires: 04/02/20  · Plan of Care Reviewed with: patient    Subjective     Chief Complaint: No complaints   Patient/Family Comments/goals: return     Occupational Profile:  Living Environment: Pt lives in a h alone w/ 3 steps to enter and B/L HR.   Previous level of function: Indep w/ ADLs and MOD I for mobility (rollator).   Roles and Routines: N/A  Equipment Used at Home:  wheelchair, nebulizer  Assistance upon Discharge: Pt has limited assistance upon D/C.     Pain/Comfort:  · Pain Rating 1:  0/10  · Pain Rating Post-Intervention 1: 0/10    Patients cultural, spiritual, Advent conflicts given the current situation:      Objective:     Communicated with: RN prior to session.  Patient found HOB elevated with   upon OT entry to room.    General Precautions: Standard, fall   Orthopedic Precautions:N/A   Braces: N/A     Occupational Performance:    Bed Mobility:    · Patient completed Scooting/Bridging with stand by assistance  · Patient completed Supine to Sit with stand by assistance    Functional Mobility/Transfers:  · Patient completed Sit <> Stand Transfer with stand by assistance  with  rolling walker   · Patient completed Bed <> Chair Transfer using Step Transfer technique with minimum assistance with rolling walker  · Functional Mobility: Pt ambulated ~10 ft at min a w/ RW, but required max a w/ R knee buckling ~3 times.     Activities of Daily Living:  · Lower Body Dressing: independence and maximal assistance max a to priti socks and indep to slide on slippers    Cognitive/Visual Perceptual:  Cognitive/Psychosocial Skills:  -       Oriented to: Person, Place, Time and Situation   -       Follows Commands/attention:Follows multistep  commands  -       Communication: clear/fluent  -       Memory: No Deficits noted  -       Safety awareness/insight to disability: intact   -       Mood/Affect/Coping skills/emotional control: Appropriate to situation  Visual/Perceptual:      -Intact      Physical Exam:  Balance:    -       Min a for ambulation   Postural examination/scapula alignment:    -       Rounded shoulders  Skin integrity: Visible skin intact  Upper Extremity Range of Motion:     -       Right Upper Extremity: WFL  -       Left Upper Extremity: WFL  Upper Extremity Strength:    -       Right Upper Extremity: WFL  -       Left Upper Extremity: WFL   Strength:    -       Right Upper Extremity: WFL  -       Left Upper Extremity: WFL  Fine Motor Coordination:    -       Intact  Gross motor  coordination:   WFL    [facility] 6 Click ADL:  AMPAC Total Score: 17    Treatment & Education:  Pt educated on POC.   Education:    Patient left up in chair with all lines intact and call button in reach    GOALS:   Multidisciplinary Problems     Occupational Therapy Goals        Problem: Occupational Therapy Goal    Goal Priority Disciplines Outcome Interventions   Occupational Therapy Goal     OT, PT/OT Ongoing, Progressing    Description:  Goals to be met by: 3/8/2020     Patient will increase functional independence with ADLs by performing:    UE Dressing with [place].  LE Dressing with Minimal Assistance.  Grooming while standing at sink with Minimal Assistance.  Toileting from bedside commode with Minimal Assistance for hygiene and clothing management.   Toilet transfer to bedside commode with Contact Guard Assistance.                      History:     Past Medical History:   Diagnosis Date    COPD (chronic obstructive pulmonary disease)     Coronary artery disease     Hypercholesterolemia     Hypertension        Past Surgical History:   Procedure Laterality Date    COLONOSCOPY  02/2016    Advised repeat in 3 years    CORONARY STENT PLACEMENT  2013       Time Tracking:     OT Date of Treatment: 03/02/20  OT Start Time: 1039  OT Stop Time: 1051  OT Total Time (min): 12 min    Billable Minutes:Evaluation 12 minutes    Roger Blake OT  3/2/2020

## 2020-03-02 NOTE — PLAN OF CARE
Problem: Fall Injury Risk  Goal: Absence of Fall and Fall-Related Injury  Outcome: Ongoing, Progressing     Problem: Adult Inpatient Plan of Care  Goal: Plan of Care Review  Outcome: Ongoing, Progressing  Goal: Patient-Specific Goal (Individualization)  Outcome: Ongoing, Progressing  Goal: Absence of Hospital-Acquired Illness or Injury  Outcome: Ongoing, Progressing  Goal: Optimal Comfort and Wellbeing  Outcome: Ongoing, Progressing  Goal: Readiness for Transition of Care  Outcome: Ongoing, Progressing  Goal: Rounds/Family Conference  Outcome: Ongoing, Progressing    Patient is AAOx4. PT assisted patient from bed to chair, tolerated well. Consumed 100% of meals. Generalized weakness noted. Pain is being managed with PRN Hydrocodone 5-325mg, per MD orders. V/S are stabled. Patient transported off unit for US bilat lower extremity veins d/t leg pain. No distress noted.

## 2020-03-02 NOTE — PLAN OF CARE
CM spoke with patient at bedside. CM explained the role of the CM/SW in assisting with discharge planning. Information in medical records verified with patient. Patient lives alone on first floor of apartment building, home DME utilized. Patient anticipates being discharged under the care of himself and family once medically stable. PT/OT recommends pending.  CM name and number on board.       PCP: Fernando Carmona MD        PHARM:   [id number] - [place]  [address] AT [facility] [address] [place]  [address]  [place] [address]  Phone: [phone] Fax: [phone]    [facility]  [address]  [place] [address]  Phone: [phone] Fax: [phone]        Payor: [employer] MEDICARE / Plan: [employer] ([employer] HEALTH / Product Type: Medicare Advantage /          03/02/20 1254   Discharge Assessment   Assessment Type Discharge Planning Assessment   Confirmed/corrected address and phone number on facesheet? Yes   Assessment information obtained from? Patient;Medical Record   Expected Length of Stay (days) 4   Communicated expected length of stay with patient/caregiver yes   Prior to hospitilization cognitive status: Alert/Oriented;No Deficits   Prior to hospitalization functional status: Wheelchair Bound   Current cognitive status: Alert/Oriented;No Deficits   Current Functional Status: Wheelchair Bound   Lives With alone   Able to Return to Prior Arrangements other (see comments)  (TBD)   Is patient able to care for self after discharge? Unable to determine at this time (comments)   Patient's perception of discharge disposition home or selfcare   Patient currently being followed by outpatient case management? No   Patient currently receives any other outside agency services? No   Equipment Currently Used at Home wheelchair;nebulizer   Do you have any problems affording any of your prescribed medications? No   Is the patient taking medications  as prescribed? yes   Does the patient have transportation home? Yes   Transportation Anticipated health plan transportation;family or friend will provide   Does the patient receive services at the [facility]? No   Discharge Plan A Home;Home with family   Discharge Plan B Skilled Nursing Facility   DME Needed Upon Discharge  none   Patient/Family in Agreement with Plan yes       Amy Quinones RN, BSN     Ext [phone]

## 2020-03-02 NOTE — PLAN OF CARE
CM spoke with patient at bedside. CM explained the role of the CM/SW in assisting with discharge planning. Information in medical records verified with patient. Patient lives alone on first floor of apartment building, home DME utilized. Patient anticipates being discharged under the care of himself and family once medically stable. PT/OT recommends pending.  CM name and number on board.

## 2020-03-02 NOTE — PT/OT/SLP EVAL
Physical Therapy Evaluation    Patient Name:  Paresh Senior   MRN:  [id number]    Recommendations:     Discharge Recommendations:  nursing facility, skilled   Discharge Equipment Recommendations: (TBD)   Barriers to discharge: Decreased caregiver support and decreased functional mobility requiring increased assist      Assessment:     Paresh Senior is a 64 y.o. male admitted with a medical diagnosis of UTI (urinary tract infection).  He presents with the following impairments/functional limitations:  weakness, impaired self care skills, impaired balance, impaired endurance, impaired functional mobilty, gait instability, decreased safety awareness. Pt presenting on this date with impairments in functional mobility and states that he lives alone w/occasional assist from his brother for IADLs. Pt will require additional therapy prior to discharging home to decrease fall risk. Pt would benefit from continued skilled acute PT 3x/wk to improve functional mobility.  Recommending pt receive PT services in SNF setting following discharge from hospital once medically cleared.     Rehab Prognosis: Fair; patient would benefit from acute skilled PT services to address these deficits and reach maximum level of function.    Recent Surgery: * No surgery found *      Plan:     During this hospitalization, patient to be seen 3 x/week to address the identified rehab impairments via gait training, therapeutic exercises, therapeutic activities, neuromuscular re-education and progress toward the following goals:    · Plan of Care Expires:  04/02/20    Subjective     Patient/Family Comments/goals: Pt pleasant and willing to participate with therapy on this date.    Pain/Comfort:  · Pain Rating 1: 0/10    Patients cultural, spiritual, Orthodox conflicts given the current situation: no    Living Environment:  Pt lives alone in a ground floor apartment w/3 [address] and bilat handrails.   Prior to admission, patients level of function was amb  household distances w/Rollator and requiring assist from brother for IADLs.  Equipment used at home: wheelchair, nebulizer.  DME owned (not currently used): none.  Upon discharge, patient will have assistance from brother.    Objective:     Communicated with NSG prior to session.  Patient found HOB elevated with    upon PT entry to room.    General Precautions: Standard, fall   Orthopedic Precautions:N/A   Braces: N/A     Exams:  · Gross Motor Coordination:  WFL    Functional Mobility:  · Bed Mobility:     · Supine to Sit: stand by assistance  · Transfers:     · Sit to Stand:  contact guard assistance with rolling walker  · Gait: 10ft Min A w/RW until pt began demonstrating large instaces of R knee buckling requiring Max A  · Balance: Sitting: SBA          Therapeutic Activities and Exercises:   - Pt educated on:   -PT roles, expectations, and POC    -Safety with mobility   -Benefits of OOB activities to increase strength and functional mobility    -Performing ther ex for increasing LE ROM and strength   -Discharge recommendations     AM-PAC 6 CLICK MOBILITY  Total Score:16     Patient left up in chair with call button in reach.    GOALS:   Multidisciplinary Problems     Physical Therapy Goals        Problem: Physical Therapy Goal    Goal Priority Disciplines Outcome Goal Variances Interventions   Physical Therapy Goal     PT, PT/OT Ongoing, Progressing     Description:  Goals to be met by: 2020    Patient will increase functional independence with mobility by performin. Supine to sit with Modified [place]  2. Sit to supine with Modified [place]  3. Sit to stand transfer with Supervision  4. Gait  x 30 feet with Contact Guard Assistance using LRAD  5. Lower extremity exercise program x15 reps per handout, with independence                     History:     Past Medical History:   Diagnosis Date    COPD (chronic obstructive pulmonary disease)     Coronary artery disease     Hypercholesterolemia      Hypertension        Past Surgical History:   Procedure Laterality Date    COLONOSCOPY  02/2016    Advised repeat in 3 years    CORONARY STENT PLACEMENT  2013       Time Tracking:     PT Received On: 03/02/20  PT Start Time: 1038     PT Stop Time: 1051  PT Total Time (min): 13 min     Billable Minutes: Evaluation 13      Rj Avila, PT  03/02/2020

## 2020-03-02 NOTE — PLAN OF CARE
Problem: Occupational Therapy Goal  Goal: Occupational Therapy Goal  Description  Goals to be met by: 3/8/2020     Patient will increase functional independence with ADLs by performing:    UE Dressing with [place].  LE Dressing with Minimal Assistance.  Grooming while standing at sink with Minimal Assistance.  Toileting from bedside commode with Minimal Assistance for hygiene and clothing management.   Toilet transfer to bedside commode with Contact Guard Assistance.     Outcome: Ongoing, Progressing    Roger Blake OTR/L  3/2/2020

## 2020-03-02 NOTE — PLAN OF CARE
Eval completed and POC established     Irving Avila, PT, DPT  3/2/2020         Problem: Physical Therapy Goal  Goal: Physical Therapy Goal  Description  Goals to be met by: 2020    Patient will increase functional independence with mobility by performin. Supine to sit with Modified [place]  2. Sit to supine with Modified [place]  3. Sit to stand transfer with Supervision  4. Gait  x 30 feet with Contact Guard Assistance using LRAD  5. Lower extremity exercise program x15 reps per handout, with independence    Outcome: Ongoing, Progressing

## 2020-03-02 NOTE — PLAN OF CARE
Patient AAOx4, ambulatory.  Patient reports no pain or discomfort.  VS stable on RA.  Saldivar in place.  telemetry shows NSR.  No falls or injuries, no major events.  Will continue to monitor.

## 2020-03-03 LAB
ALBUMIN SERPL BCP-MCNC: 2.6 G/DL (ref 3.5–5.2)
ALP SERPL-CCNC: 91 U/L (ref 55–135)
ALT SERPL W/O P-5'-P-CCNC: 20 U/L (ref 10–44)
ANION GAP SERPL CALC-SCNC: 10 MMOL/L (ref 8–16)
AST SERPL-CCNC: 23 U/L (ref 10–40)
BACTERIA UR CULT: ABNORMAL
BASOPHILS # BLD AUTO: 0.04 K/UL (ref 0–0.2)
BASOPHILS NFR BLD: 0.3 % (ref 0–1.9)
BILIRUB SERPL-MCNC: 0.3 MG/DL (ref 0.1–1)
BUN SERPL-MCNC: 32 MG/DL (ref 8–23)
CALCIUM SERPL-MCNC: 8.7 MG/DL (ref 8.7–10.5)
CHLORIDE SERPL-SCNC: 107 MMOL/L (ref 95–110)
CO2 SERPL-SCNC: 23 MMOL/L (ref 23–29)
CREAT SERPL-MCNC: 1.8 MG/DL (ref 0.5–1.4)
CRP SERPL-MCNC: 158.2 MG/L (ref 0–8.2)
DIFFERENTIAL METHOD: ABNORMAL
EOSINOPHIL # BLD AUTO: 0.3 K/UL (ref 0–0.5)
EOSINOPHIL NFR BLD: 2.5 % (ref 0–8)
ERYTHROCYTE [DISTWIDTH] IN BLOOD BY AUTOMATED COUNT: 12.7 % (ref 11.5–14.5)
ERYTHROCYTE [SEDIMENTATION RATE] IN BLOOD BY WESTERGREN METHOD: 68 MM/HR (ref 0–23)
EST. GFR  (AFRICAN AMERICAN): 45 ML/MIN/1.73 M^2
EST. GFR  (NON AFRICAN AMERICAN): 38.9 ML/MIN/1.73 M^2
FUNGUS SPEC CULT: NORMAL
FUNGUS SPEC CULT: NORMAL
GLUCOSE SERPL-MCNC: 104 MG/DL (ref 70–110)
HCT VFR BLD AUTO: 35 % (ref 40–54)
HGB BLD-MCNC: 11.5 G/DL (ref 14–18)
IMM GRANULOCYTES # BLD AUTO: 0.04 K/UL (ref 0–0.04)
IMM GRANULOCYTES NFR BLD AUTO: 0.3 % (ref 0–0.5)
LYMPHOCYTES # BLD AUTO: 1.9 K/UL (ref 1–4.8)
LYMPHOCYTES NFR BLD: 16.1 % (ref 18–48)
MAGNESIUM SERPL-MCNC: 2.1 MG/DL (ref 1.6–2.6)
MCH RBC QN AUTO: 29.3 PG (ref 27–31)
MCHC RBC AUTO-ENTMCNC: 32.9 G/DL (ref 32–36)
MCV RBC AUTO: 89 FL (ref 82–98)
MONOCYTES # BLD AUTO: 1.1 K/UL (ref 0.3–1)
MONOCYTES NFR BLD: 9.6 % (ref 4–15)
NEUTROPHILS # BLD AUTO: 8.3 K/UL (ref 1.8–7.7)
NEUTROPHILS NFR BLD: 71.2 % (ref 38–73)
NRBC BLD-RTO: 0 /100 WBC
PHOSPHATE SERPL-MCNC: 3.6 MG/DL (ref 2.7–4.5)
PLATELET # BLD AUTO: 282 K/UL (ref 150–350)
PMV BLD AUTO: 9.8 FL (ref 9.2–12.9)
POCT GLUCOSE: 143 MG/DL (ref 70–110)
POCT GLUCOSE: 168 MG/DL (ref 70–110)
POTASSIUM SERPL-SCNC: 3.9 MMOL/L (ref 3.5–5.1)
PROT SERPL-MCNC: 6.3 G/DL (ref 6–8.4)
RBC # BLD AUTO: 3.92 M/UL (ref 4.6–6.2)
RHEUMATOID FACT SERPL-ACNC: 25 IU/ML (ref 0–15)
SODIUM SERPL-SCNC: 140 MMOL/L (ref 136–145)
TSH SERPL DL<=0.005 MIU/L-ACNC: 1.17 UIU/ML (ref 0.4–4)
URATE SERPL-MCNC: 9 MG/DL (ref 3.4–7)
WBC # BLD AUTO: 11.65 K/UL (ref 3.9–12.7)

## 2020-03-03 PROCEDURE — 94761 N-INVAS EAR/PLS OXIMETRY MLT: CPT

## 2020-03-03 PROCEDURE — 84100 ASSAY OF PHOSPHORUS: CPT

## 2020-03-03 PROCEDURE — 85652 RBC SED RATE AUTOMATED: CPT

## 2020-03-03 PROCEDURE — 84550 ASSAY OF BLOOD/URIC ACID: CPT

## 2020-03-03 PROCEDURE — 80053 COMPREHEN METABOLIC PANEL: CPT

## 2020-03-03 PROCEDURE — 25000003 PHARM REV CODE 250: Performed by: INTERNAL MEDICINE

## 2020-03-03 PROCEDURE — 84443 ASSAY THYROID STIM HORMONE: CPT

## 2020-03-03 PROCEDURE — 86038 ANTINUCLEAR ANTIBODIES: CPT

## 2020-03-03 PROCEDURE — 83735 ASSAY OF MAGNESIUM: CPT

## 2020-03-03 PROCEDURE — 63600175 PHARM REV CODE 636 W HCPCS: Performed by: INTERNAL MEDICINE

## 2020-03-03 PROCEDURE — 85025 COMPLETE CBC W/AUTO DIFF WBC: CPT

## 2020-03-03 PROCEDURE — 36415 COLL VENOUS BLD VENIPUNCTURE: CPT

## 2020-03-03 PROCEDURE — 99233 PR SUBSEQUENT HOSPITAL CARE,LEVL III: ICD-10-PCS | Mod: ,,, | Performed by: INTERNAL MEDICINE

## 2020-03-03 PROCEDURE — 11000001 HC ACUTE MED/SURG PRIVATE ROOM

## 2020-03-03 PROCEDURE — 86140 C-REACTIVE PROTEIN: CPT

## 2020-03-03 PROCEDURE — 25000242 PHARM REV CODE 250 ALT 637 W/ HCPCS: Performed by: INTERNAL MEDICINE

## 2020-03-03 PROCEDURE — 99233 SBSQ HOSP IP/OBS HIGH 50: CPT | Mod: ,,, | Performed by: INTERNAL MEDICINE

## 2020-03-03 PROCEDURE — 86431 RHEUMATOID FACTOR QUANT: CPT

## 2020-03-03 RX ORDER — AMOXICILLIN AND CLAVULANATE POTASSIUM 875; 125 MG/1; MG/1
1 TABLET, FILM COATED ORAL EVERY 12 HOURS
Status: DISCONTINUED | OUTPATIENT
Start: 2020-03-03 | End: 2020-03-05 | Stop reason: HOSPADM

## 2020-03-03 RX ADMIN — HEPARIN SODIUM 5000 UNITS: 5000 INJECTION, SOLUTION INTRAVENOUS; SUBCUTANEOUS at 06:03

## 2020-03-03 RX ADMIN — HEPARIN SODIUM 5000 UNITS: 5000 INJECTION, SOLUTION INTRAVENOUS; SUBCUTANEOUS at 09:03

## 2020-03-03 RX ADMIN — POLYETHYLENE GLYCOL 3350 17 G: 17 POWDER, FOR SOLUTION ORAL at 08:03

## 2020-03-03 RX ADMIN — FINASTERIDE 5 MG: 5 TABLET, FILM COATED ORAL at 08:03

## 2020-03-03 RX ADMIN — LIDOCAINE 1 PATCH: 50 PATCH TOPICAL at 08:03

## 2020-03-03 RX ADMIN — BISOPROLOL FUMARATE 2.5 MG: 5 TABLET ORAL at 11:03

## 2020-03-03 RX ADMIN — BENZTROPINE MESYLATE 0.5 MG: 0.5 TABLET ORAL at 08:03

## 2020-03-03 RX ADMIN — PANTOPRAZOLE SODIUM 40 MG: 40 TABLET, DELAYED RELEASE ORAL at 08:03

## 2020-03-03 RX ADMIN — SENNOSIDES AND DOCUSATE SODIUM 1 TABLET: 8.6; 5 TABLET ORAL at 08:03

## 2020-03-03 RX ADMIN — ISOSORBIDE MONONITRATE 30 MG: 30 TABLET, EXTENDED RELEASE ORAL at 08:03

## 2020-03-03 RX ADMIN — TRAZODONE HYDROCHLORIDE 100 MG: 50 TABLET ORAL at 09:03

## 2020-03-03 RX ADMIN — HEPARIN SODIUM 5000 UNITS: 5000 INJECTION, SOLUTION INTRAVENOUS; SUBCUTANEOUS at 02:03

## 2020-03-03 RX ADMIN — AMOXICILLIN AND CLAVULANATE POTASSIUM 1 TABLET: 875; 125 TABLET, FILM COATED ORAL at 09:03

## 2020-03-03 RX ADMIN — ROSUVASTATIN CALCIUM 40 MG: 20 TABLET, FILM COATED ORAL at 09:03

## 2020-03-03 RX ADMIN — QUETIAPINE FUMARATE 150 MG: 100 TABLET ORAL at 09:03

## 2020-03-03 RX ADMIN — TAMSULOSIN HYDROCHLORIDE 0.4 MG: 0.4 CAPSULE ORAL at 08:03

## 2020-03-03 RX ADMIN — RISPERIDONE 2 MG: 1 TABLET ORAL at 09:03

## 2020-03-03 RX ADMIN — VITAMIN D, TAB 1000IU (100/BT) 1000 UNITS: 25 TAB at 08:03

## 2020-03-03 RX ADMIN — RISPERIDONE 2 MG: 1 TABLET ORAL at 08:03

## 2020-03-03 RX ADMIN — BENZTROPINE MESYLATE 0.5 MG: 0.5 TABLET ORAL at 09:03

## 2020-03-03 RX ADMIN — ASPIRIN 81 MG: 81 TABLET, COATED ORAL at 08:03

## 2020-03-03 RX ADMIN — FLUTICASONE FUROATE AND VILANTEROL TRIFENATATE 1 PUFF: 100; 25 POWDER RESPIRATORY (INHALATION) at 09:03

## 2020-03-03 NOTE — PLAN OF CARE
Problem: Adult Inpatient Plan of Care  Goal: Plan of Care Review  Outcome: Ongoing, Progressing  Goal: Patient-Specific Goal (Individualization)  Outcome: Ongoing, Progressing  Goal: Absence of Hospital-Acquired Illness or Injury  Outcome: Ongoing, Progressing  Goal: Optimal Comfort and Wellbeing  Outcome: Ongoing, Progressing  Goal: Readiness for Transition of Care  Outcome: Ongoing, Progressing  Goal: Rounds/Family Conference  Outcome: Ongoing, Progressing   Patient is AAOx4. Ambulates around room with supervision. V/S are stabled. Tele monitoring pacing at N.S. No c/o pain or no distress noted. Safety measures maintained.

## 2020-03-03 NOTE — PROGRESS NOTES
[facility] Hospital Medicine  Progress Note    Patient Name: Paresh Senior  MRN: [id number]  Patient Class: IP- Inpatient   Admission Date: 3/1/2020  Length of Stay: 1 days  Attending Physician: Jhon Tucker MD  Primary Care Provider: Fernando Carmona MD    [facility] Medicine Team: [facility] Jhon Tucker MD    HPI:      Mr. Senior is a 64-year-old male with a history of  diabetes type 2, hypertension, CAD, COPD, and Schizophrenia and recent urinary retention presenting with catheter pain, urinary pain and dysuria.  Patient states that he was seen by outpatient urology on 2/13 and 2/21 with continued urinary retention and failed voiding trials. Dillon was re-inserted and scheduled for repeat voiding trial. He was scheduled for follow up with Dr. Aguayo. He noted significant dysuria and worsening fatigue following dillon re-insertion on 2/21.  He now presents with urinary pain, dysuria but denies any associated fevers, chills, nausea, vomiting, diarrhea, back pain, abdominal pain, leg swelling, chest pain, lightheadedness or dizziness. He also has associated right hip pain but is able to ambulate and bear weight.       Seen by PCP on 2/18. Patient follows with pulmonary and anticholinergic agents were discontinued due to urinary retention. He was also approved to start pulmonary rehab.        Subjective:     Principal Problem:UTI (urinary tract infection)    Interval History: Sitting in chair, mild distress due to pain in lateral right foot and back of left knee. Denies fever, chills, chest pain, shortness of breath, abdominal pain, diarrhea or lower extremity swelling. Continues to report dysuria.     Review of Systems   Constitutional: Positive for fatigue. Negative for chills and fever.   HENT: Negative for congestion.    Eyes: Negative for visual disturbance.   Respiratory: Negative for cough and shortness of breath.    Cardiovascular: Negative for chest pain and leg  swelling.   Gastrointestinal: Negative for abdominal distention, abdominal pain, nausea and vomiting.   Musculoskeletal: Positive for arthralgias and myalgias (right foot pain. pain in back of left knee).   Skin: Negative for rash and wound.   Neurological: Positive for weakness.   Psychiatric/Behavioral: Negative for confusion.        Objective:     Vital Signs (Most Recent):  Temp: (!) 100.6 °F (38.1 °C) (03/02/20 1954)  Pulse: 97 (03/02/20 2300)  Resp: 18 (03/02/20 1954)  BP: 139/67 (03/02/20 1954)  SpO2: (!) 93 % (03/02/20 1954) Vital Signs (24h Range):  Temp:  [98.1 °F (36.7 °C)-100.6 °F (38.1 °C)] 100.6 °F (38.1 °C)  Pulse:  [83-99] 97  Resp:  [16-18] 18  SpO2:  [93 %-96 %] 93 %  BP: (116-144)/(66-82) 139/67     Weight: 73.6 kg (162 lb 5.9 oz)  Body mass index is 23.98 kg/m².    Intake/Output Summary (Last 24 hours) at 3/2/2020 2338  Last data filed at 3/2/2020 1500  Gross per 24 hour   Intake 870 ml   Output 1750 ml   Net -880 ml      Physical Exam   Constitutional: He is oriented to person, place, and time. He appears well-developed.   HENT:   Head: Normocephalic and atraumatic.   Eyes: Pupils are equal, round, and reactive to light. Conjunctivae are normal.   Cardiovascular: Normal rate and regular rhythm.   Pulmonary/Chest: Effort normal and breath sounds normal.   Abdominal: Soft. Bowel sounds are normal. He exhibits no distension. There is no tenderness.   Musculoskeletal: Normal range of motion.   Erythema and TTP on lateral aspect of right foot    Neurological: He is alert and oriented to person, place, and time. No cranial nerve deficit.   Skin: Skin is warm.   Psychiatric: He has a normal mood and affect.       Significant Labs:   A1C:   Recent Labs   Lab 12/13/19  0810 01/27/20  1020   HGBA1C 6.5* 6.5*     Blood Culture:   Recent Labs   Lab 03/01/20  0711 03/01/20 0721   LABBLOO No Growth to date  No Growth to date No Growth to date  No Growth to date     CBC:   Recent Labs   Lab 03/01/20 0721  03/02/20  0409   WBC 17.45* 12.31   HGB 11.8* 12.0*   HCT 35.5* 36.6*    243     CMP:   Recent Labs   Lab 03/01/20  0721 03/02/20  0409    140   K 3.6 4.3    107   CO2 23 23   * 98   BUN 62* 41*   CREATININE 3.4* 2.0*   CALCIUM 8.7 8.7   PROT 6.7 6.9   ALBUMIN 2.9* 2.8*   BILITOT 0.3 0.3   ALKPHOS 82 93   AST 26 31   ALT 21 21   ANIONGAP 12 10   EGFRNONAA 18.0* 34.3*     Lactic Acid:   Recent Labs   Lab 03/01/20  0721   LACTATE 1.0     Magnesium:   Recent Labs   Lab 03/02/20  0409   MG 2.4     POCT Glucose:   Recent Labs   Lab 03/01/20  2330 03/02/20  1136 03/02/20  2112   POCTGLUCOSE 105 203* 158*     Troponin:   Recent Labs   Lab 03/01/20  1201   TROPONINI 0.011     TSH:   Recent Labs   Lab 12/13/19  0810   TSH 2.140     Urine Studies:   Recent Labs   Lab 03/01/20  0802   COLORU Red*   APPEARANCEUA Cloudy*   PHUR 7.0   SPECGRAV 1.020   PROTEINUA 2+*   GLUCUA Negative   KETONESU Negative   BILIRUBINUA Negative   OCCULTUA 3+*   NITRITE Negative   LEUKOCYTESUR 2+*   RBCUA >100*   WBCUA >100*   BACTERIA Many*   HYALINECASTS 1     Results for JEFF JACKSON (MRN [id number]) as of 3/2/2020 23:43   Ref. Range 3/1/2020 07:21   Procalcitonin Latest Ref Range: <0.25 ng/mL 2.72 (H)       Significant Imaging: I have reviewed and interpreted all pertinent imaging results/findings within the past 24 hours.    Assessment/Plan:      Active Diagnoses:    Diagnosis Date Noted POA    PRINCIPAL PROBLEM:  UTI (urinary tract infection) [N39.0] 03/01/2020 Unknown    CHELE (acute kidney injury) [N17.9] 03/01/2020 Yes    Urine retention [R33.9] 01/29/2020 Yes    Stage 2 chronic kidney disease [N18.2] 01/27/2020 Yes    Chronic obstructive pulmonary disease [J44.9]  Yes    Centrilobular emphysema [J43.2] 12/10/2019 Yes    Type 2 diabetes mellitus with hyperglycemia, without long-term current use of insulin [E11.65] 12/10/2019 Yes    CAD (coronary artery disease) [I25.10] 11/25/2014 Yes      Problems Resolved  During this Admission:     PLAN:     Sepsis due to UTI  - afebrile, tachypenic and leukocytosis   - SBP 90s-100s  - hold home BP medications  - U/A positive for UTI  - CXR negative for infectious source  - lactate wnl   - procal 2.72  - Ucx growing staph aureus. Awaiting sensitivities   - switched from ceftriaxone to vancomycin (D1 3/2)     CHELE on CKD  Urinary retention  - Scr on admit: 3.4 (b/l Scr: 1.7)  - Scr downtrended to 2.0  - failed voiding trial on 2/13 and 2/19 in urology clinic  - avoiding anticholinergic agents  - dillon exchanged in ED  - continue tamsulosin and finasteride   - resume ACE once CHELE resolves     NIDDM type 2   - HA1c (1/27): 6.5  - SSI  - hold home glipizide   - SSI and POCT glucose ACHS     Schizophrenia  - Continuing home Risperdal, Seroquel and benztropine   - patient reports taking benztropine for many years without concern for urinary retention      CAD (coronary artery disease)  HTN (hypertension)  - Continuing home medications.  - Lisinopril-HCTZ held due to CHELE     Chronic obstructive pulmonary disease  Centrilobular emphysema  Tobacco abuse  - off Trelogy   - on Advair  - on albuterol prn   - follows with outpatient pulm  - plan for outpatient pulm rehab      Gastroesophageal reflux disease without esophagitis  - Continuing home PPI     H/o Bilateral Knee pain  H/o Right hip pain  Right foot pain  - Xray right hip negative for fracture or dislocation  - Xray right foot showed DJD  - US lower extremities negative for DVT  - lidocaine patch on right hip   - outpatient rheumatology     VTE Risk Mitigation (From admission, onward)         Ordered     heparin (porcine) injection 5,000 Units  Every 8 hours      03/01/20 1042     IP VTE HIGH RISK PATIENT  Once      03/01/20 1042              Time spent in care of patient: > 35 minutes     Jhon Tucker MD  Department of Hospital Medicine   [facility]

## 2020-03-03 NOTE — PROGRESS NOTES
Pharmacokinetic Initial Assessment: IV Vancomycin    Assessment/Plan:    Initiate intravenous vancomycin with loading dose of 1750 mg once followed by a maintenance dose of vancomycin 1000mg IV every 24 hours  Desired empiric serum trough concentration is 10 to 20 mcg/mL  Draw vancomycin trough level 30 min prior to third dose on 3/04 at approximately 2200  Pharmacy will continue to follow and monitor vancomycin.      Please contact pharmacy at extension [phone] with any questions regarding this assessment.     Thank you for the consult,   Calvin Franks       Patient brief summary:  Paresh Senior is a 64 y.o. male initiated on antimicrobial therapy with IV Vancomycin for treatment of suspected urinary tract infection    Drug Allergies:   Review of patient's allergies indicates:   Allergen Reactions    Trelegy ellipta [fluticasone-umeclidin-vilanter] Other (See Comments)     Possible urinary retention        Actual Body Weight:   73.6kg    Renal Function:   Estimated Creatinine Clearance: 37.3 mL/min (A) (based on SCr of 2 mg/dL (H)).,     Dialysis Method (if applicable):  N/A    CBC (last 72 hours):  Recent Labs   Lab Result Units 03/01/20  0721 03/02/20  0409   WBC K/uL 17.45* 12.31   Hemoglobin g/dL 11.8* 12.0*   Hematocrit % 35.5* 36.6*   Platelets K/uL 218 243   Gran% % 85.6* 73.7*   Lymph% % 5.7* 14.3*   Mono% % 6.3 8.9   Eosinophil% % 1.3 2.6   Basophil% % 0.2 0.2   Differential Method  Automated Automated       Metabolic Panel (last 72 hours):  Recent Labs   Lab Result Units 03/01/20  0721 03/01/20  0802 03/02/20  0409   Sodium mmol/L 138  --  140   Sodium Urine Random mmol/L  --  66  --    Potassium mmol/L 3.6  --  4.3   Chloride mmol/L 103  --  107   CO2 mmol/L 23  --  23   Glucose mg/dL 124*  --  98   Glucose, UA   --  Negative  --    BUN, Bld mg/dL 62*  --  41*   Creatinine mg/dL 3.4*  --  2.0*   Creatinine, Random Ur mg/dL  --  111.0  --    Albumin g/dL 2.9*  --  2.8*   Total Bilirubin mg/dL 0.3  --   0.3   Alkaline Phosphatase U/L 82  --  93   AST U/L 26  --  31   ALT U/L 21  --  21   Magnesium mg/dL  --   --  2.4   Phosphorus mg/dL  --   --  3.6       Drug levels (last 3 results):  No results for input(s): VANCOMYCINRA, VANCOMYCINPE, VANCOMYCINTR in the last 72 hours.    Microbiologic Results:  Microbiology Results (last 7 days)     Procedure Component Value Units Date/Time    Urine culture [[id number]]  (Abnormal) Collected:  03/01/20 0802    Order Status:  Completed Specimen:  Urine Updated:  03/02/20 1219     Urine Culture, Routine STAPHYLOCOCCUS AUREUS  50,000 - 99,999 cfu/ml  Susceptibility pending  No other significant isolate      Narrative:       Preferred Collection Type->Urine, Catheterized  ADD-ON ORDER UCRER #[id number], [facility] #[id number], [facility] #[id number]; PER   YOSELIN NINO DO 03/01/2020  08:55     Blood culture #1 **CANNOT BE ORDERED STAT** [[id number]] Collected:  03/01/20 0711    Order Status:  Completed Specimen:  Blood from Peripheral, Wrist, Left Updated:  03/02/20 1012     Blood Culture, Routine No Growth to date      No Growth to date    Blood culture #2 **CANNOT BE ORDERED STAT** [[id number]] Collected:  03/01/20 0721    Order Status:  Completed Specimen:  Blood from Peripheral, Hand, Left Updated:  03/02/20 1012     Blood Culture, Routine No Growth to date      No Growth to date

## 2020-03-03 NOTE — PROGRESS NOTES
[facility] Hospital Medicine  Progress Note    Patient Name: Paresh Senior  MRN: [id number]  Patient Class: IP- Inpatient   Admission Date: 3/1/2020  Length of Stay: 2 days  Attending Physician: Jhon Tucker MD  Primary Care Provider: Fernando Carmona MD    [facility] Medicine Team: [facility] HOSP MED D Jhon Tucker MD    HPI:      Mr. Senior is a 64-year-old male with a history of  diabetes type 2, hypertension, CAD, COPD, and Schizophrenia and recent urinary retention presenting with catheter pain, urinary pain and dysuria.  Patient states that he was seen by outpatient urology on 2/13 and 2/21 with continued urinary retention and failed voiding trials. Dillon was re-inserted and scheduled for repeat voiding trial. He was scheduled for follow up with Dr. Aguayo. He noted significant dysuria and worsening fatigue following dillon re-insertion on 2/21.  He now presents with urinary pain, dysuria but denies any associated fevers, chills, nausea, vomiting, diarrhea, back pain, abdominal pain, leg swelling, chest pain, lightheadedness or dizziness. He also has associated right hip pain but is able to ambulate and bear weight.       Seen by PCP on 2/18. Patient follows with pulmonary and anticholinergic agents were discontinued due to urinary retention. He was also approved to start pulmonary rehab.        Subjective:     Principal Problem:UTI (urinary tract infection)    Interval History: Patient sitting in bed, no acute distress. Reports continued right foot pain, hip pain and knee pain. Denies fever, chills, chest pain, shortness of breath, abdominal pain or lower extremity swelling. Reports continued dysuria.     Review of Systems   Constitutional: Positive for fatigue. Negative for chills and fever.   HENT: Negative for congestion.    Eyes: Negative for visual disturbance.   Respiratory: Negative for cough and shortness of breath.    Cardiovascular: Negative for chest pain and leg  swelling.   Gastrointestinal: Negative for abdominal distention, abdominal pain, nausea and vomiting.   Musculoskeletal: Positive for arthralgias and myalgias (right foot pain. pain in back of left knee).   Skin: Negative for rash and wound.   Neurological: Positive for weakness.   Psychiatric/Behavioral: Negative for confusion.        Objective:     Vital Signs (Most Recent):  Temp: 99.8 °F (37.7 °C) (03/03/20 0742)  Pulse: 95 (03/03/20 0748)  Resp: 18 (03/03/20 0742)  BP: 129/62 (03/03/20 0742)  SpO2: (!) 91 % (03/03/20 0742) Vital Signs (24h Range):  Temp:  [98.2 °F (36.8 °C)-100.6 °F (38.1 °C)] 99.8 °F (37.7 °C)  Pulse:  [83-99] 95  Resp:  [16-20] 18  SpO2:  [85 %-95 %] 91 %  BP: (116-140)/(62-82) 129/62     Weight: 73.6 kg (162 lb 4.1 oz)  Body mass index is 23.96 kg/m².    Intake/Output Summary (Last 24 hours) at 3/3/2020 0759  Last data filed at 3/3/2020 0600  Gross per 24 hour   Intake 420 ml   Output 2300 ml   Net -1880 ml      Physical Exam   Constitutional: He is oriented to person, place, and time. He appears well-developed.   HENT:   Head: Normocephalic and atraumatic.   Eyes: Pupils are equal, round, and reactive to light. Conjunctivae are normal.   Cardiovascular: Normal rate and regular rhythm.   Pulmonary/Chest: Effort normal and breath sounds normal.   Abdominal: Soft. Bowel sounds are normal. He exhibits no distension. There is no tenderness.   Musculoskeletal: Normal range of motion.   Erythema and TTP on lateral aspect of right foot    Neurological: He is alert and oriented to person, place, and time. No cranial nerve deficit.   Skin: Skin is warm.   Psychiatric: He has a normal mood and affect.       Significant Labs:   A1C:   Recent Labs   Lab 12/13/19  0810 01/27/20  1020   HGBA1C 6.5* 6.5*     Blood Culture:   No results for input(s): LABBLOO in the last 48 hours.  CBC:   Recent Labs   Lab 03/02/20  0409 03/03/20  0453   WBC 12.31 11.65   HGB 12.0* 11.5*   HCT 36.6* 35.0*    282      CMP:   Recent Labs   Lab 03/02/20  0409 03/03/20  0452    140   K 4.3 3.9    107   CO2 23 23   GLU 98 104   BUN 41* 32*   CREATININE 2.0* 1.8*   CALCIUM 8.7 8.7   PROT 6.9 6.3   ALBUMIN 2.8* 2.6*   BILITOT 0.3 0.3   ALKPHOS 93 91   AST 31 23   ALT 21 20   ANIONGAP 10 10   EGFRNONAA 34.3* 38.9*     Lactic Acid:   No results for input(s): LACTATE in the last 48 hours.  Magnesium:   Recent Labs   Lab 03/02/20  0409 03/03/20  0452   MG 2.4 2.1     POCT Glucose:   Recent Labs   Lab 03/01/20  2330 03/02/20  1136 03/02/20  2112   POCTGLUCOSE 105 203* 158*     Troponin:   Recent Labs   Lab 03/01/20  1201   TROPONINI 0.011     TSH:   Recent Labs   Lab 12/13/19  0810   TSH 2.140     Urine Studies:   Recent Labs   Lab 03/01/20  0802   COLORU Red*   APPEARANCEUA Cloudy*   PHUR 7.0   SPECGRAV 1.020   PROTEINUA 2+*   GLUCUA Negative   KETONESU Negative   BILIRUBINUA Negative   OCCULTUA 3+*   NITRITE Negative   LEUKOCYTESUR 2+*   RBCUA >100*   WBCUA >100*   BACTERIA Many*   HYALINECASTS 1     Results for JEFF JACKSON (MRN [id number]) as of 3/2/2020 23:43   Ref. Range 3/1/2020 07:21   Procalcitonin Latest Ref Range: <0.25 ng/mL 2.72 (H)       Significant Imaging: I have reviewed and interpreted all pertinent imaging results/findings within the past 24 hours.    Assessment/Plan:      Active Diagnoses:    Diagnosis Date Noted POA    PRINCIPAL PROBLEM:  UTI (urinary tract infection) [N39.0] 03/01/2020 Unknown    CHELE (acute kidney injury) [N17.9] 03/01/2020 Yes    Urine retention [R33.9] 01/29/2020 Yes    Stage 2 chronic kidney disease [N18.2] 01/27/2020 Yes    Chronic obstructive pulmonary disease [J44.9]  Yes    Centrilobular emphysema [J43.2] 12/10/2019 Yes    Type 2 diabetes mellitus with hyperglycemia, without long-term current use of insulin [E11.65] 12/10/2019 Yes    CAD (coronary artery disease) [I25.10] 11/25/2014 Yes      Problems Resolved During this Admission:     PLAN:     Sepsis   MSSA UTI  -  afebrile, tachypenic and leukocytosis   - SBP 90s-100s  - hold home BP medications  - U/A positive for UTI  - CXR negative for infectious source  - lactate wnl   - procal 2.72  - Ucx growing MSSA  - switched vancomycin (D1 3/2) to augmentin      CHELE on CKD- improving   Urinary retention  - Scr on admit: 3.4 (b/l Scr: 1.7)  - Scr downtrended to 2.0  - failed voiding trial on 2/13 and 2/19 in urology clinic  - avoiding anticholinergic agents  - dillon exchanged in ED  - continue tamsulosin and finasteride   - resume ACE once CHELE resolves     NIDDM type 2   - HA1c (1/27): 6.5  - SSI  - hold home glipizide   - SSI and POCT glucose ACHS     Schizophrenia  - Continuing home Risperdal, Seroquel and benztropine   - patient reports taking benztropine for many years without concern for urinary retention      CAD   HTN   - Continuing home medications.  - Lisinopril-HCTZ held due to CHELE     Chronic obstructive pulmonary disease  Centrilobular emphysema  Tobacco abuse  - off Trelogy   - on Advair  - on albuterol prn   - follows with outpatient pulm  - plan for outpatient pulm rehab      Gastroesophageal reflux disease without esophagitis  - Continuing home PPI     H/o Bilateral Knee pain  H/o Right hip pain  Right foot pain  - elevated ESR, CRP, RF, uric acid    - followup [facility]  - Xray right hip negative for fracture or dislocation  - Xray right foot showed DJD  - US lower extremities negative for DVT  - lidocaine patch on right hip   - consult rheumatology tomorrow     VTE Risk Mitigation (From admission, onward)         Ordered     heparin (porcine) injection 5,000 Units  Every 8 hours      03/01/20 1042     IP VTE HIGH RISK PATIENT  Once      03/01/20 1042              Time spent in care of patient: > 35 minutes     Jhon Tucker MD  Department of Hospital Medicine   [facility]

## 2020-03-03 NOTE — MEDICAL/APP STUDENT
Progress Note  [facility] Medicine      Admit Date: 3/1/2020    SUBJECTIVE:     Follow-up For:  UTI (urinary tract infection)    HPI:  Mr. Senior is a 64 y.o. male w/ pmh significant for COPD, CAD, HLD, HTN, T2DM, schizophrenia with recent urinary retention presenting for catheter pain and pain with urination. Per chart review, patient was discharged from hospital with a dillon catheter on 02/01 after being admitted for hypoglycemia. Patient was then seen by outpatient urology on 02/13 and 02/21 for continued urinary retention and failed voiding trials. Patient reports that after the dillon catheter was re-inserted on 02/21, he started to experience catheter pain, pain with urination and chills. Patient also reports hip pain that is chronic. Patient denies fever, nausea, vomiting, diarrhea, flank pain and chest pain.     Hospital Course: In the ED, patient had a WC 17.45, temp 99.5, RR 24, /60 and pulse 63. He was given 2g ceftriaxone.     Interval History: O2 saturation noted to be 85%, patient was given 1L NC, but was not using it this morning on exam. O2 sat currently 91%. Patient denies shortness of breath. Patient reports continued pain behind left knee (9/10), pain to right lateral foot with redness (9/10) and continued catheter pain (10/10) that started on 03/01. Patient reports mild improvement of dysuria. Patient denies fever, nausea, vomiting, diarrhea, chest pain, leg swelling. Patient reports that he has been unable to ambulate 2/2 pain and feeling off balance. Patient states that he was able to ambulate on his own without cane or walker prior to admission.     Constitutional: Positive for chills. Neg for fever.  Respiratory: Negative for cough or shortness of breath.  Cardiovascular: Negative for chest pain or palpitations.  Gastrointestinal: Negativ for nausea or vomiting, negative for abdominal pain or change in bowel habits.  Genitourinary: Positive for catheter pain and dysuria.    Musculoskeletal: Positive for arthralgias (right lateral foot, left popliteal fossa, right hip).  Neurological: Negative for seizures or tremors.  Behavioral/Psych: Negative for confusion.    Scheduled Meds:   aspirin  81 mg Oral Daily    benztropine  0.5 mg Oral BID    bisoprolol  2.5 mg Oral Daily    finasteride  5 mg Oral Daily    fluticasone furoate-vilanterol  1 puff Inhalation Daily    heparin (porcine)  5,000 Units Subcutaneous Q8H    isosorbide mononitrate  30 mg Oral Daily    lidocaine  1 patch Transdermal Q24H    pantoprazole  40 mg Oral Daily    polyethylene glycol  17 g Oral BID    QUEtiapine  150 mg Oral QHS    risperiDONE  2 mg Oral BID    rosuvastatin  40 mg Oral QHS    senna-docusate 8.6-50 mg  1 tablet Oral BID    tamsulosin  0.4 mg Oral Daily    traZODone  100 mg Oral QHS    vancomycin (VANCOCIN) IVPB  1,000 mg Intravenous Q24H    vitamin D  1,000 Units Oral Daily     Continuous Infusions:  PRN Meds:.acetaminophen, albuterol sulfate, Dextrose 10% Bolus, Dextrose 10% Bolus, glucagon (human recombinant), glucose, glucose, HYDROcodone-acetaminophen, insulin aspart U-100, melatonin, ondansetron, polyethylene glycol, sodium chloride 0.9%     OBJECTIVE:     Vital Signs Range (Last 24H):  Temp:  [98.2 °F (36.8 °C)-100.6 °F (38.1 °C)]   Pulse:  [83-99]   Resp:  [16-20]   BP: (116-140)/(66-82)   SpO2:  [85 %-95 %]     I & O (Last 24H):    Intake/Output Summary (Last 24 hours) at 3/3/2020 0639  Last data filed at 3/3/2020 0600  Gross per 24 hour   Intake 420 ml   Output 2300 ml   Net -1880 ml       Physical Exam:  General appearance: No distress.  Mental status: Alert and oriented x 3  HEENT:  conjunctivae/corneas clear, PERRL  Neck: supple, thyroid not enlarged  Pulm:   normal respiratory effort, CTA B, no c/w/r  Card: RRR, S1, S2 normal, no murmur, click, rub or gallop  Abd: soft, NT, ND, BS present; no masses, no organomegaly  Ext: Mild erythema to right lateral foot. TTP right lateral  foot, left popliteal fossa, right hip. No edema.   Pulses: 2+, symmetric  Skin: color, texture, turgor normal. No rashes or lesions  Neuro: CN II-XII grossly intact, no focal numbness or weakness, normal strength and tone         Laboratory Results:    Recent Labs   Lab 03/01/20  0721 03/02/20  0409 03/03/20  0453   WBC 17.45* 12.31 11.65   HGB 11.8* 12.0* 11.5*   HCT 35.5* 36.6* 35.0*    243 282     Recent Labs   Lab 03/01/20  0721 03/02/20  0409 03/03/20  0452    140 140   K 3.6 4.3 3.9    107 107   CO2 23 23 23   BUN 62* 41* 32*   CREATININE 3.4* 2.0* 1.8*   * 98 104   CALCIUM 8.7 8.7 8.7   MG  --  2.4 2.1   PHOS  --  3.6 3.6     Recent Labs   Lab 03/01/20  0721 03/02/20  0409 03/03/20  0452   ALKPHOS 82 93 91   ALT 21 21 20   AST 26 31 23   ALBUMIN 2.9* 2.8* 2.6*   PROT 6.7 6.9 6.3   BILITOT 0.3 0.3 0.3      Recent Labs   Lab 03/01/20  1204 03/01/20 2029 03/01/20  2330 03/02/20  1136 03/02/20  2112   POCTGLUCOSE 124* 105 105 203* 158*     Recent Labs     03/01/20  0721   LACTATE 1.0        Recent Labs     03/01/20  1201   TROPONINI 0.011     Hemoglobin A1C   Date Value Ref Range Status   01/27/2020 6.5 (H) 4.0 - 5.6 % Final     Comment:     ADA Screening Guidelines:  5.7-6.4%  Consistent with prediabetes  >or=6.5%  Consistent with diabetes  High levels of fetal hemoglobin interfere with the HbA1C  assay. Heterozygous hemoglobin variants (HbS, HgC, etc)do  not significantly interfere with this assay.   However, presence of multiple variants may affect accuracy.     12/13/2019 6.5 (H) 4.0 - 5.6 % Final     Comment:     ADA Screening Guidelines:  5.7-6.4%  Consistent with prediabetes  >or=6.5%  Consistent with diabetes  High levels of fetal hemoglobin interfere with the HbA1C  assay. Heterozygous hemoglobin variants (HbS, HgC, etc)do  not significantly interfere with this assay.   However, presence of multiple variants may affect accuracy.     11/25/2014 6.1 4.5 - 6.2 % Final       No  results for input(s): PT, INR, APTT in the last 24 hours.       Diagnostic Results:  Right hip XR: no fracture or dislocation  Right foot XR: mild DJD of 1st MTP and IP joint without fracture or dislocation  US LE: no evidence of DVT    Microbiology:  Microbiology Results (last 7 days)     Procedure Component Value Units Date/Time    Urine culture [[id number]]  (Abnormal) Collected:  03/01/20 0802    Order Status:  Completed Specimen:  Urine Updated:  03/02/20 1219     Urine Culture, Routine STAPHYLOCOCCUS AUREUS  50,000 - 99,999 cfu/ml  Susceptibility pending  No other significant isolate      Narrative:       Preferred Collection Type->Urine, Catheterized  ADD-ON ORDER UCRER #[id number], U[facility] #[id number], [facility] #[id number]; PER   YOSELIN NINO DO 03/01/2020  08:55     Blood culture #1 **CANNOT BE ORDERED STAT** [[id number]] Collected:  03/01/20 0711    Order Status:  Completed Specimen:  Blood from Peripheral, Wrist, Left Updated:  03/02/20 1012     Blood Culture, Routine No Growth to date      No Growth to date    Blood culture #2 **CANNOT BE ORDERED STAT** [[id number]] Collected:  03/01/20 0721    Order Status:  Completed Specimen:  Blood from Peripheral, Hand, Left Updated:  03/02/20 1012     Blood Culture, Routine No Growth to date      No Growth to date        Recent cultured reviewed     ASSESSMENT/PLAN:     Mr. Senior is a 64 y.o. male w/ pmh significant for COPD, CAD, T2DM, schizophrenia with recent urinary retention presenting for catheter pain and dysuria. Workup positive for UTI, urine culture positive for staph aureus.    #UTI  - UA + for UTI  - UCx + for staph aureus  - Ceftriaxone started on 03/01 switched to vancomycin 03/02  - monitor vancomycin levels  - awaiting sensitivities    #CHELE  - baseline Cr 1.7, today 1.8  - downtrending Cr   - hold ACEi    #Joint pain  - Hip and foot XR negative for acute processes  - US LE negative for DVT  - DDx: fracture, DVT, and osteomyelitis are less likely, gout  -  Consult rheumatology    #T2DM  - Glucose 104 today  - SSI: aspart 0-5 PRN    #Schizophrenia  - Continue home meds: benztropine, quetiapine, risperidone    #COPD  - continue home meds  - duonebs PRN    #CAD/HTN  - continue home meds: bisprolol, isosorbide mononitrate  - hold ACEi    #GERD  - continue home PPI    Dispo: Unable to ambulate on his own.   Diet: Diabetic  DVT prophylaxis: Heparin 5,000 U TID  Code status: Full    Barriers to dc:  - Vancomycin IV  - Joint pain- unable to ambulate          Kateryna Chong, MS3

## 2020-03-04 ENCOUNTER — PATIENT OUTREACH (OUTPATIENT)
Dept: ADMINISTRATIVE | Facility: OTHER | Age: 65
End: 2020-03-04

## 2020-03-04 PROBLEM — M10.9 ACUTE GOUT OF FOOT: Status: ACTIVE | Noted: 2020-03-04

## 2020-03-04 LAB
ALBUMIN SERPL BCP-MCNC: 2.6 G/DL (ref 3.5–5.2)
ALP SERPL-CCNC: 93 U/L (ref 55–135)
ALT SERPL W/O P-5'-P-CCNC: 23 U/L (ref 10–44)
ANION GAP SERPL CALC-SCNC: 8 MMOL/L (ref 8–16)
AST SERPL-CCNC: 24 U/L (ref 10–40)
BASOPHILS # BLD AUTO: 0.07 K/UL (ref 0–0.2)
BASOPHILS NFR BLD: 0.6 % (ref 0–1.9)
BILIRUB SERPL-MCNC: 0.3 MG/DL (ref 0.1–1)
BUN SERPL-MCNC: 28 MG/DL (ref 8–23)
CALCIUM SERPL-MCNC: 9 MG/DL (ref 8.7–10.5)
CCP AB SER IA-ACNC: <0.5 U/ML
CHLORIDE SERPL-SCNC: 107 MMOL/L (ref 95–110)
CO2 SERPL-SCNC: 22 MMOL/L (ref 23–29)
CREAT SERPL-MCNC: 1.8 MG/DL (ref 0.5–1.4)
DIFFERENTIAL METHOD: ABNORMAL
EOSINOPHIL # BLD AUTO: 0.5 K/UL (ref 0–0.5)
EOSINOPHIL NFR BLD: 3.9 % (ref 0–8)
ERYTHROCYTE [DISTWIDTH] IN BLOOD BY AUTOMATED COUNT: 12.6 % (ref 11.5–14.5)
EST. GFR  (AFRICAN AMERICAN): 45 ML/MIN/1.73 M^2
EST. GFR  (NON AFRICAN AMERICAN): 38.9 ML/MIN/1.73 M^2
GLUCOSE SERPL-MCNC: 106 MG/DL (ref 70–110)
HCT VFR BLD AUTO: 36.3 % (ref 40–54)
HGB BLD-MCNC: 11.9 G/DL (ref 14–18)
IMM GRANULOCYTES # BLD AUTO: 0.07 K/UL (ref 0–0.04)
IMM GRANULOCYTES NFR BLD AUTO: 0.6 % (ref 0–0.5)
LYMPHOCYTES # BLD AUTO: 1.8 K/UL (ref 1–4.8)
LYMPHOCYTES NFR BLD: 14.5 % (ref 18–48)
MAGNESIUM SERPL-MCNC: 2.2 MG/DL (ref 1.6–2.6)
MCH RBC QN AUTO: 29.9 PG (ref 27–31)
MCHC RBC AUTO-ENTMCNC: 32.8 G/DL (ref 32–36)
MCV RBC AUTO: 91 FL (ref 82–98)
MONOCYTES # BLD AUTO: 1.2 K/UL (ref 0.3–1)
MONOCYTES NFR BLD: 9.5 % (ref 4–15)
NEUTROPHILS # BLD AUTO: 8.8 K/UL (ref 1.8–7.7)
NEUTROPHILS NFR BLD: 70.9 % (ref 38–73)
NRBC BLD-RTO: 0 /100 WBC
PHOSPHATE SERPL-MCNC: 4 MG/DL (ref 2.7–4.5)
PLATELET # BLD AUTO: 314 K/UL (ref 150–350)
PMV BLD AUTO: 9.7 FL (ref 9.2–12.9)
POCT GLUCOSE: 119 MG/DL (ref 70–110)
POCT GLUCOSE: 133 MG/DL (ref 70–110)
POCT GLUCOSE: 146 MG/DL (ref 70–110)
POTASSIUM SERPL-SCNC: 4 MMOL/L (ref 3.5–5.1)
PROT SERPL-MCNC: 6.5 G/DL (ref 6–8.4)
RBC # BLD AUTO: 3.98 M/UL (ref 4.6–6.2)
SODIUM SERPL-SCNC: 137 MMOL/L (ref 136–145)
WBC # BLD AUTO: 12.36 K/UL (ref 3.9–12.7)

## 2020-03-04 PROCEDURE — 99232 PR SUBSEQUENT HOSPITAL CARE,LEVL II: ICD-10-PCS | Mod: ,,, | Performed by: INTERNAL MEDICINE

## 2020-03-04 PROCEDURE — 99223 PR INITIAL HOSPITAL CARE,LEVL III: ICD-10-PCS | Mod: GC,,, | Performed by: INTERNAL MEDICINE

## 2020-03-04 PROCEDURE — 25000003 PHARM REV CODE 250: Performed by: INTERNAL MEDICINE

## 2020-03-04 PROCEDURE — 11000001 HC ACUTE MED/SURG PRIVATE ROOM

## 2020-03-04 PROCEDURE — 63600175 PHARM REV CODE 636 W HCPCS: Performed by: INTERNAL MEDICINE

## 2020-03-04 PROCEDURE — 97535 SELF CARE MNGMENT TRAINING: CPT

## 2020-03-04 PROCEDURE — 99223 1ST HOSP IP/OBS HIGH 75: CPT | Mod: GC,,, | Performed by: INTERNAL MEDICINE

## 2020-03-04 PROCEDURE — 80053 COMPREHEN METABOLIC PANEL: CPT

## 2020-03-04 PROCEDURE — 85025 COMPLETE CBC W/AUTO DIFF WBC: CPT

## 2020-03-04 PROCEDURE — 99232 SBSQ HOSP IP/OBS MODERATE 35: CPT | Mod: ,,, | Performed by: INTERNAL MEDICINE

## 2020-03-04 PROCEDURE — 36415 COLL VENOUS BLD VENIPUNCTURE: CPT

## 2020-03-04 PROCEDURE — 84100 ASSAY OF PHOSPHORUS: CPT

## 2020-03-04 PROCEDURE — 97116 GAIT TRAINING THERAPY: CPT

## 2020-03-04 PROCEDURE — 83735 ASSAY OF MAGNESIUM: CPT

## 2020-03-04 PROCEDURE — 86200 CCP ANTIBODY: CPT

## 2020-03-04 RX ORDER — PREDNISONE 10 MG/1
20 TABLET ORAL 2 TIMES DAILY
Status: DISCONTINUED | OUTPATIENT
Start: 2020-03-04 | End: 2020-03-05 | Stop reason: HOSPADM

## 2020-03-04 RX ORDER — LISINOPRIL 10 MG/1
10 TABLET ORAL DAILY
Status: DISCONTINUED | OUTPATIENT
Start: 2020-03-04 | End: 2020-03-05 | Stop reason: HOSPADM

## 2020-03-04 RX ADMIN — RISPERIDONE 2 MG: 1 TABLET ORAL at 09:03

## 2020-03-04 RX ADMIN — QUETIAPINE FUMARATE 150 MG: 100 TABLET ORAL at 08:03

## 2020-03-04 RX ADMIN — Medication 6 MG: at 09:03

## 2020-03-04 RX ADMIN — POLYETHYLENE GLYCOL 3350 17 G: 17 POWDER, FOR SOLUTION ORAL at 09:03

## 2020-03-04 RX ADMIN — HEPARIN SODIUM 5000 UNITS: 5000 INJECTION, SOLUTION INTRAVENOUS; SUBCUTANEOUS at 06:03

## 2020-03-04 RX ADMIN — ISOSORBIDE MONONITRATE 30 MG: 30 TABLET, EXTENDED RELEASE ORAL at 09:03

## 2020-03-04 RX ADMIN — BENZTROPINE MESYLATE 0.5 MG: 0.5 TABLET ORAL at 08:03

## 2020-03-04 RX ADMIN — SENNOSIDES AND DOCUSATE SODIUM 1 TABLET: 8.6; 5 TABLET ORAL at 09:03

## 2020-03-04 RX ADMIN — BENZTROPINE MESYLATE 0.5 MG: 0.5 TABLET ORAL at 09:03

## 2020-03-04 RX ADMIN — AMOXICILLIN AND CLAVULANATE POTASSIUM 1 TABLET: 875; 125 TABLET, FILM COATED ORAL at 08:03

## 2020-03-04 RX ADMIN — FLUTICASONE FUROATE AND VILANTEROL TRIFENATATE 1 PUFF: 100; 25 POWDER RESPIRATORY (INHALATION) at 09:03

## 2020-03-04 RX ADMIN — AMOXICILLIN AND CLAVULANATE POTASSIUM 1 TABLET: 875; 125 TABLET, FILM COATED ORAL at 09:03

## 2020-03-04 RX ADMIN — PANTOPRAZOLE SODIUM 40 MG: 40 TABLET, DELAYED RELEASE ORAL at 09:03

## 2020-03-04 RX ADMIN — LIDOCAINE 1 PATCH: 50 PATCH TOPICAL at 09:03

## 2020-03-04 RX ADMIN — HEPARIN SODIUM 5000 UNITS: 5000 INJECTION, SOLUTION INTRAVENOUS; SUBCUTANEOUS at 09:03

## 2020-03-04 RX ADMIN — FINASTERIDE 5 MG: 5 TABLET, FILM COATED ORAL at 09:03

## 2020-03-04 RX ADMIN — ASPIRIN 81 MG: 81 TABLET, COATED ORAL at 09:03

## 2020-03-04 RX ADMIN — TRAZODONE HYDROCHLORIDE 100 MG: 50 TABLET ORAL at 08:03

## 2020-03-04 RX ADMIN — HYDROCODONE BITARTRATE AND ACETAMINOPHEN 1 TABLET: 5; 325 TABLET ORAL at 07:03

## 2020-03-04 RX ADMIN — BISOPROLOL FUMARATE 2.5 MG: 5 TABLET ORAL at 11:03

## 2020-03-04 RX ADMIN — PREDNISONE 20 MG: 10 TABLET ORAL at 08:03

## 2020-03-04 RX ADMIN — HEPARIN SODIUM 5000 UNITS: 5000 INJECTION, SOLUTION INTRAVENOUS; SUBCUTANEOUS at 02:03

## 2020-03-04 RX ADMIN — VITAMIN D, TAB 1000IU (100/BT) 1000 UNITS: 25 TAB at 09:03

## 2020-03-04 RX ADMIN — ROSUVASTATIN CALCIUM 40 MG: 20 TABLET, FILM COATED ORAL at 08:03

## 2020-03-04 RX ADMIN — LISINOPRIL 10 MG: 10 TABLET ORAL at 09:03

## 2020-03-04 RX ADMIN — RISPERIDONE 2 MG: 1 TABLET ORAL at 08:03

## 2020-03-04 RX ADMIN — TAMSULOSIN HYDROCHLORIDE 0.4 MG: 0.4 CAPSULE ORAL at 09:03

## 2020-03-04 NOTE — PROGRESS NOTES
[facility] Medicine  Progress Note    Patient Name: Paresh Senior  MRN: [id number]  Patient Class: IP- Inpatient   Admission Date: 3/1/2020  Length of Stay: 3 days  Attending Physician: Jhon Tucker MD  Primary Care Provider: Fernando Carmona MD    [facility] Medicine Team: [facility]OSP MED D Jhon Tucker MD    HPI:      Mr. Senior is a 64-year-old male with a history of  diabetes type 2, hypertension, CAD, COPD, and Schizophrenia and recent urinary retention presenting with catheter pain, urinary pain and dysuria.  Patient states that he was seen by outpatient urology on 2/13 and 2/21 with continued urinary retention and failed voiding trials. Dillon was re-inserted and scheduled for repeat voiding trial. He was scheduled for follow up with Dr. Aguayo. He noted significant dysuria and worsening fatigue following dillon re-insertion on 2/21.  He now presents with urinary pain, dysuria but denies any associated fevers, chills, nausea, vomiting, diarrhea, back pain, abdominal pain, leg swelling, chest pain, lightheadedness or dizziness. He also has associated right hip pain but is able to ambulate and bear weight.       Seen by PCP on 2/18. Patient follows with pulmonary and anticholinergic agents were discontinued due to urinary retention. He was also approved to start pulmonary rehab.        Subjective:     Principal Problem:UTI (urinary tract infection)    Interval History: Patient sitting in bed, no acute distress. Reports improvement in right foot pain, hip pain and knee pain. Also reports dysuria has improved. Denies fever, chills, chest pain or shortness of breath.     Review of Systems   Constitutional: Positive for fatigue. Negative for chills and fever.   HENT: Negative for congestion.    Eyes: Negative for visual disturbance.   Respiratory: Negative for cough and shortness of breath.    Cardiovascular: Negative for chest pain and leg swelling.   Gastrointestinal:  Negative for abdominal distention, abdominal pain, nausea and vomiting.   Musculoskeletal: Positive for arthralgias (improving) and myalgias (right foot pain. pain in back of left knee (improving)).   Skin: Negative for rash and wound.   Neurological: Positive for weakness.   Psychiatric/Behavioral: Negative for confusion.        Objective:     Vital Signs (Most Recent):  Temp: 98.2 °F (36.8 °C) (03/04/20 0743)  Pulse: 65 (03/04/20 0750)  Resp: 16 (03/04/20 0743)  BP: 119/69 (03/04/20 0743)  SpO2: 97 % (03/04/20 0743) Vital Signs (24h Range):  Temp:  [97.4 °F (36.3 °C)-99 °F (37.2 °C)] 98.2 °F (36.8 °C)  Pulse:  [65-93] 65  Resp:  [14-20] 16  SpO2:  [92 %-97 %] 97 %  BP: (108-135)/(60-69) 119/69     Weight: 76.7 kg (169 lb 1.5 oz)  Body mass index is 24.97 kg/m².    Intake/Output Summary (Last 24 hours) at 3/4/2020 0843  Last data filed at 3/4/2020 0600  Gross per 24 hour   Intake 660 ml   Output 1550 ml   Net -890 ml      Physical Exam   Constitutional: He is oriented to person, place, and time. He appears well-developed.   HENT:   Head: Normocephalic and atraumatic.   Eyes: Pupils are equal, round, and reactive to light. Conjunctivae are normal.   Cardiovascular: Normal rate and regular rhythm.   Pulmonary/Chest: Effort normal and breath sounds normal.   Abdominal: Soft. Bowel sounds are normal. He exhibits no distension. There is no tenderness.   Musculoskeletal: Normal range of motion.   Erythema and TTP on lateral aspect of right foot    Neurological: He is alert and oriented to person, place, and time. No cranial nerve deficit.   Skin: Skin is warm.   Psychiatric: He has a normal mood and affect.       Significant Labs:   A1C:   Recent Labs   Lab 12/13/19  0810 01/27/20  1020   HGBA1C 6.5* 6.5*     Blood Culture:   No results for input(s): LABBLOO in the last 48 hours.  CBC:   Recent Labs   Lab 03/03/20  0453 03/04/20  0523   WBC 11.65 12.36   HGB 11.5* 11.9*   HCT 35.0* 36.3*    314     CMP:   Recent  Labs   Lab 03/03/20  0452 03/04/20  0523    137   K 3.9 4.0    107   CO2 23 22*    106   BUN 32* 28*   CREATININE 1.8* 1.8*   CALCIUM 8.7 9.0   PROT 6.3 6.5   ALBUMIN 2.6* 2.6*   BILITOT 0.3 0.3   ALKPHOS 91 93   AST 23 24   ALT 20 23   ANIONGAP 10 8   EGFRNONAA 38.9* 38.9*     Lactic Acid:   No results for input(s): LACTATE in the last 48 hours.  Magnesium:   Recent Labs   Lab 03/03/20  0452 03/04/20  0523   MG 2.1 2.2     POCT Glucose:   Recent Labs   Lab 03/03/20  1132 03/03/20  2145 03/04/20  0745   POCTGLUCOSE 143* 168* 119*     Troponin:   No results for input(s): TROPONINI in the last 48 hours.  TSH:   Recent Labs   Lab 03/03/20  1538   TSH 1.166     Urine Studies:   No results for input(s): COLORU, APPEARANCEUA, PHUR, SPECGRAV, PROTEINUA, GLUCUA, KETONESU, BILIRUBINUA, OCCULTUA, NITRITE, UROBILINOGEN, LEUKOCYTESUR, RBCUA, WBCUA, BACTERIA, SQUAMEPITHEL, HYALINECASTS in the last 48 hours.    Invalid input(s): WRIGHTSUR  Results for JEFF JACKSON (MRN [id number]) as of 3/2/2020 23:43   Ref. Range 3/1/2020 07:21   Procalcitonin Latest Ref Range: <0.25 ng/mL 2.72 (H)       Significant Imaging: I have reviewed and interpreted all pertinent imaging results/findings within the past 24 hours.    Assessment/Plan:      Active Diagnoses:    Diagnosis Date Noted POA    PRINCIPAL PROBLEM:  UTI (urinary tract infection) [N39.0] 03/01/2020 Unknown    CHELE (acute kidney injury) [N17.9] 03/01/2020 Yes    Urine retention [R33.9] 01/29/2020 Yes    Stage 2 chronic kidney disease [N18.2] 01/27/2020 Yes    Chronic obstructive pulmonary disease [J44.9]  Yes    Centrilobular emphysema [J43.2] 12/10/2019 Yes    Type 2 diabetes mellitus with hyperglycemia, without long-term current use of insulin [E11.65] 12/10/2019 Yes    CAD (coronary artery disease) [I25.10] 11/25/2014 Yes      Problems Resolved During this Admission:     PLAN:     Sepsis   MSSA UTI  - afebrile, tachypenic and leukocytosis   - SBP  90s-100s  - hold home BP medications  - U/A positive for UTI  - CXR negative for infectious source  - lactate wnl   - procal 2.72  - Ucx growing MSSA  - switched vancomycin (D1 3/2) to augmentin      CHELE on CKD- improving   Urinary retention  - Scr on admit: 3.4 (b/l Scr: 1.7)  - Scr downtrended to 1.8  - failed voiding trial on 2/13 and 2/19 in urology clinic  - avoiding anticholinergic agents  - dillon exchanged in ED  - continue tamsulosin and finasteride   - resumed lisinopril at a reduced dose     NIDDM type 2   - HA1c (1/27): 6.5  - SSI  - hold home glipizide   - SSI and POCT glucose ACHS     Schizophrenia  - Continuing home Risperdal, Seroquel and benztropine   - patient reports taking benztropine for many years without concern for urinary retention      CAD   HTN   - Continuing home medications.  - resume lisinopril. Avoiding thiazides due to gout      Chronic obstructive pulmonary disease  Centrilobular emphysema  Tobacco abuse  - off Trelogy   - on Advair  - on albuterol prn   - follows with outpatient pulm  - plan for outpatient pulm rehab      Gastroesophageal reflux disease without esophagitis  - Continuing home PPI     Acute gout of right foot  - elevated ESR, CRP, RF, uric acid    - followup [facility]  - Xray right hip negative for fracture or dislocation  - Xray right foot showed DJD  - US lower extremities negative for DVT  - lidocaine patch on right hip   - discontinued home thiazide  - consult rheumatology. apprec recs  -- prednisone 20 mg BID x 5 days   -- Would avoid colchicine and NSAIDs given kidney function  -- Follow up [facility]. Anti-CCP negative   -- Outpatient rheumatology clinic in 1-2 months at which time we can evaluate need for chronic therapy    VTE Risk Mitigation (From admission, onward)         Ordered     heparin (porcine) injection 5,000 Units  Every 8 hours      03/01/20 1042     IP VTE HIGH RISK PATIENT  Once      03/01/20 1042              Time spent in care of patient: > 35 minutes      Jhon Tucker MD  Department of Hospital Medicine   [facility]

## 2020-03-04 NOTE — CONSULTS
"[facility]  Rheumatology  Consult Note    Patient Name: Paresh Senior  MRN: [id number]  Admission Date: 3/1/2020  Hospital Length of Stay: 3 days  Code Status: Full Code   Attending Provider: Jhon Tucker MD  Primary Care Physician: Fernando Carmona MD  Principal Problem:UTI (urinary tract infection)    Consults  Subjective:     HPI: 65 yo male with past medical history of T2DM, HTN, CAD, COPD, and schizophrenia who [place] [facility] [place] urinary catheter pain. He was recently diagnosed with urinary retention on hospitalization on 1/27. At that time imaging findings with evidence of prostatomegaly and he was started on flomax. He has been seen by urology on 2/13 and 2/21 for voiding trials which he continues to fail. Indwelling dillon was re-inserted on 2/21 and he is currently taking flomax+finasteride. Since then the patient has had worsening pain around catheter site and dysuria. On arrival here, he was afebrile with VSS. Labs were significant for leukocytosis(17K), stable H/(11.8/25.5), CHELE with BUN/sCr 62/3.4(baseline around 1.7), UTI with UA showing 2+ protein, >100 RBCs, >100 WBCs, negative nitrite. He was admitted for CHELE + UTI and started on Ceftriaxone with dillon exchanged. Since admission urine culture is growing MSSA and antibiotics were de-escalated to Augmentin on 3/3. He has had improvement in his leukocytosis which has trended down to 12K and his sCr is closer to baseline at 1.8.     Rheumatology is being consulted because since admission the patient has been complaining of pain in his right foot, back of left knee, and right hip. The pain in his right foot started about 2 days ago after he was admitted, whereas the pain in hip and knee he has experienced before. It appears patient had PRN Morris on board q6 and he has only taken 2 doses since admission. Inflammatory markers obtained sow ESR 68 and .2, RF 25. XR was obtained of right hip which showed "no acute " "fracture or dislocation and mild degenerative arthrosis" and the right foot which showed mildly DJD affecting 1st metatarsophalangeal and interphalangeal joint with no fracture or dislocation or bony destruction. The patient says that he has been dealing with pain in his hip and knee joints for years. He says the pain is worse with movement and generally comes and goes/lets up. He denies any morning stiffness and denies any swelling, warmth or erythema associated with his joint pain. He says the pain was worse a few days ago and is starting to get better.  He says that the PRN Norco, tylenol has helped the pain some when he does ask for them. He is able to walk without issue. He denies prior diagnosis of rheumatoid arthritis and denies any family history of RA or other autoimmune disorders. Of note, he has had arthrocenteses done on bilateral knees 1/30/2020 for pain patient had which had only 95 WBCs and not suggestive of septic or gouty arthritis.     Past Medical History:   Diagnosis Date    COPD (chronic obstructive pulmonary disease)     Coronary artery disease     Hypercholesterolemia     Hypertension        Past Surgical History:   Procedure Laterality Date    COLONOSCOPY  02/2016    Advised repeat in 3 years    CORONARY STENT PLACEMENT  2013       Immunization History   Administered Date(s) Administered    Hepatitis B, Adult 01/20/2020    Influenza - Quadrivalent 02/01/2018    Influenza - Quadrivalent - PF (6 months and older) 01/20/2020    Influenza A (H1N1) 2009 Monovalent - IM 12/11/2009    Pneumococcal Polysaccharide - 23 Valent 07/28/2016       Review of patient's allergies indicates:   Allergen Reactions    Trelegy ellipta [fluticasone-umeclidin-vilanter] Other (See Comments)     Possible urinary retention      Current Facility-Administered Medications   Medication Frequency    acetaminophen tablet 500 mg Q6H PRN    albuterol sulfate nebulizer solution 2.5 mg Q6H PRN    " "amoxicillin-clavulanate 875-125mg per tablet 1 tablet Q12H    aspirin EC tablet 81 mg Daily    benztropine tablet 0.5 mg BID    bisoprolol split tablet 2.5 mg Daily    dextrose 10% (D10W) Bolus PRN    dextrose 10% (D10W) Bolus PRN    finasteride tablet 5 mg Daily    fluticasone furoate-vilanterol 100-25 mcg/dose diskus inhaler 1 puff Daily    glucagon (human recombinant) injection 1 mg PRN    glucose chewable tablet 16 g PRN    glucose chewable tablet 24 g PRN    heparin (porcine) injection 5,000 Units Q8H    HYDROcodone-acetaminophen 5-325 mg per tablet 1 tablet Q6H PRN    insulin aspart U-100 pen 0-5 Units QID (AC + HS) PRN    isosorbide mononitrate 24 hr tablet 30 mg Daily    lidocaine 5 % patch 1 patch Q24H    lisinopril tablet 10 mg Daily    melatonin tablet 6 mg Nightly PRN    ondansetron injection 4 mg Q8H PRN    pantoprazole EC tablet 40 mg Daily    polyethylene glycol packet 17 g BID    polyethylene glycol packet 17 g BID PRN    QUEtiapine tablet 150 mg QHS    risperiDONE tablet 2 mg BID    rosuvastatin tablet 40 mg QHS    senna-docusate 8.6-50 mg per tablet 1 tablet BID    sodium chloride 0.9% flush 10 mL PRN    tamsulosin 24 hr capsule 0.4 mg Daily    traZODone tablet 100 mg QHS    vitamin D 1000 units tablet 1,000 Units Daily     Family History     Problem Relation (Age of Onset)    Diabetes Grandchild    Mental illness Brother    No Known Problems Mother        Tobacco Use    Smoking status: Current Every Day Smoker     Packs/day: 0.75     Years: 50.00     Pack years: 37.50     Types: Cigars    Smokeless tobacco: Never Used   Substance and Sexual Activity    Alcohol use: Yes     Comment: 2-3beers/day    Drug use: Yes     Types: Cocaine, "Crack" cocaine     Comment: 3-4 marijuana joints per day    Sexual activity: Not Currently     Review of Systems   Constitutional: Negative for appetite change, chills and fever.   HENT: Negative for congestion, rhinorrhea, sinus pain, " sore throat and trouble swallowing.    Eyes: Negative for photophobia and visual disturbance.   Respiratory: Negative for cough and shortness of breath.    Cardiovascular: Negative for chest pain, palpitations and leg swelling.   Gastrointestinal: Negative for abdominal pain, constipation, diarrhea and nausea.   Genitourinary: Positive for dysuria. Negative for difficulty urinating, frequency, hematuria and urgency.   Musculoskeletal: Positive for arthralgias. Negative for gait problem and joint swelling.   Skin: Negative for pallor and rash.   Neurological: Negative for light-headedness and headaches.   Psychiatric/Behavioral: Negative for agitation and confusion.     Objective:     Vital Signs (Most Recent):  Temp: 98.2 °F (36.8 °C) (03/04/20 0743)  Pulse: 85 (03/04/20 0926)  Resp: 15 (03/04/20 0926)  BP: 119/69 (03/04/20 0743)  SpO2: 97 % (03/04/20 0743)  O2 Device (Oxygen Therapy): room air (03/01/20 2230) Vital Signs (24h Range):  Temp:  [97.4 °F (36.3 °C)-99 °F (37.2 °C)] 98.2 °F (36.8 °C)  Pulse:  [65-91] 85  Resp:  [14-20] 15  SpO2:  [92 %-97 %] 97 %  BP: (108-135)/(60-69) 119/69     Weight: 76.7 kg (169 lb 1.5 oz) (03/04/20 0743)  Body mass index is 24.97 kg/m².  Body surface area is 1.93 meters squared.      Intake/Output Summary (Last 24 hours) at 3/4/2020 0959  Last data filed at 3/4/2020 0600  Gross per 24 hour   Intake 440 ml   Output 1550 ml   Net -1110 ml       Physical Exam   Constitutional: He is oriented to person, place, and time and well-developed, well-nourished, and in no distress.   He appears comfortable and in no distress   HENT:   Head: Normocephalic and atraumatic.   Eyes: Conjunctivae and EOM are normal. Pupils are equal, round, and reactive to light. No scleral icterus.   Neck: Normal range of motion. Neck supple.   Cardiovascular: Normal rate, regular rhythm, normal heart sounds and intact distal pulses.  Exam reveals no gallop and no friction rub.    No murmur heard.  Pulmonary/Chest:  Effort normal and breath sounds normal. No respiratory distress. He has no wheezes. He has no rales.   Abdominal: Soft. Bowel sounds are normal. He exhibits no distension. There is no tenderness.   Lymphadenopathy:     He has no cervical adenopathy.   Neurological: He is alert and oriented to person, place, and time.   Skin: Skin is warm and dry.     Musculoskeletal: Normal range of motion. He exhibits tenderness and deformity. He exhibits no edema.   Bony deformity of left knee > right knee without effusion, warmth, or erythema.   Some tenderness to palpation of posterior left knee with no significant swelling, erythema, or palpable cyst    Full painless ROM of right hip    Right lateral aspect of foot is tender to palpation and overlying skin with some mild erythema without induration. Right foot also more swollen than left.            Significant Labs:  CBC:   Recent Labs   Lab 03/04/20  0523   WBC 12.36   HGB 11.9*   HCT 36.3*        CMP:   Recent Labs   Lab 03/04/20  0523      CALCIUM 9.0   ALBUMIN 2.6*   PROT 6.5      K 4.0   CO2 22*      BUN 28*   CREATININE 1.8*   ALKPHOS 93   ALT 23   AST 24   BILITOT 0.3     CRP:   Recent Labs   Lab 03/03/20  1538   .2*     ESR:   Recent Labs   Lab 03/03/20  1538   SEDRATE 68*     RF 25  Significant Imaging:  EXAMINATION:  XR HIP 2 VIEW RIGHT    CLINICAL HISTORY:  Pain in right hip    TECHNIQUE:  AP view of the pelvis and frog leg lateral view of the right hip were performed.    COMPARISON:  None    FINDINGS:  There is no evidence of acute fracture or dislocation. The bilateral femoral heads appear well seated within the acetabula with mild degenerative arthrosis. The ilioischial and iliopectineal lines appear maintained.There is no significant pubic symphyseal or sacroiliac joint diastasis.The sacrum is partially obscured by overlying bowel gas.      Impression       No radiographic evidence of acute fracture or dislocation.      EXAMINATION:  XR FOOT COMPLETE 3 VIEW RIGHT    CLINICAL HISTORY:  lateral right foot pain and tenderness;.    TECHNIQUE:  AP, lateral, and oblique views of the right foot were performed.    COMPARISON:  None    FINDINGS:  Mild DJD mainly affecting the 1st metatarsophalangeal and interphalangeal joint.  No fracture or dislocation.  No bone destruction identified      Impression       See above         Assessment/Plan:     Acute gout of foot  65 yo male here with CHELE and UTI presents with 2 day pain of pain in his right foot. He also has more acute on chronic pain in his right hip and posterior left knee. His inflammatory markers including ESR and CRP are elevated at 68 and 158 respectively and he has an elevated RF at 25. Uric acid level is elevated at 9. On exam, there is swelling, erythema, tenderness and warmth of his lateral right foot. His Knee exhibits no swelling and no palpable cyst on posterior aspect. XR of right hip and foot suggestive of OA. Given evidence of synovitis of right foot and positive uric acid, recommending to treat this as acute gout. Low suspicion for rheumatoid arthritis at this time given lack of other areas of synovitis, deformities, morning stiffness although it does remain on differential. Other joint pains likely consistent with OA.    Plan:  -Recommend treatment of acute gout with prednisone 20 mg BID x 5 days   -Would avoid colchicine and NSAIDs given kidney function  -Follow up [facility]. Will also obtain anti-CCP.  -We will follow up with him in clinic in 1-2 months at which time we can evaluate need for chronic therapy        Thank you for your consult. I will follow-up with patient. Please contact us if you have any additional questions.    Pramod Wilder MD  Rheumatology  [facility]

## 2020-03-04 NOTE — PLAN OF CARE
Pt would benefit from continued skilled acute PT services to improve functional mobility. POC is to continue towards current goals set to progress towards PLOF.   Problem: Physical Therapy Goal  Goal: Physical Therapy Goal  Description  Goals to be met by: 2020    Patient will increase functional independence with mobility by performin. Supine to sit with Modified [place]  2. Sit to supine with Modified [place]  3. Sit to stand transfer with Supervision  4. Gait  x 30 feet with Contact Guard Assistance using LRAD -Met 3/4/2020   5. Lower extremity exercise program x15 reps per handout, with independence     Outcome: Ongoing, Progressing

## 2020-03-04 NOTE — MEDICAL/APP STUDENT
Progress Note  [facility]      Admit Date: 3/1/2020    SUBJECTIVE:     Follow-up For:  UTI (urinary tract infection)    HPI:  Mr. Senior is a 64 y.o. male w/ pmh significant for COPD, CAD, HLD, HTN, T2DM, schizophrenia with recent urinary retention presenting for catheter pain and pain with urination. Per chart review, patient was discharged from hospital with a dillon catheter on 02/01 after being admitted for hypoglycemia. Patient was then seen by outpatient urology on 02/13 and 02/21 for continued urinary retention and failed voiding trials. Patient reports that after the dillon catheter was re-inserted on 02/21, he started to experience catheter pain, pain with urination and chills. Patient also reports hip pain that is chronic. Patient denies fever, nausea, vomiting, diarrhea, flank pain and chest pain.    Hospital Course: In the ED, patient had a WC 17.45, temp 99.5, RR 24, /60 and pulse 63. He was given 2g ceftriaxone.     Interval History: NAEON. Patient reports significant improvement in right foot, left popliteal fossa and right hip pain. Patient also reports improved catheter pain and dysuria. Patient denies fever, nausea, vomiting, diarrhea, chest pain and leg swelling. He states that his walking has improved and he has been able to walk to the restroom without significant pain or weakness. Patient states that he lives alone in a one story house with three stairs.     Constitutional: Positive for chills. Negative for fever.  Respiratory: Negative for cough or shortness of breath.  Cardiovascular: Negative for chest pain or palpitations.  Gastrointestinal: Negative for nausea or vomiting, negative for abdominal pain or change in bowel habits.  Genitourinary: Positive for catheter pain and dysuria.   Musculoskeletal: Positive for arthralgias (right lateral foot, left popliteal fossa, right hip).  Neurological: Negative for seizures or tremors.  Behavioral/Psych: Negative for  confusion.    Scheduled Meds:   amoxicillin-clavulanate 875-125mg  1 tablet Oral Q12H    aspirin  81 mg Oral Daily    benztropine  0.5 mg Oral BID    bisoprolol  2.5 mg Oral Daily    finasteride  5 mg Oral Daily    fluticasone furoate-vilanterol  1 puff Inhalation Daily    heparin (porcine)  5,000 Units Subcutaneous Q8H    isosorbide mononitrate  30 mg Oral Daily    lidocaine  1 patch Transdermal Q24H    pantoprazole  40 mg Oral Daily    polyethylene glycol  17 g Oral BID    QUEtiapine  150 mg Oral QHS    risperiDONE  2 mg Oral BID    rosuvastatin  40 mg Oral QHS    senna-docusate 8.6-50 mg  1 tablet Oral BID    tamsulosin  0.4 mg Oral Daily    traZODone  100 mg Oral QHS    vitamin D  1,000 Units Oral Daily     Continuous Infusions:  PRN Meds:.acetaminophen, albuterol sulfate, Dextrose 10% Bolus, Dextrose 10% Bolus, glucagon (human recombinant), glucose, glucose, HYDROcodone-acetaminophen, insulin aspart U-100, melatonin, ondansetron, polyethylene glycol, sodium chloride 0.9%     OBJECTIVE:     Vital Signs Range (Last 24H):  Temp:  [97.4 °F (36.3 °C)-99 °F (37.2 °C)]   Pulse:  [79-95]   Resp:  [14-20]   BP: (108-135)/(60-68)   SpO2:  [92 %-95 %]     I & O (Last 24H):    Intake/Output Summary (Last 24 hours) at 3/4/2020 0742  Last data filed at 3/4/2020 0600  Gross per 24 hour   Intake 660 ml   Output 1550 ml   Net -890 ml       Physical Exam:  General appearance: No distress.  Mental status: Alert and oriented x 3  HEENT:  conjunctivae/corneas clear, PERRL  Neck: supple, thyroid not enlarged  Pulm:   normal respiratory effort, CTA B, no c/w/r  Card: RRR, S1, S2 normal, no murmur, click, rub or gallop  Abd: soft, NT, ND, BS present; no masses, no organomegaly  Ext: Mild erythema to right lateral foot. TTP right lateral foot, left popliteal fossa, right hip. No edema.   Pulses: 2+, symmetric  Skin: color, texture, turgor normal. No rashes or lesions  Neuro: CN II-XII grossly intact, no focal numbness  or weakness, normal strength and tone       Laboratory Results:    Recent Labs   Lab 03/02/20  0409 03/03/20  0453 03/04/20  0523   WBC 12.31 11.65 12.36   HGB 12.0* 11.5* 11.9*   HCT 36.6* 35.0* 36.3*    282 314     Recent Labs   Lab 03/02/20  0409 03/03/20  0452 03/04/20  0523    140 137   K 4.3 3.9 4.0    107 107   CO2 23 23 22*   BUN 41* 32* 28*   CREATININE 2.0* 1.8* 1.8*   GLU 98 104 106   CALCIUM 8.7 8.7 9.0   MG 2.4 2.1 2.2   PHOS 3.6 3.6 4.0     Recent Labs   Lab 03/02/20  0409 03/03/20  0452 03/04/20  0523   ALKPHOS 93 91 93   ALT 21 20 23   AST 31 23 24   ALBUMIN 2.8* 2.6* 2.6*   PROT 6.9 6.3 6.5   BILITOT 0.3 0.3 0.3      Recent Labs   Lab 03/01/20 2029 03/01/20  2330 03/02/20  1136 03/02/20  2112 03/03/20  1132 03/03/20  2145   POCTGLUCOSE 105 105 203* 158* 143* 168*     No results for input(s): LACTATE in the last 72 hours.     Recent Labs     03/01/20  1201   TROPONINI 0.011     Hemoglobin A1C   Date Value Ref Range Status   01/27/2020 6.5 (H) 4.0 - 5.6 % Final     Comment:     ADA Screening Guidelines:  5.7-6.4%  Consistent with prediabetes  >or=6.5%  Consistent with diabetes  High levels of fetal hemoglobin interfere with the HbA1C  assay. Heterozygous hemoglobin variants (HbS, HgC, etc)do  not significantly interfere with this assay.   However, presence of multiple variants may affect accuracy.     12/13/2019 6.5 (H) 4.0 - 5.6 % Final     Comment:     ADA Screening Guidelines:  5.7-6.4%  Consistent with prediabetes  >or=6.5%  Consistent with diabetes  High levels of fetal hemoglobin interfere with the HbA1C  assay. Heterozygous hemoglobin variants (HbS, HgC, etc)do  not significantly interfere with this assay.   However, presence of multiple variants may affect accuracy.     11/25/2014 6.1 4.5 - 6.2 % Final       No results for input(s): PT, INR, APTT in the last 24 hours.       Diagnostic Results:  Right hip XR: no fracture or dislocation  Right foot XR: mild DJD of 1st MTP and  IP joint without fracture or dislocation  US LE: no evidence of DVT    Microbiology:  Microbiology Results (last 7 days)     Procedure Component Value Units Date/Time    Urine culture [[id number]]  (Abnormal)  (Susceptibility) Collected:  03/01/20 0802    Order Status:  Completed Specimen:  Urine Updated:  03/03/20 1037     Urine Culture, Routine STAPHYLOCOCCUS AUREUS  50,000 - 99,999 cfu/ml  No other significant isolate      Narrative:       Preferred Collection Type->Urine, Catheterized  ADD-ON ORDER UCRER #[id number], UROSM #[id number], UNAR #[id number]; PER   YOSELIN NINO,  03/01/2020  08:55     Blood culture #2 **CANNOT BE ORDERED STAT** [[id number]] Collected:  03/01/20 0721    Order Status:  Completed Specimen:  Blood from Peripheral, Hand, Left Updated:  03/03/20 1012     Blood Culture, Routine No Growth to date      No Growth to date      No Growth to date    Blood culture #1 **CANNOT BE ORDERED STAT** [[id number]] Collected:  03/01/20 0711    Order Status:  Completed Specimen:  Blood from Peripheral, Wrist, Left Updated:  03/03/20 1012     Blood Culture, Routine No Growth to date      No Growth to date      No Growth to date        Recent cultured reviewed     ASSESSMENT/PLAN:     Mr. Senior is a 64 y.o. male w/ pmh significant for COPD, CAD, T2DM, schizophrenia with recent urinary retention presenting for catheter pain and dysuria. Workup positive for UTI, urine culture positive for staph aureus.    #UTI  - UA + for UTI  - UCx + for staph aureus  - Sensitive to oxacillin, tetracycline, bactrim  - Ceftriaxone started on 03/01, Vancomycin 03/02, Augmentin 03/03    #CHELE  - baseline Cr 1.7, today 1.8  - downtrending Cr   - resume ACEi when CHELE resolved     #Urinary retention  - failed voiding trials on 02/13 and 02/19  - avoid anticholinergics  - continue tamsulosin and finasteride  - outpatient urology f/u    #Joint pain  - Hip and foot XR negative for acute processes  - US LE negative for DVT  - DDx: gout  and less likely fracture, DVT or osteomyelitis  - ESR, CRP, RF and uric acid elevated  - awaiting ARDEN  - Consult rheumatology     #T2DM  - Glucose 106 today  - SSI: aspart 0-5 PRN     #Schizophrenia  - Continue home meds: benztropine, quetiapine, risperidone     #COPD  - continue home meds  - duonebs PRN  - outpatient pulmonary rehab     #CAD/HTN  - continue home meds: bisprolol, isosorbide mononitrate  - hold ACEi until CHELE resolved     #GERD  - continue home PPI     Dispo: Can be discharged home on PO Augmentin, awaiting Rheum consult, assess mobility  Diet: Diabetic  DVT prophylaxis: Heparin 5,000 U TID  Code status: Full        Kateryna Chong, MS3

## 2020-03-04 NOTE — SUBJECTIVE & OBJECTIVE
Past Medical History:   Diagnosis Date    COPD (chronic obstructive pulmonary disease)     Coronary artery disease     Hypercholesterolemia     Hypertension        Past Surgical History:   Procedure Laterality Date    COLONOSCOPY  02/2016    Advised repeat in 3 years    CORONARY STENT PLACEMENT  2013       Immunization History   Administered Date(s) Administered    Hepatitis B, Adult 01/20/2020    Influenza - Quadrivalent 02/01/2018    Influenza - Quadrivalent - PF (6 months and older) 01/20/2020    Influenza A (H1N1) 2009 Monovalent - IM 12/11/2009    Pneumococcal Polysaccharide - 23 Valent 07/28/2016       Review of patient's allergies indicates:   Allergen Reactions    Trelegy ellipta [fluticasone-umeclidin-vilanter] Other (See Comments)     Possible urinary retention      Current Facility-Administered Medications   Medication Frequency    acetaminophen tablet 500 mg Q6H PRN    albuterol sulfate nebulizer solution 2.5 mg Q6H PRN    amoxicillin-clavulanate 875-125mg per tablet 1 tablet Q12H    aspirin EC tablet 81 mg Daily    benztropine tablet 0.5 mg BID    bisoprolol split tablet 2.5 mg Daily    dextrose 10% (D10W) Bolus PRN    dextrose 10% (D10W) Bolus PRN    finasteride tablet 5 mg Daily    fluticasone furoate-vilanterol 100-25 mcg/dose diskus inhaler 1 puff Daily    glucagon (human recombinant) injection 1 mg PRN    glucose chewable tablet 16 g PRN    glucose chewable tablet 24 g PRN    heparin (porcine) injection 5,000 Units Q8H    HYDROcodone-acetaminophen 5-325 mg per tablet 1 tablet Q6H PRN    insulin aspart U-100 pen 0-5 Units QID (AC + HS) PRN    isosorbide mononitrate 24 hr tablet 30 mg Daily    lidocaine 5 % patch 1 patch Q24H    lisinopril tablet 10 mg Daily    melatonin tablet 6 mg Nightly PRN    ondansetron injection 4 mg Q8H PRN    pantoprazole EC tablet 40 mg Daily    polyethylene glycol packet 17 g BID    polyethylene glycol packet 17 g BID PRN    QUEtiapine  "tablet 150 mg QHS    risperiDONE tablet 2 mg BID    rosuvastatin tablet 40 mg QHS    senna-docusate 8.6-50 mg per tablet 1 tablet BID    sodium chloride 0.9% flush 10 mL PRN    tamsulosin 24 hr capsule 0.4 mg Daily    traZODone tablet 100 mg QHS    vitamin D 1000 units tablet 1,000 Units Daily     Family History     Problem Relation (Age of Onset)    Diabetes Grandchild    Mental illness Brother    No Known Problems Mother        Tobacco Use    Smoking status: Current Every Day Smoker     Packs/day: 0.75     Years: 50.00     Pack years: 37.50     Types: Cigars    Smokeless tobacco: Never Used   Substance and Sexual Activity    Alcohol use: Yes     Comment: 2-3beers/day    Drug use: Yes     Types: Cocaine, "Crack" cocaine     Comment: 3-4 marijuana joints per day    Sexual activity: Not Currently     Review of Systems   Constitutional: Negative for appetite change, chills and fever.   HENT: Negative for congestion, rhinorrhea, sinus pain, sore throat and trouble swallowing.    Eyes: Negative for photophobia and visual disturbance.   Respiratory: Negative for cough and shortness of breath.    Cardiovascular: Negative for chest pain, palpitations and leg swelling.   Gastrointestinal: Negative for abdominal pain, constipation, diarrhea and nausea.   Genitourinary: Positive for dysuria. Negative for difficulty urinating, frequency, hematuria and urgency.   Musculoskeletal: Positive for arthralgias. Negative for gait problem and joint swelling.   Skin: Negative for pallor and rash.   Neurological: Negative for light-headedness and headaches.   Psychiatric/Behavioral: Negative for agitation and confusion.     Objective:     Vital Signs (Most Recent):  Temp: 98.2 °F (36.8 °C) (03/04/20 0743)  Pulse: 85 (03/04/20 0926)  Resp: 15 (03/04/20 0926)  BP: 119/69 (03/04/20 0743)  SpO2: 97 % (03/04/20 0743)  O2 Device (Oxygen Therapy): room air (03/01/20 2230) Vital Signs (24h Range):  Temp:  [97.4 °F (36.3 °C)-99 °F " (37.2 °C)] 98.2 °F (36.8 °C)  Pulse:  [65-91] 85  Resp:  [14-20] 15  SpO2:  [92 %-97 %] 97 %  BP: (108-135)/(60-69) 119/69     Weight: 76.7 kg (169 lb 1.5 oz) (03/04/20 0743)  Body mass index is 24.97 kg/m².  Body surface area is 1.93 meters squared.      Intake/Output Summary (Last 24 hours) at 3/4/2020 0959  Last data filed at 3/4/2020 0600  Gross per 24 hour   Intake 440 ml   Output 1550 ml   Net -1110 ml       Physical Exam   Constitutional: He is oriented to person, place, and time and well-developed, well-nourished, and in no distress.   He appears comfortable and in no distress   HENT:   Head: Normocephalic and atraumatic.   Eyes: Conjunctivae and EOM are normal. Pupils are equal, round, and reactive to light. No scleral icterus.   Neck: Normal range of motion. Neck supple.   Cardiovascular: Normal rate, regular rhythm, normal heart sounds and intact distal pulses.  Exam reveals no gallop and no friction rub.    No murmur heard.  Pulmonary/Chest: Effort normal and breath sounds normal. No respiratory distress. He has no wheezes. He has no rales.   Abdominal: Soft. Bowel sounds are normal. He exhibits no distension. There is no tenderness.   Lymphadenopathy:     He has no cervical adenopathy.   Neurological: He is alert and oriented to person, place, and time.   Skin: Skin is warm and dry.     Musculoskeletal: Normal range of motion. He exhibits tenderness and deformity. He exhibits no edema.   Bony deformity of left knee > right knee without effusion, warmth, or erythema.   Some tenderness to palpation of posterior left knee with no significant swelling, erythema, or palpable cyst    Full painless ROM of right hip    Right lateral aspect of foot is tender to palpation and overlying skin with some mild erythema without induration. Right foot also more swollen than left.            Significant Labs:  CBC:   Recent Labs   Lab 03/04/20  0523   WBC 12.36   HGB 11.9*   HCT 36.3*        CMP:   Recent Labs    Lab 03/04/20  0523      CALCIUM 9.0   ALBUMIN 2.6*   PROT 6.5      K 4.0   CO2 22*      BUN 28*   CREATININE 1.8*   ALKPHOS 93   ALT 23   AST 24   BILITOT 0.3     CRP:   Recent Labs   Lab 03/03/20  1538   .2*     ESR:   Recent Labs   Lab 03/03/20  1538   SEDRATE 68*     RF 25  Significant Imaging:  EXAMINATION:  XR HIP 2 VIEW RIGHT    CLINICAL HISTORY:  Pain in right hip    TECHNIQUE:  AP view of the pelvis and frog leg lateral view of the right hip were performed.    COMPARISON:  None    FINDINGS:  There is no evidence of acute fracture or dislocation. The bilateral femoral heads appear well seated within the acetabula with mild degenerative arthrosis. The ilioischial and iliopectineal lines appear maintained.There is no significant pubic symphyseal or sacroiliac joint diastasis.The sacrum is partially obscured by overlying bowel gas.      Impression       No radiographic evidence of acute fracture or dislocation.     EXAMINATION:  XR FOOT COMPLETE 3 VIEW RIGHT    CLINICAL HISTORY:  lateral right foot pain and tenderness;.    TECHNIQUE:  AP, lateral, and oblique views of the right foot were performed.    COMPARISON:  None    FINDINGS:  Mild DJD mainly affecting the 1st metatarsophalangeal and interphalangeal joint.  No fracture or dislocation.  No bone destruction identified      Impression       See above

## 2020-03-04 NOTE — PT/OT/SLP PROGRESS
Physical Therapy Treatment    Patient Name:  Paresh Senior   MRN:  [id number]    Recommendations:     Discharge Recommendations:  home health PT   Discharge Equipment Recommendations: none   Barriers to discharge: Decreased caregiver support    Assessment:     Paresh Senior is a 64 y.o. male admitted with a medical diagnosis of UTI (urinary tract infection).  He presents with the following impairments/functional limitations:  weakness, impaired functional mobilty, gait instability, impaired endurance, impaired balance, impaired self care skills. Pt demonstrating large improvements in functional mobility amb 200ft without AD SBA. Pt found in bathroom performing toileting and self care Mod I without difficulty. Pt has progressed and is now safe to discharge home w/HHPT once medically stable. Pt would benefit from continued skilled acute PT 3x/wk to improve functional mobility.  Recommending pt receive PT services in HHPT setting following discharge from hospital once medically cleared.      Rehab Prognosis: Fair; patient would benefit from acute skilled PT services to address these deficits and reach maximum level of function.    Recent Surgery: * No surgery found *      Plan:     During this hospitalization, patient to be seen 3 x/week to address the identified rehab impairments via gait training, therapeutic activities, therapeutic exercises, neuromuscular re-education and progress toward the following goals:    · Plan of Care Expires:  04/02/20    Subjective     Patient/Family Comments/goals: Pt willing to participate with therapy on this date.    Pain/Comfort:  Pain Rating 1: 0/10      Objective:     Communicated with NSG prior to session.  Patient found in bathroom with dillon catheter upon PT entry to room.     General Precautions: Standard, fall   Orthopedic Precautions:N/A   Braces: N/A     Functional Mobility:  · Bed Mobility:     · Sit to Supine: modified independence  · Transfers:     · Sit to Stand:   supervision with no AD  · Toilet Transfer: modified independence with  no AD  using  Step Transfer  · Gait: 14ft, 110ft, 75ft, and 200ft SBA without AD  · Balance: SBA      AM-PAC 6 CLICK MOBILITY  Turning over in bed (including adjusting bedclothes, sheets and blankets)?: 3  Sitting down on and standing up from a chair with arms (e.g., wheelchair, bedside commode, etc.): 3  Moving from lying on back to sitting on the side of the bed?: 3  Moving to and from a bed to a chair (including a wheelchair)?: 3  Need to walk in hospital room?: 3  Climbing 3-5 steps with a railing?: 3  Basic Mobility Total Score: 18       Therapeutic Activities and Exercises:   - Pt educated on:   -PT roles, expectations, and POC    -Safety with mobility   -Benefits of OOB activities to increase strength and functional mobility    -Performing ther ex for increasing LE ROM and strength   -Discharge recommendations     Patient left supine with all lines intact and call button in reach..    GOALS:   Multidisciplinary Problems     Physical Therapy Goals        Problem: Physical Therapy Goal    Goal Priority Disciplines Outcome Goal Variances Interventions   Physical Therapy Goal     PT, PT/OT Ongoing, Progressing     Description:  Goals to be met by: 2020    Patient will increase functional independence with mobility by performin. Supine to sit with Modified [place]  2. Sit to supine with Modified [place]  3. Sit to stand transfer with Supervision  4. Gait  x 30 feet with Contact Guard Assistance using LRAD -Met 3/4/2020   5. Lower extremity exercise program x15 reps per handout, with independence                      Time Tracking:     PT Received On: 20  PT Start Time: 823     PT Stop Time: 836  PT Total Time (min): 13 min     Billable Minutes: Gait Training 13    Treatment Type: Treatment  PT/PTA: PT           Rj Avila, PT  2020

## 2020-03-04 NOTE — PLAN OF CARE
03/04/20 0853   Post-Acute Status   Post-Acute Authorization Placement   Post-Acute Placement Status Patient List Provided

## 2020-03-04 NOTE — HPI
"63 yo male with past medical history of T2DM, HTN, CAD, COPD, and schizophrenia who presented to [facility] on 3/1 for urinary catheter pain. He was recently diagnosed with urinary retention on hospitalization on 1/27. At that time imaging findings with evidence of prostatomegaly and he was started on flomax. He has been seen by urology on 2/13 and 2/21 for voiding trials which he continues to fail. Indwelling dillon was re-inserted on 2/21 and he is currently taking flomax+finasteride. Since then the patient has had worsening pain around catheter site and dysuria. On arrival here, he was afebrile with VSS. Labs were significant for leukocytosis(17K), stable H/(11.8/25.5), CHELE with BUN/sCr 62/3.4(baseline around 1.7), UTI with UA showing 2+ protein, >100 RBCs, >100 WBCs, negative nitrite. He was admitted for CHELE + UTI and started on Ceftriaxone with dillon exchanged. Since admission urine culture is growing MSSA and antibiotics were de-escalated to Augmentin on 3/3. He has had improvement in his leukocytosis which has trended down to 12K and his sCr is closer to baseline at 1.8.     Rheumatology is being consulted because since admission the patient has been complaining of pain in his right foot, back of left knee, and right hip. The pain in his right foot started about 2 days ago after he was admitted, whereas the pain in hip and knee he has experienced before. It appears patient had PRN [place] on board q6 and he has only taken 2 doses since admission. Inflammatory markers obtained sow ESR 68 and .2, RF 25. XR was obtained of right hip which showed "no acute fracture or dislocation and mild degenerative arthrosis" and the right foot which showed mildly DJD affecting 1st metatarsophalangeal and interphalangeal joint with no fracture or dislocation or bony destruction. The patient says that he has been dealing with pain in his hip and knee joints for years. He says the pain is worse with movement and generally comes and " goes/lets up. He denies any morning stiffness and denies any swelling, warmth or erythema associated with his joint pain. He says the pain was worse a few days ago and is starting to get better.  He says that the PRN Norco, tylenol has helped the pain some when he does ask for them. He is able to walk without issue. He denies prior diagnosis of rheumatoid arthritis and denies any family history of RA or other autoimmune disorders. Of note, he has had arthrocenteses done on bilateral knees 1/30/2020 for pain patient had which had only 95 WBCs and not suggestive of septic or gouty arthritis.

## 2020-03-04 NOTE — PLAN OF CARE
Problem: Occupational Therapy Goal  Goal: Occupational Therapy Goal  Description  Goals to be met by: 3/8/2020     Patient will increase functional independence with ADLs by performing:    UE Dressing with [place].  LE Dressing with Minimal Assistance. Met  Grooming while standing at sink with Minimal Assistance.  Toileting from bedside commode with Minimal Assistance for hygiene and clothing management.   Toilet transfer to bedside commode with Contact Guard Assistance. Met      Outcome: Ongoing, Progressing   Patient's goals are appropriate.   JAMES Vogt  3/4/2020

## 2020-03-04 NOTE — ASSESSMENT & PLAN NOTE
63 yo male here with CHELE and UTI presents with 2 day pain of pain in his right foot. He also has more acute on chronic pain in his right hip and posterior left knee. His inflammatory markers including ESR and CRP are elevated at 68 and 158 respectively and he has an elevated RF at 25. Uric acid level is elevated at 9. On exam, there is swelling, erythema, tenderness and warmth of his lateral right foot. His Knee exhibits no swelling and no palpable cyst on posterior aspect. XR of right hip and foot suggestive of OA. Given evidence of synovitis of right foot and positive uric acid, recommending to treat this as acute gout. Low suspicion for rheumatoid arthritis at this time given lack of other areas of synovitis, deformities, morning stiffness although it does remain on differential. Other joint pains likely consistent with OA.    Plan:  -Recommend treatment of acute gout with prednisone 20 mg BID x 5 days   -Follow up ARDEN. Will also obtain anti-CCP.  -We will follow up with him in clinic in 1-2 months at which time we can evaluate need for chronic therapy

## 2020-03-04 NOTE — PT/OT/SLP PROGRESS
Occupational Therapy   Treatment    Name: Paresh Senior  MRN: [id number]  Admitting Diagnosis:  UTI (urinary tract infection)       Recommendations:     Discharge Recommendations: home health OT  Discharge Equipment Recommendations:  none  Barriers to discharge:  Decreased caregiver support    Assessment:     Paresh Senior is a 64 y.o. male with a medical diagnosis of UTI (urinary tract infection).  Performance deficits affecting function are weakness, impaired endurance, impaired self care skills, gait instability, impaired functional mobilty, impaired balance. Patient tolerated treatment session and was cooperative to perform tasks. Patient has progressed well and demonstrated no buckling during functional ambulation demonstrating good safety when OOB. Recommending HHOT once medically appropriate for discharge to increase maximal independence, reduce burden of care, and ensure safety in home environment. Patient would benefit from continued skilled acute OT 3x/wk to improve functional mobility, increase independence with ADLs, and address established goals.    Rehab Prognosis:  Fair; patient would benefit from acute skilled OT services to address these deficits and reach maximum level of function.       Plan:     Patient to be seen 3 x/week to address the above listed problems via self-care/home management, therapeutic activities, therapeutic exercises  · Plan of Care Expires: 04/02/20  · Plan of Care Reviewed with: patient    Subjective     Pain/Comfort:  · Pain Rating 1: 0/10  · Pain Rating Post-Intervention 1: 0/10    Objective:     Communicated with: NSLUCIANO prior to session.  Patient found supine with dillon catheter upon OT entry to room.    General Precautions: Standard, fall   Orthopedic Precautions:N/A   Braces: N/A     Occupational Performance:     Bed Mobility:    · Patient completed Supine to Sit with modified independence  · Patient completed Sit to Supine with modified independence     Functional  Mobility/Transfers:  · Patient completed Sit <> Stand Transfer with supervision  with  rolling walker   · Patient completed Toilet Transfer bed<>toilet with functional ambulation technique with SBA with  rolling walker     Activities of Daily Living:  · Lower Body Dressing: Setup donning and doffing shoes    · Toileting: total assistance due to dillon catheter. Mod (I) for BM per patient report during toilet transfer and patient reported that he had toileting right before OT session.    [facility] 6 Click ADL: 19    Treatment & Education:  Role of OT and POC  ADL retraining  Functional mobility training  Safety    Patient left supine with call button in reach and all needs met. Education:      GOALS:   Multidisciplinary Problems     Occupational Therapy Goals        Problem: Occupational Therapy Goal    Goal Priority Disciplines Outcome Interventions   Occupational Therapy Goal     OT, PT/OT Ongoing, Progressing    Description:  Goals to be met by: 3/8/2020     Patient will increase functional independence with ADLs by performing:    UE Dressing with [place].  LE Dressing with Minimal Assistance. Met  Grooming while standing at sink with Minimal Assistance.  Toileting from bedside commode with Minimal Assistance for hygiene and clothing management.   Toilet transfer to bedside commode with Contact Guard Assistance. Met                       Time Tracking:     OT Date of Treatment: 03/04/20  OT Start Time: 1022  OT Stop Time: 1030  OT Total Time (min): 8 min    Billable Minutes:Self Care/Home Management 8    JAMES Vogt  3/4/2020

## 2020-03-05 VITALS
BODY MASS INDEX: 25.5 KG/M2 | OXYGEN SATURATION: 96 % | WEIGHT: 172.19 LBS | SYSTOLIC BLOOD PRESSURE: 118 MMHG | TEMPERATURE: 98 F | HEART RATE: 89 BPM | DIASTOLIC BLOOD PRESSURE: 64 MMHG | RESPIRATION RATE: 15 BRPM | HEIGHT: 69 IN

## 2020-03-05 PROBLEM — N17.9 AKI (ACUTE KIDNEY INJURY): Status: RESOLVED | Noted: 2020-03-01 | Resolved: 2020-03-05

## 2020-03-05 PROBLEM — N39.0 UTI (URINARY TRACT INFECTION): Status: RESOLVED | Noted: 2020-03-01 | Resolved: 2020-03-05

## 2020-03-05 LAB
ALBUMIN SERPL BCP-MCNC: 2.5 G/DL (ref 3.5–5.2)
ALP SERPL-CCNC: 85 U/L (ref 55–135)
ALT SERPL W/O P-5'-P-CCNC: 24 U/L (ref 10–44)
ANA SER QL IF: NORMAL
ANION GAP SERPL CALC-SCNC: 9 MMOL/L (ref 8–16)
AST SERPL-CCNC: 23 U/L (ref 10–40)
BASOPHILS # BLD AUTO: 0.03 K/UL (ref 0–0.2)
BASOPHILS NFR BLD: 0.3 % (ref 0–1.9)
BILIRUB SERPL-MCNC: 0.2 MG/DL (ref 0.1–1)
BUN SERPL-MCNC: 27 MG/DL (ref 8–23)
CALCIUM SERPL-MCNC: 9.2 MG/DL (ref 8.7–10.5)
CHLORIDE SERPL-SCNC: 108 MMOL/L (ref 95–110)
CO2 SERPL-SCNC: 21 MMOL/L (ref 23–29)
CREAT SERPL-MCNC: 1.8 MG/DL (ref 0.5–1.4)
DIFFERENTIAL METHOD: ABNORMAL
EOSINOPHIL # BLD AUTO: 0 K/UL (ref 0–0.5)
EOSINOPHIL NFR BLD: 0.2 % (ref 0–8)
ERYTHROCYTE [DISTWIDTH] IN BLOOD BY AUTOMATED COUNT: 12.7 % (ref 11.5–14.5)
EST. GFR  (AFRICAN AMERICAN): 45 ML/MIN/1.73 M^2
EST. GFR  (NON AFRICAN AMERICAN): 38.9 ML/MIN/1.73 M^2
GLUCOSE SERPL-MCNC: 257 MG/DL (ref 70–110)
HCT VFR BLD AUTO: 36.3 % (ref 40–54)
HGB BLD-MCNC: 11.8 G/DL (ref 14–18)
IMM GRANULOCYTES # BLD AUTO: 0.08 K/UL (ref 0–0.04)
IMM GRANULOCYTES NFR BLD AUTO: 0.7 % (ref 0–0.5)
LYMPHOCYTES # BLD AUTO: 0.7 K/UL (ref 1–4.8)
LYMPHOCYTES NFR BLD: 6.5 % (ref 18–48)
MAGNESIUM SERPL-MCNC: 2.2 MG/DL (ref 1.6–2.6)
MCH RBC QN AUTO: 30 PG (ref 27–31)
MCHC RBC AUTO-ENTMCNC: 32.5 G/DL (ref 32–36)
MCV RBC AUTO: 92 FL (ref 82–98)
MONOCYTES # BLD AUTO: 0.2 K/UL (ref 0.3–1)
MONOCYTES NFR BLD: 2.1 % (ref 4–15)
NEUTROPHILS # BLD AUTO: 10 K/UL (ref 1.8–7.7)
NEUTROPHILS NFR BLD: 90.2 % (ref 38–73)
NRBC BLD-RTO: 0 /100 WBC
PHOSPHATE SERPL-MCNC: 3.6 MG/DL (ref 2.7–4.5)
PLATELET # BLD AUTO: 330 K/UL (ref 150–350)
PMV BLD AUTO: 9.7 FL (ref 9.2–12.9)
POCT GLUCOSE: 252 MG/DL (ref 70–110)
POTASSIUM SERPL-SCNC: 4.9 MMOL/L (ref 3.5–5.1)
PROCALCITONIN SERPL IA-MCNC: 0.19 NG/ML
PROT SERPL-MCNC: 6.6 G/DL (ref 6–8.4)
RBC # BLD AUTO: 3.93 M/UL (ref 4.6–6.2)
SODIUM SERPL-SCNC: 138 MMOL/L (ref 136–145)
WBC # BLD AUTO: 11.08 K/UL (ref 3.9–12.7)

## 2020-03-05 PROCEDURE — 36415 COLL VENOUS BLD VENIPUNCTURE: CPT

## 2020-03-05 PROCEDURE — 83735 ASSAY OF MAGNESIUM: CPT

## 2020-03-05 PROCEDURE — 25000003 PHARM REV CODE 250: Performed by: INTERNAL MEDICINE

## 2020-03-05 PROCEDURE — 63600175 PHARM REV CODE 636 W HCPCS: Performed by: INTERNAL MEDICINE

## 2020-03-05 PROCEDURE — 85025 COMPLETE CBC W/AUTO DIFF WBC: CPT

## 2020-03-05 PROCEDURE — 99239 PR HOSPITAL DISCHARGE DAY,>30 MIN: ICD-10-PCS | Mod: ,,, | Performed by: INTERNAL MEDICINE

## 2020-03-05 PROCEDURE — 84100 ASSAY OF PHOSPHORUS: CPT

## 2020-03-05 PROCEDURE — 99239 HOSP IP/OBS DSCHRG MGMT >30: CPT | Mod: ,,, | Performed by: INTERNAL MEDICINE

## 2020-03-05 PROCEDURE — 84145 PROCALCITONIN (PCT): CPT

## 2020-03-05 PROCEDURE — 80053 COMPREHEN METABOLIC PANEL: CPT

## 2020-03-05 RX ORDER — AMOXICILLIN AND CLAVULANATE POTASSIUM 875; 125 MG/1; MG/1
1 TABLET, FILM COATED ORAL EVERY 12 HOURS
Qty: 4 TABLET | Refills: 0 | Status: SHIPPED | OUTPATIENT
Start: 2020-03-05 | End: 2020-03-07

## 2020-03-05 RX ORDER — GLIPIZIDE 5 MG/1
5 TABLET ORAL 2 TIMES DAILY WITH MEALS
Qty: 60 TABLET | Refills: 3 | Status: SHIPPED | OUTPATIENT
Start: 2020-03-05 | End: 2020-03-26 | Stop reason: SDUPTHER

## 2020-03-05 RX ORDER — PREDNISONE 20 MG/1
20 TABLET ORAL 2 TIMES DAILY
Qty: 8 TABLET | Refills: 0 | Status: SHIPPED | OUTPATIENT
Start: 2020-03-05 | End: 2020-03-09

## 2020-03-05 RX ORDER — LISINOPRIL 10 MG/1
10 TABLET ORAL DAILY
Qty: 30 TABLET | Refills: 3 | Status: SHIPPED | OUTPATIENT
Start: 2020-03-05 | End: 2020-03-26 | Stop reason: SDUPTHER

## 2020-03-05 RX ADMIN — AMOXICILLIN AND CLAVULANATE POTASSIUM 1 TABLET: 875; 125 TABLET, FILM COATED ORAL at 09:03

## 2020-03-05 RX ADMIN — RISPERIDONE 2 MG: 1 TABLET ORAL at 09:03

## 2020-03-05 RX ADMIN — ASPIRIN 81 MG: 81 TABLET, COATED ORAL at 09:03

## 2020-03-05 RX ADMIN — PREDNISONE 20 MG: 10 TABLET ORAL at 09:03

## 2020-03-05 RX ADMIN — POLYETHYLENE GLYCOL 3350 17 G: 17 POWDER, FOR SOLUTION ORAL at 09:03

## 2020-03-05 RX ADMIN — FINASTERIDE 5 MG: 5 TABLET, FILM COATED ORAL at 09:03

## 2020-03-05 RX ADMIN — SENNOSIDES AND DOCUSATE SODIUM 1 TABLET: 8.6; 5 TABLET ORAL at 09:03

## 2020-03-05 RX ADMIN — FLUTICASONE FUROATE AND VILANTEROL TRIFENATATE 1 PUFF: 100; 25 POWDER RESPIRATORY (INHALATION) at 09:03

## 2020-03-05 RX ADMIN — VITAMIN D, TAB 1000IU (100/BT) 1000 UNITS: 25 TAB at 09:03

## 2020-03-05 RX ADMIN — ISOSORBIDE MONONITRATE 30 MG: 30 TABLET, EXTENDED RELEASE ORAL at 09:03

## 2020-03-05 RX ADMIN — LISINOPRIL 10 MG: 10 TABLET ORAL at 09:03

## 2020-03-05 RX ADMIN — HEPARIN SODIUM 5000 UNITS: 5000 INJECTION, SOLUTION INTRAVENOUS; SUBCUTANEOUS at 05:03

## 2020-03-05 RX ADMIN — PANTOPRAZOLE SODIUM 40 MG: 40 TABLET, DELAYED RELEASE ORAL at 09:03

## 2020-03-05 RX ADMIN — TAMSULOSIN HYDROCHLORIDE 0.4 MG: 0.4 CAPSULE ORAL at 09:03

## 2020-03-05 RX ADMIN — BENZTROPINE MESYLATE 0.5 MG: 0.5 TABLET ORAL at 09:03

## 2020-03-05 RX ADMIN — BISOPROLOL FUMARATE 2.5 MG: 5 TABLET ORAL at 09:03

## 2020-03-05 RX ADMIN — LIDOCAINE 1 PATCH: 50 PATCH TOPICAL at 09:03

## 2020-03-05 NOTE — PLAN OF CARE
[facility]    HOME HEALTH ORDERS  FACE TO FACE ENCOUNTER    Patient Name: Paresh Senior  YOB: 1955    PCP: Fernando Carmona MD   PCP Address: [address] / [place] [address]  PCP Phone Number: [phone]  PCP Fax: [phone]    Encounter Date: 03/05/2020    Admit to Home Health    Diagnoses:  Active Hospital Problems    Diagnosis  POA    Acute gout of foot [M10.9]  Yes    Urine retention [R33.9]  Yes    Stage 2 chronic kidney disease [N18.2]  Yes    Chronic obstructive pulmonary disease [J44.9]  Yes     Pulmonary function tests 12/17/2019: FEV1: 1.25  (46 % predicted), FVC:  2.56 (73 % predicted), FEV1/FVC:  48, DLCO: 10.9 (41 % predicted)        Centrilobular emphysema [J43.2]  Yes    Type 2 diabetes mellitus with hyperglycemia, without long-term current use of insulin [E11.65]  Yes    CAD (coronary artery disease) [I25.10]  Yes      Resolved Hospital Problems    Diagnosis Date Resolved POA    *UTI (urinary tract infection) [N39.0] 03/05/2020 Unknown    CHELE (acute kidney injury) [N17.9] 03/05/2020 Yes       Future Appointments   Date Time Provider Department Center   3/12/2020 10:00 AM Hubert Aguayo MD [facility] UROLOGY [facility]   3/16/2020  1:00 PM Nadine Raza NP [facility] PULMSVC [facility]   3/23/2020 10:20 AM Fernando Carmona MD [facility]           I have seen and examined this patient face to face today. My clinical findings that support the need for the home health skilled services and home bound status are the following:  Weakness/numbness causing balance and gait disturbance due to COPD Exacerbation, Infection and Weakness/Debility making it taxing to leave home.    Allergies:  Review of patient's allergies indicates:   Allergen Reactions    Trelegy ellipta [fluticasone-umeclidin-vilanter] Other (See Comments)     Possible urinary retention        Diet: cardiac diet and diabetic diet: 2000 calorie    Activities: activity  as tolerated    Nursing:   SN to complete comprehensive assessment including routine vital signs. Instruct on disease process and s/s of complications to report to MD. Review/verify medication list sent home with the patient at time of discharge  and instruct patient/caregiver as needed. Frequency may be adjusted depending on start of care date.    Notify MD if SBP > 160 or < 90; DBP > 90 or < 50; HR > 120 or < 50; Temp > 101      CONSULTS:    Physical Therapy to evaluate and treat. Evaluate for home safety and equipment needs; Establish/upgrade home exercise program. Perform / instruct on therapeutic exercises, gait training, transfer training, and Range of Motion.  Occupational Therapy to evaluate and treat. Evaluate home environment for safety and equipment needs. Perform/Instruct on transfers, ADL training, ROM, and therapeutic exercises.    MISCELLANEOUS CARE:  Diabetic Care:   Fingerstick blood sugar a.m. and p.m. and Report CBG < 60 or > 350 to physician.  COPD: Please ensure patient has a functioning nebulizer and provide education on its usage.  If patient has increased cough or symptoms, please initiate COPD protocol including  schedule appointment with PCP or pulmonologist within 24 hours    WOUND CARE ORDERS  n/a      Medications: Review discharge medications with patient and family and provide education.      Current Discharge Medication List      START taking these medications    Details   amoxicillin-clavulanate 875-125mg (AUGMENTIN) 875-125 mg per tablet Take 1 tablet by mouth every 12 (twelve) hours. for 2 days  Qty: 4 tablet, Refills: 0      lisinopril 10 MG tablet Take 1 tablet (10 mg total) by mouth once daily.  Qty: 30 tablet, Refills: 3      predniSONE (DELTASONE) 20 MG tablet Take 1 tablet (20 mg total) by mouth 2 (two) times daily. for 4 days  Qty: 8 tablet, Refills: 0         CONTINUE these medications which have CHANGED    Details   glipiZIDE (GLUCOTROL) 5 MG tablet Take 1 tablet (5 mg  total) by mouth 2 (two) times daily with meals.  Qty: 60 tablet, Refills: 3    Associated Diagnoses: Type 2 diabetes mellitus with hyperglycemia, without long-term current use of insulin         CONTINUE these medications which have NOT CHANGED    Details   acetaminophen (TYLENOL) 500 MG tablet Take 2 tablets (1,000 mg total) by mouth every 8 (eight) hours as needed for Pain.  Qty: 60 tablet, Refills: 2    Associated Diagnoses: Bilateral hip pain      albuterol (PROVENTIL) 2.5 mg /3 mL (0.083 %) nebulizer solution Take 2.5 mg by nebulization every 6 (six) hours as needed for Wheezing or Shortness of Breath. Rescue      albuterol (VENTOLIN HFA) 90 mcg/actuation inhaler Inhale 2 puffs into the lungs every 6 (six) hours as needed for Wheezing. Rescue  Qty: 18 g, Refills: 5    Associated Diagnoses: Centrilobular emphysema      aspirin (ECOTRIN) 81 MG EC tablet Take 1 tablet (81 mg total) by mouth once daily.  Qty: 90 tablet, Refills: 3    Associated Diagnoses: Coronary artery disease involving native heart without angina pectoris, unspecified vessel or lesion type      benztropine (COGENTIN) 0.5 MG tablet Take 1 tablet (0.5 mg total) by mouth 2 (two) times daily.  Qty: 180 tablet, Refills: 0    Associated Diagnoses: Schizophrenia, unspecified type      bisoprolol (ZEBETA) 5 MG tablet Take 0.5 tablets (2.5 mg total) by mouth once daily.  Qty: 45 tablet, Refills: 3    Associated Diagnoses: Coronary artery disease involving native heart without angina pectoris, unspecified vessel or lesion type      blood sugar diagnostic Strp To check BG 3 times daily, to use with insurance preferred meter  Qty: 100 each, Refills: 5    Associated Diagnoses: Type 2 diabetes mellitus with hyperglycemia, without long-term current use of insulin      blood-glucose meter kit To check BG 3 times daily, to use with insurance preferred meter  Qty: 1 each, Refills: 0    Associated Diagnoses: Type 2 diabetes mellitus with hyperglycemia, without  long-term current use of insulin      cholecalciferol, vitamin D3, (VITAMIN D3) 25 mcg (1,000 unit) capsule Take 1 capsule (1,000 Units total) by mouth once daily.  Refills: 0    Associated Diagnoses: Vitamin D deficiency      diclofenac sodium (VOLTAREN) 1 % Gel Apply 4 g topically 4 (four) times daily.  Qty: 100 g, Refills: 0    Associated Diagnoses: Bilateral hip pain      finasteride (PROSCAR) 5 mg tablet Take 1 tablet (5 mg total) by mouth once daily.  Qty: 30 tablet, Refills: 0    Associated Diagnoses: BPH with obstruction/lower urinary tract symptoms      fluticasone-salmeterol diskus inhaler 100-50 mcg Inhale 1 puff into the lungs 2 (two) times daily. Controller. Rinse mouth after use  Qty: 60 each, Refills: 6    Associated Diagnoses: Chronic obstructive pulmonary disease, unspecified COPD type      isosorbide mononitrate (IMDUR) 30 MG 24 hr tablet Take 1 tablet (30 mg total) by mouth once daily.  Qty: 90 tablet, Refills: 3    Associated Diagnoses: Coronary artery disease involving native heart without angina pectoris, unspecified vessel or lesion type      lancets Misc To check BG 3 times daily, to use with insurance preferred meter  Qty: 100 each, Refills: 5    Associated Diagnoses: Type 2 diabetes mellitus with hyperglycemia, without long-term current use of insulin      mag hydrox/aluminum hyd/simeth (ANTACID ORAL) Take by mouth.      omeprazole (PRILOSEC) 20 MG capsule Take 20 mg by mouth once daily.      polyethylene glycol (GLYCOLAX) 17 gram/dose powder Mix 1 capful (17 g) with liquid and take by mouth 2 (two) times daily as needed (constipation).  Qty: 510 g, Refills: 1      QUEtiapine (SEROQUEL) 300 MG Tab Take 150 mg by mouth every evening.       risperiDONE (RISPERDAL) 2 MG tablet Take 2 mg by mouth 2 (two) times daily.       rosuvastatin (CRESTOR) 40 MG Tab Take 1 tablet (40 mg total) by mouth every evening.  Qty: 90 tablet, Refills: 3    Associated Diagnoses: Coronary artery disease involving  native heart without angina pectoris, unspecified vessel or lesion type      tamsulosin (FLOMAX) 0.4 mg Cap Take 1 capsule (0.4 mg total) by mouth once daily.  Qty: 30 capsule, Refills: 11      traZODone (DESYREL) 100 MG tablet Take 100 mg by mouth every evening.      triamcinolone acetonide 0.1% (KENALOG) 0.1 % ointment Apply topically 2 (two) times daily. for 14 days  Qty: 30 g, Refills: 0    Associated Diagnoses: Dermatitis         STOP taking these medications       lisinopril-hydrochlorothiazide (PRINZIDE,ZESTORETIC) 20-25 mg Tab Comments:   Reason for Stopping:               I certify that this patient is confined to his home and needs intermittent skilled nursing care, physical therapy and occupational therapy.

## 2020-03-05 NOTE — PLAN OF CARE
03/05/20 0959   Final Note   Assessment Type Final Discharge Note   Anticipated Discharge Disposition Home-Health   What phone number can be called within the next 1-3 days to see how you are doing after discharge?   (Paresh Senior [phone])   Hospital Follow Up  Appt(s) scheduled? Yes   Discharge plans and expectations educations in teach back method with documentation complete? Yes   Right Care Referral Info   Post Acute Recommendation Home-care     Amy Quinones RN, BSN     Ext [phone]

## 2020-03-05 NOTE — PLAN OF CARE
03/05/20 1335   Post-Acute Status   Post-Acute Authorization Placement   Post-Acute Placement Status Set-up Complete   Discharge Plan   Discharge Plan A Home Health        Pt accepted by [facility].    No additional sw needs at this time.    Selma Ulloa, EVER [phone]

## 2020-03-05 NOTE — PLAN OF CARE
03/05/20 1055   Post-Acute Status   Post-Acute Authorization Home Health/Hospice   Post-Acute Placement Status Referrals Sent   Discharge Plan   Discharge Plan A Home Health     Pt to [place] today with HH; referral sent to N for auth and agency assignment.    Selma Ulloa, EVER [phone]

## 2020-03-05 NOTE — PLAN OF CARE
Went over plan of care - all questions answered. No complaints voiced, no falls or injuries.  Complaints of pain x 1 - meds per mar. No other complaints voiced. Will continue to monitor

## 2020-03-05 NOTE — DISCHARGE SUMMARY
[facility] Hospital Medicine  Discharge Summary      Patient Name: Paresh Senior  MRN: [id number]  Admission Date: 3/1/2020  Hospital Length of Stay: 4 days  Discharge Date and Time: 3/5/2020  2:01 PM  Attending Physician: Jhon Tucker MD   Discharging Provider: Jhon Tucker MD  Primary Care Provider: Fernando Carmona MD    [facility] Team: [facility] D Jhon Tucker MD    HPI:   Mr. Senior is a 64-year-old male with a history of  diabetes type 2, hypertension, CAD, COPD, and Schizophrenia and recent urinary retention presenting with catheter pain, urinary pain and dysuria.  Patient states that he was seen by outpatient urology on 2/13 and 2/21 with continued urinary retention and failed voiding trials. Dillon was re-inserted and scheduled for repeat voiding trial. He was scheduled for follow up with Dr. Aguayo. He noted significant dysuria and worsening fatigue following dillon re-insertion on 2/21.  He now presents with urinary pain, dysuria but denies any associated fevers, chills, nausea, vomiting, diarrhea, back pain, abdominal pain, leg swelling, chest pain, lightheadedness or dizziness. He also has associated right hip pain but is able to ambulate and bear weight.       Seen by PCP on 2/18. Patient follows with pulmonary and anticholinergic agents were discontinued due to urinary retention. He was also approved to start pulmonary rehab.      * No surgery found *      Hospital Course:     Sepsis- resolved   MSSA UTI  - afebrile, tachypenic and leukocytosis   - SBP 90s-100s  - hold home BP medications  - U/A positive for UTI  - CXR negative for infectious source  - lactate wnl   - procal 2.72  - Ucx growing MSSA  - switched vancomycin (D1 3/2) to augmentin to complete 5 days total abx course      CHELE on CKD- improving   Urinary retention  - Scr on admit: 3.4 (b/l Scr: 1.7)  - Scr downtrended to 1.8  - failed voiding trial on 2/13 and 2/19 in urology clinic  -  avoiding anticholinergic agents  - dillon exchanged in ED  - continue tamsulosin and finasteride   - resumed lisinopril at a reduced dose      NIDDM type 2   - HA1c (1/27): 6.5  - SSI  - resume home glipizide upon discharge   - SSI and POCT glucose ACHS     Schizophrenia  - Continuing home Risperdal, Seroquel and benztropine   - patient reports taking benztropine for many years without concern for urinary retention      CAD   HTN   - Continuing home medications.  - resume lisinopril. Avoiding thiazides due to gout      Chronic obstructive pulmonary disease  Centrilobular emphysema  Tobacco abuse  - off Trelogy   - on Advair  - on albuterol prn   - follows with outpatient pulm  - plan for outpatient pulm rehab      Gastroesophageal reflux disease without esophagitis  - Continuing home PPI     Acute gout of right foot  - elevated ESR, CRP, RF, uric acid    - followup [facility]  - Xray right hip negative for fracture or dislocation  - Xray right foot showed DJD  - US lower extremities negative for DVT  - lidocaine patch on right hip   - discontinued home thiazide  - consult rheumatology. apprec recs  -- prednisone 20 mg BID x 5 days   -- Would avoid colchicine and NSAIDs given kidney function  -- Follow up [facility]. Anti-CCP negative   -- Outpatient rheumatology clinic in 1-2 months at which time we can evaluate need for chronic therapy    Consults:   Consults (From admission, onward)        Status Ordering Provider     Inpatient consult to Rheumatology  Once     Provider:  (Not yet assigned)    Completed GWENDOLYN ELIZONDO          Final Active Diagnoses:    Diagnosis Date Noted POA    PRINCIPAL PROBLEM:  UTI (urinary tract infection) [N39.0] 03/01/2020 Unknown    Acute gout of foot [M10.9] 03/04/2020 Unknown    CHELE (acute kidney injury) [N17.9] 03/01/2020 Yes    Urine retention [R33.9] 01/29/2020 Yes    Stage 2 chronic kidney disease [N18.2] 01/27/2020 Yes    Chronic obstructive pulmonary disease [J44.9]  Yes     Centrilobular emphysema [J43.2] 12/10/2019 Yes    Type 2 diabetes mellitus with hyperglycemia, without long-term current use of insulin [E11.65] 12/10/2019 Yes    CAD (coronary artery disease) [I25.10] 11/25/2014 Yes      Problems Resolved During this Admission:      Discharged Condition: good    Disposition: Home-Health Care [facility]    Follow Up:    Patient Instructions:   No discharge procedures on file.  Medications:  Reconciled Home Medications:      Medication List      START taking these medications    amoxicillin-clavulanate 875-125mg 875-125 mg per tablet  Commonly known as:  AUGMENTIN  Take 1 tablet by mouth every 12 (twelve) hours. for 2 days     lisinopril 10 MG tablet  Take 1 tablet (10 mg total) by mouth once daily.     predniSONE 20 MG tablet  Commonly known as:  DELTASONE  Take 1 tablet (20 mg total) by mouth 2 (two) times daily. for 4 days        CONTINUE taking these medications    acetaminophen 500 MG tablet  Commonly known as:  TYLENOL  Take 2 tablets (1,000 mg total) by mouth every 8 (eight) hours as needed for Pain.     * albuterol 2.5 mg /3 mL (0.083 %) nebulizer solution  Commonly known as:  PROVENTIL  Take 2.5 mg by nebulization every 6 (six) hours as needed for Wheezing or Shortness of Breath. Rescue     * albuterol 90 mcg/actuation inhaler  Commonly known as:  Ventolin HFA  Inhale 2 puffs into the lungs every 6 (six) hours as needed for Wheezing. Rescue     ANTACID ORAL  Take by mouth.     aspirin 81 MG EC tablet  Commonly known as:  ECOTRIN  Take 1 tablet (81 mg total) by mouth once daily.     benztropine 0.5 MG tablet  Commonly known as:  COGENTIN  Take 1 tablet (0.5 mg total) by mouth 2 (two) times daily.     bisoprolol 5 MG tablet  Commonly known as:  ZEBETA  Take 0.5 tablets (2.5 mg total) by mouth once daily.     blood sugar diagnostic Strp  To check BG 3 times daily, to use with insurance preferred meter     blood-glucose meter kit  To check BG 3 times daily, to use with insurance  preferred meter     cholecalciferol (vitamin D3) 25 mcg (1,000 unit) capsule  Commonly known as:  VITAMIN D3  Take 1 capsule (1,000 Units total) by mouth once daily.     diclofenac sodium 1 % Gel  Commonly known as:  VOLTAREN  Apply 4 g topically 4 (four) times daily.     finasteride 5 mg tablet  Commonly known as:  PROSCAR  Take 1 tablet (5 mg total) by mouth once daily.     fluticasone-salmeterol 100-50 mcg/dose 100-50 mcg/dose diskus inhaler  Commonly known as:  Advair Diskus  Inhale 1 puff into the lungs 2 (two) times daily. Controller. Rinse mouth after use     glipiZIDE 5 MG tablet  Commonly known as:  GLUCOTROL  Take 1 tablet (5 mg total) by mouth 2 (two) times daily with meals.     isosorbide mononitrate 30 MG 24 hr tablet  Commonly known as:  IMDUR  Take 1 tablet (30 mg total) by mouth once daily.     lancets Misc  To check BG 3 times daily, to use with insurance preferred meter     omeprazole 20 MG capsule  Commonly known as:  PRILOSEC  Take 20 mg by mouth once daily.     polyethylene glycol 17 gram/dose powder  Commonly known as:  GLYCOLAX  Mix 1 capful (17 g) with liquid and take by mouth 2 (two) times daily as needed (constipation).     QUEtiapine 300 MG Tab  Commonly known as:  SEROQUEL  Take 150 mg by mouth every evening.     risperiDONE 2 MG tablet  Commonly known as:  RISPERDAL  Take 2 mg by mouth 2 (two) times daily.     rosuvastatin 40 MG Tab  Commonly known as:  CRESTOR  Take 1 tablet (40 mg total) by mouth every evening.     tamsulosin 0.4 mg Cap  Commonly known as:  FLOMAX  Take 1 capsule (0.4 mg total) by mouth once daily.     traZODone 100 MG tablet  Commonly known as:  DESYREL  Take 100 mg by mouth every evening.     triamcinolone acetonide 0.1% 0.1 % ointment  Commonly known as:  KENALOG  Apply topically 2 (two) times daily. for 14 days         * This list has 2 medication(s) that are the same as other medications prescribed for you. Read the directions carefully, and ask your doctor or  other care provider to review them with you.            STOP taking these medications    lisinopril-hydrochlorothiazide 20-25 mg Tab  Commonly known as:  PRINZIDE,ZESTORETIC            Significant Diagnostic Studies: Labs:   CMP   Recent Labs   Lab 03/04/20  0523 03/05/20  0400    138   K 4.0 4.9    108   CO2 22* 21*    257*   BUN 28* 27*   CREATININE 1.8* 1.8*   CALCIUM 9.0 9.2   PROT 6.5 6.6   ALBUMIN 2.6* 2.5*   BILITOT 0.3 0.2   ALKPHOS 93 85   AST 24 23   ALT 23 24   ANIONGAP 8 9   ESTGFRAFRICA 45.0* 45.0*   EGFRNONAA 38.9* 38.9*   , CBC   Recent Labs   Lab 03/04/20  0523 03/05/20  0400   WBC 12.36 11.08   HGB 11.9* 11.8*   HCT 36.3* 36.3*    330   , Troponin   Recent Labs   Lab 03/01/20  1201   TROPONINI 0.011    and A1C:   Recent Labs   Lab 12/13/19  0810 01/27/20  1020   HGBA1C 6.5* 6.5*       Pending Diagnostic Studies:     Procedure Component Value Units Date/Time    ARDEN [[id number]] Collected:  03/03/20 1538    Order Status:  Sent Lab Status:  In process Updated:  03/03/20 1557    Specimen:  Blood         Indwelling Lines/Drains at time of discharge:   Lines/Drains/Airways     Drain                 Urethral Catheter 03/01/20 0751 Latex 18 Fr. 3 days                Time spent on the discharge of patient: 45 minutes  Patient was seen and examined on the date of discharge and determined to be suitable for discharge.         Jhon Tucker MD  Department of Hospital Medicine  [facility]

## 2020-03-05 NOTE — PLAN OF CARE
03/05/20 1253   Post-Acute Status   Post-Acute Authorization Home Health/Hospice   Post-Acute Placement Status Authorization Obtained   Discharge Plan   Discharge Plan A [facility]     N has assigned pt to [facility] for HH. SW sent referral to [facility] through [facility].    Selma Ulloa, EVER [phone]

## 2020-03-06 LAB
BACTERIA BLD CULT: NORMAL
BACTERIA BLD CULT: NORMAL

## 2020-03-06 NOTE — PHYSICIAN QUERY
PT Name: Paresh Senior  MR #: [id number]    Physician Query Form - Cause and Effect Relationship Clarification      CDS/: Adela ABDI, RN-BC  Contact information: [email or web address]  This form is a permanent document in the medical record.     Query Date: March 6, 2020    By submitting this query, we are merely seeking further clarification of documentation. Please utilize your independent clinical judgment when addressing the question(s) below.    The Medical record contains the following:  Supporting Clinical Findings   Location in record       Dillon catheter placed on February 18th for urinary retention and since then has been having pain with urination.  He attempted to have the catheter removed week later however secondary to bleeding and repeat urinary tension, had the catheter replaced....recent urinary retention presenting with catheter pain, urinary pain and dysuria.                                                                                                                                                                              ED provider note, Dr. Hill, 3/1/2020   Sepsis   MSSA UTI  - afebrile, tachypenic and leukocytosis   - SBP 90s-100s  - hold home BP medications  - U/A positive for UTI  - CXR negative for infectious source  - lactate wnl   - procal 2.72  - Ucx growing MSSA  - switched vancomycin (D1 3/2) to augmentin                                                                                             CHELE on CKD- improving   Urinary retention  - Scr on admit: 3.4 (b/l Scr: 1.7)  - Scr downtrended to 2.0  - failed voiding trial on 2/13 and 2/19 in urology clinic  - avoiding anticholinergic agents  - dillon exchanged in ED  - continue tamsulosin and finasteride   - resume ACE once CHELE resolves                                                                      Specimen-urine, catheter  Color, red  Appearance, cloudy  Occult blood 3+  Leukocytes 2+  RBC and WBC >  100  Bacteria-many    STAPHYLOCOCCUS AUREUS   50,000 - 99,999 cfu/ml       cefTRIAXone (ROCEPHIN) 2 g IVPB  vancomycin 1750 mg 500 mL IVPB  amoxicillin-clavulanate 875-125mg 1 tablet [facility], [place], Dr. Tucker, 3/3/2020                                                  Urinalysis 3/1/2020                Urine culture 3/1/2020        MAR 3/1 and 3/2  3/2  3/3 and 3/4       Provider, please clarify if there is any correlation between dillon and UTI.           Are the conditions:    [ X ] Due to or associated with each other   [  ] Unrelated to each other   [  ] Other (Please Specify): _________________________   [  ] Clinically Undetermined

## 2020-03-08 ENCOUNTER — TELEPHONE (OUTPATIENT)
Dept: FAMILY MEDICINE | Facility: CLINIC | Age: 65
End: 2020-03-08

## 2020-03-08 NOTE — TELEPHONE ENCOUNTER
FIT neg.     X-ray bilateral hips  Mild osteoarthritis of the hips bilaterally with underlying cam and pincer morphology.    MD Reynaldo

## 2020-03-09 ENCOUNTER — PATIENT OUTREACH (OUTPATIENT)
Dept: ADMINISTRATIVE | Facility: CLINIC | Age: 65
End: 2020-03-09

## 2020-03-09 DIAGNOSIS — F20.9 SCHIZOPHRENIA, UNSPECIFIED TYPE: ICD-10-CM

## 2020-03-09 DIAGNOSIS — I50.9 CONGESTIVE HEART FAILURE, UNSPECIFIED HF CHRONICITY, UNSPECIFIED HEART FAILURE TYPE: Primary | ICD-10-CM

## 2020-03-09 DIAGNOSIS — J44.9 CHRONIC OBSTRUCTIVE PULMONARY DISEASE, UNSPECIFIED COPD TYPE: ICD-10-CM

## 2020-03-09 DIAGNOSIS — N18.2 STAGE 2 CHRONIC KIDNEY DISEASE: ICD-10-CM

## 2020-03-09 RX ORDER — QUETIAPINE FUMARATE 300 MG/1
300 TABLET, FILM COATED ORAL NIGHTLY
Qty: 90 TABLET | Refills: 0 | Status: SHIPPED | OUTPATIENT
Start: 2020-03-09 | End: 2020-03-26 | Stop reason: SDUPTHER

## 2020-03-09 RX ORDER — TRAZODONE HYDROCHLORIDE 100 MG/1
100 TABLET ORAL NIGHTLY
Qty: 90 TABLET | Refills: 0 | Status: SHIPPED | OUTPATIENT
Start: 2020-03-09 | End: 2020-03-26 | Stop reason: SDUPTHER

## 2020-03-09 RX ORDER — RISPERIDONE 2 MG/1
2 TABLET ORAL 2 TIMES DAILY
Qty: 180 TABLET | Refills: 0 | Status: SHIPPED | OUTPATIENT
Start: 2020-03-09 | End: 2020-03-26 | Stop reason: SDUPTHER

## 2020-03-09 NOTE — TELEPHONE ENCOUNTER
----- Message from Michelle Domingo RN sent at 3/9/2020  1:51 PM CDT -----  Regarding: Prescriptions  During [facility] post discharge call, patient stated that he is having a lot of trouble getting prescriptions.  Please contact patient about this issue.  Respectfully,  Michelle Domingo, RN  [facility]

## 2020-03-09 NOTE — PATIENT INSTRUCTIONS
Urinary Tract Infections in Men  Urinary tract infections (UTIs) are most often caused by bacteria (germs) that invade the urinary tract. The bacteria may come from outside the body. Or they may travel from the skin outside of rectum into the urethra. Pain in or around the urinary tract is a common symptom for most UTIs. But the only way to know for sure if you have a UTI is to have a urinalysis and urine culture.     Three Types of UTIs  Cystitis: A bladder infection, or cystitis, is often linked to a blockage from an enlarged prostate. You may have an urgent or frequent need to urinate, and bloody urine. Treatment includes antibiotics and medications to relax or shrink the prostate. In some cases, surgery is needed.  Urethritis: This is an infection of the urethra. You may have a discharge from the urethra or burning when you urinate.You may also have pain in the urethra or penis. Urethritis is treated with antibiotics.  Prostatitis: This is an inflammation or infection of the prostate. You may have an urgent or frequent need to urinate, fever, or burning when you urinate. Or you may have a tender prostate, or a vague feeling of pressure. Prostatitis is treated with a range of medications, depending on the cause.  Pyelonephritis: This is a kidney infection. If not treated, it can be serious and damage your kidneys. In severe cases you may be hospitalized. You may have a fever and upper back pain.  Treating a UTI  Medications: Most UTIs are treated with antibiotics. These kill the bacteria. The length of time you need to take them depends on the type of infection. Take antibiotics exactly as directed until all of the medication is gone. If you do not, the infection may not go away and may become harder to treat. For certain types of UTIs, you may be given other medications to help treat your symptoms.  Lifestyle changes: The lifestyle changes below will help get rid of your current infection. They may also help  prevent future UTIs.  Drink plenty of fluids such as water, juice, or other caffeine-free drinks. This helps flush bacteria out of your system.  Empty your bladder when you feel the urge to urinate and before going to sleep. Urine that stays in your bladder promotes infection.  Use condoms during sex. These help prevent UTIs caused by sexually transmitted bacteria.  Keep follow-up appointments with your health care provider. He or she can may do tests to make sure the infection has cleared. If necessary, additional treatment can be started.  Additional treatment: Most UTIs respond to medication. But sometimes a procedure or surgery is needed. This can treat an enlarged prostate, or remove a kidney stone or other blockage. Surgery may also treat problems caused by scarring or long-term infections.  © 4829-9611 [employer], [address] [place] [address], [place] [address]. All rights reserved. This information is not intended as a substitute for professional medical care. Always follow your healthcare professional's instructions.

## 2020-03-09 NOTE — TELEPHONE ENCOUNTER
Returned pt phone call, pt stated that that you would fill his physc meds until he finds a Dr. Please Advise.

## 2020-03-09 NOTE — TELEPHONE ENCOUNTER
Called patient to notify --Please let patient know that his FIT test was negative. This needs to be repeated in 1 year. Additionally the x-ray of his hips confirm that he has mild osteoarthritis of both hips.  Patient verbalized understanding.

## 2020-03-11 ENCOUNTER — OFFICE VISIT (OUTPATIENT)
Dept: UROLOGY | Facility: CLINIC | Age: 65
End: 2020-03-11
Payer: MEDICARE

## 2020-03-11 VITALS — HEART RATE: 101 BPM | DIASTOLIC BLOOD PRESSURE: 64 MMHG | SYSTOLIC BLOOD PRESSURE: 111 MMHG | TEMPERATURE: 98 F

## 2020-03-11 DIAGNOSIS — R33.9 URINARY RETENTION: Primary | ICD-10-CM

## 2020-03-11 DIAGNOSIS — N40.0 BENIGN PROSTATIC HYPERPLASIA, UNSPECIFIED WHETHER LOWER URINARY TRACT SYMPTOMS PRESENT: ICD-10-CM

## 2020-03-11 DIAGNOSIS — Z87.440 HISTORY OF UTI: ICD-10-CM

## 2020-03-11 PROCEDURE — 3074F PR MOST RECENT SYSTOLIC BLOOD PRESSURE < 130 MM HG: ICD-10-PCS | Mod: CPTII,S$GLB,, | Performed by: NURSE PRACTITIONER

## 2020-03-11 PROCEDURE — 99999 PR PBB SHADOW E&M-EST. PATIENT-LVL IV: CPT | Mod: PBBFAC,,, | Performed by: NURSE PRACTITIONER

## 2020-03-11 PROCEDURE — 51798 PR MEAS,POST-VOID RES,US,NON-IMAGING: ICD-10-PCS | Mod: S$GLB,,, | Performed by: NURSE PRACTITIONER

## 2020-03-11 PROCEDURE — 3074F SYST BP LT 130 MM HG: CPT | Mod: CPTII,S$GLB,, | Performed by: NURSE PRACTITIONER

## 2020-03-11 PROCEDURE — 3078F DIAST BP <80 MM HG: CPT | Mod: CPTII,S$GLB,, | Performed by: NURSE PRACTITIONER

## 2020-03-11 PROCEDURE — 99999 PR PBB SHADOW E&M-EST. PATIENT-LVL IV: ICD-10-PCS | Mod: PBBFAC,,, | Performed by: NURSE PRACTITIONER

## 2020-03-11 PROCEDURE — 3078F PR MOST RECENT DIASTOLIC BLOOD PRESSURE < 80 MM HG: ICD-10-PCS | Mod: CPTII,S$GLB,, | Performed by: NURSE PRACTITIONER

## 2020-03-11 PROCEDURE — 99213 PR OFFICE/OUTPT VISIT, EST, LEVL III, 20-29 MIN: ICD-10-PCS | Mod: S$GLB,,, | Performed by: NURSE PRACTITIONER

## 2020-03-11 PROCEDURE — 99213 OFFICE O/P EST LOW 20 MIN: CPT | Mod: S$GLB,,, | Performed by: NURSE PRACTITIONER

## 2020-03-11 PROCEDURE — 51798 US URINE CAPACITY MEASURE: CPT | Mod: S$GLB,,, | Performed by: NURSE PRACTITIONER

## 2020-03-11 NOTE — Clinical Note
Hi Dr. Aguayo,    I had referred this patient to you for urodynamics as he failed 2 voiding trials in clinic. Pulmonary contacted me because they had recently started him on Trelegy and thought this might be contributing to his urinary retention, so they stopped it. We did a voiding trial since he has been off Trelegy and he was able to pass voiding trial, so I was going to have my staff cancel urodynamics. He has a follow-up with me next week to make sure he's still voiding okay. Just wanted to let you know what was going on. Sincerely,Ella Garrett

## 2020-03-11 NOTE — PROGRESS NOTES
Subjective:       Patient ID: Paresh Senior is a 64 y.o. male.    Chief Complaint: Hospital follow-up    This is a 64 y.o.  male patient that is an established patient of mine.  The patient is referred to me by Dr. Carmona for urinary retention. Hx of CKD2, CHF, 2DM, GERD and HTN. The patient was hospitalized on 1/27/2020 for hypoglycemia. During admission, patient had difficulty urinating, a bladder scan was completed and showed almost 1L in bladder. A dillon was placed and Flomax was started. Renal ultrasound revealed simple cysts and prostatomegaly.  A voiding trial was attempted but patient was unable to urinate, bladder scan revealed 385mL and dillon was reinserted. Patient was discharged with dillon.        2/14/2020  Patient is here today for a voiding trial. He reports that he has never experienced urinary retention in the past. He was experiencing a weaker stream with some urgency and frequency. He has been taking Flomax since discharge. Constipation has improved with the use of stool softeners and fiber. Last BM was yesterday. He has been hydrating well.     2/21/2020  He is here today for repeat voiding trial. Compliant with flomax and finasteride. No constipation. Hydrating well. Catheter has been draining well. No complaints.     3/11/2020  Patient had recently been started on Trelegy by pulmonary, who thought that medication was contributing to urinary retention. Trielegy was stopped by pulmonary. We were going to repeat voiding trial, however patient was admitted to [facility] main campus on 3/1/2020 with a UTI. He is here today to repeat voiding trial now that he has been off Trelegy. He has finished course of augmentin. Catheter has been draining well. Compliant with flomax and finasteride. His main complaint is chronic knee and hip pain. No other complaints.       LAST PSA  Lab Results   Component Value Date    PSA 3.2 12/13/2019       Lab Results   Component Value Date    CREATININE 1.8 (H)  "03/05/2020     ---  Past Medical History:   Diagnosis Date    Allergy     COPD (chronic obstructive pulmonary disease)     Coronary artery disease     Disorder of kidney and ureter     Hypercholesterolemia     Hypertension     Urinary tract infection        Past Surgical History:   Procedure Laterality Date    COLONOSCOPY  02/2016    Advised repeat in 3 years    CORONARY STENT PLACEMENT  2013       Family History   Problem Relation Age of Onset    No Known Problems Mother     Mental illness Brother     Diabetes Grandchild        Social History     Tobacco Use    Smoking status: Current Every Day Smoker     Packs/day: 0.75     Years: 50.00     Pack years: 37.50     Types: Cigars    Smokeless tobacco: Never Used   Substance Use Topics    Alcohol use: Yes     Comment: 2-3beers/day    Drug use: Yes     Types: Cocaine, "Crack" cocaine     Comment: 3-4 marijuana joints per day       Current Outpatient Medications on File Prior to Visit   Medication Sig Dispense Refill    albuterol (PROVENTIL) 2.5 mg /3 mL (0.083 %) nebulizer solution Take 2.5 mg by nebulization every 6 (six) hours as needed for Wheezing or Shortness of Breath. Rescue      albuterol (VENTOLIN HFA) 90 mcg/actuation inhaler Inhale 2 puffs into the lungs every 6 (six) hours as needed for Wheezing. Rescue 18 g 5    aspirin (ECOTRIN) 81 MG EC tablet Take 1 tablet (81 mg total) by mouth once daily. 90 tablet 3    benztropine (COGENTIN) 0.5 MG tablet Take 1 tablet (0.5 mg total) by mouth 2 (two) times daily. 180 tablet 0    bisoprolol (ZEBETA) 5 MG tablet Take 0.5 tablets (2.5 mg total) by mouth once daily. 45 tablet 3    cholecalciferol, vitamin D3, (VITAMIN D3) 25 mcg (1,000 unit) capsule Take 1 capsule (1,000 Units total) by mouth once daily.  0    finasteride (PROSCAR) 5 mg tablet Take 1 tablet (5 mg total) by mouth once daily. 30 tablet 0    glipiZIDE (GLUCOTROL) 5 MG tablet Take 1 tablet (5 mg total) by mouth 2 (two) times daily with " meals. 60 tablet 3    isosorbide mononitrate (IMDUR) 30 MG 24 hr tablet Take 1 tablet (30 mg total) by mouth once daily. 90 tablet 3    lisinopril 10 MG tablet Take 1 tablet (10 mg total) by mouth once daily. 30 tablet 3    omeprazole (PRILOSEC) 20 MG capsule Take 20 mg by mouth once daily.      QUEtiapine (SEROQUEL) 300 MG Tab Take 1 tablet (300 mg total) by mouth every evening. 90 tablet 0    risperiDONE (RISPERDAL) 2 MG tablet Take 1 tablet (2 mg total) by mouth 2 (two) times daily. 180 tablet 0    rosuvastatin (CRESTOR) 40 MG Tab Take 1 tablet (40 mg total) by mouth every evening. 90 tablet 3    tamsulosin (FLOMAX) 0.4 mg Cap Take 1 capsule (0.4 mg total) by mouth once daily. 30 capsule 11    traZODone (DESYREL) 100 MG tablet Take 1 tablet (100 mg total) by mouth every evening. 90 tablet 0    acetaminophen (TYLENOL) 500 MG tablet Take 2 tablets (1,000 mg total) by mouth every 8 (eight) hours as needed for Pain. 60 tablet 2    blood sugar diagnostic Strp To check BG 3 times daily, to use with insurance preferred meter (Patient not taking: Reported on 3/9/2020) 100 each 5    blood-glucose meter kit To check BG 3 times daily, to use with insurance preferred meter (Patient not taking: Reported on 3/9/2020) 1 each 0    diclofenac sodium (VOLTAREN) 1 % Gel Apply 4 g topically 4 (four) times daily. (Patient not taking: Reported on 3/9/2020) 100 g 0    fluticasone-salmeterol diskus inhaler 100-50 mcg Inhale 1 puff into the lungs 2 (two) times daily. Controller. Rinse mouth after use (Patient not taking: Reported on 3/9/2020) 60 each 6    lancets Misc To check BG 3 times daily, to use with insurance preferred meter (Patient not taking: Reported on 3/9/2020) 100 each 5    mag hydrox/aluminum hyd/simeth (ANTACID ORAL) Take by mouth.      polyethylene glycol (GLYCOLAX) 17 gram/dose powder Mix 1 capful (17 g) with liquid and take by mouth 2 (two) times daily as needed (constipation). (Patient not taking:  Reported on 3/9/2020) 510 g 1    triamcinolone acetonide 0.1% (KENALOG) 0.1 % ointment Apply topically 2 (two) times daily. for 14 days 30 g 0     No current facility-administered medications on file prior to visit.        Review of patient's allergies indicates:   Allergen Reactions    Trelegy ellipta [fluticasone-umeclidin-vilanter] Other (See Comments)     Possible urinary retention        Review of Systems   Constitutional: Negative for activity change and fever.   Eyes: Negative for visual disturbance.   Respiratory: Negative for shortness of breath.    Cardiovascular: Negative for chest pain.   Gastrointestinal: Negative for abdominal distention.   Genitourinary: Negative for dysuria and hematuria.   Musculoskeletal: Negative for gait problem.   Skin: Negative for color change.   Neurological: Negative for facial asymmetry.   Psychiatric/Behavioral: Negative for agitation and confusion.       Objective:      Physical Exam   Constitutional: He appears well-developed and well-nourished.   HENT:   Head: Normocephalic and atraumatic.   Eyes: EOM are normal.   Neck: Normal range of motion. Neck supple.   Pulmonary/Chest: Effort normal.   Abdominal: He exhibits no distension.   Genitourinary:   Genitourinary Comments: Catheter in place, clear yellow urine in leg bag   Musculoskeletal: Normal range of motion.   Neurological: He is alert.   Skin: Skin is warm and dry.   Psychiatric: He has a normal mood and affect.       Assessment:       1. Urinary retention    2. Benign prostatic hyperplasia, unspecified whether lower urinary tract symptoms present    3. History of UTI        Plan:         1. 400 mL of sterile saline was instilled in bladder, balloon was deflated and catheter removed, pt tolerated well. Pt was able to urinate 350 mL of urine without difficulty. Patient stayed around the clinic and continued to hydrate for a few hours. He was able to urinate another 400mL of urine. PVR around 45 minutes later was  314mL. Patient then immediately voided 250mL. Will hold off on urodynamics as it appears Trelegy likely contributing towards urinary retention now that patient voiding adequately with 3 good voids in clinic today. Will continue with flomax and finasteride as patient was experiencing some BPH symptoms prior to urinary retention. Will have him follow-up next week with PVR.             Urinary retention  -     POCT Bladder Scan    Benign prostatic hyperplasia, unspecified whether lower urinary tract symptoms present  -     POCT Bladder Scan    History of UTI

## 2020-03-13 DIAGNOSIS — Z72.0 TOBACCO ABUSE: ICD-10-CM

## 2020-03-13 DIAGNOSIS — J44.9 CHRONIC OBSTRUCTIVE PULMONARY DISEASE, UNSPECIFIED COPD TYPE: Primary | ICD-10-CM

## 2020-03-14 ENCOUNTER — PATIENT OUTREACH (OUTPATIENT)
Dept: ADMINISTRATIVE | Facility: OTHER | Age: 65
End: 2020-03-14

## 2020-03-14 NOTE — PROGRESS NOTES
Chart reviewed.   Requested updates from Care Everywhere.  Immunizations reconciled.   HM updated.  Optometry referral placed 01/20/2020  Colonoscopy previously ordered.

## 2020-03-16 ENCOUNTER — HOSPITAL ENCOUNTER (OUTPATIENT)
Dept: PULMONOLOGY | Facility: CLINIC | Age: 65
Discharge: HOME | End: 2020-03-16
Payer: MEDICARE

## 2020-03-16 ENCOUNTER — OFFICE VISIT (OUTPATIENT)
Dept: PULMONOLOGY | Facility: CLINIC | Age: 65
End: 2020-03-16
Payer: MEDICARE

## 2020-03-16 VITALS
WEIGHT: 180 LBS | DIASTOLIC BLOOD PRESSURE: 84 MMHG | OXYGEN SATURATION: 96 % | BODY MASS INDEX: 27.28 KG/M2 | HEIGHT: 68 IN | SYSTOLIC BLOOD PRESSURE: 122 MMHG | HEIGHT: 68 IN | WEIGHT: 168.63 LBS | HEART RATE: 94 BPM | BODY MASS INDEX: 25.56 KG/M2

## 2020-03-16 DIAGNOSIS — Z72.0 TOBACCO ABUSE: ICD-10-CM

## 2020-03-16 DIAGNOSIS — I70.0 AORTIC ATHEROSCLEROSIS: ICD-10-CM

## 2020-03-16 DIAGNOSIS — J44.9 CHRONIC OBSTRUCTIVE PULMONARY DISEASE, UNSPECIFIED COPD TYPE: ICD-10-CM

## 2020-03-16 PROCEDURE — 99499 RISK ADDL DX/OHS AUDIT: ICD-10-PCS | Mod: S$GLB,,, | Performed by: NURSE PRACTITIONER

## 2020-03-16 PROCEDURE — 99214 PR OFFICE/OUTPT VISIT, EST, LEVL IV, 30-39 MIN: ICD-10-PCS | Mod: 25,S$GLB,, | Performed by: NURSE PRACTITIONER

## 2020-03-16 PROCEDURE — 99214 OFFICE O/P EST MOD 30 MIN: CPT | Mod: 25,S$GLB,, | Performed by: NURSE PRACTITIONER

## 2020-03-16 PROCEDURE — 94618 PULMONARY STRESS TESTING: ICD-10-PCS | Mod: S$GLB,,, | Performed by: INTERNAL MEDICINE

## 2020-03-16 PROCEDURE — 94618 PULMONARY STRESS TESTING: CPT | Mod: S$GLB,,, | Performed by: INTERNAL MEDICINE

## 2020-03-16 PROCEDURE — 99999 PR PBB SHADOW E&M-EST. PATIENT-LVL III: CPT | Mod: PBBFAC,,, | Performed by: NURSE PRACTITIONER

## 2020-03-16 PROCEDURE — 99499 UNLISTED E&M SERVICE: CPT | Mod: S$GLB,,, | Performed by: NURSE PRACTITIONER

## 2020-03-16 PROCEDURE — 99999 PR PBB SHADOW E&M-EST. PATIENT-LVL III: ICD-10-PCS | Mod: PBBFAC,,, | Performed by: NURSE PRACTITIONER

## 2020-03-16 RX ORDER — FLUTICASONE FUROATE AND VILANTEROL 100; 25 UG/1; UG/1
1 POWDER RESPIRATORY (INHALATION) DAILY
COMMUNITY
End: 2020-04-06 | Stop reason: SDUPTHER

## 2020-03-16 NOTE — ASSESSMENT & PLAN NOTE
Patient explains COPD is controlled at this time.  Patient had to be taken off a trilogy in relation to urinary retention.  During hospitalization, he was taken off of Advair and started on Breo.  Patient reports doing well with Breo will continue at this time.  Patient had 6 min walk test today which was not suggestive of desaturation.     Plan:  -Again reiterated smoking cessation of cigars in relation to his COPD  -Will continue Breo 1 puff once a day.  Discussed the rinse mouth out after use  -Continue albuterol as needed  -Patient does not want to be referred to Pulmonary Rehab.  -Patient had CT of chest with was suggestive of a lung nodule need 9 month follow-up CT.  -Discussed with patient to follow-up in 3 months with pulmonary function test.  He did not want performs pulmonary function test today

## 2020-03-16 NOTE — LETTER
March 16, 2020      Fernando Carmona MD  [address]  [address]  [facility] [address]           [facility]  [address]  [place] [address]  Phone: [phone]          Patient: Paresh Senior   MR Number: [id number]   YOB: 1955   Date of Visit: 3/16/2020       Dear Dr. Fernando Carmona:    Thank you for referring Paresh Senior to me for evaluation. Attached you will find relevant portions of my assessment and plan of care.    If you have questions, please do not hesitate to call me. I look forward to following Paresh Senior along with you.    Sincerely,    Nadine Raza, NP    Enclosure  CC:  No Recipients    If you would like to receive this communication electronically, please contact [email or web address] or [phone] to request more information on [employer] Link access.    For providers and/or their staff who would like to refer a patient to [facility], please contact us through our one-stop-shop provider referral line, [facility], at [phone].    If you feel you have received this communication in error or would no longer like to receive these types of communications, please e-mail [email or web address]

## 2020-03-16 NOTE — PROGRESS NOTES
Subjective:       Patient ID: Paresh Senior is a 64 y.o. male.    Chief Complaint: Follow-up    HPI:   Paresh Senior is a 64 y.o. male with hx of DM II, HTN, CAD , HLD, CHF, Schizopherina, COPD and active smoker who present for COPD evaluation and est care within the [facility] system. He explains previously  following at [facility].     Patient  experiences chronic mMRC 3 symptoms of dyspnea. Notes to have no chronic cough. Explains had 1 COPD exacerbations in the last one year; 1 hospitalizations for respiratory problems. Denies hx of intubation. He reports taking Symbicort 2 times a week and not using Spiriva. Reports daily use of albuterol nebs or HFA pump. For appx 1 year, he reports using oxygen to assist in breathing efforts. He tells has not followed with pulmonary rehab. He explains has followed with Cardiology yesterday and has upcoming procedures including ECHO.     Currently, he reports smoking 1 cigar a day. Explains quit smoking cigarettes 3 months ago. Discloses having 30-40 pack year hx. HE wears supplemental oxygen with activity and he express aware never to smoke while using oxygen. Tells he follows with smoking cessation at the [facility].     Interval Hx 3/16/2020-    Since last visitation, Mr. Senior was taken off of trilogy in relation to urinary retention as a possible side effect.  He had follow with Urology had a catheter in subsequently developed a urinary tract infection which has been treated accordingly.  His Saldivar catheter has been removed.  During patient's hospitalization, he was taken off of Advair and started on Breo.  Patient explains has been compliant with daily use.  Tells he has not required albuterol inhaler.  He discloses remains smoking 1 cigar a day.  He is also reporting having a 12 lb intentional weight loss and finding his shortness of breath has significantly improved.  He had 6 min walk test today which was not suggestive desaturation.  On 12/31/2019, he had an  echocardiogram which was essentially normal.  In February 2020, head CT of chest which did show 5 mm left lower lobe subpleural nodule. Denies wheezing, cough, chest pain, fever, chills or lower leg swelling.    Review of Systems   Constitutional: Negative for fever, chills, weight loss (Reports having intentional 12 lbs weight loss. ) and night sweats.   Respiratory: Negative for cough, sputum production, shortness of breath (Reports dyspnea has improved. ), wheezing, dyspnea on extertion and use of rescue inhaler.    Cardiovascular: Negative for chest pain and leg swelling.   Genitourinary: Negative for difficulty urinating (Reports has resolved. ) and hematuria.   Musculoskeletal: Positive for gait problem (Reports chronic hip pain and uses rolling walker. ).       Social History     Tobacco Use    Smoking status: Current Every Day Smoker     Packs/day: 0.75     Years: 50.00     Pack years: 37.50     Types: Cigars    Smokeless tobacco: Never Used   Substance Use Topics    Alcohol use: Yes     Comment: 2-3beers/day     Review of patient's allergies indicates:   Allergen Reactions    Trelegy ellipta [fluticasone-umeclidin-vilanter] Other (See Comments)     Possible urinary retention      Past Medical History:   Diagnosis Date    Allergy     Colon polyp 2013    COPD (chronic obstructive pulmonary disease)     Coronary artery disease     Disorder of kidney and ureter     Hypercholesterolemia     Hypertension     Urinary tract infection      Past Surgical History:   Procedure Laterality Date    COLONOSCOPY  02/2016    Advised repeat in 3 years    CORONARY STENT PLACEMENT  2013     Current Outpatient Medications on File Prior to Visit   Medication Sig    acetaminophen (TYLENOL) 500 MG tablet Take 2 tablets (1,000 mg total) by mouth every 8 (eight) hours as needed for Pain.    albuterol (PROVENTIL) 2.5 mg /3 mL (0.083 %) nebulizer solution Take 2.5 mg by nebulization every 6 (six) hours as needed for  Wheezing or Shortness of Breath. Rescue    albuterol (VENTOLIN HFA) 90 mcg/actuation inhaler Inhale 2 puffs into the lungs every 6 (six) hours as needed for Wheezing. Rescue    aspirin (ECOTRIN) 81 MG EC tablet Take 1 tablet (81 mg total) by mouth once daily.    benztropine (COGENTIN) 0.5 MG tablet Take 1 tablet (0.5 mg total) by mouth 2 (two) times daily.    bisoprolol (ZEBETA) 5 MG tablet Take 0.5 tablets (2.5 mg total) by mouth once daily.    blood sugar diagnostic Strp To check BG 3 times daily, to use with insurance preferred meter    blood-glucose meter kit To check BG 3 times daily, to use with insurance preferred meter    cholecalciferol, vitamin D3, (VITAMIN D3) 25 mcg (1,000 unit) capsule Take 1 capsule (1,000 Units total) by mouth once daily.    diclofenac sodium (VOLTAREN) 1 % Gel Apply 4 g topically 4 (four) times daily.    fluticasone furoate-vilanteroL (BREO) 100-25 mcg/dose diskus inhaler Inhale 1 puff into the lungs once daily. Controller    glipiZIDE (GLUCOTROL) 5 MG tablet Take 1 tablet (5 mg total) by mouth 2 (two) times daily with meals.    isosorbide mononitrate (IMDUR) 30 MG 24 hr tablet Take 1 tablet (30 mg total) by mouth once daily.    lancets Misc To check BG 3 times daily, to use with insurance preferred meter    lisinopril 10 MG tablet Take 1 tablet (10 mg total) by mouth once daily.    mag hydrox/aluminum hyd/simeth (ANTACID ORAL) Take by mouth.    omeprazole (PRILOSEC) 20 MG capsule Take 20 mg by mouth once daily.    polyethylene glycol (GLYCOLAX) 17 gram/dose powder Mix 1 capful (17 g) with liquid and take by mouth 2 (two) times daily as needed (constipation).    QUEtiapine (SEROQUEL) 300 MG Tab Take 1 tablet (300 mg total) by mouth every evening.    risperiDONE (RISPERDAL) 2 MG tablet Take 1 tablet (2 mg total) by mouth 2 (two) times daily.    rosuvastatin (CRESTOR) 40 MG Tab Take 1 tablet (40 mg total) by mouth every evening.    tamsulosin (FLOMAX) 0.4 mg Cap  "Take 1 capsule (0.4 mg total) by mouth once daily.    traZODone (DESYREL) 100 MG tablet Take 1 tablet (100 mg total) by mouth every evening.    [DISCONTINUED] fluticasone-salmeterol diskus inhaler 100-50 mcg Inhale 1 puff into the lungs 2 (two) times daily. Controller. Rinse mouth after use    finasteride (PROSCAR) 5 mg tablet Take 1 tablet (5 mg total) by mouth once daily.    triamcinolone acetonide 0.1% (KENALOG) 0.1 % ointment Apply topically 2 (two) times daily. for 14 days     No current facility-administered medications on file prior to visit.      Objective:      Vitals:    03/16/20 0926   BP: 122/84   BP Location: Right arm   Patient Position: Sitting   Pulse: 94   SpO2: 96%   Weight: 76.5 kg (168 lb 10.4 oz)   Height: 5' 8" (1.727 m)     Physical Exam   Constitutional: He is oriented to person, place, and time. He appears well-developed and well-nourished. No distress.   Cardiovascular: Normal rate, regular rhythm and normal heart sounds.   Pulmonary/Chest: Normal expansion, effort normal and breath sounds normal. No respiratory distress. He has no wheezes. He has no rhonchi.   Using portable oxygen.    Musculoskeletal: Normal range of motion. He exhibits no edema.   Neurological: He is alert and oriented to person, place, and time.   Using walker with ambulation.    Skin: Skin is warm and dry.   Psychiatric: He has a normal mood and affect. His behavior is normal.   Vitals reviewed.    Personal Diagnostic Review    6 MWT 3/16/2020 ----> 1050 ft. On RA                            Pre            POST  Oxygen sat       96%               96%  HR                      99                102  BP                    132/73           131/73  SERENE                 4                   7-8      Pulmonary function tests 12/17/2019: FEV1: 1.25  (46 % predicted), FVC:  2.56 (73 % predicted), FEV1/FVC:  48, DLCO: 10.9 (41 % predicted)    CXR 3/1/2020-  FINDINGS:  Cardiomediastinal silhouette is unchanged.  No confluent " airspace opacity, large pleural effusion, or pneumothorax.  No acute osseous abnormality.    CT of chest 2/26/2020-  Impression       Centrilobular emphysema.    Noncalcified left lower lobe subpleural nodule.  Recommend correlation with any prior outside similar cross-sectional imaging to assess for stability.  Otherwise, optional CT at 12 months could be performed.    Mild anterior pericardial effusion versus thickening.    Aortic arch and coronary calcific atherosclerosis.     ECHO 12/31/2019-  · Normal left ventricular systolic function. The estimated ejection fraction is 55%  · Mild eccentric left ventricular hypertrophy.  · Normal LV diastolic function.  · Mild mitral regurgitation.  · Local segmental wall motion abnormalities.  · Normal right ventricular systolic function.  · Normal central venous pressure (3 mm Hg).      Assessment:     Problem List Items Addressed This Visit        Pulmonary    Chronic obstructive pulmonary disease    Current Assessment & Plan     Patient explains COPD is controlled at this time.  Patient had to be taken off a trilogy in relation to urinary retention.  During hospitalization, he was taken off of Advair and started on Breo.  Patient reports doing well with Breo will continue at this time.  Patient had 6 min walk test today which was not suggestive of desaturation.     Plan:  -Again reiterated smoking cessation of cigars in relation to his COPD  -Will continue Breo 1 puff once a day.  Discussed the rinse mouth out after use  -Continue albuterol as needed  -Patient does not want to be referred to Pulmonary Rehab.  -Patient had CT of chest with was suggestive of a lung nodule need 9 month follow-up CT.  -Discussed with patient to follow-up in 3 months with pulmonary function test.  He did not want performs pulmonary function test today         Relevant Orders    Spirometry without Bronchodilator    LUNG VOLUMES    DLCO-Carbon Monoxide Diffusing Capacity    Lung nodule < 6cm on  CT    Overview     CT 2/2019- Noncalcified left lower lobe subpleural nodule.  Recommend correlation with any prior outside similar cross-sectional imaging to assess for stability.     - Plan obtain repeat CT of chest in 9 months.          Relevant Orders    CT Chest Without Contrast       Cardiac/Vascular    Aortic atherosclerosis    Overview     Seen on CT 2/26/2020     On Crestor  Recommended to continue to follow with PCP.            Other    Tobacco abuse    Current Assessment & Plan     Again counseling on smoking cessation was provided.   Mr. Senior is not interested in smoking cessation at this time.            Follow up in 3 months with spirometry

## 2020-03-16 NOTE — ASSESSMENT & PLAN NOTE
Again counseling on smoking cessation was provided.   Mr. Senior is not interested in smoking cessation at this time.

## 2020-03-17 ENCOUNTER — TELEPHONE (OUTPATIENT)
Dept: GASTROENTEROLOGY | Facility: CLINIC | Age: 65
End: 2020-03-17

## 2020-03-17 NOTE — TELEPHONE ENCOUNTER
Spoke with pt and he would like to post pone the procedure due to the COVID-19 virus. Patient stated that the colonoscopy was just for screening purposes. Informed pt that we will contact him in a couple of weeks to reschedule. Verbal Understanding.

## 2020-03-19 ENCOUNTER — TELEPHONE (OUTPATIENT)
Dept: UROLOGY | Facility: CLINIC | Age: 65
End: 2020-03-19

## 2020-03-19 NOTE — TELEPHONE ENCOUNTER
----- Message from Ella Garrett NP sent at 3/19/2020  1:54 PM CDT -----  Can you please reschedule patient's appointment with me in 1 month. Thank you!

## 2020-03-19 NOTE — TELEPHONE ENCOUNTER
Spoke to Mr. Senior about rescheduling his appointment due to covid concerns. He reports he has been urinating well without difficulty. Will have patient's appointment rescheduled in 1 month for PVR. He will contact us if any issues or concerns before then.

## 2020-03-20 NOTE — PROCEDURES
Paresh Senior is a 64 y.o.  male patient, who presents for a 6 minute walk test ordered by MONIQUE Cordova.  The diagnosis is Qualify for Oxygen, COPD.  The patient's BMI is 27.4 kg/m2.  Predicted distance (lower limit of normal) is 389.13 meters.      Test Results:    The test was completed without stopping.    The total time walked was 360 seconds.  During walking, the patient reported:  Dyspnea, Lightheadedness, Leg pain. The patient used a walker  during testing.     3/16/2020--------Distance: 320.04 meters (1050 feet)     O2 Sat % Supplemental Oxygen Heart Rate Blood Pressure Emili Scale   Pre-exercise  (Resting) 96 % Room Air 99 bpm 132/73 mmHg 4   During Exercise 96 % Room Air 102 bpm 131/73 mmHg 7-8   Post-exercise  (Recovery) 98 % Room Air  93 bpm   mmHg       Recovery Time: 88 seconds    Performing nurse/tech: Shane HEMPHILL      PREVIOUS STUDY:   The patient has not had a previous study.      CLINICAL INTERPRETATION:  Six minute walk distance is 320.04 meters (1050 feet) with very heavy dyspnea.  During exercise, there was no significant desaturation while breathing room air.  Both blood pressure and heart rate remained stable with walking.  The patient reported non-pulmonary symptoms during exercise.  No previous study performed.  Based upon age and body mass index, exercise capacity is less than predicted.  
Physical Therapy      Outpatient Physical Therapy  Phone: [phone] Fax: [phone]     To: Referring Practitioner: Dr. John Marshall      Patient: Shanel Spencer   : 1981   MRN: [id number]  Evaluation Date: 2019      Diagnosis Information:  · Diagnosis: rectus diastasis   · Treatment Diagnosis: Abdominal weakness causing moderate diastasis; decreased functional mobility     Physical Therapy Certification/Re-Certification Form  Dear Dr. John Marshall: The following patient has been evaluated for physical therapy services and for therapy to continue, Medicare requires monthly physician review of the treatment plan. Please review the attached evaluation and/or summary of the patient's plan of care, and verify that you agree therapy should continue by signing the attached document and sending it back to our office. Plan of Care/Treatment to date:  [x] Therapeutic Exercise    [] Modalities:  [x] Therapeutic Activity     [] Ultrasound  [] Electrical Stimulation  [] Gait Training      [] Cervical Traction [] Lumbar Traction  [] Neuromuscular Re-education    [] Cold/hotpack [] Iontophoresis   [x] Instruction in HEP     Other:  [] Manual Therapy      []             [] Aquatic Therapy      []           ? Frequency/Duration: up to 8 visits  # Days per week: [] 1 day # Weeks: [] 1 week [] 5 weeks     [] 2 days? [] 2 weeks [] 6 weeks     [] 3 days   [] 3 weeks [] 7 weeks     [] 4 days   [] 4 weeks [] 8 weeks    Rehab Potential: [x] Excellent [] Good [] Fair  [] Poor       Electronically signed by:  Fabricio Harding PT        If you have any questions or concerns, please don't hesitate to call.   Thank you for your referral.      Physician Signature:________________________________Date:__________________  By signing above, therapists plan is approved by physician
goals and POC  REQUIRES PT FOLLOW UP: Yes  Activity Tolerance  Activity Tolerance: Patient Tolerated treatment well         Plan   Plan  Times per week: 8 visits totals  Current Treatment Recommendations: Strengthening, Home Exercise Program, Safety Education & Training, Pain Management    G-Code       OutComes Score                                                  AM-PAC Score             Goals  Long term goals  Time Frame for Long term goals : within 8 visits  Long term goal 1: Patient to demonstrate a 50% reduction in diastasis rectus at and above the umbilicus so that she can lift without straining her abdominals  Long term goal 2: Patient to demonstrate improved abdominal strength to 4/5 overall so she can lift and carry her children without strain or pain       Therapy Time   Individual Concurrent Group Co-treatment   Time In 1200         Time Out 1255         Minutes 60                 [place], [place]

## 2020-03-23 NOTE — TELEPHONE ENCOUNTER
----- Message from Louisa Lainez sent at 3/23/2020  8:33 AM CDT -----  Contact: Patient   Patient called to notify the doctor's office that he has changed pharmacies. He will be using [facility] mail order.     Please call if any questions [phone].

## 2020-03-26 DIAGNOSIS — M25.551 BILATERAL HIP PAIN: ICD-10-CM

## 2020-03-26 DIAGNOSIS — N13.8 BPH WITH OBSTRUCTION/LOWER URINARY TRACT SYMPTOMS: ICD-10-CM

## 2020-03-26 DIAGNOSIS — N40.1 BPH WITH OBSTRUCTION/LOWER URINARY TRACT SYMPTOMS: ICD-10-CM

## 2020-03-26 DIAGNOSIS — E11.65 TYPE 2 DIABETES MELLITUS WITH HYPERGLYCEMIA, WITHOUT LONG-TERM CURRENT USE OF INSULIN: ICD-10-CM

## 2020-03-26 DIAGNOSIS — F20.9 SCHIZOPHRENIA, UNSPECIFIED TYPE: ICD-10-CM

## 2020-03-26 DIAGNOSIS — I25.10 CORONARY ARTERY DISEASE INVOLVING NATIVE HEART WITHOUT ANGINA PECTORIS, UNSPECIFIED VESSEL OR LESION TYPE: ICD-10-CM

## 2020-03-26 DIAGNOSIS — M25.552 BILATERAL HIP PAIN: ICD-10-CM

## 2020-03-26 RX ORDER — OMEPRAZOLE 20 MG/1
20 CAPSULE, DELAYED RELEASE ORAL DAILY
Qty: 90 CAPSULE | Refills: 1 | Status: SHIPPED | OUTPATIENT
Start: 2020-03-26

## 2020-03-26 RX ORDER — ALBUTEROL SULFATE 0.83 MG/ML
2.5 SOLUTION RESPIRATORY (INHALATION) EVERY 6 HOURS PRN
Qty: 1 BOX | Refills: 3 | Status: ON HOLD | OUTPATIENT
Start: 2020-03-26 | End: 2020-08-25 | Stop reason: SDUPTHER

## 2020-03-26 RX ORDER — DICLOFENAC SODIUM 10 MG/G
4 GEL TOPICAL 4 TIMES DAILY
Qty: 100 G | Refills: 0 | Status: SHIPPED | OUTPATIENT
Start: 2020-03-26

## 2020-03-26 RX ORDER — ROSUVASTATIN CALCIUM 40 MG/1
40 TABLET, COATED ORAL NIGHTLY
Qty: 90 TABLET | Refills: 3 | Status: SHIPPED | OUTPATIENT
Start: 2020-03-26 | End: 2021-03-26

## 2020-03-26 RX ORDER — GLIPIZIDE 5 MG/1
5 TABLET ORAL 2 TIMES DAILY WITH MEALS
Qty: 180 TABLET | Refills: 3 | Status: SHIPPED | OUTPATIENT
Start: 2020-03-26 | End: 2021-02-02

## 2020-03-26 RX ORDER — LISINOPRIL 10 MG/1
10 TABLET ORAL DAILY
Qty: 90 TABLET | Refills: 1 | Status: SHIPPED | OUTPATIENT
Start: 2020-03-26 | End: 2020-10-26 | Stop reason: SDUPTHER

## 2020-03-26 RX ORDER — QUETIAPINE FUMARATE 300 MG/1
300 TABLET, FILM COATED ORAL NIGHTLY
Qty: 90 TABLET | Refills: 1 | Status: ON HOLD | OUTPATIENT
Start: 2020-03-26 | End: 2020-08-25 | Stop reason: HOSPADM

## 2020-03-26 RX ORDER — BENZTROPINE MESYLATE 0.5 MG/1
0.5 TABLET ORAL 2 TIMES DAILY
Qty: 180 TABLET | Refills: 1 | Status: SHIPPED | OUTPATIENT
Start: 2020-03-26 | End: 2020-12-14

## 2020-03-26 RX ORDER — BISOPROLOL FUMARATE 5 MG/1
2.5 TABLET, FILM COATED ORAL DAILY
Qty: 45 TABLET | Refills: 3 | Status: ON HOLD | OUTPATIENT
Start: 2020-03-26 | End: 2020-08-25 | Stop reason: HOSPADM

## 2020-03-26 RX ORDER — TRAZODONE HYDROCHLORIDE 100 MG/1
100 TABLET ORAL NIGHTLY
Qty: 90 TABLET | Refills: 1 | Status: ON HOLD | OUTPATIENT
Start: 2020-03-26 | End: 2020-08-25 | Stop reason: HOSPADM

## 2020-03-26 RX ORDER — ISOSORBIDE MONONITRATE 30 MG/1
30 TABLET, EXTENDED RELEASE ORAL DAILY
Qty: 90 TABLET | Refills: 3 | Status: SHIPPED | OUTPATIENT
Start: 2020-03-26 | End: 2021-02-02

## 2020-03-26 RX ORDER — RISPERIDONE 2 MG/1
2 TABLET ORAL 2 TIMES DAILY
Qty: 180 TABLET | Refills: 1 | Status: ON HOLD | OUTPATIENT
Start: 2020-03-26 | End: 2020-08-25 | Stop reason: HOSPADM

## 2020-03-27 ENCOUNTER — TELEPHONE (OUTPATIENT)
Dept: FAMILY MEDICINE | Facility: CLINIC | Age: 65
End: 2020-03-27

## 2020-03-27 NOTE — TELEPHONE ENCOUNTER
----- Message from [facility] sent at 3/27/2020  9:55 AM CDT -----  Contact: pt   Pt called about his prescriptions albuterol     (PROVENTIL) 2.5 mg /3 mL (0.083 %) nebulizer solution and albuterol (VENTOLIN HFA) 90 mcg/actuation inhaler    [phone]    Pt can be reached at [phone]

## 2020-03-30 DIAGNOSIS — J43.2 CENTRILOBULAR EMPHYSEMA: ICD-10-CM

## 2020-03-30 RX ORDER — ALBUTEROL SULFATE 90 UG/1
2 AEROSOL, METERED RESPIRATORY (INHALATION) EVERY 6 HOURS PRN
Qty: 18 G | Refills: 5 | Status: SHIPPED | OUTPATIENT
Start: 2020-03-30 | End: 2020-04-13 | Stop reason: ALTCHOICE

## 2020-03-30 NOTE — TELEPHONE ENCOUNTER
----- Message from Jacquie Mcclelland sent at 3/30/2020 10:03 AM CDT -----  Contact: Self [phone]  Patient would like to speak with you about not getting all his medications. Please advise

## 2020-04-01 LAB
ACID FAST MOD KINY STN SPEC: NORMAL
ACID FAST MOD KINY STN SPEC: NORMAL
MYCOBACTERIUM SPEC QL CULT: NORMAL
MYCOBACTERIUM SPEC QL CULT: NORMAL

## 2020-04-02 ENCOUNTER — TELEPHONE (OUTPATIENT)
Dept: FAMILY MEDICINE | Facility: CLINIC | Age: 65
End: 2020-04-02

## 2020-04-02 NOTE — TELEPHONE ENCOUNTER
----- Message from Greene Michael sent at 4/2/2020 10:13 AM CDT -----  Contact: Patient  Type:  RX Refill Request    Who Called: Parrish  Refill or New Rx:refill  RX Name and Strength:unknown  How is the patient currently taking it? (ex. 1XDay):  Is this a 30 day or 90 day RX:  Preferred Pharmacy with phone number:[facility] - [address] [phone] (Phone)   Local or Mail Order:mail  Ordering Provider:ER   Would the patient rather a call back or a response via [facility]? Call back  Best Call Back Number:[phone]  Additional Information: patient states he received some meds in the ER that he would like to get refills for

## 2020-04-06 ENCOUNTER — TELEPHONE (OUTPATIENT)
Dept: FAMILY MEDICINE | Facility: CLINIC | Age: 65
End: 2020-04-06

## 2020-04-06 NOTE — TELEPHONE ENCOUNTER
----- Message from Yaquelin Lucas sent at 4/6/2020  2:27 PM CDT -----  Contact: pt @ [phone]  Asking to speak with someone in Dr. Marquis's office regarding his medication refill: fluticasone furoate-vilanteroL (BREO) 100-25 mcg/dose diskus inhaler. Says he is out of the medication.    [facility] - [address]  [address]  [facility] [address]  Phone: [phone] Fax: [phone]

## 2020-04-06 NOTE — TELEPHONE ENCOUNTER
Informed pt that per Dr. Carmona he has to call his Pulmonary physician regarding the Breo. Pt verbalized understanding.

## 2020-04-06 NOTE — TELEPHONE ENCOUNTER
----- Message from Ramona Rodriguez sent at 4/6/2020 12:59 PM CDT -----  Contact: patient  Type:  RX Refill Request    Who Called: Wallie  Refill or New Rx:new  RX Name and Strength:Breo ellipta 100-25  How is the patient currently taking it? (ex. 1XDay):  Is this a 30 day or 90 day RX:  Preferred Pharmacy with phone number:  Local or Mail Order:mail  Ordering Provider:Mathew  Would the patient rather a call back or a response via [facility]? Call Back  Best Call Back Number: [phone]  Additional Information: Patient states he needs a new rx to replace Advair 100-50 because it is to expensive and he would like to get Breo instead Please call patient to confirm

## 2020-04-06 NOTE — TELEPHONE ENCOUNTER
Spoke to pt and states that the Albuterol Inhaler is too expensive and pt would the Breo. Pls advise.

## 2020-04-06 NOTE — TELEPHONE ENCOUNTER
I spoke to Mr Senior to let him know his rx refill request to [facility]. Patient verbalized understanding.

## 2020-04-07 RX ORDER — FLUTICASONE FUROATE AND VILANTEROL 100; 25 UG/1; UG/1
1 POWDER RESPIRATORY (INHALATION) DAILY
Qty: 3 EACH | Refills: 3 | Status: ON HOLD | OUTPATIENT
Start: 2020-04-07 | End: 2020-08-25 | Stop reason: SDUPTHER

## 2020-04-13 ENCOUNTER — TELEPHONE (OUTPATIENT)
Dept: FAMILY MEDICINE | Facility: CLINIC | Age: 65
End: 2020-04-13

## 2020-04-13 DIAGNOSIS — J44.9 CHRONIC OBSTRUCTIVE PULMONARY DISEASE, UNSPECIFIED COPD TYPE: Primary | ICD-10-CM

## 2020-04-13 DIAGNOSIS — J43.9 PULMONARY EMPHYSEMA, UNSPECIFIED EMPHYSEMA TYPE: ICD-10-CM

## 2020-04-13 RX ORDER — ALBUTEROL SULFATE 90 UG/1
2 AEROSOL, METERED RESPIRATORY (INHALATION) EVERY 6 HOURS PRN
Qty: 18 G | Refills: 2 | Status: SHIPPED | OUTPATIENT
Start: 2020-04-13 | End: 2020-04-15 | Stop reason: ALTCHOICE

## 2020-04-13 NOTE — TELEPHONE ENCOUNTER
Patient is requesting alternative medication to albuterol (VENTOLIN HFA) 90 mcg/actuation inhaler.  Insurance will not cover medication.  Please advise.

## 2020-04-13 NOTE — TELEPHONE ENCOUNTER
----- Message from Jessy Lu sent at 4/13/2020 12:23 PM CDT -----  Contact: Pt [phone]  Pt states that the pharmacy is saying that they haven't gotten his script for albuterol (VENTOLIN HFA) 90 mcg/actuation inhaler also states that the pharmacy says his insurance no longe covers it. Please send an alternate option pt is out. Please call back to discuss

## 2020-04-15 ENCOUNTER — TELEPHONE (OUTPATIENT)
Dept: FAMILY MEDICINE | Facility: CLINIC | Age: 65
End: 2020-04-15

## 2020-04-15 DIAGNOSIS — J43.9 PULMONARY EMPHYSEMA, UNSPECIFIED EMPHYSEMA TYPE: ICD-10-CM

## 2020-04-15 DIAGNOSIS — J44.9 CHRONIC OBSTRUCTIVE PULMONARY DISEASE, UNSPECIFIED COPD TYPE: Primary | ICD-10-CM

## 2020-04-15 RX ORDER — ALBUTEROL SULFATE 90 UG/1
2 AEROSOL, METERED RESPIRATORY (INHALATION) EVERY 6 HOURS PRN
Qty: 18 G | Refills: 2 | Status: ON HOLD | OUTPATIENT
Start: 2020-04-15 | End: 2020-08-25 | Stop reason: SDUPTHER

## 2020-04-15 NOTE — TELEPHONE ENCOUNTER
Spoke to Geeta with [employer] and stated that the ProAir HFA 90mg inhaler is covered. Pls advise.

## 2020-04-15 NOTE — TELEPHONE ENCOUNTER
----- Message from Radha RESTREPORegi Maria sent at 4/15/2020 10:13 AM CDT -----  Contact: [phone] self   Pt is requesting an alternative medication for albuterol (PROVENTIL/VENTOLIN HFA) 90 mcg/actuation inhaler. Pt states that his insurance does not cover the inhaler. Please advise

## 2020-04-15 NOTE — TELEPHONE ENCOUNTER
Informed pt to call his insurance company to find out which inhaler are they willing to cover and let Dr. Carmona know. Pt verbalized understanding.

## 2020-04-15 NOTE — TELEPHONE ENCOUNTER
----- Message from Prashant North sent at 4/15/2020 11:04 AM CDT -----  Contact: [employer]/Ms.Abdirashid MuñozRegiAbdirashid called in and wanted to speak with the office regarding a prescription. She would like a call back from the office and can be reached at    [phone]

## 2020-04-24 ENCOUNTER — PATIENT OUTREACH (OUTPATIENT)
Dept: ADMINISTRATIVE | Facility: OTHER | Age: 65
End: 2020-04-24

## 2020-04-27 ENCOUNTER — OFFICE VISIT (OUTPATIENT)
Dept: RHEUMATOLOGY | Facility: CLINIC | Age: 65
End: 2020-04-27
Payer: MEDICARE

## 2020-04-27 DIAGNOSIS — M10.9 GOUT, UNSPECIFIED CAUSE, UNSPECIFIED CHRONICITY, UNSPECIFIED SITE: ICD-10-CM

## 2020-04-27 DIAGNOSIS — M15.9 OSTEOARTHRITIS OF MULTIPLE JOINTS, UNSPECIFIED OSTEOARTHRITIS TYPE: Primary | ICD-10-CM

## 2020-04-27 DIAGNOSIS — N18.9 CHRONIC KIDNEY DISEASE, UNSPECIFIED CKD STAGE: ICD-10-CM

## 2020-04-27 PROCEDURE — 99443 PR PHYSICIAN TELEPHONE EVALUATION 21-30 MIN: ICD-10-PCS | Mod: 95,,, | Performed by: STUDENT IN AN ORGANIZED HEALTH CARE EDUCATION/TRAINING PROGRAM

## 2020-04-27 PROCEDURE — 99443 PR PHYSICIAN TELEPHONE EVALUATION 21-30 MIN: CPT | Mod: 95,,, | Performed by: STUDENT IN AN ORGANIZED HEALTH CARE EDUCATION/TRAINING PROGRAM

## 2020-04-27 RX ORDER — COLCHICINE 0.6 MG/1
TABLET ORAL
Qty: 30 TABLET | Refills: 11 | Status: SHIPPED | OUTPATIENT
Start: 2020-04-27 | End: 2020-09-28

## 2020-04-27 NOTE — PROGRESS NOTES
"Subjective:       Patient ID: Paresh Senior is a 64 y.o. male.    Chief Complaint: No chief complaint on file. Gout     HPI   65 yo male with past medical history of T2DM, HTN, CAD, COPD, schizophrenia, Urine retention due to prostatomegaly on flomax and finasteride, CKD here for follow up for gout and OA.     Initial history from hospitalization from 3/4/2020:  : 65 yo male with past medical history of T2DM, HTN, CAD, COPD, and schizophrenia who presented to [facility] on 3/1 for urinary catheter pain. He was recently diagnosed with urinary retention on hospitalization on 1/27. At that time imaging findings with evidence of prostatomegaly and he was started on flomax. He has been seen by urology on 2/13 and 2/21 for voiding trials which he continues to fail. Indwelling dillon was re-inserted on 2/21 and he is currently taking flomax+finasteride. Since then the patient has had worsening pain around catheter site and dysuria. On arrival here, he was afebrile with VSS. Labs were significant for leukocytosis(17K), stable H/(11.8/25.5), CHELE with BUN/sCr 62/3.4(baseline around 1.7), UTI with UA showing 2+ protein, >100 RBCs, >100 WBCs, negative nitrite. He was admitted for CHELE + UTI and started on Ceftriaxone with dillon exchanged. Since admission urine culture is growing MSSA and antibiotics were de-escalated to Augmentin on 3/3. He has had improvement in his leukocytosis which has trended down to 12K and his sCr is closer to baseline at 1.8.      Rheumatology is being consulted because since admission the patient has been complaining of pain in his right foot, back of left knee, and right hip. The pain in his right foot started about 2 days ago after he was admitted, whereas the pain in hip and knee he has experienced before. It appears patient had PRN [place] on board q6 and he has only taken 2 doses since admission. Inflammatory markers obtained sow ESR 68 and .2, RF 25. XR was obtained of right hip which showed "no " "acute fracture or dislocation and mild degenerative arthrosis" and the right foot which showed mildly DJD affecting 1st metatarsophalangeal and interphalangeal joint with no fracture or dislocation or bony destruction. The patient says that he has been dealing with pain in his hip and knee joints for years. He says the pain is worse with movement and generally comes and goes/lets up. He denies any morning stiffness and denies any swelling, warmth or erythema associated with his joint pain. He says the pain was worse a few days ago and is starting to get better.  He says that the PRN Norco, tylenol has helped the pain some when he does ask for them. He is able to walk without issue. He denies prior diagnosis of rheumatoid arthritis and denies any family history of RA or other autoimmune disorders. Of note, he has had arthrocenteses done on bilateral knees 1/30/2020 for pain patient had which had only 95 WBCs and not suggestive of septic or gouty arthritis.     He also has more acute on chronic pain in his right hip and posterior left knee. His inflammatory markers including ESR and CRP are elevated at 68 and 158 respectively and he has an elevated RF at 25. Uric acid level is elevated at 9. On exam, there is swelling, erythema, tenderness and warmth of his lateral right foot. His Knee exhibits no swelling and no palpable cyst on posterior aspect. XR of right hip and foot suggestive of OA. Given evidence of synovitis of right foot and positive uric acid, recommending to treat this as acute gout. Low suspicion for rheumatoid arthritis at this time given lack of other areas of synovitis, deformities, morning stiffness although it does remain on differential. Other joint pains likely consistent with OA.    We had recommended treatment of acute gout with prednisone 20mg BID for 5 days. Avoid colchicine and NSAIDs given kidney function.     Interval history:  After the prednisone, things improved. No swollen joints. That " was his first episode. He does have pain in the b/l hips, on the outside on the buttock area. Pain is worse when he lies down on it. Pain is worse with movement. Takes tylenol for pain which helps some. He has not done physical therapy for the hips. Only walks outside when need groceries or pharmacy. Does house work to other physical activity. No other painful/swollen joints.       Labs: RF 25, CCP negative, ARDEN negative, uric acid 9.0  CBC: showing mild normocytic anemia, normal wbc and plt count   CMP showing Cr 1.8, GFR 45, normal LFTs     Review of Systems   Constitutional: Negative for chills and fever.   HENT: Negative for mouth sores.    Eyes: Negative for pain.   Respiratory: Negative for shortness of breath.    Cardiovascular: Negative for chest pain.   Gastrointestinal: Negative for abdominal pain, constipation and diarrhea.   Genitourinary: Negative for dysuria.   Musculoskeletal: Positive for arthralgias. Negative for joint swelling.   Skin: Negative for rash.   Neurological: Negative for headaches.   Hematological: Negative for adenopathy.   Psychiatric/Behavioral: Negative for agitation.         Objective:   There were no vitals taken for this visit.     Physical Exam    Unable to due as telephone visit     No data to display   Lab Results   Component Value Date    WBC 11.08 03/05/2020    HGB 11.8 (L) 03/05/2020    HCT 36.3 (L) 03/05/2020    MCV 92 03/05/2020     03/05/2020       CMP  Sodium   Date Value Ref Range Status   03/05/2020 138 136 - 145 mmol/L Final     Potassium   Date Value Ref Range Status   03/05/2020 4.9 3.5 - 5.1 mmol/L Final     Chloride   Date Value Ref Range Status   03/05/2020 108 95 - 110 mmol/L Final     CO2   Date Value Ref Range Status   03/05/2020 21 (L) 23 - 29 mmol/L Final     Glucose   Date Value Ref Range Status   03/05/2020 257 (H) 70 - 110 mg/dL Final     BUN, Bld   Date Value Ref Range Status   03/05/2020 27 (H) 8 - 23 mg/dL Final     Creatinine   Date Value Ref  Range Status   03/05/2020 1.8 (H) 0.5 - 1.4 mg/dL Final     Calcium   Date Value Ref Range Status   03/05/2020 9.2 8.7 - 10.5 mg/dL Final     Total Protein   Date Value Ref Range Status   03/05/2020 6.6 6.0 - 8.4 g/dL Final     Albumin   Date Value Ref Range Status   03/05/2020 2.5 (L) 3.5 - 5.2 g/dL Final     Total Bilirubin   Date Value Ref Range Status   03/05/2020 0.2 0.1 - 1.0 mg/dL Final     Comment:     For infants and newborns, interpretation of results should be based  on gestational age, weight and in agreement with clinical  observations.  Premature Infant recommended reference ranges:  Up to 24 hours.............<8.0 mg/dL  Up to 48 hours............<12.0 mg/dL  3-5 days..................<15.0 mg/dL  6-29 days.................<15.0 mg/dL       Alkaline Phosphatase   Date Value Ref Range Status   03/05/2020 85 55 - 135 U/L Final     AST   Date Value Ref Range Status   03/05/2020 23 10 - 40 U/L Final     ALT   Date Value Ref Range Status   03/05/2020 24 10 - 44 U/L Final     Anion Gap   Date Value Ref Range Status   03/05/2020 9 8 - 16 mmol/L Final     eGFR if    Date Value Ref Range Status   03/05/2020 45.0 (A) >60 mL/min/1.73 m^2 Final     eGFR if non    Date Value Ref Range Status   03/05/2020 38.9 (A) >60 mL/min/1.73 m^2 Final     Comment:     Calculation used to obtain the estimated glomerular filtration  rate (eGFR) is the CKD-EPI equation.        Lab Results   Component Value Date    URICACID 9.0 (H) 03/03/2020          Assessment:       1. Osteoarthritis of multiple joints, unspecified osteoarthritis type    2. Gout, unspecified cause, unspecified chronicity, unspecified site    3. Chronic kidney disease, unspecified CKD stage        63 yo male with past medical history of T2DM, HTN, CAD, COPD, schizophrenia, Urine retention due to prostatomegaly on flomax and finasteride, CKD here for follow up for gout and OA. Patient had his first episode of gout in March 2020  with right lateral foot pain, erythema and swelling. Uric Acid elevated at 9.0. Due to CKD with start on ULT.        Plan:       #GOUT, non tophaceous   -will get [id number], pending this plan to start allopurinol 50mg daily (due to CKD)  -will start colchicine 0.3mg daily, dose adjusted due to CKD  -goal will be Uric acid less than 6   -cbc/cmp/uric acid monthly; with adjustment of allopurinol accordingly   -will consider arthritis survey at next visit     #Osteoarthritis hips and knees  -No NSAIDS due to CKD   -pt takes tylenol   -PT referral for virtual PT  -due to multiple psych medications, will not add any gabapentin or cymbalta    #Positive RF  -no swollen or painful joints in RA distrubution   -will monitor     Problem List Items Addressed This Visit     None      Visit Diagnoses     Osteoarthritis of multiple joints, unspecified osteoarthritis type    -  Primary    Gout, unspecified cause, unspecified chronicity, unspecified site        Relevant Orders    CBC auto differential    Comprehensive metabolic panel    Uric acid    HLA Class I Disease Association    Ambulatory referral/consult to Physical/Occupational Therapy    Chronic kidney disease, unspecified CKD stage        Relevant Orders    CBC auto differential    Comprehensive metabolic panel    Uric acid    HLA Class I Disease Association    Ambulatory referral/consult to Physical/Occupational Therapy        The patient location is: Home   The chief complaint leading to consultation is: Arthralgias   Visit type: audio only  Total time spent with patient: 30min   Each patient to whom he or she provides medical services by telemedicine is:  (1) informed of the relationship between the physician and patient and the respective role of any other health care provider with respect to management of the patient; and (2) notified that he or she may decline to receive medical services by telemedicine and may withdraw from such care at any time.    Notes: see above        Patient seen and discussed with Dr. Nweman    RTC in 3 mos     Juma Rowe MD  Rheumatology PGY 5

## 2020-04-28 ENCOUNTER — TELEPHONE (OUTPATIENT)
Dept: RHEUMATOLOGY | Facility: CLINIC | Age: 65
End: 2020-04-28

## 2020-05-07 ENCOUNTER — PATIENT OUTREACH (OUTPATIENT)
Dept: ADMINISTRATIVE | Facility: HOSPITAL | Age: 65
End: 2020-05-07

## 2020-05-07 NOTE — LETTER
AUTHORIZATION FOR RELEASE OF   CONFIDENTIAL INFORMATION    Dear [facility], [facility],    We are seeing Paresh Senior, date of birth 1955, in the clinic at [facility]. Fernando Carmona MD is the patient's PCP. Paresh Senior has an outstanding lab/procedure at the time we reviewed his chart. In order to help keep his health information updated, he has authorized us to request the following medical record(s):            ( X )  COLONOSCOPY         Please fax records to us at [phone].     Attention: Alondra Ayala     If you have any questions, please contact me at [phone].          Patient Name: Paresh Senior  : 1955  Patient Phone #: [phone]

## 2020-05-21 ENCOUNTER — TELEPHONE (OUTPATIENT)
Dept: GASTROENTEROLOGY | Facility: CLINIC | Age: 65
End: 2020-05-21

## 2020-05-21 DIAGNOSIS — Z01.812 PRE-PROCEDURE LAB EXAM: Primary | ICD-10-CM

## 2020-05-21 DIAGNOSIS — Z86.010 PERSONAL HISTORY OF COLONIC POLYPS: ICD-10-CM

## 2020-05-21 RX ORDER — POLYETHYLENE GLYCOL 3350, SODIUM SULFATE ANHYDROUS, SODIUM BICARBONATE, SODIUM CHLORIDE, POTASSIUM CHLORIDE 236; 22.74; 6.74; 5.86; 2.97 G/4L; G/4L; G/4L; G/4L; G/4L
4 POWDER, FOR SOLUTION ORAL SEE ADMIN INSTRUCTIONS
Qty: 4000 ML | Refills: 0 | Status: SHIPPED | OUTPATIENT
Start: 2020-05-21 | End: 2020-06-16 | Stop reason: CLARIF

## 2020-05-21 NOTE — TELEPHONE ENCOUNTER
Colonoscopy scheduled with Dr. Velez on 6/17/20. Golytely explained to pt and mailed to home address.       Covid testing scheduled in [place] on 6/15/20 at 9:30am

## 2020-05-21 NOTE — TELEPHONE ENCOUNTER
GOLYTELY/ COLYTE/ NULYTELY Instructions    You are scheduled for a colonoscopy with Dr. Velez on 6/17/20 at [facility] located at [address] [place] [address].  Check in at the admit desk, first floor of the hospital (which is the building on the left).     You will receive a call 2-3 days before your colonoscopy to tell you the time to arrive.  If you have not received a call by the day before your procedure, call the Endoscopy Lab at [phone].    To ensure that your test is accurate and complete, you MUST follow these instructions listed below.  If you have any questions, please call our office at [phone].  Plan on being at the hospital for your procedure for 3-4 hours.    1.  Follow a CLEAR LIQUID DIET for the entire day before your scheduled colonoscopy.  This means no solid food the entire day starting when you wake.  You may have as much of the clear liquids as you want throughout the day.   CLEAR LIQUID DIET:   - Avoid Red, Orange, Purple, and/or Blue food coloring   - NO DAIRY   - You can have:  Coffee with sugar (no creamer), tea, water, soda, apple or white grape juice, chicken or beef broth/bouillon (no meat, noodles, or veggies), green/yellow popsicles, green/yellow Jell-O, lemonade.    2.  MIX GOLYTELY/COLYTE/NULYTELY (all names for same product) WITH ONE (1) GALLON OF WATER.  YOU MAY ADD A FLAVOR PACKET OR YELLOW/GREEN POWDER DRINK MIX TO THIS.  PUT IN REFRIGERATOR.  This is easier to drink if this solution is cold, so you can mix the solution one day ahead of time and place in the refrigerator prior to drinking.  You have to drink the solution within 24-36 hours of mixing it.  Do NOT put this solution over ice.  It IS ok to drink with a straw.    3. AT 5 PM THE DAY BEFORE YOUR COLONOSCOPY, DRINK ONE (1) 8 OUNCE GLASS OF MIXTURE EVERY 10 MINUTES UNTIL HALF OF THE GALLON IS CONSUMED.  Keep this mixture cold and in refrigerator as much as you can while drinking it.  Place the  remaining half of mixture in the refrigerator when you finish the first half.    4.  The endoscopy department will call you 2 days before your colonoscopy to tell you the exact time to arrive, AND to tell you the exact time to drink the 2nd portion of your prep (which will be FIVE HOURS BEFORE YOUR ARRIVAL TIME).  At this time given to you, DRINK ONE (1) 8 OUNCE GLASS OF MIXTURE EVERY 10 MINUTES UNTIL THE OTHER HALF IS CONSUMED. Keep the mixture cold while you are drinking it. Once this is complete, you may not have ANYTHING else by mouth!      5.  You must have someone with you to DRIVE YOU HOME since you will be receiving IV sedation for the colonoscopy.    6.  It is ok to take your heart, blood pressure, and seizure medications in the morning of your test with a SIP of water.  Hold other medications until after your procedure.  Do NOT have anything else to eat or drink the morning of your colonoscopy.  It is ok to brush your teeth.    7.  If you are on blood thinners THAT YOU HAVE BEEN INSTRUCTED TO HOLD BY YOUR DOCTOR FOR THIS PROCEDURE, then do NOT take this the morning of your colonoscopy.  Do NOT stop these medications on your own, they must be approved to be held by your doctor.  Your colonoscopy can NOT be done if you are on these medications.  Examples of blood thinners include: Coumadin, Aggrenox, Plavix, Pradaxa, Reapro, Pletal, Xarelto, Ticagrelor, Brilinta, Eliquis, and high dose aspirin (325 mg).  You do not have to stop baby aspirin 81 mg.    8.  IF YOU ARE DIABETIC:  NO INSULIN OR ORAL MEDICATIONS THE MORNING OF THE COLONOSCOPY.  TAKE ONLY HALF THE DOSE OF YOUR INSULIN THE DAY BEFORE THE COLONOSCOPY.  DO NOT TAKE ANY ORAL DIABETIC MEDICATIONS THE DAY BEFORE THE COLONOSCOPY.  IF YOU ARE AN INSULIN DEPENDENT DIABETIC WITH UNSTABLE BLOOD SUGARS, NOTIFY YOUR PRIMARY CARE PHYSICIAN FOR INSTRUCTIONS.

## 2020-06-08 NOTE — PROGRESS NOTES
Have spoken to the patient in relation to Pulmonary Rehab.  He refuses to go at this time.  I also reviewed patient's chart noting that he is having urinary retention and  following with Urology.  He was started on trelegy in relation to his COPD.  Patient explains  compliant with daily use - as having an easier regimen.  Had not been compliant with Spiriva in the past.  There is a potential side effect of urinary retention with long-acting anticholinergic agents.  Patient was instructed to stop using trielegy.  Will start on Advair (ICS/LABA).  Also DC'd to any anti cholinergic neb treatments. He verbalizes understanding.  Patient has a follow-up appointment with me in 2 weeks.  I have also message patient's urologist and primary care provider.   Scheduled patient with Dr Hunt this afternoon at 130.

## 2020-06-15 ENCOUNTER — TELEPHONE (OUTPATIENT)
Dept: ENDOSCOPY | Facility: HOSPITAL | Age: 65
End: 2020-06-15

## 2020-06-15 NOTE — TELEPHONE ENCOUNTER
Spoke with patient about arrival time @ 0905    Prep instructions reviewed: the day before the procedure, follow a clear liquid diet all day, then start the first 1/2 of prep at 5pm and take 2nd 1/2 of prep @.0400  Pt must be completely NPO when prep completed @.0500              Medications: Do not take Insulin or oral diabetic medications the day of the procedure.  Take as prescribed: heart, seizure and blood pressure medication in the morning with a sip of water (less than an ounce).  Take any breathing medications and bring inhalers to hospital with you Leave all valuables and jewelry at home.     Wear comfortable clothes to procedure to change into hospital gown You cannot drive for 24 hours after your procedure because you will receive sedation for your procedure to make you comfortable.  A ride must be provided at discharge.

## 2020-06-16 ENCOUNTER — TELEPHONE (OUTPATIENT)
Dept: GASTROENTEROLOGY | Facility: CLINIC | Age: 65
End: 2020-06-16

## 2020-06-16 RX ORDER — POLYETHYLENE GLYCOL 3350, SODIUM SULFATE ANHYDROUS, SODIUM BICARBONATE, SODIUM CHLORIDE, POTASSIUM CHLORIDE 236; 22.74; 6.74; 5.86; 2.97 G/4L; G/4L; G/4L; G/4L; G/4L
4 POWDER, FOR SOLUTION ORAL SEE ADMIN INSTRUCTIONS
Qty: 4000 ML | Refills: 0 | Status: ON HOLD | OUTPATIENT
Start: 2020-06-16 | End: 2020-08-25 | Stop reason: HOSPADM

## 2020-06-16 NOTE — TELEPHONE ENCOUNTER
----- Message from Billie Flowers RN sent at 6/16/2020  9:15 AM CDT -----  Pt did not have Covid test done as instructed. Pt will reschedule

## 2020-06-16 NOTE — TELEPHONE ENCOUNTER
Patient would like to reschedule the Colonoscopy. Colonoscopy rescheduled to 7/22/20 with Dr. Velez. Instructions explained to patient again.     Patient stated that he mixed the prep already. Informed pt that we will send a new prep to the pharmacy.         Covid testing scheduled on 7/20/20 at 9:00am [facility].              GOLYTELY/ COLYTE/ NULYTELY Instructions    You are scheduled for a colonoscopy with Dr. Velez on 7/22/20 at [facility] located at [address] [place] [address].  Check in at the admit desk, first floor of the hospital (which is the building on the left).     You will receive a call 2-3 days before your colonoscopy to tell you the time to arrive.  If you have not received a call by the day before your procedure, call the Endoscopy Lab at [phone].    To ensure that your test is accurate and complete, you MUST follow these instructions listed below.  If you have any questions, please call our office at [phone].  Plan on being at the hospital for your procedure for 3-4 hours.    1.  Follow a CLEAR LIQUID DIET for the entire day before your scheduled colonoscopy.  This means no solid food the entire day starting when you wake.  You may have as much of the clear liquids as you want throughout the day.   CLEAR LIQUID DIET:   - Avoid Red, Orange, Purple, and/or Blue food coloring   - NO DAIRY   - You can have:  Coffee with sugar (no creamer), tea, water, soda, apple or white grape juice, chicken or beef broth/bouillon (no meat, noodles, or veggies), green/yellow popsicles, green/yellow Jell-O, lemonade.    2.  MIX GOLYTELY/COLYTE/NULYTELY (all names for same product) WITH ONE (1) GALLON OF WATER.  YOU MAY ADD A FLAVOR PACKET OR YELLOW/GREEN POWDER DRINK MIX TO THIS.  PUT IN REFRIGERATOR.  This is easier to drink if this solution is cold, so you can mix the solution one day ahead of time and place in the refrigerator prior to drinking.  You have to drink the solution within  24-36 hours of mixing it.  Do NOT put this solution over ice.  It IS ok to drink with a straw.    3. AT 5 PM THE DAY BEFORE YOUR COLONOSCOPY, DRINK ONE (1) 8 OUNCE GLASS OF MIXTURE EVERY 10 MINUTES UNTIL HALF OF THE GALLON IS CONSUMED.  Keep this mixture cold and in refrigerator as much as you can while drinking it.  Place the remaining half of mixture in the refrigerator when you finish the first half.    4.  The endoscopy department will call you 2 days before your colonoscopy to tell you the exact time to arrive, AND to tell you the exact time to drink the 2nd portion of your prep (which will be FIVE HOURS BEFORE YOUR ARRIVAL TIME).  At this time given to you, DRINK ONE (1) 8 OUNCE GLASS OF MIXTURE EVERY 10 MINUTES UNTIL THE OTHER HALF IS CONSUMED. Keep the mixture cold while you are drinking it. Once this is complete, you may not have ANYTHING else by mouth!      5.  You must have someone with you to DRIVE YOU HOME since you will be receiving IV sedation for the colonoscopy.    6.  It is ok to take your heart, blood pressure, and seizure medications in the morning of your test with a SIP of water.  Hold other medications until after your procedure.  Do NOT have anything else to eat or drink the morning of your colonoscopy.  It is ok to brush your teeth.    7.  If you are on blood thinners THAT YOU HAVE BEEN INSTRUCTED TO HOLD BY YOUR DOCTOR FOR THIS PROCEDURE, then do NOT take this the morning of your colonoscopy.  Do NOT stop these medications on your own, they must be approved to be held by your doctor.  Your colonoscopy can NOT be done if you are on these medications.  Examples of blood thinners include: Coumadin, Aggrenox, Plavix, Pradaxa, Reapro, Pletal, Xarelto, Ticagrelor, Brilinta, Eliquis, and high dose aspirin (325 mg).  You do not have to stop baby aspirin 81 mg.    8.  IF YOU ARE DIABETIC:  NO INSULIN OR ORAL MEDICATIONS THE MORNING OF THE COLONOSCOPY.  TAKE ONLY HALF THE DOSE OF YOUR INSULIN THE DAY  BEFORE THE COLONOSCOPY.  DO NOT TAKE ANY ORAL DIABETIC MEDICATIONS THE DAY BEFORE THE COLONOSCOPY.  IF YOU ARE AN INSULIN DEPENDENT DIABETIC WITH UNSTABLE BLOOD SUGARS, NOTIFY YOUR PRIMARY CARE PHYSICIAN FOR INSTRUCTIONS.

## 2020-06-17 ENCOUNTER — TELEPHONE (OUTPATIENT)
Dept: RHEUMATOLOGY | Facility: CLINIC | Age: 65
End: 2020-06-17

## 2020-06-18 ENCOUNTER — PATIENT OUTREACH (OUTPATIENT)
Dept: ADMINISTRATIVE | Facility: OTHER | Age: 65
End: 2020-06-18

## 2020-07-10 ENCOUNTER — TELEPHONE (OUTPATIENT)
Dept: UROLOGY | Facility: CLINIC | Age: 65
End: 2020-07-10

## 2020-07-15 ENCOUNTER — PATIENT OUTREACH (OUTPATIENT)
Dept: ADMINISTRATIVE | Facility: OTHER | Age: 65
End: 2020-07-15

## 2020-07-15 NOTE — PROGRESS NOTES
Requested updates within Care Everywhere.  Patient's chart was reviewed for overdue MARVIN topics.  Immunizations reconciled.    Orders placed:  Tasked appts:  Labs Linked:

## 2020-07-17 ENCOUNTER — OFFICE VISIT (OUTPATIENT)
Dept: UROLOGY | Facility: CLINIC | Age: 65
End: 2020-07-17
Payer: MEDICARE

## 2020-07-17 VITALS
SYSTOLIC BLOOD PRESSURE: 140 MMHG | HEIGHT: 68 IN | HEART RATE: 89 BPM | DIASTOLIC BLOOD PRESSURE: 77 MMHG | TEMPERATURE: 99 F | WEIGHT: 168 LBS | BODY MASS INDEX: 25.46 KG/M2

## 2020-07-17 DIAGNOSIS — N40.1 BPH WITH OBSTRUCTION/LOWER URINARY TRACT SYMPTOMS: Primary | ICD-10-CM

## 2020-07-17 DIAGNOSIS — R35.0 URINARY FREQUENCY: ICD-10-CM

## 2020-07-17 DIAGNOSIS — R33.9 INCOMPLETE EMPTYING OF BLADDER: ICD-10-CM

## 2020-07-17 DIAGNOSIS — N13.8 BPH WITH OBSTRUCTION/LOWER URINARY TRACT SYMPTOMS: Primary | ICD-10-CM

## 2020-07-17 PROCEDURE — 3077F PR MOST RECENT SYSTOLIC BLOOD PRESSURE >= 140 MM HG: ICD-10-PCS | Mod: CPTII,S$GLB,, | Performed by: NURSE PRACTITIONER

## 2020-07-17 PROCEDURE — 99999 PR PBB SHADOW E&M-EST. PATIENT-LVL V: CPT | Mod: PBBFAC,,, | Performed by: NURSE PRACTITIONER

## 2020-07-17 PROCEDURE — 1101F PT FALLS ASSESS-DOCD LE1/YR: CPT | Mod: CPTII,S$GLB,, | Performed by: NURSE PRACTITIONER

## 2020-07-17 PROCEDURE — 51736 PR SIMPLE UROFLOWMETRY: ICD-10-PCS | Mod: S$GLB,,, | Performed by: NURSE PRACTITIONER

## 2020-07-17 PROCEDURE — 3078F DIAST BP <80 MM HG: CPT | Mod: CPTII,S$GLB,, | Performed by: NURSE PRACTITIONER

## 2020-07-17 PROCEDURE — 81002 PR URINALYSIS NONAUTO W/O SCOPE: ICD-10-PCS | Mod: S$GLB,,, | Performed by: NURSE PRACTITIONER

## 2020-07-17 PROCEDURE — 99999 PR PBB SHADOW E&M-EST. PATIENT-LVL V: ICD-10-PCS | Mod: PBBFAC,,, | Performed by: NURSE PRACTITIONER

## 2020-07-17 PROCEDURE — 81002 URINALYSIS NONAUTO W/O SCOPE: CPT | Mod: S$GLB,,, | Performed by: NURSE PRACTITIONER

## 2020-07-17 PROCEDURE — 99213 OFFICE O/P EST LOW 20 MIN: CPT | Mod: 25,S$GLB,, | Performed by: NURSE PRACTITIONER

## 2020-07-17 PROCEDURE — 3008F BODY MASS INDEX DOCD: CPT | Mod: CPTII,S$GLB,, | Performed by: NURSE PRACTITIONER

## 2020-07-17 PROCEDURE — 51736 URINE FLOW MEASUREMENT: CPT | Mod: S$GLB,,, | Performed by: NURSE PRACTITIONER

## 2020-07-17 PROCEDURE — 3008F PR BODY MASS INDEX (BMI) DOCUMENTED: ICD-10-PCS | Mod: CPTII,S$GLB,, | Performed by: NURSE PRACTITIONER

## 2020-07-17 PROCEDURE — 3078F PR MOST RECENT DIASTOLIC BLOOD PRESSURE < 80 MM HG: ICD-10-PCS | Mod: CPTII,S$GLB,, | Performed by: NURSE PRACTITIONER

## 2020-07-17 PROCEDURE — 51798 PR MEAS,POST-VOID RES,US,NON-IMAGING: ICD-10-PCS | Mod: S$GLB,,, | Performed by: NURSE PRACTITIONER

## 2020-07-17 PROCEDURE — 1101F PR PT FALLS ASSESS DOC 0-1 FALLS W/OUT INJ PAST YR: ICD-10-PCS | Mod: CPTII,S$GLB,, | Performed by: NURSE PRACTITIONER

## 2020-07-17 PROCEDURE — 99213 PR OFFICE/OUTPT VISIT, EST, LEVL III, 20-29 MIN: ICD-10-PCS | Mod: 25,S$GLB,, | Performed by: NURSE PRACTITIONER

## 2020-07-17 PROCEDURE — 3077F SYST BP >= 140 MM HG: CPT | Mod: CPTII,S$GLB,, | Performed by: NURSE PRACTITIONER

## 2020-07-17 PROCEDURE — 51798 US URINE CAPACITY MEASURE: CPT | Mod: S$GLB,,, | Performed by: NURSE PRACTITIONER

## 2020-07-17 RX ORDER — TAMSULOSIN HYDROCHLORIDE 0.4 MG/1
0.4 CAPSULE ORAL DAILY
Qty: 90 CAPSULE | Refills: 3 | Status: SHIPPED | OUTPATIENT
Start: 2020-07-17 | End: 2021-07-17

## 2020-07-17 RX ORDER — FINASTERIDE 5 MG/1
5 TABLET, FILM COATED ORAL DAILY
Qty: 90 TABLET | Refills: 3 | Status: SHIPPED | OUTPATIENT
Start: 2020-07-17 | End: 2021-07-17

## 2020-07-17 NOTE — PATIENT INSTRUCTIONS
- Refilled flomax and finasteride.  - Avoid bladder irritants including but not limited to caffeine, alcohol, smoking, spicy foods, acidic foods, tomato-based products, citrus, artificial sweeteners, chocolate, coffee or tea. Limit fluids 3 hours before bed.  - Can try double voiding as discussed.    normal...

## 2020-07-17 NOTE — PROGRESS NOTES
Subjective:       Patient ID: Paresh Senior is a 65 y.o. male.    Chief Complaint: Urinary Frequency    This is a 64 y.o.  male patient that is an established patient of mine.  The patient is referred to me by Dr. Carmona for urinary retention. Hx of CKD2, CHF, 2DM, GERD and HTN. The patient was hospitalized on 1/27/2020 for hypoglycemia. During admission, patient had difficulty urinating, a bladder scan was completed and showed almost 1L in bladder. A dillon was placed and Flomax was started. Renal ultrasound revealed simple cysts and prostatomegaly.  A voiding trial was attempted but patient was unable to urinate, bladder scan revealed 385mL and dillon was reinserted. Patient was discharged with dillon.  Patient subsequently failed 2 voiding trials. Pulmonary reached out to let me know that patient had been started on Trelegy that might be contributing towards urinary retention. Trelegy was discontinued by pulmonary and patient was able to successfully pass voiding trial in clinic.     7/17/2020  Patient here today for follow-up. He has been urinating without difficulty. Patient's biggest complaint is urinary frequency. It is mostly bothersome during the day. He goes every hour. Night time is only 1-2x. Stream is weak at times. No dysuria or hematuria. Patient has not been taking flomax or finasteride. He drinks coffee, tea and cold drinks throughout the day. Still smoking. No constipation. Urine dip: + protein. Uroflow vol: 89ml, qmax: 7.5ml/s  qavg: 3.7ml/s PVR: 119ml.          LAST PSA  Lab Results   Component Value Date    PSA 3.2 12/13/2019       Lab Results   Component Value Date    CREATININE 1.8 (H) 03/05/2020       ---  Past Medical History:   Diagnosis Date    Allergy     Colon polyp 2013    COPD (chronic obstructive pulmonary disease)     Coronary artery disease     Disorder of kidney and ureter     Hypercholesterolemia     Hypertension     Urinary tract infection        Past Surgical History:  "  Procedure Laterality Date    COLONOSCOPY  02/2016    Advised repeat in 3 years    CORONARY STENT PLACEMENT  2013       Family History   Problem Relation Age of Onset    No Known Problems Mother     Mental illness Brother     Diabetes Grandchild        Social History     Tobacco Use    Smoking status: Current Every Day Smoker     Packs/day: 0.75     Years: 50.00     Pack years: 37.50     Types: Cigars    Smokeless tobacco: Never Used   Substance Use Topics    Alcohol use: Yes     Comment: 2-3beers/day    Drug use: Yes     Types: Cocaine, "Crack" cocaine     Comment: 3-4 marijuana joints per day       Current Outpatient Medications on File Prior to Visit   Medication Sig Dispense Refill    acetaminophen (TYLENOL) 500 MG tablet Take 2 tablets (1,000 mg total) by mouth every 8 (eight) hours as needed for Pain. 60 tablet 2    albuterol (PROAIR HFA) 90 mcg/actuation inhaler Inhale 2 puffs into the lungs every 6 (six) hours as needed for Wheezing. Rescue 18 g 2    albuterol (PROVENTIL) 2.5 mg /3 mL (0.083 %) nebulizer solution Take 3 mLs (2.5 mg total) by nebulization every 6 (six) hours as needed for Wheezing or Shortness of Breath. Rescue 1 Box 3    aspirin (ECOTRIN) 81 MG EC tablet Take 1 tablet (81 mg total) by mouth once daily. 90 tablet 3    benztropine (COGENTIN) 0.5 MG tablet Take 1 tablet (0.5 mg total) by mouth 2 (two) times daily. 180 tablet 1    bisoprolol (ZEBETA) 5 MG tablet Take 0.5 tablets (2.5 mg total) by mouth once daily. 45 tablet 3    blood sugar diagnostic Strp To check BG 3 times daily, to use with insurance preferred meter 100 each 5    blood-glucose meter kit To check BG 3 times daily, to use with insurance preferred meter 1 each 0    cholecalciferol, vitamin D3, (VITAMIN D3) 25 mcg (1,000 unit) capsule Take 1 capsule (1,000 Units total) by mouth once daily.  0    colchicine (COLCRYS) 0.6 mg tablet Take half a tablet every day 30 tablet 11    diclofenac sodium (VOLTAREN) 1 % " Gel Apply 4 g topically 4 (four) times daily. 100 g 0    fluticasone furoate-vilanteroL (BREO) 100-25 mcg/dose diskus inhaler Inhale 1 puff into the lungs once daily. Controller 3 each 3    glipiZIDE (GLUCOTROL) 5 MG tablet Take 1 tablet (5 mg total) by mouth 2 (two) times daily with meals. 180 tablet 3    isosorbide mononitrate (IMDUR) 30 MG 24 hr tablet Take 1 tablet (30 mg total) by mouth once daily. 90 tablet 3    lancets Misc To check BG 3 times daily, to use with insurance preferred meter 100 each 5    lisinopriL 10 MG tablet Take 1 tablet (10 mg total) by mouth once daily. 90 tablet 1    mag hydrox/aluminum hyd/simeth (ANTACID ORAL) Take by mouth.      omeprazole (PRILOSEC) 20 MG capsule Take 1 capsule (20 mg total) by mouth once daily. 90 capsule 1    polyethylene glycol (GLYCOLAX) 17 gram/dose powder Mix 1 capful (17 g) with liquid and take by mouth 2 (two) times daily as needed (constipation). 510 g 1    polyethylene glycol (GOLYTELY,NULYTELY) 236-22.74-6.74 -5.86 gram suspension Take 4,000 mLs (4 L total) by mouth As instructed. 4000 mL 0    QUEtiapine (SEROQUEL) 300 MG Tab Take 1 tablet (300 mg total) by mouth every evening. 90 tablet 1    risperiDONE (RISPERDAL) 2 MG tablet Take 1 tablet (2 mg total) by mouth 2 (two) times daily. 180 tablet 1    rosuvastatin (CRESTOR) 40 MG Tab Take 1 tablet (40 mg total) by mouth every evening. 90 tablet 3    traZODone (DESYREL) 100 MG tablet Take 1 tablet (100 mg total) by mouth every evening. 90 tablet 1    [DISCONTINUED] tamsulosin (FLOMAX) 0.4 mg Cap Take 1 capsule (0.4 mg total) by mouth once daily. 30 capsule 11    triamcinolone acetonide 0.1% (KENALOG) 0.1 % ointment Apply topically 2 (two) times daily. for 14 days 30 g 0    [DISCONTINUED] finasteride (PROSCAR) 5 mg tablet Take 1 tablet (5 mg total) by mouth once daily. 30 tablet 0     No current facility-administered medications on file prior to visit.        Review of patient's allergies  indicates:   Allergen Reactions    Trelegy ellipta [fluticasone-umeclidin-vilanter] Other (See Comments)     Possible urinary retention        Review of Systems   Constitutional: Negative for activity change and fever.   Eyes: Negative for visual disturbance.   Respiratory: Negative for shortness of breath.    Cardiovascular: Negative for chest pain.   Gastrointestinal: Negative for abdominal distention.   Genitourinary: Positive for frequency. Negative for difficulty urinating and dysuria.   Musculoskeletal: Negative for gait problem.   Skin: Negative for color change.   Neurological: Negative for facial asymmetry.   Psychiatric/Behavioral: Negative for agitation and confusion.       Objective:      Physical Exam  Constitutional:       Appearance: He is well-developed.   HENT:      Head: Normocephalic and atraumatic.   Neck:      Musculoskeletal: Normal range of motion and neck supple.   Pulmonary:      Effort: Pulmonary effort is normal.   Abdominal:      General: There is no distension.   Musculoskeletal: Normal range of motion.   Skin:     General: Skin is warm and dry.   Neurological:      Mental Status: He is alert.   Psychiatric:         Mood and Affect: Mood normal.         Assessment:       1. BPH with obstruction/lower urinary tract symptoms    2. Urinary frequency    3. Incomplete emptying of bladder        Plan:       - Flomax and finasteride refilled  - Work on double voiding   - Patient frequents a lot of bladder irritants. Avoid bladder irritants including but not limited to caffeine, alcohol, smoking, spicy foods, acidic foods, tomato-based products, citrus, artificial sweeteners, chocolate, coffee or tea. Limit fluids 3 hours before bed.  - Follow-up in 2 months or sooner if any issues        BPH with obstruction/lower urinary tract symptoms  -     finasteride (PROSCAR) 5 mg tablet; Take 1 tablet (5 mg total) by mouth once daily.  Dispense: 90 tablet; Refill: 3  -     tamsulosin (FLOMAX) 0.4 mg  Cap; Take 1 capsule (0.4 mg total) by mouth once daily.  Dispense: 90 capsule; Refill: 3    Urinary frequency    Incomplete emptying of bladder

## 2020-07-20 ENCOUNTER — LAB VISIT (OUTPATIENT)
Dept: FAMILY MEDICINE | Facility: CLINIC | Age: 65
End: 2020-07-20
Payer: MEDICARE

## 2020-07-20 ENCOUNTER — TELEPHONE (OUTPATIENT)
Dept: ENDOSCOPY | Facility: HOSPITAL | Age: 65
End: 2020-07-20

## 2020-07-20 PROCEDURE — U0003 INFECTIOUS AGENT DETECTION BY NUCLEIC ACID (DNA OR RNA); SEVERE ACUTE RESPIRATORY SYNDROME CORONAVIRUS 2 (SARS-COV-2) (CORONAVIRUS DISEASE [COVID-19]), AMPLIFIED PROBE TECHNIQUE, MAKING USE OF HIGH THROUGHPUT TECHNOLOGIES AS DESCRIBED BY CMS-2020-01-R: HCPCS

## 2020-07-20 NOTE — TELEPHONE ENCOUNTER
Spoke with patient about arrival time @. 0730    Prep instructions reviewed: the day before the procedure, follow a clear liquid diet all day, then start the first 1/2 of prep at 5pm and take 2nd 1/2 of prep @.0230  Pt must be completely NPO when prep completed @. 0430             Medications: Do not take Insulin or oral diabetic medications the day of the procedure.  Take as prescribed: heart, seizure and blood pressure medication in the morning with a sip of water (less than an ounce).  Take any breathing medications and bring inhalers to hospital with you Leave all valuables and jewelry at home.     Wear comfortable clothes to procedure to change into hospital gown You cannot drive for 24 hours after your procedure because you will receive sedation for your procedure to make you comfortable.  A ride must be provided at discharge.

## 2020-07-21 LAB — SARS-COV-2 RNA RESP QL NAA+PROBE: NOT DETECTED

## 2020-07-22 ENCOUNTER — ANESTHESIA EVENT (OUTPATIENT)
Dept: ENDOSCOPY | Facility: HOSPITAL | Age: 65
End: 2020-07-22
Payer: MEDICARE

## 2020-07-22 ENCOUNTER — ANESTHESIA (OUTPATIENT)
Dept: ENDOSCOPY | Facility: HOSPITAL | Age: 65
End: 2020-07-22
Payer: MEDICARE

## 2020-07-22 ENCOUNTER — HOSPITAL ENCOUNTER (OUTPATIENT)
Facility: HOSPITAL | Age: 65
Discharge: HOME | End: 2020-07-22
Attending: INTERNAL MEDICINE | Admitting: INTERNAL MEDICINE
Payer: MEDICARE

## 2020-07-22 VITALS
DIASTOLIC BLOOD PRESSURE: 70 MMHG | BODY MASS INDEX: 26.51 KG/M2 | RESPIRATION RATE: 18 BRPM | SYSTOLIC BLOOD PRESSURE: 170 MMHG | HEART RATE: 84 BPM | TEMPERATURE: 98 F | OXYGEN SATURATION: 96 % | WEIGHT: 179 LBS | HEIGHT: 69 IN

## 2020-07-22 DIAGNOSIS — Z86.010 PERSONAL HISTORY OF COLONIC POLYPS: ICD-10-CM

## 2020-07-22 PROBLEM — Z86.0100 PERSONAL HISTORY OF COLONIC POLYPS: Status: ACTIVE | Noted: 2020-07-22

## 2020-07-22 LAB — GLUCOSE SERPL-MCNC: 94 MG/DL (ref 70–110)

## 2020-07-22 PROCEDURE — 45385 COLONOSCOPY W/LESION REMOVAL: CPT | Performed by: INTERNAL MEDICINE

## 2020-07-22 PROCEDURE — 88305 TISSUE EXAM BY PATHOLOGIST: CPT | Performed by: PATHOLOGY

## 2020-07-22 PROCEDURE — 25000003 PHARM REV CODE 250: Performed by: INTERNAL MEDICINE

## 2020-07-22 PROCEDURE — 45380 PR COLONOSCOPY,BIOPSY: ICD-10-PCS | Mod: 59,,, | Performed by: INTERNAL MEDICINE

## 2020-07-22 PROCEDURE — 45385 PR COLONOSCOPY,REMV LESN,SNARE: ICD-10-PCS | Mod: PT,,, | Performed by: INTERNAL MEDICINE

## 2020-07-22 PROCEDURE — 88305 TISSUE EXAM BY PATHOLOGIST: ICD-10-PCS | Mod: 26,,, | Performed by: PATHOLOGY

## 2020-07-22 PROCEDURE — 37000009 HC ANESTHESIA EA ADD 15 MINS: Performed by: INTERNAL MEDICINE

## 2020-07-22 PROCEDURE — 63600175 PHARM REV CODE 636 W HCPCS: Performed by: NURSE ANESTHETIST, CERTIFIED REGISTERED

## 2020-07-22 PROCEDURE — 88305 TISSUE EXAM BY PATHOLOGIST: CPT | Mod: 26,,, | Performed by: PATHOLOGY

## 2020-07-22 PROCEDURE — 45380 COLONOSCOPY AND BIOPSY: CPT | Performed by: INTERNAL MEDICINE

## 2020-07-22 PROCEDURE — 45385 COLONOSCOPY W/LESION REMOVAL: CPT | Mod: PT,,, | Performed by: INTERNAL MEDICINE

## 2020-07-22 PROCEDURE — 45380 COLONOSCOPY AND BIOPSY: CPT | Mod: 59,,, | Performed by: INTERNAL MEDICINE

## 2020-07-22 PROCEDURE — 27201012 HC FORCEPS, HOT/COLD, DISP: Performed by: INTERNAL MEDICINE

## 2020-07-22 PROCEDURE — 27201089 HC SNARE, DISP (ANY): Performed by: INTERNAL MEDICINE

## 2020-07-22 PROCEDURE — 37000008 HC ANESTHESIA 1ST 15 MINUTES: Performed by: INTERNAL MEDICINE

## 2020-07-22 RX ORDER — LIDOCAINE HYDROCHLORIDE 20 MG/ML
INJECTION INTRAVENOUS
Status: DISCONTINUED | OUTPATIENT
Start: 2020-07-22 | End: 2020-07-22

## 2020-07-22 RX ORDER — DEXTROMETHORPHAN/PSEUDOEPHED 2.5-7.5/.8
DROPS ORAL
Status: COMPLETED | OUTPATIENT
Start: 2020-07-22 | End: 2020-07-22

## 2020-07-22 RX ORDER — SODIUM CHLORIDE 0.9 % (FLUSH) 0.9 %
10 SYRINGE (ML) INJECTION
Status: DISCONTINUED | OUTPATIENT
Start: 2020-07-22 | End: 2020-07-22 | Stop reason: HOSPADM

## 2020-07-22 RX ORDER — PROPOFOL 10 MG/ML
VIAL (ML) INTRAVENOUS CONTINUOUS PRN
Status: DISCONTINUED | OUTPATIENT
Start: 2020-07-22 | End: 2020-07-22

## 2020-07-22 RX ORDER — PROPOFOL 10 MG/ML
VIAL (ML) INTRAVENOUS
Status: DISCONTINUED | OUTPATIENT
Start: 2020-07-22 | End: 2020-07-22

## 2020-07-22 RX ORDER — SODIUM CHLORIDE 9 MG/ML
INJECTION, SOLUTION INTRAVENOUS CONTINUOUS
Status: DISCONTINUED | OUTPATIENT
Start: 2020-07-22 | End: 2020-07-22 | Stop reason: HOSPADM

## 2020-07-22 RX ADMIN — PROPOFOL 150 MCG/KG/MIN: 10 INJECTION, EMULSION INTRAVENOUS at 08:07

## 2020-07-22 RX ADMIN — LIDOCAINE HYDROCHLORIDE 100 MG: 20 INJECTION, SOLUTION INTRAVENOUS at 08:07

## 2020-07-22 RX ADMIN — PROPOFOL 80 MG: 10 INJECTION, EMULSION INTRAVENOUS at 08:07

## 2020-07-22 RX ADMIN — SIMETHICONE 40 MG: 20 SUSPENSION/ DROPS ORAL at 08:07

## 2020-07-22 RX ADMIN — SODIUM CHLORIDE: 0.9 INJECTION, SOLUTION INTRAVENOUS at 08:07

## 2020-07-22 NOTE — H&P
"    Short Stay Endoscopy History and Physical    PCP - Fernando Carmona MD    Procedure - Colonoscopy  ASA - per anesthesia  Mallampati - per anesthesia  History of Anesthesia problems - no  Family history Anesthesia problems - no   Plan of anesthesia - General    HPI:  This is a 65 y.o. male here for evaluation of : personal history of colon polyps  Last exam was 2/2016.      ROS:  Constitutional: No fevers, chills, No weight loss  CV: No chest pain  Pulm: No cough, No shortness of breath  GI: see HPI  Derm: No rash    Medical History:  has a past medical history of Allergy, Colon polyp (2013), COPD (chronic obstructive pulmonary disease), Coronary artery disease, Disorder of kidney and ureter, Hypercholesterolemia, Hypertension, and Urinary tract infection.    Surgical History:  has a past surgical history that includes Coronary stent placement (2013) and Colonoscopy (02/2016).    Family History: family history includes Diabetes in his grandchild; Mental illness in his brother; No Known Problems in his mother.. Otherwise no colon cancer, inflammatory bowel disease, or GI malignancies.    Social History:  reports that he has been smoking cigars. He has a 37.50 pack-year smoking history. He has never used smokeless tobacco. He reports current alcohol use. He reports current drug use. Drugs: Cocaine and "Crack" cocaine.    Review of patient's allergies indicates:   Allergen Reactions    Trelegy ellipta [fluticasone-umeclidin-vilanter] Other (See Comments)     Possible urinary retention        Medications:   Medications Prior to Admission   Medication Sig Dispense Refill Last Dose    acetaminophen (TYLENOL) 500 MG tablet Take 2 tablets (1,000 mg total) by mouth every 8 (eight) hours as needed for Pain. 60 tablet 2     albuterol (PROAIR HFA) 90 mcg/actuation inhaler Inhale 2 puffs into the lungs every 6 (six) hours as needed for Wheezing. Rescue 18 g 2     albuterol (PROVENTIL) 2.5 mg /3 mL (0.083 %) nebulizer " solution Take 3 mLs (2.5 mg total) by nebulization every 6 (six) hours as needed for Wheezing or Shortness of Breath. Rescue 1 Box 3     aspirin (ECOTRIN) 81 MG EC tablet Take 1 tablet (81 mg total) by mouth once daily. 90 tablet 3     benztropine (COGENTIN) 0.5 MG tablet Take 1 tablet (0.5 mg total) by mouth 2 (two) times daily. 180 tablet 1     bisoprolol (ZEBETA) 5 MG tablet Take 0.5 tablets (2.5 mg total) by mouth once daily. 45 tablet 3     blood sugar diagnostic Strp To check BG 3 times daily, to use with insurance preferred meter 100 each 5     blood-glucose meter kit To check BG 3 times daily, to use with insurance preferred meter 1 each 0     cholecalciferol, vitamin D3, (VITAMIN D3) 25 mcg (1,000 unit) capsule Take 1 capsule (1,000 Units total) by mouth once daily.  0     colchicine (COLCRYS) 0.6 mg tablet Take half a tablet every day 30 tablet 11     diclofenac sodium (VOLTAREN) 1 % Gel Apply 4 g topically 4 (four) times daily. 100 g 0     finasteride (PROSCAR) 5 mg tablet Take 1 tablet (5 mg total) by mouth once daily. 90 tablet 3     fluticasone furoate-vilanteroL (BREO) 100-25 mcg/dose diskus inhaler Inhale 1 puff into the lungs once daily. Controller 3 each 3     glipiZIDE (GLUCOTROL) 5 MG tablet Take 1 tablet (5 mg total) by mouth 2 (two) times daily with meals. 180 tablet 3     isosorbide mononitrate (IMDUR) 30 MG 24 hr tablet Take 1 tablet (30 mg total) by mouth once daily. 90 tablet 3     lancets Misc To check BG 3 times daily, to use with insurance preferred meter 100 each 5     lisinopriL 10 MG tablet Take 1 tablet (10 mg total) by mouth once daily. 90 tablet 1     mag hydrox/aluminum hyd/simeth (ANTACID ORAL) Take by mouth.       omeprazole (PRILOSEC) 20 MG capsule Take 1 capsule (20 mg total) by mouth once daily. 90 capsule 1     polyethylene glycol (GLYCOLAX) 17 gram/dose powder Mix 1 capful (17 g) with liquid and take by mouth 2 (two) times daily as needed (constipation).  510 g 1     polyethylene glycol (GOLYTELY,NULYTELY) 236-22.74-6.74 -5.86 gram suspension Take 4,000 mLs (4 L total) by mouth As instructed. 4000 mL 0     QUEtiapine (SEROQUEL) 300 MG Tab Take 1 tablet (300 mg total) by mouth every evening. 90 tablet 1     risperiDONE (RISPERDAL) 2 MG tablet Take 1 tablet (2 mg total) by mouth 2 (two) times daily. 180 tablet 1     rosuvastatin (CRESTOR) 40 MG Tab Take 1 tablet (40 mg total) by mouth every evening. 90 tablet 3     tamsulosin (FLOMAX) 0.4 mg Cap Take 1 capsule (0.4 mg total) by mouth once daily. 90 capsule 3     traZODone (DESYREL) 100 MG tablet Take 1 tablet (100 mg total) by mouth every evening. 90 tablet 1     triamcinolone acetonide 0.1% (KENALOG) 0.1 % ointment Apply topically 2 (two) times daily. for 14 days 30 g 0          Physical Exam:    Vital Signs: There were no vitals filed for this visit.    General Appearance: Well appearing in no acute distress  Eyes:    No scleral icterus  ENT: Neck supple, Lips, mucosa, and tongue normal; teeth and gums normal  Lungs: CTA bilaterally  Heart:  S1, S2 normal, no murmurs heard  Abdomen: Soft, non tender, non distended with positive bowel sounds. No hepatosplenomegaly, ascites, or mass.  Extremities: 2+ pulses, no clubbing, cyanosis or edema  Skin: No rash      Labs:  Lab Results   Component Value Date    WBC 11.08 03/05/2020    HGB 11.8 (L) 03/05/2020    HCT 36.3 (L) 03/05/2020     03/05/2020    CHOL 228 (H) 12/13/2019    TRIG 191 (H) 12/13/2019    HDL 35 (L) 12/13/2019    ALT 24 03/05/2020    AST 23 03/05/2020     03/05/2020    K 4.9 03/05/2020     03/05/2020    CREATININE 1.8 (H) 03/05/2020    BUN 27 (H) 03/05/2020    CO2 21 (L) 03/05/2020    TSH 1.166 03/03/2020    PSA 3.2 12/13/2019    HGBA1C 6.5 (H) 01/27/2020       I have explained the risks and benefits of endoscopy procedures to the patient including but not limited to bleeding, perforation, infection, and death.  The patient was asked  if they understand and allowed to ask any further questions to their satisfaction.    Saul Velez MD

## 2020-07-22 NOTE — ANESTHESIA POSTPROCEDURE EVALUATION
Anesthesia Post Evaluation    Patient: Paresh Senior    Procedure(s) Performed: Procedure(s) (LRB):  COLONOSCOPY Golytely (N/A)    Final Anesthesia Type: MAC    Patient location during evaluation: GI PACU  Patient participation: Yes- Able to Participate  Level of consciousness: awake and alert  Post-procedure vital signs: reviewed and stable  Pain management: adequate  Airway patency: patent    PONV status at discharge: No PONV  Anesthetic complications: no      Cardiovascular status: blood pressure returned to baseline  Respiratory status: unassisted, spontaneous ventilation and room air  Hydration status: euvolemic  Follow-up not needed.          Vitals Value Taken Time   /72 07/22/20 0849   Temp 97.8 07/22/20 0849   Pulse 81 07/22/20 0849   Resp 16 07/22/20 0849   SpO2 97 07/22/20 0849         No case tracking events are documented in the log.      Pain/Rachel Score: Rachel Score: 9 (7/22/2020  8:46 AM)

## 2020-07-22 NOTE — ANESTHESIA PREPROCEDURE EVALUATION
07/22/2020  Paresh Senior is a 65 y.o., male for colonoscopy under MAC    Past Medical History:   Diagnosis Date    Allergy     Colon polyp 2013    COPD (chronic obstructive pulmonary disease)     Coronary artery disease     Disorder of kidney and ureter     Hypercholesterolemia     Hypertension     Urinary tract infection      Past Surgical History:   Procedure Laterality Date    COLONOSCOPY  02/2016    Advised repeat in 3 years    CORONARY STENT PLACEMENT  2013         Anesthesia Evaluation    I have reviewed the Patient Summary Reports.   I have reviewed the NPO Status.   I have reviewed the Medications.     Review of Systems  Social:  Smoker        Physical Exam  General:  Well nourished    Airway/Jaw/Neck:  Airway Findings: Mallampati: II      Chest/Lungs:  Chest/Lungs Clear    Heart/Vascular:  Heart Findings: Normal        12/2019  · Normal left ventricular systolic function. The estimated ejection fraction is 55%  · Mild eccentric left ventricular hypertrophy.  · Normal LV diastolic function.  · Mild mitral regurgitation.  · Local segmental wall motion abnormalities.  · Normal right ventricular systolic function.  · Normal central venous pressure (3 mm Hg).      Anesthesia Plan  Type of Anesthesia, risks & benefits discussed:  Anesthesia Type:  MAC  Patient's Preference:   Intra-op Monitoring Plan: standard ASA monitors  Intra-op Monitoring Plan Comments:   Post Op Pain Control Plan:   Post Op Pain Control Plan Comments:   Induction:    Beta Blocker:  Patient is on a Beta-Blocker and has received one dose within the past 24 hours (No further documentation required).       Informed Consent: Patient understands risks and agrees with Anesthesia plan.  Questions answered. Anesthesia consent signed with patient.  ASA Score: 4     Day of Surgery Review of History & Physical:            Ready For  Surgery From Anesthesia Perspective.

## 2020-07-22 NOTE — [employer] PATIENT INSTRUCTIONS
Discharge Summary/Instructions after an Endoscopic Procedure  Patient Name: Paresh Senior  Patient MRN: [id number]  Patient YOB: 1955  Wednesday, July 22, 2020  Saul Velez MD  Your health is very important to us during the Covid Crisis. Following your   procedure today, you will receive a daily text for 2 weeks asking about   signs or symptoms of Covid 19.  Please respond to this text when you   receive it so we can follow up and keep you as safe as possible.   RESTRICTIONS:  During your procedure today, you received medications for sedation.  These   medications may affect your judgment, balance and coordination.  Therefore,   for 24 hours, you have the following restrictions:   - DO NOT drive a car, operate machinery, make legal/financial decisions,   sign important papers or drink alcohol.    ACTIVITY:  Today: no heavy lifting, straining or running due to procedural   sedation/anesthesia.  The following day: return to full activity including work.  DIET:  Eat and drink normally unless instructed otherwise.     TREATMENT FOR COMMON SIDE EFFECTS:  - Mild abdominal pain, nausea, belching, bloating or excessive gas:  rest,   eat lightly and use a heating pad.  - Sore Throat: treat with throat lozenges and/or gargle with warm salt   water.  - Because air was used during the procedure, expelling large amounts of air   from your rectum or belching is normal.  - If a bowel prep was taken, you may not have a bowel movement for 1-3 days.    This is normal.  SYMPTOMS TO WATCH FOR AND REPORT TO YOUR PHYSICIAN:  1. Abdominal pain or bloating, other than gas cramps.  2. Chest pain.  3. Back pain.  4. Signs of infection such as: chills or fever occurring within 24 hours   after the procedure.  5. Rectal bleeding, which would show as bright red, maroon, or black stools.   (A tablespoon of blood from the rectum is not serious, especially if   hemorrhoids are present.)  6. Vomiting.  7. Weakness or dizziness.  GO  DIRECTLY TO THE NEAREST EMERGENCY ROOM IF YOU HAVE ANY OF THE FOLLOWING:      Difficulty breathing              Chills and/or fever over 101 F   Persistent vomiting and/or vomiting blood   Severe abdominal pain   Severe chest pain   Black, tarry stools   Bleeding- more than one tablespoon   Any other symptom or condition that you feel may need urgent attention  Your doctor recommends these additional instructions:  If any biopsies were taken, your doctors clinic will contact you in 1 to 2   weeks with any results.  - Discharge patient to home.   - Patient has a contact number available for emergencies.  The signs and   symptoms of potential delayed complications were discussed with the   patient.  Return to normal activities tomorrow.  Written discharge   instructions were provided to the patient.   - Resume previous diet.   - Continue present medications.   - Resume Plavix (clopidogrel) at prior dose in 2 days.  Refer to primary   physician for further adjustment of therapy.   - Await pathology results.   - Repeat colonoscopy in 5 years for surveillance.  For questions, problems or results please call your physician - Saul Velez MD.  EMERGENCY PHONE NUMBER: [phone],  LAB RESULTS: [phone]  IF A COMPLICATION OR EMERGENCY SITUATION ARISES AND YOU ARE UNABLE TO REACH   YOUR PHYSICIAN - GO DIRECTLY TO THE EMERGENCY ROOM.  Saul Velez MD  7/22/2020 8:44:44 AM  This report has been verified and signed electronically.  [employer]

## 2020-07-22 NOTE — TRANSFER OF CARE
"Anesthesia Transfer of Care Note    Patient: Paresh Senior    Procedure(s) Performed: Procedure(s) (LRB):  COLONOSCOPY [place] (N/A)    Patient location: GI    Anesthesia Type: MAC    Transport from OR: Transported from OR on room air with adequate spontaneous ventilation    Post pain: adequate analgesia    Post assessment: no apparent anesthetic complications    Post vital signs: stable    Level of consciousness: awake, alert and oriented    Nausea/Vomiting: no nausea/vomiting    Complications: none    Transfer of care protocol was followed      Last vitals:   Visit Vitals  Ht 5' 9" (1.753 m)   Wt 81.2 kg (179 lb)   BMI 26.43 kg/m²     "

## 2020-07-23 LAB
FINAL PATHOLOGIC DIAGNOSIS: NORMAL
GROSS: NORMAL

## 2020-07-27 ENCOUNTER — LAB VISIT (OUTPATIENT)
Dept: LAB | Facility: HOSPITAL | Age: 65
End: 2020-07-27
Attending: FAMILY MEDICINE
Payer: MEDICARE

## 2020-07-27 DIAGNOSIS — M10.9 GOUT, UNSPECIFIED CAUSE, UNSPECIFIED CHRONICITY, UNSPECIFIED SITE: ICD-10-CM

## 2020-07-27 DIAGNOSIS — N18.9 CHRONIC KIDNEY DISEASE, UNSPECIFIED CKD STAGE: ICD-10-CM

## 2020-07-27 LAB
ALBUMIN SERPL BCP-MCNC: 3.3 G/DL (ref 3.5–5.2)
ALP SERPL-CCNC: 72 U/L (ref 55–135)
ALT SERPL W/O P-5'-P-CCNC: 15 U/L (ref 10–44)
ANION GAP SERPL CALC-SCNC: 8 MMOL/L (ref 8–16)
AST SERPL-CCNC: 21 U/L (ref 10–40)
BASOPHILS # BLD AUTO: 0.07 K/UL (ref 0–0.2)
BASOPHILS NFR BLD: 0.8 % (ref 0–1.9)
BILIRUB SERPL-MCNC: 0.3 MG/DL (ref 0.1–1)
BUN SERPL-MCNC: 10 MG/DL (ref 8–23)
CALCIUM SERPL-MCNC: 8.7 MG/DL (ref 8.7–10.5)
CHLORIDE SERPL-SCNC: 109 MMOL/L (ref 95–110)
CO2 SERPL-SCNC: 24 MMOL/L (ref 23–29)
CREAT SERPL-MCNC: 1.3 MG/DL (ref 0.5–1.4)
DIFFERENTIAL METHOD: NORMAL
EOSINOPHIL # BLD AUTO: 0.3 K/UL (ref 0–0.5)
EOSINOPHIL NFR BLD: 3 % (ref 0–8)
ERYTHROCYTE [DISTWIDTH] IN BLOOD BY AUTOMATED COUNT: 13.5 % (ref 11.5–14.5)
EST. GFR  (AFRICAN AMERICAN): >60 ML/MIN/1.73 M^2
EST. GFR  (NON AFRICAN AMERICAN): 57 ML/MIN/1.73 M^2
GLUCOSE SERPL-MCNC: 68 MG/DL (ref 70–110)
HCT VFR BLD AUTO: 43.7 % (ref 40–54)
HGB BLD-MCNC: 14.6 G/DL (ref 14–18)
IMM GRANULOCYTES # BLD AUTO: 0.02 K/UL (ref 0–0.04)
IMM GRANULOCYTES NFR BLD AUTO: 0.2 % (ref 0–0.5)
LYMPHOCYTES # BLD AUTO: 1.7 K/UL (ref 1–4.8)
LYMPHOCYTES NFR BLD: 21 % (ref 18–48)
MCH RBC QN AUTO: 29.9 PG (ref 27–31)
MCHC RBC AUTO-ENTMCNC: 33.4 G/DL (ref 32–36)
MCV RBC AUTO: 89 FL (ref 82–98)
MONOCYTES # BLD AUTO: 0.6 K/UL (ref 0.3–1)
MONOCYTES NFR BLD: 7.7 % (ref 4–15)
NEUTROPHILS # BLD AUTO: 5.6 K/UL (ref 1.8–7.7)
NEUTROPHILS NFR BLD: 67.3 % (ref 38–73)
NRBC BLD-RTO: 0 /100 WBC
PLATELET # BLD AUTO: 258 K/UL (ref 150–350)
PMV BLD AUTO: 10.5 FL (ref 9.2–12.9)
POTASSIUM SERPL-SCNC: 4 MMOL/L (ref 3.5–5.1)
PROT SERPL-MCNC: 6.5 G/DL (ref 6–8.4)
RBC # BLD AUTO: 4.89 M/UL (ref 4.6–6.2)
SODIUM SERPL-SCNC: 141 MMOL/L (ref 136–145)
URATE SERPL-MCNC: 7.5 MG/DL (ref 3.4–7)
WBC # BLD AUTO: 8.3 K/UL (ref 3.9–12.7)

## 2020-07-27 PROCEDURE — 36415 COLL VENOUS BLD VENIPUNCTURE: CPT

## 2020-07-27 PROCEDURE — 85025 COMPLETE CBC W/AUTO DIFF WBC: CPT

## 2020-07-27 PROCEDURE — 80053 COMPREHEN METABOLIC PANEL: CPT

## 2020-07-27 PROCEDURE — 84550 ASSAY OF BLOOD/URIC ACID: CPT

## 2020-07-31 ENCOUNTER — TELEPHONE (OUTPATIENT)
Dept: RHEUMATOLOGY | Facility: CLINIC | Age: 65
End: 2020-07-31

## 2020-08-21 ENCOUNTER — PATIENT OUTREACH (OUTPATIENT)
Dept: ADMINISTRATIVE | Facility: OTHER | Age: 65
End: 2020-08-21

## 2020-08-21 DIAGNOSIS — E11.65 TYPE 2 DIABETES MELLITUS WITH HYPERGLYCEMIA, WITHOUT LONG-TERM CURRENT USE OF INSULIN: Primary | ICD-10-CM

## 2020-08-21 NOTE — PROGRESS NOTES
Care Everywhere: updated  Immunization: updated  Health Maintenance: updated  Media Review: review for outside eye exam report   Legacy Review:   Order placed:   Upcoming appts:  Referral to optometry 1/20/2020

## 2020-08-24 ENCOUNTER — HOSPITAL ENCOUNTER (OUTPATIENT)
Facility: HOSPITAL | Age: 65
Discharge: HOME | End: 2020-08-26
Attending: EMERGENCY MEDICINE | Admitting: EMERGENCY MEDICINE
Payer: MEDICARE

## 2020-08-24 DIAGNOSIS — J96.11 CHRONIC HYPOXEMIC RESPIRATORY FAILURE: ICD-10-CM

## 2020-08-24 DIAGNOSIS — J44.9 CHRONIC OBSTRUCTIVE PULMONARY DISEASE, UNSPECIFIED COPD TYPE: ICD-10-CM

## 2020-08-24 DIAGNOSIS — R06.02 SOB (SHORTNESS OF BREATH): ICD-10-CM

## 2020-08-24 DIAGNOSIS — J44.1 COPD EXACERBATION: Primary | ICD-10-CM

## 2020-08-24 DIAGNOSIS — J43.9 PULMONARY EMPHYSEMA, UNSPECIFIED EMPHYSEMA TYPE: ICD-10-CM

## 2020-08-24 PROBLEM — J96.01 ACUTE HYPOXEMIC RESPIRATORY FAILURE: Status: ACTIVE | Noted: 2020-08-24

## 2020-08-24 LAB
ALBUMIN SERPL BCP-MCNC: 3 G/DL (ref 3.5–5.2)
ALLENS TEST: ABNORMAL
ALLENS TEST: ABNORMAL
ALP SERPL-CCNC: 73 U/L (ref 55–135)
ALT SERPL W/O P-5'-P-CCNC: 12 U/L (ref 10–44)
ANION GAP SERPL CALC-SCNC: 7 MMOL/L (ref 8–16)
AST SERPL-CCNC: 17 U/L (ref 10–40)
BASOPHILS # BLD AUTO: 0.04 K/UL (ref 0–0.2)
BASOPHILS NFR BLD: 0.4 % (ref 0–1.9)
BILIRUB SERPL-MCNC: 0.4 MG/DL (ref 0.1–1)
BNP SERPL-MCNC: 108 PG/ML (ref 0–99)
BUN SERPL-MCNC: 10 MG/DL (ref 8–23)
CALCIUM SERPL-MCNC: 8.5 MG/DL (ref 8.7–10.5)
CHLORIDE SERPL-SCNC: 111 MMOL/L (ref 95–110)
CO2 SERPL-SCNC: 24 MMOL/L (ref 23–29)
CREAT SERPL-MCNC: 1.1 MG/DL (ref 0.5–1.4)
DELSYS: ABNORMAL
DELSYS: ABNORMAL
DIFFERENTIAL METHOD: NORMAL
EOSINOPHIL # BLD AUTO: 0.1 K/UL (ref 0–0.5)
EOSINOPHIL NFR BLD: 1.4 % (ref 0–8)
EP: 5
ERYTHROCYTE [DISTWIDTH] IN BLOOD BY AUTOMATED COUNT: 14.1 % (ref 11.5–14.5)
ERYTHROCYTE [SEDIMENTATION RATE] IN BLOOD BY WESTERGREN METHOD: 16 MM/H
EST. GFR  (AFRICAN AMERICAN): >60 ML/MIN/1.73 M^2
EST. GFR  (NON AFRICAN AMERICAN): >60 ML/MIN/1.73 M^2
FIO2: 35
GLUCOSE SERPL-MCNC: 116 MG/DL (ref 70–110)
HCO3 UR-SCNC: 22.7 MMOL/L (ref 24–28)
HCO3 UR-SCNC: 27.3 MMOL/L (ref 24–28)
HCT VFR BLD AUTO: 44.1 % (ref 40–54)
HGB BLD-MCNC: 14.6 G/DL (ref 14–18)
IMM GRANULOCYTES # BLD AUTO: 0.03 K/UL (ref 0–0.04)
IMM GRANULOCYTES NFR BLD AUTO: 0.3 % (ref 0–0.5)
IP: 10
LYMPHOCYTES # BLD AUTO: 2.2 K/UL (ref 1–4.8)
LYMPHOCYTES NFR BLD: 21 % (ref 18–48)
MCH RBC QN AUTO: 29.8 PG (ref 27–31)
MCHC RBC AUTO-ENTMCNC: 33.1 G/DL (ref 32–36)
MCV RBC AUTO: 90 FL (ref 82–98)
MIN VOL: 9
MODE: ABNORMAL
MODE: ABNORMAL
MONOCYTES # BLD AUTO: 1 K/UL (ref 0.3–1)
MONOCYTES NFR BLD: 9.5 % (ref 4–15)
NEUTROPHILS # BLD AUTO: 6.9 K/UL (ref 1.8–7.7)
NEUTROPHILS NFR BLD: 67.4 % (ref 38–73)
NRBC BLD-RTO: 0 /100 WBC
PCO2 BLDA: 38.8 MMHG (ref 35–45)
PCO2 BLDA: 45.9 MMHG (ref 35–45)
PH SMN: 7.38 [PH] (ref 7.35–7.45)
PH SMN: 7.38 [PH] (ref 7.35–7.45)
PLATELET # BLD AUTO: 226 K/UL (ref 150–350)
PMV BLD AUTO: 10.3 FL (ref 9.2–12.9)
PO2 BLDA: 69 MMHG (ref 80–100)
PO2 BLDA: 77 MMHG (ref 40–60)
POC BE: -2 MMOL/L
POC BE: 2 MMOL/L
POC SATURATED O2: 93 % (ref 95–100)
POC SATURATED O2: 95 % (ref 95–100)
POC TCO2: 24 MMOL/L (ref 23–27)
POC TCO2: 29 MMOL/L (ref 24–29)
POCT GLUCOSE: 159 MG/DL (ref 70–110)
POCT GLUCOSE: 174 MG/DL (ref 70–110)
POCT GLUCOSE: 247 MG/DL (ref 70–110)
POCT GLUCOSE: 377 MG/DL (ref 70–110)
POTASSIUM SERPL-SCNC: 3.9 MMOL/L (ref 3.5–5.1)
PROT SERPL-MCNC: 6.3 G/DL (ref 6–8.4)
RBC # BLD AUTO: 4.9 M/UL (ref 4.6–6.2)
SAMPLE: ABNORMAL
SAMPLE: ABNORMAL
SARS-COV-2 RDRP RESP QL NAA+PROBE: NEGATIVE
SITE: ABNORMAL
SITE: ABNORMAL
SODIUM SERPL-SCNC: 142 MMOL/L (ref 136–145)
SP02: 94
SP02: 96
SPONT RATE: 26
TROPONIN I SERPL DL<=0.01 NG/ML-MCNC: 0.03 NG/ML (ref 0–0.03)
TROPONIN I SERPL DL<=0.01 NG/ML-MCNC: 0.03 NG/ML (ref 0–0.03)
WBC # BLD AUTO: 10.25 K/UL (ref 3.9–12.7)

## 2020-08-24 PROCEDURE — 36600 WITHDRAWAL OF ARTERIAL BLOOD: CPT

## 2020-08-24 PROCEDURE — 94761 N-INVAS EAR/PLS OXIMETRY MLT: CPT

## 2020-08-24 PROCEDURE — 99285 EMERGENCY DEPT VISIT HI MDM: CPT | Mod: ,,, | Performed by: EMERGENCY MEDICINE

## 2020-08-24 PROCEDURE — C9399 UNCLASSIFIED DRUGS OR BIOLOG: HCPCS | Performed by: HOSPITALIST

## 2020-08-24 PROCEDURE — 99220 PR INITIAL OBSERVATION CARE,LEVL III: ICD-10-PCS | Mod: ,,, | Performed by: HOSPITALIST

## 2020-08-24 PROCEDURE — S4991 NICOTINE PATCH NONLEGEND: HCPCS | Performed by: HOSPITALIST

## 2020-08-24 PROCEDURE — 25000242 PHARM REV CODE 250 ALT 637 W/ HCPCS: Performed by: EMERGENCY MEDICINE

## 2020-08-24 PROCEDURE — 27000221 HC OXYGEN, UP TO 24 HOURS

## 2020-08-24 PROCEDURE — G0378 HOSPITAL OBSERVATION PER HR: HCPCS

## 2020-08-24 PROCEDURE — 94640 AIRWAY INHALATION TREATMENT: CPT

## 2020-08-24 PROCEDURE — 96372 THER/PROPH/DIAG INJ SC/IM: CPT | Mod: 59

## 2020-08-24 PROCEDURE — 94660 CPAP INITIATION&MGMT: CPT

## 2020-08-24 PROCEDURE — 93005 ELECTROCARDIOGRAM TRACING: CPT

## 2020-08-24 PROCEDURE — 63600175 PHARM REV CODE 636 W HCPCS: Performed by: EMERGENCY MEDICINE

## 2020-08-24 PROCEDURE — 99220 PR INITIAL OBSERVATION CARE,LEVL III: CPT | Mod: ,,, | Performed by: HOSPITALIST

## 2020-08-24 PROCEDURE — 99285 PR EMERGENCY DEPT VISIT,LEVEL V: ICD-10-PCS | Mod: ,,, | Performed by: EMERGENCY MEDICINE

## 2020-08-24 PROCEDURE — 82803 BLOOD GASES ANY COMBINATION: CPT

## 2020-08-24 PROCEDURE — 93010 EKG 12-LEAD: ICD-10-PCS | Mod: ,,, | Performed by: INTERNAL MEDICINE

## 2020-08-24 PROCEDURE — 25000003 PHARM REV CODE 250: Performed by: STUDENT IN AN ORGANIZED HEALTH CARE EDUCATION/TRAINING PROGRAM

## 2020-08-24 PROCEDURE — 25000242 PHARM REV CODE 250 ALT 637 W/ HCPCS: Performed by: HOSPITALIST

## 2020-08-24 PROCEDURE — 96366 THER/PROPH/DIAG IV INF ADDON: CPT

## 2020-08-24 PROCEDURE — 96365 THER/PROPH/DIAG IV INF INIT: CPT

## 2020-08-24 PROCEDURE — 80053 COMPREHEN METABOLIC PANEL: CPT

## 2020-08-24 PROCEDURE — 84484 ASSAY OF TROPONIN QUANT: CPT | Mod: 91

## 2020-08-24 PROCEDURE — 99285 EMERGENCY DEPT VISIT HI MDM: CPT | Mod: 25

## 2020-08-24 PROCEDURE — 63600175 PHARM REV CODE 636 W HCPCS: Performed by: HOSPITALIST

## 2020-08-24 PROCEDURE — 83880 ASSAY OF NATRIURETIC PEPTIDE: CPT

## 2020-08-24 PROCEDURE — 85025 COMPLETE CBC W/AUTO DIFF WBC: CPT

## 2020-08-24 PROCEDURE — 63700000 PHARM REV CODE 250 ALT 637 W/O HCPCS: Performed by: HOSPITALIST

## 2020-08-24 PROCEDURE — 93010 ELECTROCARDIOGRAM REPORT: CPT | Mod: ,,, | Performed by: INTERNAL MEDICINE

## 2020-08-24 PROCEDURE — 84484 ASSAY OF TROPONIN QUANT: CPT

## 2020-08-24 PROCEDURE — 99900035 HC TECH TIME PER 15 MIN (STAT)

## 2020-08-24 PROCEDURE — 25000003 PHARM REV CODE 250: Performed by: HOSPITALIST

## 2020-08-24 PROCEDURE — 36415 COLL VENOUS BLD VENIPUNCTURE: CPT

## 2020-08-24 PROCEDURE — U0002 COVID-19 LAB TEST NON-CDC: HCPCS

## 2020-08-24 RX ORDER — AZITHROMYCIN 250 MG/1
250 TABLET, FILM COATED ORAL DAILY
Status: DISCONTINUED | OUTPATIENT
Start: 2020-08-25 | End: 2020-08-26 | Stop reason: HOSPADM

## 2020-08-24 RX ORDER — ROSUVASTATIN CALCIUM 20 MG/1
40 TABLET, COATED ORAL NIGHTLY
Status: DISCONTINUED | OUTPATIENT
Start: 2020-08-24 | End: 2020-08-26 | Stop reason: HOSPADM

## 2020-08-24 RX ORDER — AMOXICILLIN 250 MG
2 CAPSULE ORAL 2 TIMES DAILY PRN
Status: DISCONTINUED | OUTPATIENT
Start: 2020-08-24 | End: 2020-08-26 | Stop reason: HOSPADM

## 2020-08-24 RX ORDER — ASPIRIN 81 MG/1
81 TABLET ORAL DAILY
Status: DISCONTINUED | OUTPATIENT
Start: 2020-08-24 | End: 2020-08-26 | Stop reason: HOSPADM

## 2020-08-24 RX ORDER — TRAZODONE HYDROCHLORIDE 100 MG/1
100 TABLET ORAL NIGHTLY
Status: DISCONTINUED | OUTPATIENT
Start: 2020-08-24 | End: 2020-08-25

## 2020-08-24 RX ORDER — IBUPROFEN 200 MG
16 TABLET ORAL
Status: DISCONTINUED | OUTPATIENT
Start: 2020-08-24 | End: 2020-08-26 | Stop reason: HOSPADM

## 2020-08-24 RX ORDER — BENZTROPINE MESYLATE 0.5 MG/1
0.5 TABLET ORAL 2 TIMES DAILY
Status: DISCONTINUED | OUTPATIENT
Start: 2020-08-24 | End: 2020-08-26 | Stop reason: HOSPADM

## 2020-08-24 RX ORDER — SODIUM CHLORIDE 0.9 % (FLUSH) 0.9 %
3 SYRINGE (ML) INJECTION
Status: DISCONTINUED | OUTPATIENT
Start: 2020-08-24 | End: 2020-08-26 | Stop reason: HOSPADM

## 2020-08-24 RX ORDER — IBUPROFEN 200 MG
1 TABLET ORAL DAILY
Status: DISCONTINUED | OUTPATIENT
Start: 2020-08-24 | End: 2020-08-26 | Stop reason: HOSPADM

## 2020-08-24 RX ORDER — IBUPROFEN 200 MG
24 TABLET ORAL
Status: DISCONTINUED | OUTPATIENT
Start: 2020-08-24 | End: 2020-08-26 | Stop reason: HOSPADM

## 2020-08-24 RX ORDER — SUCRALFATE 1 G/10ML
1 SUSPENSION ORAL EVERY 6 HOURS
Status: DISCONTINUED | OUTPATIENT
Start: 2020-08-24 | End: 2020-08-26 | Stop reason: HOSPADM

## 2020-08-24 RX ORDER — ASPIRIN 325 MG
325 TABLET ORAL
Status: COMPLETED | OUTPATIENT
Start: 2020-08-24 | End: 2020-08-24

## 2020-08-24 RX ORDER — ALBUTEROL SULFATE 2.5 MG/.5ML
15 SOLUTION RESPIRATORY (INHALATION)
Status: COMPLETED | OUTPATIENT
Start: 2020-08-24 | End: 2020-08-24

## 2020-08-24 RX ORDER — RISPERIDONE 1 MG/1
2 TABLET ORAL 2 TIMES DAILY
Status: DISCONTINUED | OUTPATIENT
Start: 2020-08-24 | End: 2020-08-26 | Stop reason: HOSPADM

## 2020-08-24 RX ORDER — IPRATROPIUM BROMIDE 0.5 MG/2.5ML
1 SOLUTION RESPIRATORY (INHALATION)
Status: COMPLETED | OUTPATIENT
Start: 2020-08-24 | End: 2020-08-24

## 2020-08-24 RX ORDER — ISOSORBIDE MONONITRATE 30 MG/1
30 TABLET, EXTENDED RELEASE ORAL DAILY
Status: DISCONTINUED | OUTPATIENT
Start: 2020-08-24 | End: 2020-08-26 | Stop reason: HOSPADM

## 2020-08-24 RX ORDER — TAMSULOSIN HYDROCHLORIDE 0.4 MG/1
0.4 CAPSULE ORAL DAILY
Status: DISCONTINUED | OUTPATIENT
Start: 2020-08-24 | End: 2020-08-26 | Stop reason: HOSPADM

## 2020-08-24 RX ORDER — MAGNESIUM SULFATE HEPTAHYDRATE 40 MG/ML
2 INJECTION, SOLUTION INTRAVENOUS
Status: COMPLETED | OUTPATIENT
Start: 2020-08-24 | End: 2020-08-24

## 2020-08-24 RX ORDER — FINASTERIDE 5 MG/1
5 TABLET, FILM COATED ORAL DAILY
Status: DISCONTINUED | OUTPATIENT
Start: 2020-08-24 | End: 2020-08-26 | Stop reason: HOSPADM

## 2020-08-24 RX ORDER — INSULIN ASPART 100 [IU]/ML
3 INJECTION, SOLUTION INTRAVENOUS; SUBCUTANEOUS
Status: DISCONTINUED | OUTPATIENT
Start: 2020-08-24 | End: 2020-08-26

## 2020-08-24 RX ORDER — POLYETHYLENE GLYCOL 3350 17 G/17G
17 POWDER, FOR SOLUTION ORAL DAILY
Status: DISCONTINUED | OUTPATIENT
Start: 2020-08-24 | End: 2020-08-26 | Stop reason: HOSPADM

## 2020-08-24 RX ORDER — AZITHROMYCIN 250 MG/1
500 TABLET, FILM COATED ORAL ONCE
Status: COMPLETED | OUTPATIENT
Start: 2020-08-24 | End: 2020-08-24

## 2020-08-24 RX ORDER — METOPROLOL TARTRATE 25 MG/1
25 TABLET, FILM COATED ORAL 2 TIMES DAILY
Status: DISCONTINUED | OUTPATIENT
Start: 2020-08-24 | End: 2020-08-26 | Stop reason: HOSPADM

## 2020-08-24 RX ORDER — INSULIN ASPART 100 [IU]/ML
0-5 INJECTION, SOLUTION INTRAVENOUS; SUBCUTANEOUS
Status: DISCONTINUED | OUTPATIENT
Start: 2020-08-24 | End: 2020-08-26 | Stop reason: HOSPADM

## 2020-08-24 RX ORDER — GLUCAGON 1 MG
1 KIT INJECTION
Status: DISCONTINUED | OUTPATIENT
Start: 2020-08-24 | End: 2020-08-26 | Stop reason: HOSPADM

## 2020-08-24 RX ORDER — LISINOPRIL 10 MG/1
10 TABLET ORAL DAILY
Status: DISCONTINUED | OUTPATIENT
Start: 2020-08-24 | End: 2020-08-25

## 2020-08-24 RX ORDER — MAG HYDROX/ALUMINUM HYD/SIMETH 200-200-20
30 SUSPENSION, ORAL (FINAL DOSE FORM) ORAL
Status: DISCONTINUED | OUTPATIENT
Start: 2020-08-24 | End: 2020-08-26 | Stop reason: HOSPADM

## 2020-08-24 RX ORDER — ONDANSETRON 2 MG/ML
4 INJECTION INTRAMUSCULAR; INTRAVENOUS EVERY 12 HOURS PRN
Status: DISCONTINUED | OUTPATIENT
Start: 2020-08-24 | End: 2020-08-26 | Stop reason: HOSPADM

## 2020-08-24 RX ORDER — FLUTICASONE FUROATE AND VILANTEROL 100; 25 UG/1; UG/1
1 POWDER RESPIRATORY (INHALATION) DAILY
Status: DISCONTINUED | OUTPATIENT
Start: 2020-08-24 | End: 2020-08-26 | Stop reason: HOSPADM

## 2020-08-24 RX ORDER — ENOXAPARIN SODIUM 100 MG/ML
40 INJECTION SUBCUTANEOUS EVERY 24 HOURS
Status: DISCONTINUED | OUTPATIENT
Start: 2020-08-24 | End: 2020-08-26 | Stop reason: HOSPADM

## 2020-08-24 RX ORDER — COLCHICINE 0.6 MG/1
0.6 TABLET, FILM COATED ORAL DAILY
Status: DISCONTINUED | OUTPATIENT
Start: 2020-08-24 | End: 2020-08-26 | Stop reason: HOSPADM

## 2020-08-24 RX ORDER — IPRATROPIUM BROMIDE AND ALBUTEROL SULFATE 2.5; .5 MG/3ML; MG/3ML
3 SOLUTION RESPIRATORY (INHALATION)
Status: DISCONTINUED | OUTPATIENT
Start: 2020-08-24 | End: 2020-08-26 | Stop reason: HOSPADM

## 2020-08-24 RX ORDER — PANTOPRAZOLE SODIUM 40 MG/1
40 TABLET, DELAYED RELEASE ORAL DAILY
Status: DISCONTINUED | OUTPATIENT
Start: 2020-08-24 | End: 2020-08-26 | Stop reason: HOSPADM

## 2020-08-24 RX ORDER — PREDNISONE 20 MG/1
40 TABLET ORAL DAILY
Status: DISCONTINUED | OUTPATIENT
Start: 2020-08-24 | End: 2020-08-26 | Stop reason: HOSPADM

## 2020-08-24 RX ADMIN — FINASTERIDE 5 MG: 5 TABLET, FILM COATED ORAL at 09:08

## 2020-08-24 RX ADMIN — PREDNISONE 40 MG: 20 TABLET ORAL at 09:08

## 2020-08-24 RX ADMIN — TRAZODONE HYDROCHLORIDE 100 MG: 100 TABLET ORAL at 09:08

## 2020-08-24 RX ADMIN — RISPERIDONE 2 MG: 1 TABLET ORAL at 09:08

## 2020-08-24 RX ADMIN — ASPIRIN 81 MG: 81 TABLET, COATED ORAL at 09:08

## 2020-08-24 RX ADMIN — IPRATROPIUM BROMIDE AND ALBUTEROL SULFATE 3 ML: .5; 2.5 SOLUTION RESPIRATORY (INHALATION) at 07:08

## 2020-08-24 RX ADMIN — INSULIN ASPART 3 UNITS: 100 INJECTION, SOLUTION INTRAVENOUS; SUBCUTANEOUS at 05:08

## 2020-08-24 RX ADMIN — INSULIN DETEMIR 10 UNITS: 100 INJECTION, SOLUTION SUBCUTANEOUS at 09:08

## 2020-08-24 RX ADMIN — ALUMINUM HYDROXIDE, MAGNESIUM HYDROXIDE, AND SIMETHICONE 30 ML: 200; 200; 20 SUSPENSION ORAL at 09:08

## 2020-08-24 RX ADMIN — ASPIRIN 325 MG ORAL TABLET 325 MG: 325 PILL ORAL at 04:08

## 2020-08-24 RX ADMIN — METOPROLOL TARTRATE 25 MG: 25 TABLET, FILM COATED ORAL at 09:08

## 2020-08-24 RX ADMIN — INSULIN ASPART 3 UNITS: 100 INJECTION, SOLUTION INTRAVENOUS; SUBCUTANEOUS at 07:08

## 2020-08-24 RX ADMIN — POLYETHYLENE GLYCOL 3350 17 G: 17 POWDER, FOR SOLUTION ORAL at 09:08

## 2020-08-24 RX ADMIN — SUCRALFATE 1 G: 1 SUSPENSION ORAL at 11:08

## 2020-08-24 RX ADMIN — IPRATROPIUM BROMIDE 1 MG: 0.5 SOLUTION RESPIRATORY (INHALATION) at 03:08

## 2020-08-24 RX ADMIN — ENOXAPARIN SODIUM 40 MG: 40 INJECTION SUBCUTANEOUS at 06:08

## 2020-08-24 RX ADMIN — ALUMINUM HYDROXIDE, MAGNESIUM HYDROXIDE, AND SIMETHICONE 30 ML: 200; 200; 20 SUSPENSION ORAL at 06:08

## 2020-08-24 RX ADMIN — ALBUTEROL SULFATE 15 MG: 2.5 SOLUTION RESPIRATORY (INHALATION) at 03:08

## 2020-08-24 RX ADMIN — LISINOPRIL 10 MG: 10 TABLET ORAL at 09:08

## 2020-08-24 RX ADMIN — FLUTICASONE FUROATE AND VILANTEROL TRIFENATATE 1 PUFF: 100; 25 POWDER RESPIRATORY (INHALATION) at 09:08

## 2020-08-24 RX ADMIN — ALUMINUM HYDROXIDE, MAGNESIUM HYDROXIDE, AND SIMETHICONE 30 ML: 200; 200; 20 SUSPENSION ORAL at 11:08

## 2020-08-24 RX ADMIN — ROSUVASTATIN CALCIUM 40 MG: 20 TABLET, COATED ORAL at 09:08

## 2020-08-24 RX ADMIN — SUCRALFATE 1 G: 1 SUSPENSION ORAL at 05:08

## 2020-08-24 RX ADMIN — AZITHROMYCIN 500 MG: 250 TABLET, FILM COATED ORAL at 07:08

## 2020-08-24 RX ADMIN — NICOTINE 1 PATCH: 21 PATCH, EXTENDED RELEASE TRANSDERMAL at 09:08

## 2020-08-24 RX ADMIN — TAMSULOSIN HYDROCHLORIDE 0.4 MG: 0.4 CAPSULE ORAL at 09:08

## 2020-08-24 RX ADMIN — MAGNESIUM SULFATE 2 G: 2 INJECTION INTRAVENOUS at 03:08

## 2020-08-24 RX ADMIN — BENZTROPINE MESYLATE 0.5 MG: 0.5 TABLET ORAL at 09:08

## 2020-08-24 RX ADMIN — PANTOPRAZOLE SODIUM 40 MG: 40 TABLET, DELAYED RELEASE ORAL at 09:08

## 2020-08-24 RX ADMIN — INSULIN ASPART 2 UNITS: 100 INJECTION, SOLUTION INTRAVENOUS; SUBCUTANEOUS at 11:08

## 2020-08-24 RX ADMIN — COLCHICINE 0.6 MG: 0.6 TABLET, FILM COATED ORAL at 09:08

## 2020-08-24 RX ADMIN — ALBUTEROL SULFATE 15 MG: 2.5 SOLUTION RESPIRATORY (INHALATION) at 04:08

## 2020-08-24 RX ADMIN — QUETIAPINE FUMARATE 300 MG: 200 TABLET ORAL at 09:08

## 2020-08-24 RX ADMIN — INSULIN ASPART 3 UNITS: 100 INJECTION, SOLUTION INTRAVENOUS; SUBCUTANEOUS at 11:08

## 2020-08-24 RX ADMIN — IPRATROPIUM BROMIDE AND ALBUTEROL SULFATE 3 ML: .5; 2.5 SOLUTION RESPIRATORY (INHALATION) at 01:08

## 2020-08-24 RX ADMIN — INSULIN ASPART 5 UNITS: 100 INJECTION, SOLUTION INTRAVENOUS; SUBCUTANEOUS at 07:08

## 2020-08-24 RX ADMIN — ISOSORBIDE MONONITRATE 30 MG: 30 TABLET, EXTENDED RELEASE ORAL at 09:08

## 2020-08-24 NOTE — ED NOTES
"Pt resting in stretcher watching TV, no apparent distress noted. Pt remains on BiPAP at this time, tolerating well. Respirations even and unlabored, oxygen saturation 98%. Pt reports mild "stinging" pain to left ribcage. Pt rates pain 3/10 on 0-10 scale. Aspirin administered per MD order. Pt aware of POC and denies needs at this time. Pt remains on cardiac monitor, automated BP cuff, and pulse oximeter. Side rails raised x2, bed locked and low.  "

## 2020-08-24 NOTE — PROGRESS NOTES
[facility] Hospital Medicine  Progress Note    Patient Name: Paresh Senior  MRN: [id number]  Patient Class: OP- Observation   Admission Date: 8/24/2020  Length of Stay: 0 days  Attending Physician: Jhon Tucker MD  Primary Care Provider: Fernando Carmona MD    [facility] Medicine Team: [facility] HOSP MED K Jhon Tucker MD    HPI:      Mr. Senior is a 65 year old male with PMH of HTN, CAD, NIDDM2, CKD II, schizophrenia, gout, tobacco abuse, COPD presents to ED for evaluation of worsening SOB for last 2-3 days. Patient also endorses associated wheezing and productive cough of yellow sputum. Patient states he tried using nebulizer at home without improvement. This evening he had hard time catching his breath and called EMS. Upon EMS arrival he was wheezing with O2 sat 91%. He received supplemental oxygen, duonebs and solumedrol in route. He was transiently placed on BIPAP in ED with significant clinical improvement and transitioned to 2L supplemental oxygen with sat 95%.      Patient states he was on 2L home oxygen until one year ago when new insurance company did not approve it. He denies fever, chills, chest pain, palpitation, sick contact or recent travel. He endorses smoking 1 PPD. States he is compliant with his medications.     Subjective:     Principal Problem:COPD exacerbation    Interval History: Patient lying in bed, in moderate respiratory distress. Reports wheezing, fatigue and SOB. No other complaints. Notes that SOB worsened after his inhalers were changed several months ago due to his insurance. He also mentions that he needs home oxygen reordered since it was discontinued after switched insurances.    Review of Systems   Constitutional: Positive for activity change. Negative for chills and fever.   HENT: Negative for congestion.    Eyes: Negative for visual disturbance.   Respiratory: Positive for shortness of breath. Negative for cough.    Cardiovascular: Negative for chest  pain and leg swelling.   Gastrointestinal: Negative for abdominal distention, abdominal pain, nausea and vomiting.   Genitourinary: Negative for dysuria.   Neurological: Negative for dizziness and weakness.   Psychiatric/Behavioral: Negative for confusion.        Objective:     Vital Signs (Most Recent):  Temp: 98.3 °F (36.8 °C) (08/24/20 0752)  Pulse: 96 (08/24/20 0755)  Resp: (!) 22 (08/24/20 0755)  BP: (!) 169/75 (08/24/20 0752)  SpO2: 95 % (08/24/20 0755) Vital Signs (24h Range):  Temp:  [98.3 °F (36.8 °C)-98.5 °F (36.9 °C)] 98.3 °F (36.8 °C)  Pulse:  [] 96  Resp:  [20-26] 22  SpO2:  [93 %-98 %] 95 %  BP: (160-190)/(75-96) 169/75     Weight: 77.1 kg (170 lb)  Body mass index is 25.1 kg/m².  No intake or output data in the 24 hours ending 08/24/20 0831     Physical Exam  Constitutional:       General: He is in acute distress (moderate respiratory distress).   HENT:      Head: Normocephalic.      Mouth/Throat:      Mouth: Mucous membranes are moist.   Eyes:      Pupils: Pupils are equal, round, and reactive to light.   Neck:      Musculoskeletal: Normal range of motion.   Cardiovascular:      Rate and Rhythm: Normal rate and regular rhythm.      Pulses: Normal pulses.      Heart sounds: Normal heart sounds.   Pulmonary:      Effort: Respiratory distress present.      Breath sounds: Wheezing present.   Abdominal:      General: Bowel sounds are normal. There is no distension.      Palpations: Abdomen is soft.      Tenderness: There is no abdominal tenderness.   Musculoskeletal: Normal range of motion.   Skin:     General: Skin is warm.   Neurological:      General: No focal deficit present.      Mental Status: He is alert.   Psychiatric:         Mood and Affect: Mood normal.           Significant Labs:   A1C: No results for input(s): HGBA1C in the last 4320 hours.  CBC:   Recent Labs   Lab 08/24/20  0305   WBC 10.25   HGB 14.6   HCT 44.1        CMP:   Recent Labs   Lab 08/24/20  0305      K 3.9    *   CO2 24   *   BUN 10   CREATININE 1.1   CALCIUM 8.5*   PROT 6.3   ALBUMIN 3.0*   BILITOT 0.4   ALKPHOS 73   AST 17   ALT 12   ANIONGAP 7*   EGFRNONAA >60.0     Magnesium: No results for input(s): MG in the last 48 hours.  POCT Glucose:   Recent Labs   Lab 08/24/20  1112 08/24/20  1601 08/24/20  2117   POCTGLUCOSE 247* 159* 174*     Troponin:   Recent Labs   Lab 08/24/20  0305 08/24/20  0957   TROPONINI 0.031* 0.030*     TSH:   Recent Labs   Lab 03/03/20  1538   TSH 1.166     Urine Studies: No results for input(s): COLORU, APPEARANCEUA, PHUR, SPECGRAV, PROTEINUA, GLUCUA, KETONESU, BILIRUBINUA, OCCULTUA, NITRITE, UROBILINOGEN, LEUKOCYTESUR, RBCUA, WBCUA, BACTERIA, SQUAMEPITHEL, HYALINECASTS in the last 48 hours.    Invalid input(s): WRIGHTSUR    Significant Imaging: I have reviewed and interpreted all pertinent imaging results/findings within the past 24 hours.    Assessment/Plan:      Active Diagnoses:    Diagnosis Date Noted POA    PRINCIPAL PROBLEM:  COPD exacerbation [J44.1] 08/24/2020 Yes    Acute hypoxemic respiratory failure [J96.01] 08/24/2020 Yes    Urine retention [R33.9] 01/29/2020 Yes    Stage 2 chronic kidney disease [N18.2] 01/27/2020 Yes    Mixed hyperlipidemia [E78.2] 12/16/2019 Yes    Centrilobular emphysema [J43.2] 12/10/2019 Yes    Schizophrenia [F20.9] 12/10/2019 Yes    Type 2 diabetes mellitus with hyperglycemia, without long-term current use of insulin [E11.65] 12/10/2019 Yes    CAD (coronary artery disease) [I25.10] 11/25/2014 Yes    Tobacco abuse [Z72.0] 11/25/2014 Yes      Problems Resolved During this Admission:     Scheduled Meds:   albuterol-ipratropium  3 mL Nebulization Q6H WAKE    aluminum-magnesium hydroxide-simethicone  30 mL Oral QID (AC & HS)    aspirin  81 mg Oral Daily    [START ON 8/25/2020] azithromycin  250 mg Oral Daily    benztropine  0.5 mg Oral BID    colchicine  0.6 mg Oral Daily    enoxaparin  40 mg Subcutaneous Q24H    finasteride  5 mg  Oral Daily    fluticasone furoate-vilanteroL  1 puff Inhalation Daily    insulin aspart U-100  3 Units Subcutaneous TIDWM    insulin detemir U-100  10 Units Subcutaneous QHS    isosorbide mononitrate  30 mg Oral Daily    lisinopriL  10 mg Oral Daily    metoprolol tartrate  25 mg Oral BID    nicotine  1 patch Transdermal Daily    pantoprazole  40 mg Oral Daily    polyethylene glycol  17 g Oral Daily    predniSONE  40 mg Oral Daily    QUEtiapine  300 mg Oral QHS    risperiDONE  2 mg Oral BID    rosuvastatin  40 mg Oral QHS    sucralfate  1 g Oral Q6H    tamsulosin  0.4 mg Oral Daily    traZODone  100 mg Oral QHS     Continuous Infusions:  PRN Meds:.dextrose 50%, dextrose 50%, glucagon (human recombinant), glucose, glucose, insulin aspart U-100, ondansetron, senna-docusate 8.6-50 mg, sodium chloride 0.9%    PLAN:    #COPD exacerbation   Acute hypoxic respiratory failure   Tobacco abuse      -clinically improved with nebs, solumedrol and transient BIPAP   -currently on 2 liter supplemental oxygen with sat 95%  -conversant, AAO X 4  -continue nebs, steroid and azithromycin per COPD pathway   -counseled smoking cessation   -follow up with respiratory cultures   -nicotine patch   -check UDS given prior history of cocaine and THC abuse   -social work consult for discharge planing and home oxygen set up   - if patient remains on O2 prior to discharge, he will need 6MWT to qualify for home oxygen      #HTN, CAD  -stable, no acute issue   -continue home regimen aspirin, beta blocker, statin, ACE-I, imdur      #NIDDM2  -check A1C  -hold glipizide  -will start on levemir 10U, novolog 3U TIDWM and SSI  -adjust insulin based on CBG      #Chronic gout  -no acute flare   -continue colchicine daily      #GERD  -on PPI     #BPH  -no acute issue   -continue home dose proscar and flomax      #Schizophrenia   -no acute issue   -denies SI/HI  -continue home dose risperidone, seroquel, benztropine and trazodone     VTE Risk  Mitigation (From admission, onward)         Ordered     enoxaparin injection 40 mg  Every 24 hours      08/24/20 0611     IP VTE HIGH RISK PATIENT  Once      08/24/20 0611     Place sequential compression device  Until discontinued      08/24/20 0611              Time spent in care of patient: > 35 minutes     Jhon Tucker MD  Department of Hospital Medicine   [facility]

## 2020-08-24 NOTE — ED NOTES
Admitted to floor. Diagnosis, medications, & POC discussed with patient. Patient verbalized understanding. All questions and concerns answered. No needs expressed at the time. Pt is awake, alert and oriented x4 with no acute distress noted. Respirations even and unlabored.Pt on 2L oxygen per NC, sats 96%. Per stretcher out of ED to floor. Mask in place and all belongings on stretcher with patient.

## 2020-08-24 NOTE — ED NOTES
Telemetry Verification   Patient placed on Telemetry Box  Verified with War Room  Box # [id number]   Monitor Tech Geeta   Rate 101   Rhythm Sinus Tachycardia

## 2020-08-24 NOTE — ED PROVIDER NOTES
Encounter Date: 8/24/2020       History     Chief Complaint   Patient presents with    COPD exacerbation     Upon EMS arrivial RA saturation 91%; Given duoneb and solumedrol in route w/ marked improvement.      HPI   Patient is a 65-year-old male history of COPD, CAD, hypertension, who presents to ED brought in by EMS complaining of shortness of breath that worsened over the past 2 days.  Patient states despite him using his home nebulizer and inhaler his shortness of breath has continued to worsen.  Patient states symptom got severely worse today which prompted him to activated EMS.  EMS gave the patient DuoNeb treatment, Solu-Medrol en route prior to arrival to the ED.  Patient states he used to have supplemental oxygen at home however states insurance took this away sometime ago.  Patient also endorses a cough productive of yellowish phlegm intermittently.  Patient endorses chest tightness however denies any fevers, chills, nausea, vomiting, myalgias, no sick contacts.  Review of patient's allergies indicates:   Allergen Reactions    Trelegy ellipta [fluticasone-umeclidin-vilanter] Other (See Comments)     Possible urinary retention      Past Medical History:   Diagnosis Date    Allergy     Colon polyp 2013    COPD (chronic obstructive pulmonary disease)     Coronary artery disease     Disorder of kidney and ureter     Hypercholesterolemia     Hypertension     Urinary tract infection      Past Surgical History:   Procedure Laterality Date    COLONOSCOPY  02/2016    Advised repeat in 3 years    COLONOSCOPY N/A 7/22/2020    Procedure: COLONOSCOPY [place];  Surgeon: Saul Velez MD;  Location: [facility];  Service: Endoscopy;  Laterality: N/A;    CORONARY STENT PLACEMENT  2013     Family History   Problem Relation Age of Onset    No Known Problems Mother     Mental illness Brother     Diabetes Grandchild      Social History     Tobacco Use    Smoking status: Current Every Day Smoker      "Packs/day: 0.75     Years: 50.00     Pack years: 37.50     Types: Cigars    Smokeless tobacco: Never Used   Substance Use Topics    Alcohol use: Yes     Comment: 2-3beers/day    Drug use: Yes     Types: Cocaine, "Crack" cocaine     Comment: 3-4 marijuana joints per day     Review of Systems   Constitutional: Negative for fever.   HENT: Negative for sore throat.    Respiratory: Positive for cough, chest tightness and shortness of breath.    Cardiovascular: Negative for chest pain.   Gastrointestinal: Negative for nausea.   Genitourinary: Negative for dysuria.   Musculoskeletal: Negative for back pain.   Skin: Negative for rash.   Neurological: Negative for weakness.   Hematological: Does not bruise/bleed easily.       Physical Exam     Initial Vitals [08/24/20 0256]   BP Pulse Resp Temp SpO2   (!) 173/91 105 (!) 26 98.5 °F (36.9 °C) (!) 93 %      MAP       --         Physical Exam    Nursing note and vitals reviewed.  Constitutional: He appears well-developed and well-nourished. He is not diaphoretic. He is active.  Non-toxic appearance. He does not appear ill.   Patient appears uncomfortable taking shallow breaths   HENT:   Head: Normocephalic and atraumatic.   Eyes: Conjunctivae are normal. Pupils are equal, round, and reactive to light. Right conjunctiva is not injected. Left conjunctiva is not injected.   Neck: Trachea normal, normal range of motion and full passive range of motion without pain. Neck supple. No neck rigidity.   Cardiovascular: Normal rate, regular rhythm, S1 normal, S2 normal, intact distal pulses and normal pulses.   Pulmonary/Chest: He has wheezes.   Mild inspiratory and expiratory wheezes appreciated.  Subcostal accessory muscle use.   Abdominal: Soft. Normal appearance and bowel sounds are normal. There is no abdominal tenderness.   Musculoskeletal: No tenderness or edema.   Neurological: He is alert and oriented to person, place, and time.   Skin: Skin is warm and dry. Capillary refill " "takes less than 2 seconds.         ED Course   Procedures  Labs Reviewed   COMPREHENSIVE METABOLIC PANEL - Abnormal; Notable for the following components:       Result Value    Chloride 111 (*)     Glucose 116 (*)     Calcium 8.5 (*)     Albumin 3.0 (*)     Anion Gap 7 (*)     All other components within normal limits   TROPONIN I - Abnormal; Notable for the following components:    Troponin I 0.031 (*)     All other components within normal limits   CBC W/ AUTO DIFFERENTIAL   B-TYPE NATRIURETIC PEPTIDE   SARS-COV-2 RNA AMPLIFICATION, QUAL     EKG Readings: (Independently Interpreted)   Heart Rate: 100.   Normal sinus rhythm, rate of 100 beats per minute, no ST elevations, depressions.  Intervals within normal limits.       Imaging Results          X-Ray Chest 1 View (Final result)  Result time 08/24/20 03:46:49    Final result by Gibran Ramirez MD (08/24/20 03:46:49)                 Impression:      No acute cardiopulmonary finding identified on this single view.      Electronically signed by: Gibran Ramirez MD  Date:    08/24/2020  Time:    03:46             Narrative:    EXAMINATION:  XR CHEST 1 VIEW    CLINICAL HISTORY:  Provided history is "  Shortness of breath".    TECHNIQUE:  One view of the chest.    COMPARISON:  03/01/2020.    FINDINGS:  Cardiac wires overlie the chest.  Cardiac silhouette is not enlarged.  Mild platelike subsegmental atelectasis versus scarring in the left mid lung.  No focal consolidation.  No sizable pleural effusion.  No pneumothorax.                                 Medical Decision Making:   Initial Assessment:   This is the emergent evaluation of a 65-year-old gentleman history of COPD who presents to ED complaining of shortness of breath has been worsening for the past 2 days.  On arrival patient was satting in the low 90s and slightly tachypneic.  Patient receives Solu-Medrol, DuoNeb treatment enroute to the ED. On physical exam patient is noted to be slightly tachycardic with " inspiratory and expiratory wheezing on auscultation.  2+ equal radial pulses bilaterally.  There is no lower extremity edema.  Remainder of exam is unremarkable.  Due to patient being so short of breath he was placed on BiPAP on arrival in addition to receiving magnesium, repeat DuoNeb treatment.  Patient is a became much more comfortable at this intervention.  Obtained basic labs in addition to chest x-ray, cardiac markers, COVID-19 nasopharyngeal swab, VBG.  Labs return and noted his elevated troponin of to slightly above normal range of 0.031.  EKG normal sinus with no ST elevations, no irregular intervals, Flattening T-waves in inferior leads.  COVID-19 was negative.  Chest x-ray was negative for any acute cardiopulmonary disease process.  VBG normal for patient. Remainder of labs are unremarkable.  At this time patient will be placed in observation for NSTEMI type 2.  He is currently hemodynamically stable.  Will monitor the patient's transferred to inpatient bed for continued care.    Caio Kidd M.D.  [facility] Emergency Medicine  PGY-4                Attending Attestation:   Physician Attestation Statement for Resident:  As the supervising MD   Physician Attestation Statement: I have personally seen and examined this patient.   I agree with the above history. -:   As the supervising MD I agree with the above PE.    As the supervising MD I agree with the above treatment, course, plan, and disposition.   -:   COPD exacerbation on BiPAP, continuous nebs, received Solu-Medrol from EMS and received magnesium here.  Significantly improved with these measures.  Admitted to Internal Medicine.  I have reviewed and agree with the residents interpretation of the following: lab data and x-rays.                                  Clinical Impression:       ICD-10-CM ICD-9-CM   1. COPD exacerbation  J44.1 491.21   2. SOB (shortness of breath)  R06.02 786.05   3. SOB (shortness of breath)  R06.02 786.05             ED Disposition  Condition    Admit                           Caio Kidd MD  Resident  08/24/20 0432       Yenni Garcia MD  08/24/20 0454

## 2020-08-24 NOTE — PLAN OF CARE
Problem: Adult Inpatient Plan of Care  Goal: Plan of Care Review  Outcome: Ongoing, Progressing     Discussed w/ pt plan of care, pt verbalized understanding, pt denies pain/needs at this time, pt actively trying to get out of bed to sit up and help open his airway, pt given nicotine patch for tobacco cessation while in hospital, discussed importance of decreasing/quitting smoking, pt verbalized understanding of teaching      Problem: Adult Inpatient Plan of Care  Goal: Optimal Comfort and Wellbeing  Outcome: Ongoing, Progressing     Pt ambulated to bathroom w/ standby assist, pt sitting up in chair, allowed pt to leave own clothes on per his request, pt SOB is improving, pt denies s/s at this time, will continue to monitor

## 2020-08-24 NOTE — ED NOTES
Pt to be weaned off BiPAP at this time per MD Jose. BiPAP discontinued by RRT. Pt respirations even and unlabored on room air. Oxygen saturation 91-92% on room air. 2L oxygen applied per NC. Sats 94%. MD aware of pt status.

## 2020-08-24 NOTE — H&P
[facility]    History & Physical      Patient Name: Paresh Senior  MRN: [id number]  Admission Date: 8/24/2020  Attending Physician: Jhon Tucker MD   Primary Care Provider: Fernando Carmona MD    Hospital Medicine Team: Networked reference to record PCT  Jerilyn Zamorano DO     Patient information was obtained from patient and ER records.     Subjective:     Principal Problem:COPD exacerbation    Chief Complaint:   Chief Complaint   Patient presents with    COPD exacerbation     Upon EMS arrivial RA saturation 91%; Given duoneb and solumedrol in route w/ marked improvement.         HPI:     Mr. Senior is a 65 year old male with PMH of HTN, CAD, NIDDM2, CKD II, schizophrenia, gout, tobacco abuse, COPD presents to ED for evaluation of worsening SOB for last 2-3 days. Patient also endorses associated wheezing and productive cough of yellow sputum. Patient states he tried using nebulizer at home without improvement. This evening he had hard time catching his breath and called EMS. Upon EMS arrival he was wheezing with O2 sat 91%. He received supplemental oxygen, duonebs and solumedrol in route. He was transiently placed on BIPAP in ED with significant clinical improvement and transitioned to 2L supplemental oxygen with sat 95%.     Patient states he was on 2L home oxygen until one year ago when new insurance company did not approve it. He denies fever, chills, chest pain, palpitation, sick contact or recent travel. He endorses smoking 1 PPD. States he is compliant with his medications.     Past Medical History:   Diagnosis Date    Allergy     Colon polyp 2013    COPD (chronic obstructive pulmonary disease)     Coronary artery disease     Disorder of kidney and ureter     Hypercholesterolemia     Hypertension     Urinary tract infection        Past Surgical History:   Procedure Laterality Date    COLONOSCOPY  02/2016    Advised repeat in 3 years    COLONOSCOPY N/A 7/22/2020    Procedure:  COLONOSCOPY [place];  Surgeon: Saul Velez MD;  Location: [facility];  Service: Endoscopy;  Laterality: N/A;    CORONARY STENT PLACEMENT  2013       Review of patient's allergies indicates:   Allergen Reactions    Trelegy ellipta [fluticasone-umeclidin-vilanter] Other (See Comments)     Possible urinary retention        No current facility-administered medications on file prior to encounter.      Current Outpatient Medications on File Prior to Encounter   Medication Sig    acetaminophen (TYLENOL) 500 MG tablet Take 2 tablets (1,000 mg total) by mouth every 8 (eight) hours as needed for Pain.    albuterol (PROAIR HFA) 90 mcg/actuation inhaler Inhale 2 puffs into the lungs every 6 (six) hours as needed for Wheezing. Rescue    albuterol (PROVENTIL) 2.5 mg /3 mL (0.083 %) nebulizer solution Take 3 mLs (2.5 mg total) by nebulization every 6 (six) hours as needed for Wheezing or Shortness of Breath. Rescue    aspirin (ECOTRIN) 81 MG EC tablet Take 1 tablet (81 mg total) by mouth once daily.    benztropine (COGENTIN) 0.5 MG tablet Take 1 tablet (0.5 mg total) by mouth 2 (two) times daily.    bisoprolol (ZEBETA) 5 MG tablet Take 0.5 tablets (2.5 mg total) by mouth once daily.    blood sugar diagnostic Strp To check BG 3 times daily, to use with insurance preferred meter    blood-glucose meter kit To check BG 3 times daily, to use with insurance preferred meter    cholecalciferol, vitamin D3, (VITAMIN D3) 25 mcg (1,000 unit) capsule Take 1 capsule (1,000 Units total) by mouth once daily.    colchicine (COLCRYS) 0.6 mg tablet Take half a tablet every day    diclofenac sodium (VOLTAREN) 1 % Gel Apply 4 g topically 4 (four) times daily.    finasteride (PROSCAR) 5 mg tablet Take 1 tablet (5 mg total) by mouth once daily.    fluticasone furoate-vilanteroL (BREO) 100-25 mcg/dose diskus inhaler Inhale 1 puff into the lungs once daily. Controller    glipiZIDE (GLUCOTROL) 5 MG tablet Take 1 tablet (5 mg total) by  "mouth 2 (two) times daily with meals.    isosorbide mononitrate (IMDUR) 30 MG 24 hr tablet Take 1 tablet (30 mg total) by mouth once daily.    lancets Misc To check BG 3 times daily, to use with insurance preferred meter    lisinopriL 10 MG tablet Take 1 tablet (10 mg total) by mouth once daily.    mag hydrox/aluminum hyd/simeth (ANTACID ORAL) Take by mouth.    omeprazole (PRILOSEC) 20 MG capsule Take 1 capsule (20 mg total) by mouth once daily.    polyethylene glycol (GLYCOLAX) 17 gram/dose powder Mix 1 capful (17 g) with liquid and take by mouth 2 (two) times daily as needed (constipation).    polyethylene glycol (GOLYTELY,NULYTELY) 236-22.74-6.74 -5.86 gram suspension Take 4,000 mLs (4 L total) by mouth As instructed.    QUEtiapine (SEROQUEL) 300 MG Tab Take 1 tablet (300 mg total) by mouth every evening.    risperiDONE (RISPERDAL) 2 MG tablet Take 1 tablet (2 mg total) by mouth 2 (two) times daily.    rosuvastatin (CRESTOR) 40 MG Tab Take 1 tablet (40 mg total) by mouth every evening.    tamsulosin (FLOMAX) 0.4 mg Cap Take 1 capsule (0.4 mg total) by mouth once daily.    traZODone (DESYREL) 100 MG tablet Take 1 tablet (100 mg total) by mouth every evening.    triamcinolone acetonide 0.1% (KENALOG) 0.1 % ointment Apply topically 2 (two) times daily. for 14 days     Family History     Problem Relation (Age of Onset)    Diabetes Grandchild    Mental illness Brother    No Known Problems Mother        Tobacco Use    Smoking status: Current Every Day Smoker     Packs/day: 0.75     Years: 50.00     Pack years: 37.50     Types: Cigars    Smokeless tobacco: Never Used   Substance and Sexual Activity    Alcohol use: Yes     Comment: 2-3beers/day    Drug use: Yes     Types: Cocaine, "Crack" cocaine     Comment: 3-4 marijuana joints per day    Sexual activity: Not Currently     Review of Systems   Constitutional: Negative for activity change, appetite change, chills, diaphoresis, fatigue, fever and " unexpected weight change.   HENT: Negative for congestion, dental problem, drooling, ear discharge, ear pain, facial swelling, hearing loss, mouth sores, nosebleeds, postnasal drip, rhinorrhea, sinus pressure, sneezing, sore throat, tinnitus, trouble swallowing and voice change.    Eyes: Negative for photophobia, pain, discharge, redness, itching and visual disturbance.   Respiratory: Positive for cough, shortness of breath and wheezing. Negative for apnea, choking, chest tightness and stridor.    Cardiovascular: Negative for chest pain, palpitations and leg swelling.   Gastrointestinal: Negative for abdominal distention, abdominal pain, anal bleeding, blood in stool, constipation, diarrhea, nausea, rectal pain and vomiting.   Endocrine: Negative for cold intolerance, heat intolerance, polydipsia, polyphagia and polyuria.   Genitourinary: Negative for decreased urine volume, difficulty urinating, discharge, dysuria, enuresis, flank pain, frequency, genital sores, hematuria, penile pain, penile swelling, scrotal swelling, testicular pain and urgency.   Musculoskeletal: Negative for arthralgias, back pain, gait problem, joint swelling, myalgias, neck pain and neck stiffness.   Skin: Negative for color change, pallor, rash and wound.   Allergic/Immunologic: Negative for environmental allergies, food allergies and immunocompromised state.   Neurological: Negative for dizziness, tremors, seizures, syncope, facial asymmetry, speech difficulty, weakness, light-headedness, numbness and headaches.   Hematological: Negative for adenopathy. Does not bruise/bleed easily.   Psychiatric/Behavioral: Negative for agitation, behavioral problems, confusion, decreased concentration, dysphoric mood, hallucinations, self-injury, sleep disturbance and suicidal ideas. The patient is not nervous/anxious and is not hyperactive.      Objective:     Vital Signs (Most Recent):  Temp: 98.3 °F (36.8 °C) (08/24/20 0531)  Pulse: 97 (08/24/20  0531)  Resp: (!) 21 (08/24/20 0423)  BP: (!) 190/96 (08/24/20 0531)  SpO2: 96 % (08/24/20 0531) Vital Signs (24h Range):  Temp:  [98.3 °F (36.8 °C)-98.5 °F (36.9 °C)] 98.3 °F (36.8 °C)  Pulse:  [] 97  Resp:  [20-26] 21  SpO2:  [93 %-98 %] 96 %  BP: (160-190)/(81-96) 190/96     Weight: 77.1 kg (170 lb)  Body mass index is 25.1 kg/m².    Physical Exam  Constitutional:       General: He is not in acute distress.     Appearance: He is well-developed. He is not diaphoretic.   HENT:      Head: Normocephalic and atraumatic.      Nose: Nose normal.      Mouth/Throat:      Pharynx: No oropharyngeal exudate.   Eyes:      General: No scleral icterus.     Conjunctiva/sclera: Conjunctivae normal.      Pupils: Pupils are equal, round, and reactive to light.   Neck:      Musculoskeletal: Normal range of motion and neck supple.      Thyroid: No thyromegaly.      Vascular: No JVD.      Trachea: No tracheal deviation.   Cardiovascular:      Rate and Rhythm: Normal rate and regular rhythm.      Heart sounds: Normal heart sounds. No murmur.   Pulmonary:      Effort: Pulmonary effort is normal. No respiratory distress.      Breath sounds: Wheezing (mild bibasilar expiratory wheezing ) present. No rales.   Chest:      Chest wall: No tenderness.   Abdominal:      General: Bowel sounds are normal. There is no distension.      Palpations: Abdomen is soft. There is no mass.      Tenderness: There is no abdominal tenderness. There is no guarding or rebound.   Musculoskeletal: Normal range of motion.         General: No tenderness.   Lymphadenopathy:      Cervical: No cervical adenopathy.   Skin:     General: Skin is warm and dry.      Findings: No erythema or rash.   Neurological:      Mental Status: He is alert and oriented to person, place, and time.      Cranial Nerves: No cranial nerve deficit.      Motor: No abnormal muscle tone.      Coordination: Coordination normal.      Deep Tendon Reflexes: Reflexes are normal and symmetric.  Reflexes normal.   Psychiatric:         Thought Content: Thought content normal.         Judgment: Judgment normal.           CRANIAL NERVES     CN III, IV, VI   Pupils are equal, round, and reactive to light.      Significant Labs:   Recent Results (from the past 24 hour(s))   CBC auto differential    Collection Time: 08/24/20  3:05 AM   Result Value Ref Range    WBC 10.25 3.90 - 12.70 K/uL    RBC 4.90 4.60 - 6.20 M/uL    Hemoglobin 14.6 14.0 - 18.0 g/dL    Hematocrit 44.1 40.0 - 54.0 %    Mean Corpuscular Volume 90 82 - 98 fL    Mean Corpuscular Hemoglobin 29.8 27.0 - 31.0 pg    Mean Corpuscular Hemoglobin Conc 33.1 32.0 - 36.0 g/dL    RDW 14.1 11.5 - 14.5 %    Platelets 226 150 - 350 K/uL    MPV 10.3 9.2 - 12.9 fL    Immature Granulocytes 0.3 0.0 - 0.5 %    Gran # (ANC) 6.9 1.8 - 7.7 K/uL    Immature Grans (Abs) 0.03 0.00 - 0.04 K/uL    Lymph # 2.2 1.0 - 4.8 K/uL    Mono # 1.0 0.3 - 1.0 K/uL    Eos # 0.1 0.0 - 0.5 K/uL    Baso # 0.04 0.00 - 0.20 K/uL    nRBC 0 0 /100 WBC    Gran% 67.4 38.0 - 73.0 %    Lymph% 21.0 18.0 - 48.0 %    Mono% 9.5 4.0 - 15.0 %    Eosinophil% 1.4 0.0 - 8.0 %    Basophil% 0.4 0.0 - 1.9 %    Differential Method Automated    Comprehensive metabolic panel    Collection Time: 08/24/20  3:05 AM   Result Value Ref Range    Sodium 142 136 - 145 mmol/L    Potassium 3.9 3.5 - 5.1 mmol/L    Chloride 111 (H) 95 - 110 mmol/L    CO2 24 23 - 29 mmol/L    Glucose 116 (H) 70 - 110 mg/dL    BUN, Bld 10 8 - 23 mg/dL    Creatinine 1.1 0.5 - 1.4 mg/dL    Calcium 8.5 (L) 8.7 - 10.5 mg/dL    Total Protein 6.3 6.0 - 8.4 g/dL    Albumin 3.0 (L) 3.5 - 5.2 g/dL    Total Bilirubin 0.4 0.1 - 1.0 mg/dL    Alkaline Phosphatase 73 55 - 135 U/L    AST 17 10 - 40 U/L    ALT 12 10 - 44 U/L    Anion Gap 7 (L) 8 - 16 mmol/L    eGFR if African American >60.0 >60 mL/min/1.73 m^2    eGFR if non African American >60.0 >60 mL/min/1.73 m^2   Brain natriuretic peptide    Collection Time: 08/24/20  3:05 AM   Result Value Ref Range      (H) 0 - 99 pg/mL   Troponin I    Collection Time: 08/24/20  3:05 AM   Result Value Ref Range    Troponin I 0.031 (H) 0.000 - 0.026 ng/mL   COVID-19 Rapid Screening    Collection Time: 08/24/20  3:08 AM   Result Value Ref Range    SARS-CoV-2 RNA, Amplification, Qual Negative Negative   ISTAT PROCEDURE    Collection Time: 08/24/20  4:17 AM   Result Value Ref Range    POC PH 7.382 7.35 - 7.45    POC PCO2 45.9 (H) 35 - 45 mmHg    POC PO2 77 (HH) 40 - 60 mmHg    POC HCO3 27.3 24 - 28 mmol/L    POC BE 2 -2 to 2 mmol/L    POC SATURATED O2 95 95 - 100 %    POC TCO2 29 24 - 29 mmol/L    Rate 16     Sample VENOUS     Site Other     Allens Test N/A     DelSys CPAP/BiPAP     Mode BiPAP     FiO2 35     Spont Rate 26     Min Vol 9     Sp02 96     IP 10     EP 5          Significant Imaging: I have reviewed and interpreted all pertinent imaging results/findings within the past 24 hours.    Assessment/Plan:     Active Diagnoses:    Diagnosis Date Noted POA    PRINCIPAL PROBLEM:  COPD exacerbation [J44.1] 08/24/2020 Yes    Acute hypoxemic respiratory failure [J96.01] 08/24/2020 Yes    Urine retention [R33.9] 01/29/2020 Yes    Stage 2 chronic kidney disease [N18.2] 01/27/2020 Yes    Mixed hyperlipidemia [E78.2] 12/16/2019 Yes    Centrilobular emphysema [J43.2] 12/10/2019 Yes    Schizophrenia [F20.9] 12/10/2019 Yes    Type 2 diabetes mellitus with hyperglycemia, without long-term current use of insulin [E11.65] 12/10/2019 Yes    CAD (coronary artery disease) [I25.10] 11/25/2014 Yes    Tobacco abuse [Z72.0] 11/25/2014 Yes      Problems Resolved During this Admission:     #COPD exacerbation   Acute hypoxic respiratory failure   Tobacco abuse     -clinically improved with nebs, solumedrol and transient BIPAP   -currently on 2 liter supplemental oxygen with sat 95%  -conversant, AAO X 4  -continue nebs, steroid and azithromycin per COPD pathway   -counseled smoking cessation   -follow up with respiratory cultures    -nicotine patch   -check UDS given prior history of cocaine and THC abuse   -social work consult for discharge planing and home oxygen set up     #HTN, CAD  -stable, no acute issue   -continue home regimen aspirin, beta blocker, statin, ACE-I, imdur     #NIDDM2  -check A1C  -hold glipizide  -will start on levemir 10U, novolog 3U TIDWM and SSI  -adjust insulin based on CBG     #Chronic gout  -no acute flare   -continue colchicine daily     #GERD  -on PPI    #BPH  -no acute issue   -continue home dose proscar and flomax     #Schizophrenia   -no acute issue   -denies SI/HI  -continue home dose risperidone, seroquel, benztropine and trazodone     VTE Risk Mitigation (From admission, onward)         Ordered     enoxaparin injection 40 mg  Every 24 hours      08/24/20 0611     IP VTE HIGH RISK PATIENT  Once      08/24/20 0611     Place sequential compression device  Until discontinued      08/24/20 0611                  Jerilyn Zamorano DO  Department of Hospital Medicine   [facility]

## 2020-08-25 PROBLEM — F14.10 COCAINE USE DISORDER: Status: ACTIVE | Noted: 2020-08-25

## 2020-08-25 LAB
ANION GAP SERPL CALC-SCNC: 6 MMOL/L (ref 8–16)
BASOPHILS # BLD AUTO: 0.03 K/UL (ref 0–0.2)
BASOPHILS NFR BLD: 0.2 % (ref 0–1.9)
BUN SERPL-MCNC: 27 MG/DL (ref 8–23)
CALCIUM SERPL-MCNC: 8.5 MG/DL (ref 8.7–10.5)
CHLORIDE SERPL-SCNC: 109 MMOL/L (ref 95–110)
CO2 SERPL-SCNC: 25 MMOL/L (ref 23–29)
CREAT SERPL-MCNC: 1.4 MG/DL (ref 0.5–1.4)
DIFFERENTIAL METHOD: ABNORMAL
EOSINOPHIL # BLD AUTO: 0 K/UL (ref 0–0.5)
EOSINOPHIL NFR BLD: 0 % (ref 0–8)
ERYTHROCYTE [DISTWIDTH] IN BLOOD BY AUTOMATED COUNT: 14.4 % (ref 11.5–14.5)
EST. GFR  (AFRICAN AMERICAN): >60 ML/MIN/1.73 M^2
EST. GFR  (NON AFRICAN AMERICAN): 52.4 ML/MIN/1.73 M^2
ESTIMATED AVG GLUCOSE: 128 MG/DL (ref 68–131)
GLUCOSE SERPL-MCNC: 125 MG/DL (ref 70–110)
HBA1C MFR BLD HPLC: 6.1 % (ref 4–5.6)
HCT VFR BLD AUTO: 41 % (ref 40–54)
HGB BLD-MCNC: 13.5 G/DL (ref 14–18)
IMM GRANULOCYTES # BLD AUTO: 0.15 K/UL (ref 0–0.04)
IMM GRANULOCYTES NFR BLD AUTO: 0.9 % (ref 0–0.5)
LYMPHOCYTES # BLD AUTO: 0.9 K/UL (ref 1–4.8)
LYMPHOCYTES NFR BLD: 5.3 % (ref 18–48)
MAGNESIUM SERPL-MCNC: 2.8 MG/DL (ref 1.6–2.6)
MCH RBC QN AUTO: 29.3 PG (ref 27–31)
MCHC RBC AUTO-ENTMCNC: 32.9 G/DL (ref 32–36)
MCV RBC AUTO: 89 FL (ref 82–98)
MONOCYTES # BLD AUTO: 1.6 K/UL (ref 0.3–1)
MONOCYTES NFR BLD: 9.3 % (ref 4–15)
NEUTROPHILS # BLD AUTO: 14.8 K/UL (ref 1.8–7.7)
NEUTROPHILS NFR BLD: 84.3 % (ref 38–73)
NRBC BLD-RTO: 0 /100 WBC
PHOSPHATE SERPL-MCNC: 2.8 MG/DL (ref 2.7–4.5)
PLATELET # BLD AUTO: 245 K/UL (ref 150–350)
PMV BLD AUTO: 11.2 FL (ref 9.2–12.9)
POCT GLUCOSE: 211 MG/DL (ref 70–110)
POCT GLUCOSE: 227 MG/DL (ref 70–110)
POCT GLUCOSE: 394 MG/DL (ref 70–110)
POTASSIUM SERPL-SCNC: 5.1 MMOL/L (ref 3.5–5.1)
RBC # BLD AUTO: 4.6 M/UL (ref 4.6–6.2)
SODIUM SERPL-SCNC: 140 MMOL/L (ref 136–145)
WBC # BLD AUTO: 17.54 K/UL (ref 3.9–12.7)

## 2020-08-25 PROCEDURE — 99225 PR SUBSEQUENT OBSERVATION CARE,LEVEL II: ICD-10-PCS | Mod: ,,, | Performed by: HOSPITALIST

## 2020-08-25 PROCEDURE — 99213 PR OFFICE/OUTPT VISIT, EST, LEVL III, 20-29 MIN: ICD-10-PCS | Mod: ,,, | Performed by: PSYCHIATRY & NEUROLOGY

## 2020-08-25 PROCEDURE — 85025 COMPLETE CBC W/AUTO DIFF WBC: CPT

## 2020-08-25 PROCEDURE — G0378 HOSPITAL OBSERVATION PER HR: HCPCS

## 2020-08-25 PROCEDURE — 27000221 HC OXYGEN, UP TO 24 HOURS

## 2020-08-25 PROCEDURE — 99213 OFFICE O/P EST LOW 20 MIN: CPT | Mod: ,,, | Performed by: PSYCHIATRY & NEUROLOGY

## 2020-08-25 PROCEDURE — 83735 ASSAY OF MAGNESIUM: CPT

## 2020-08-25 PROCEDURE — 94761 N-INVAS EAR/PLS OXIMETRY MLT: CPT

## 2020-08-25 PROCEDURE — 99900035 HC TECH TIME PER 15 MIN (STAT)

## 2020-08-25 PROCEDURE — 63600175 PHARM REV CODE 636 W HCPCS: Performed by: HOSPITALIST

## 2020-08-25 PROCEDURE — 99225 PR SUBSEQUENT OBSERVATION CARE,LEVEL II: CPT | Mod: ,,, | Performed by: HOSPITALIST

## 2020-08-25 PROCEDURE — 25000003 PHARM REV CODE 250: Performed by: HOSPITALIST

## 2020-08-25 PROCEDURE — 36415 COLL VENOUS BLD VENIPUNCTURE: CPT

## 2020-08-25 PROCEDURE — 25000242 PHARM REV CODE 250 ALT 637 W/ HCPCS: Performed by: HOSPITALIST

## 2020-08-25 PROCEDURE — 94640 AIRWAY INHALATION TREATMENT: CPT

## 2020-08-25 PROCEDURE — S4991 NICOTINE PATCH NONLEGEND: HCPCS | Performed by: HOSPITALIST

## 2020-08-25 PROCEDURE — 63700000 PHARM REV CODE 250 ALT 637 W/O HCPCS: Performed by: HOSPITALIST

## 2020-08-25 PROCEDURE — 80048 BASIC METABOLIC PNL TOTAL CA: CPT

## 2020-08-25 PROCEDURE — 96372 THER/PROPH/DIAG INJ SC/IM: CPT

## 2020-08-25 PROCEDURE — 83036 HEMOGLOBIN GLYCOSYLATED A1C: CPT

## 2020-08-25 PROCEDURE — 84100 ASSAY OF PHOSPHORUS: CPT

## 2020-08-25 RX ORDER — DIPHENHYDRAMINE HCL 25 MG
25 CAPSULE ORAL NIGHTLY PRN
Status: DISCONTINUED | OUTPATIENT
Start: 2020-08-25 | End: 2020-08-26 | Stop reason: HOSPADM

## 2020-08-25 RX ORDER — FLUTICASONE FUROATE AND VILANTEROL 100; 25 UG/1; UG/1
1 POWDER RESPIRATORY (INHALATION) DAILY
Qty: 60 EACH | Refills: 3 | Status: SHIPPED | OUTPATIENT
Start: 2020-08-25 | End: 2020-08-26 | Stop reason: SDUPTHER

## 2020-08-25 RX ORDER — DIPHENHYDRAMINE HCL 25 MG
25 CAPSULE ORAL NIGHTLY PRN
Qty: 30 CAPSULE | Refills: 2 | Status: SHIPPED | OUTPATIENT
Start: 2020-08-25

## 2020-08-25 RX ORDER — TRAZODONE HYDROCHLORIDE 50 MG/1
50 TABLET ORAL NIGHTLY PRN
Status: DISCONTINUED | OUTPATIENT
Start: 2020-08-25 | End: 2020-08-25

## 2020-08-25 RX ORDER — METOPROLOL TARTRATE 25 MG/1
25 TABLET, FILM COATED ORAL 2 TIMES DAILY
Qty: 60 TABLET | Refills: 11 | Status: SHIPPED | OUTPATIENT
Start: 2020-08-25 | End: 2021-08-25

## 2020-08-25 RX ORDER — AZITHROMYCIN 250 MG/1
250 TABLET, FILM COATED ORAL DAILY
Qty: 4 TABLET | Refills: 0 | Status: SHIPPED | OUTPATIENT
Start: 2020-08-26 | End: 2020-08-26

## 2020-08-25 RX ORDER — LISINOPRIL 20 MG/1
20 TABLET ORAL DAILY
Status: DISCONTINUED | OUTPATIENT
Start: 2020-08-25 | End: 2020-08-26 | Stop reason: HOSPADM

## 2020-08-25 RX ORDER — ALBUTEROL SULFATE 0.83 MG/ML
2.5 SOLUTION RESPIRATORY (INHALATION) EVERY 6 HOURS PRN
Qty: 180 ML | Refills: 3 | Status: SHIPPED | OUTPATIENT
Start: 2020-08-25 | End: 2020-10-14 | Stop reason: SDUPTHER

## 2020-08-25 RX ORDER — PREDNISONE 20 MG/1
40 TABLET ORAL DAILY
Qty: 4 TABLET | Refills: 0 | Status: SHIPPED | OUTPATIENT
Start: 2020-08-26 | End: 2020-08-26

## 2020-08-25 RX ORDER — ALBUTEROL SULFATE 90 UG/1
2 AEROSOL, METERED RESPIRATORY (INHALATION) EVERY 6 HOURS PRN
Qty: 8.5 G | Refills: 2 | Status: SHIPPED | OUTPATIENT
Start: 2020-08-25 | End: 2020-08-26 | Stop reason: SDUPTHER

## 2020-08-25 RX ADMIN — NICOTINE 1 PATCH: 21 PATCH, EXTENDED RELEASE TRANSDERMAL at 08:08

## 2020-08-25 RX ADMIN — INSULIN DETEMIR 10 UNITS: 100 INJECTION, SOLUTION SUBCUTANEOUS at 09:08

## 2020-08-25 RX ADMIN — SUCRALFATE 1 G: 1 SUSPENSION ORAL at 01:08

## 2020-08-25 RX ADMIN — RISPERIDONE 2 MG: 1 TABLET ORAL at 08:08

## 2020-08-25 RX ADMIN — COLCHICINE 0.6 MG: 0.6 TABLET, FILM COATED ORAL at 08:08

## 2020-08-25 RX ADMIN — IPRATROPIUM BROMIDE AND ALBUTEROL SULFATE 3 ML: .5; 2.5 SOLUTION RESPIRATORY (INHALATION) at 06:08

## 2020-08-25 RX ADMIN — METOPROLOL TARTRATE 25 MG: 25 TABLET, FILM COATED ORAL at 09:08

## 2020-08-25 RX ADMIN — SUCRALFATE 1 G: 1 SUSPENSION ORAL at 05:08

## 2020-08-25 RX ADMIN — METOPROLOL TARTRATE 25 MG: 25 TABLET, FILM COATED ORAL at 08:08

## 2020-08-25 RX ADMIN — PREDNISONE 40 MG: 20 TABLET ORAL at 08:08

## 2020-08-25 RX ADMIN — ALUMINUM HYDROXIDE, MAGNESIUM HYDROXIDE, AND SIMETHICONE 30 ML: 200; 200; 20 SUSPENSION ORAL at 05:08

## 2020-08-25 RX ADMIN — SODIUM CHLORIDE 250 ML: 0.9 INJECTION, SOLUTION INTRAVENOUS at 09:08

## 2020-08-25 RX ADMIN — FINASTERIDE 5 MG: 5 TABLET, FILM COATED ORAL at 08:08

## 2020-08-25 RX ADMIN — INSULIN ASPART 3 UNITS: 100 INJECTION, SOLUTION INTRAVENOUS; SUBCUTANEOUS at 08:08

## 2020-08-25 RX ADMIN — ASPIRIN 81 MG: 81 TABLET, COATED ORAL at 08:08

## 2020-08-25 RX ADMIN — POLYETHYLENE GLYCOL 3350 17 G: 17 POWDER, FOR SOLUTION ORAL at 08:08

## 2020-08-25 RX ADMIN — IPRATROPIUM BROMIDE AND ALBUTEROL SULFATE 3 ML: .5; 2.5 SOLUTION RESPIRATORY (INHALATION) at 01:08

## 2020-08-25 RX ADMIN — INSULIN ASPART 5 UNITS: 100 INJECTION, SOLUTION INTRAVENOUS; SUBCUTANEOUS at 12:08

## 2020-08-25 RX ADMIN — FLUTICASONE FUROATE AND VILANTEROL TRIFENATATE 1 PUFF: 100; 25 POWDER RESPIRATORY (INHALATION) at 08:08

## 2020-08-25 RX ADMIN — SUCRALFATE 1 G: 1 SUSPENSION ORAL at 12:08

## 2020-08-25 RX ADMIN — LISINOPRIL 20 MG: 20 TABLET ORAL at 08:08

## 2020-08-25 RX ADMIN — PANTOPRAZOLE SODIUM 40 MG: 40 TABLET, DELAYED RELEASE ORAL at 08:08

## 2020-08-25 RX ADMIN — ISOSORBIDE MONONITRATE 30 MG: 30 TABLET, EXTENDED RELEASE ORAL at 08:08

## 2020-08-25 RX ADMIN — ALUMINUM HYDROXIDE, MAGNESIUM HYDROXIDE, AND SIMETHICONE 30 ML: 200; 200; 20 SUSPENSION ORAL at 09:08

## 2020-08-25 RX ADMIN — TAMSULOSIN HYDROCHLORIDE 0.4 MG: 0.4 CAPSULE ORAL at 08:08

## 2020-08-25 RX ADMIN — ENOXAPARIN SODIUM 40 MG: 40 INJECTION SUBCUTANEOUS at 04:08

## 2020-08-25 RX ADMIN — INSULIN ASPART 2 UNITS: 100 INJECTION, SOLUTION INTRAVENOUS; SUBCUTANEOUS at 04:08

## 2020-08-25 RX ADMIN — AZITHROMYCIN 250 MG: 250 TABLET, FILM COATED ORAL at 05:08

## 2020-08-25 RX ADMIN — BENZTROPINE MESYLATE 0.5 MG: 0.5 TABLET ORAL at 09:08

## 2020-08-25 RX ADMIN — INSULIN ASPART 3 UNITS: 100 INJECTION, SOLUTION INTRAVENOUS; SUBCUTANEOUS at 12:08

## 2020-08-25 RX ADMIN — IPRATROPIUM BROMIDE AND ALBUTEROL SULFATE 3 ML: .5; 2.5 SOLUTION RESPIRATORY (INHALATION) at 08:08

## 2020-08-25 RX ADMIN — ROSUVASTATIN CALCIUM 40 MG: 20 TABLET, COATED ORAL at 09:08

## 2020-08-25 RX ADMIN — INSULIN ASPART 3 UNITS: 100 INJECTION, SOLUTION INTRAVENOUS; SUBCUTANEOUS at 04:08

## 2020-08-25 RX ADMIN — BENZTROPINE MESYLATE 0.5 MG: 0.5 TABLET ORAL at 08:08

## 2020-08-25 RX ADMIN — ALUMINUM HYDROXIDE, MAGNESIUM HYDROXIDE, AND SIMETHICONE 30 ML: 200; 200; 20 SUSPENSION ORAL at 12:08

## 2020-08-25 RX ADMIN — RISPERIDONE 2 MG: 1 TABLET ORAL at 09:08

## 2020-08-25 NOTE — ASSESSMENT & PLAN NOTE
Paresh Senior is a 65 y.o. male with a past psychiatric history of schizophrenia, currently presenting with COPD exacerbation.  Psychiatry was originally consulted to address the patient's home med regimen.     IMPRESSION  Patient has been stable from psychiatric standpoint but has been having significant issues with med side effects, most prominently sedation, leading to him self-altering regimen. Was meant to have outpatient f/u after last CL consult but never did occur. No evidence of danger to self or others. No evidence of acute psychosis, patient with AH (last ~1 week ago) that occurs only in AM and has good insight. Occasional crack cocaine use (warned of possible medical complications today, including risk for stroke), minimal alcohol, rare cannabis, ongoing tobacco use despite medical warnings. No interest in rehab.     Dx:  Schizophrenia  Cocaine use disorder  Cannabis use disorder  Tobacco abuse disorder    PLAN:    1. Scheduled Medication(s):  - continue risperdal 2 mg PO BID, continue at discharge  - discontinue seroquel and trazodone here and at discharge, patient taking only occasionally, unlikely to provide any real psychiatric coverage with infrequent use and causing significant oversedation.     2. PRN Medication(s):  - Start benadryl 25-50 mg PO nightly PRN insomnia, can also utilize melatonin     3.  Monitor:  - n/a     4. Legal Status/Precaution(s):  N/a, does not meet PEC criteria    5. Follow-up  - Psych MDs spoke with patient about follow-up. Reviewed [facility] services available (patient plans to utilize these, voiced intent to call to schedule), also reviewed [facility] and walk-in availability if he wants a faster appt prior to that. Advised that outpatient services would be placed in discharge instructions, with [facility] and [facility] in bold.     Defer further any med changes to outpatient provider(s). Patient was counseled on cessation of illicit drugs, with particular focus on  cocaine    Psychiatry will sign off at this time

## 2020-08-25 NOTE — PLAN OF CARE
08/25/20 1420   Post-Acute Status   Post-Acute Authorization Other   Other Status No Post-Acute Service Needs   Discharge Delays None known at this time
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
## 2020-08-25 NOTE — NURSING
Home Oxygen Evaluation    Date Performed: 2020    1) Patient's Home O2 Sat on room air, while at rest: 92%        If O2 sats on room air at rest are 88% or below, patient qualifies. No additional testing needed. Document N/A in steps 2 and 3. If 89% or above, complete steps 2.      2) Patient's O2 Sat on room air while exercisin%        If O2 sats on room air while exercising remain 89% or above patient does not qualify, no further testing needed Document N/A in step 3. If O2 sats on room air while exercising are 88% or below, continue to step 3.      3) Patient's O2 Sat while exercising on O2: 91% at 3 LPM         (Must show improvement from #2 for patients to qualify)    If O2 sats improve on oxygen, patient qualifies for portable oxygen. If not, the patient does not qualify.        
Pt /92. FLORY Mcguire notified. No new orders given.  
Alert and oriented, no focal deficits, no motor or sensory deficits.

## 2020-08-25 NOTE — SUBJECTIVE & OBJECTIVE
"     Patient History           Medical as of 8/25/2020     Past Medical History     Diagnosis Date Comments Source    Allergy -- -- Provider    Colon polyp 2013 -- Provider    COPD (chronic obstructive pulmonary disease) -- -- Provider    Coronary artery disease -- -- Provider    Disorder of kidney and ureter -- -- Provider    Hypercholesterolemia -- -- Provider    Hypertension -- -- Provider    Urinary tract infection -- -- Provider          Pertinent Negatives     Diagnosis Date Noted Comments Source    Kidney stone 03/11/2020 -- Provider                  Surgical as of 8/25/2020     Past Surgical History     Procedure Laterality Date Comments Source    CORONARY STENT PLACEMENT -- 2013 -- Provider    COLONOSCOPY -- 02/2016 Advised repeat in 3 years Provider    COLONOSCOPY N/A 7/22/2020 Procedure: COLONOSCOPY [place];  Surgeon: Saul Velez MD;  Location: [facility];  Service: Endoscopy;  Laterality: N/A; Provider                  Family as of 8/25/2020     Problem Relation Name Age of Onset Comments Source    No Known Problems Mother -- -- -- Provider    Mental illness Brother -- -- -- Provider    Diabetes Grandchild -- -- -- Provider            Tobacco Use as of 8/25/2020     Smoking Status Smoking Start Date Smoking Quit Date Packs/Day Years Used    Current Every Day Smoker -- -- 0.75 50.00    Types Comments Smokeless Tobacco Status Smokeless Tobacco Quit Date Source     Cigars -- Never Used -- Provider            Alcohol Use as of 8/25/2020     Alcohol Use Drinks/Week Alcohol/Week Comments Source    Yes   -- 2-3beers/day Provider    Frequency Typical Drinks Binge Drinking        -- -- --              Drug Use as of 8/25/2020     Drug Use Types Frequency Comments Source    Yes  Cocaine, "Crack" cocaine -- 3-4 marijuana joints per day Provider            Sexual Activity as of 8/25/2020     Sexually Active Birth Control Partners Comments Source    Not Currently -- -- -- Provider            Activities of Daily " Living as of 8/25/2020    None           Social Documentation as of 8/25/2020    Tobacco Use: Currently smoking 1 cigar every other day. Former cigarette use; initiated at age 8yo, last use 11/2019 (age 63 yo), using 0.5ppd    Source: Provider           Occupational as of 8/25/2020    None           Socioeconomic as of 8/25/2020     Marital Status Spouse Name Number of Children Years Education Education Level Preferred Language Ethnicity Race Source    Single -- -- -- -- English /Black Black or  --    Financial Resource Strain Food Insecurity: Worry Food Insecurity: Inability Transportation Needs: Medical Transportation Needs: Non-medical    -- -- -- -- --            Pertinent History     Question Response Comments    Lives with -- --    Place in Birth Order -- --    Lives in -- --    Number of Siblings -- --    Raised by -- --    Legal Involvement -- --    Childhood Trauma -- --    Criminal History of -- --    Financial Status -- --    Highest Level of Education -- --    Does patient have access to a firearm? -- --     Service -- --    Primary Leisure Activity -- --    Spirituality -- --        Past Medical History:   Diagnosis Date    Allergy     Colon polyp 2013    COPD (chronic obstructive pulmonary disease)     Coronary artery disease     Disorder of kidney and ureter     Hypercholesterolemia     Hypertension     Urinary tract infection      Past Surgical History:   Procedure Laterality Date    COLONOSCOPY  02/2016    Advised repeat in 3 years    COLONOSCOPY N/A 7/22/2020    Procedure: COLONOSCOPY [place];  Surgeon: Saul Velez MD;  [facility];  Service: Endoscopy;  Laterality: N/A;    CORONARY STENT PLACEMENT  2013     Family History     Problem Relation (Age of Onset)    Diabetes Grandchild    Mental illness Brother    No Known Problems Mother        Tobacco Use    Smoking status: Current Every Day Smoker     Packs/day: 0.75     Years: 50.00  "    Pack years: 37.50     Types: Cigars    Smokeless tobacco: Never Used   Substance and Sexual Activity    Alcohol use: Yes     Comment: 2-3beers/day    Drug use: Yes     Types: Cocaine, "Crack" cocaine     Comment: 3-4 marijuana joints per day    Sexual activity: Not Currently     Review of patient's allergies indicates:   Allergen Reactions    Trelegy ellipta [fluticasone-umeclidin-vilanter] Other (See Comments)     Possible urinary retention        No current facility-administered medications on file prior to encounter.      Current Outpatient Medications on File Prior to Encounter   Medication Sig    acetaminophen (TYLENOL) 500 MG tablet Take 2 tablets (1,000 mg total) by mouth every 8 (eight) hours as needed for Pain.    albuterol (PROAIR HFA) 90 mcg/actuation inhaler Inhale 2 puffs into the lungs every 6 (six) hours as needed for Wheezing. Rescue    albuterol (PROVENTIL) 2.5 mg /3 mL (0.083 %) nebulizer solution Take 3 mLs (2.5 mg total) by nebulization every 6 (six) hours as needed for Wheezing or Shortness of Breath. Rescue    aspirin (ECOTRIN) 81 MG EC tablet Take 1 tablet (81 mg total) by mouth once daily.    benztropine (COGENTIN) 0.5 MG tablet Take 1 tablet (0.5 mg total) by mouth 2 (two) times daily.    bisoprolol (ZEBETA) 5 MG tablet Take 0.5 tablets (2.5 mg total) by mouth once daily.    blood sugar diagnostic Strp To check BG 3 times daily, to use with insurance preferred meter    blood-glucose meter kit To check BG 3 times daily, to use with insurance preferred meter    cholecalciferol, vitamin D3, (VITAMIN D3) 25 mcg (1,000 unit) capsule Take 1 capsule (1,000 Units total) by mouth once daily.    colchicine (COLCRYS) 0.6 mg tablet Take half a tablet every day    diclofenac sodium (VOLTAREN) 1 % Gel Apply 4 g topically 4 (four) times daily.    finasteride (PROSCAR) 5 mg tablet Take 1 tablet (5 mg total) by mouth once daily.    fluticasone furoate-vilanteroL (BREO) 100-25 mcg/dose " diskus inhaler Inhale 1 puff into the lungs once daily. Controller    glipiZIDE (GLUCOTROL) 5 MG tablet Take 1 tablet (5 mg total) by mouth 2 (two) times daily with meals.    isosorbide mononitrate (IMDUR) 30 MG 24 hr tablet Take 1 tablet (30 mg total) by mouth once daily.    lancets Misc To check BG 3 times daily, to use with insurance preferred meter    lisinopriL 10 MG tablet Take 1 tablet (10 mg total) by mouth once daily.    mag hydrox/aluminum hyd/simeth (ANTACID ORAL) Take by mouth.    omeprazole (PRILOSEC) 20 MG capsule Take 1 capsule (20 mg total) by mouth once daily.    polyethylene glycol (GLYCOLAX) 17 gram/dose powder Mix 1 capful (17 g) with liquid and take by mouth 2 (two) times daily as needed (constipation).    polyethylene glycol (GOLYTELY,NULYTELY) 236-22.74-6.74 -5.86 gram suspension Take 4,000 mLs (4 L total) by mouth As instructed.    QUEtiapine (SEROQUEL) 300 MG Tab Take 1 tablet (300 mg total) by mouth every evening.    risperiDONE (RISPERDAL) 2 MG tablet Take 1 tablet (2 mg total) by mouth 2 (two) times daily.    rosuvastatin (CRESTOR) 40 MG Tab Take 1 tablet (40 mg total) by mouth every evening.    tamsulosin (FLOMAX) 0.4 mg Cap Take 1 capsule (0.4 mg total) by mouth once daily.    traZODone (DESYREL) 100 MG tablet Take 1 tablet (100 mg total) by mouth every evening.    triamcinolone acetonide 0.1% (KENALOG) 0.1 % ointment Apply topically 2 (two) times daily. for 14 days     Psychotherapeutics (From admission, onward)    Start     Stop Route Frequency Ordered    08/25/20 1230  traZODone tablet 50 mg      -- Oral Nightly PRN 08/25/20 1119    08/24/20 0900  risperiDONE tablet 2 mg      -- Oral 2 times daily 08/24/20 0609        Review of Systems   Constitutional: Negative for fever.   HENT: Negative for congestion.    Cardiovascular: Negative for chest pain.   Gastrointestinal: Negative for abdominal pain.   Musculoskeletal: Negative for back pain.   Psychiatric/Behavioral:  "Negative for agitation, behavioral problems and confusion.     Strengths and Liabilities: Strength: Patient is stable., Liability: Patient has poor health.    Objective:     Vital Signs (Most Recent):  Temp: 97.9 °F (36.6 °C) (08/25/20 0800)  Pulse: 102 (08/25/20 0902)  Resp: 17 (08/25/20 0847)  BP: (!) 177/90 (08/25/20 0800)  SpO2: 98 % (08/25/20 0847) Vital Signs (24h Range):  Temp:  [97.8 °F (36.6 °C)-98.3 °F (36.8 °C)] 97.9 °F (36.6 °C)  Pulse:  [] 102  Resp:  [16-20] 17  SpO2:  [93 %-98 %] 98 %  BP: (138-177)/(71-92) 177/90     Height: 5' 9" (175.3 cm)  Weight: 77.1 kg (170 lb)  Body mass index is 25.1 kg/m².      Intake/Output Summary (Last 24 hours) at 8/25/2020 1135  Last data filed at 8/25/2020 0900  Gross per 24 hour   Intake 490 ml   Output 300 ml   Net 190 ml       Physical Exam  Psychiatric:      Comments: Mental Status Exam:  Appearance: unremarkable, age appropriate, sitting up in chair, O2 cannula in  Level of Consciousness: alert, awake  Behavior/Cooperation: normal, friendly and cooperative  Psychomotor: unremarkable   Speech: normal tone, normal rate, normal pitch, normal volume   Language: english, fluid  Orientation: person, place, situation, time/date, month of year, year  Attention Span/Concentration: spelled "WORLD" forwards and backwards  Memory: IR 3/3 , STR 3/3  Mood: "feel pretty good"  Affect: appropriate, mood congruent, full, reactive  Thought Process: linear, normal and logical  Associations: normal and logical  Thought Content: normal, no suicidality, no homicidality, delusions, or paranoia  Fund of Knowledge: Aware of current events and 2/2 presidents  Abstraction: proverbs were concrete - book by its cover, grass is greener  Insight: good  Judgment: fair          Significant Labs: All pertinent labs within the past 24 hours have been reviewed.    Significant Imaging: I have reviewed all pertinent imaging results/findings within the past 24 hours.  "

## 2020-08-25 NOTE — PLAN OF CARE
Fernando Carmona MD   [address] / [place] [address]       [facility] - [place], [place] - [address] [place] [address]  [address] [place] [address]  [address]  [facility] [address]  Phone: [phone] Fax: [phone]     Payor: [employer] MEDICARE / Plan: [employer] HE[employer] / Product Type: Medicare Advantage /           08/25/20 1417   Discharge Assessment   Assessment Type Discharge Planning Assessment   Confirmed/corrected address and phone number on facesheet? Yes   Assessment information obtained from? Patient   Prior to hospitilization cognitive status: Alert/Oriented   Prior to hospitalization functional status: Assistive Equipment   Current cognitive status: Alert/Oriented   Current Functional Status: Assistive Equipment   Lives With alone   Able to Return to Prior Arrangements yes   Is patient able to care for self after discharge? Yes   Who are your caregiver(s) and their phone number(s)? Emmanuel Senior ( brother) [phone]   Patient's perception of discharge disposition home or selfcare   Readmission Within the Last 30 Days no previous admission in last 30 days   Patient currently being followed by outpatient case management? No   Patient currently receives any other outside agency services? No   Equipment Currently Used at Home rollator   Do you have any problems affording any of your prescribed medications? No   Is the patient taking medications as prescribed? yes   Does the patient have transportation home? Yes   Transportation Anticipated family or friend will provide   Does the patient receive services at the [facility]? No   Discharge Plan A Home   Discharge Plan B Home Health   DME Needed Upon Discharge  oxygen   Patient/Family in Agreement with Plan yes

## 2020-08-25 NOTE — PROGRESS NOTES
[facility] Hospital Medicine  Progress Note    Patient Name: Paresh Senior  MRN: [id number]  Patient Class: OP- Observation   Admission Date: 8/24/2020  Length of Stay: 0 days  Attending Physician: Orin Hope MD  Primary Care Provider: Fernando Carmona MD      Subjective:     Principal Problem:COPD exacerbation    Interval History: patient sitting up, repots some improvements in wheezing. Qualified for home oxygen with 87% sats on room air, improved on 3L. Is supposed to be on home oxygen but ran out. He reports his home meds for schizophrenia are makng him very tired and lethargic at home, he has been taking 1/2 the dose of cogentin, risperdal and seroquel at home. He said his symptoms arent as controlled but he has less tiredness. He thinks he need sthem adjusted now, will ask psych to see if we can adjust not for him, consulted for recs, if not adjusted today then will change to at least 1/2 dose tonight. Has some abdominal fullness, ikely basleine obesity says last BM was yesterday and regular. Wheezing improved, scant on expiration. Has had inhalers issues at home too, will see which ones are preferrd by insurance to pharm downstiars.  Will plan to dc tomorrow in AM may need rai, updated CM.  Working on home oxygen now.  Will need psych fu OP to continue to titrate meds, will f/u recs to adjust now for lethargy     Review of Systems   Constitutional: Positive for activity change. Negative for chills and fever.   HENT: Negative for congestion.    Eyes: Negative for visual disturbance.   Respiratory: Positive for shortness of breath. Negative for cough.    Cardiovascular: Negative for chest pain and leg swelling.   Gastrointestinal: Negative for abdominal distention, abdominal pain, nausea and vomiting.   Genitourinary: Negative for dysuria.   Neurological: Negative for dizziness and weakness.   Psychiatric/Behavioral: Negative for confusion.        Objective:     Vital Signs  (Most Recent):  Temp: 97.9 °F (36.6 °C) (08/25/20 0800)  Pulse: 102 (08/25/20 0902)  Resp: 17 (08/25/20 0847)  BP: (!) 177/90 (08/25/20 0800)  SpO2: 98 % (08/25/20 0847) Vital Signs (24h Range):  Temp:  [97.8 °F (36.6 °C)-98.3 °F (36.8 °C)] 97.9 °F (36.6 °C)  Pulse:  [] 102  Resp:  [16-20] 17  SpO2:  [93 %-98 %] 98 %  BP: (138-177)/(71-92) 177/90     Weight: 77.1 kg (170 lb)  Body mass index is 25.1 kg/m².    Intake/Output Summary (Last 24 hours) at 8/25/2020 1108  Last data filed at 8/25/2020 0900  Gross per 24 hour   Intake 490 ml   Output 300 ml   Net 190 ml        Physical Exam  Constitutional:       General: He is in acute distress.      Comments: Speaking comfortably. On oxygen   HENT:      Head: Normocephalic.      Mouth/Throat:      Mouth: Mucous membranes are moist.   Eyes:      Pupils: Pupils are equal, round, and reactive to light.   Neck:      Musculoskeletal: Normal range of motion.   Cardiovascular:      Rate and Rhythm: Normal rate and regular rhythm.      Pulses: Normal pulses.      Heart sounds: Normal heart sounds.   Pulmonary:      Effort: No respiratory distress.      Breath sounds: Wheezing present.      Comments: Exp wheezes, scant improving  Abdominal:      General: Bowel sounds are normal. There is no distension.      Palpations: Abdomen is soft.      Tenderness: There is no abdominal tenderness.      Comments: Obese centraly   Musculoskeletal: Normal range of motion.   Skin:     General: Skin is warm.   Neurological:      General: No focal deficit present.      Mental Status: He is alert.   Psychiatric:         Mood and Affect: Mood normal.           Significant Labs:   A1C:   Recent Labs   Lab 08/25/20 0417   HGBA1C 6.1*     CBC:   Recent Labs   Lab 08/24/20  0305 08/25/20 0417   WBC 10.25 17.54*   HGB 14.6 13.5*   HCT 44.1 41.0    245     CMP:   Recent Labs   Lab 08/24/20 0305 08/25/20 0417    140   K 3.9 5.1   * 109   CO2 24 25   * 125*   BUN 10 27*    CREATININE 1.1 1.4   CALCIUM 8.5* 8.5*   PROT 6.3  --    ALBUMIN 3.0*  --    BILITOT 0.4  --    ALKPHOS 73  --    AST 17  --    ALT 12  --    ANIONGAP 7* 6*   EGFRNONAA >60.0 52.4*     Magnesium:   Recent Labs   Lab 08/25/20  0417   MG 2.8*     POCT Glucose:   Recent Labs   Lab 08/24/20  1112 08/24/20  1601 08/24/20  2117   POCTGLUCOSE 247* 159* 174*     Troponin:   Recent Labs   Lab 08/24/20  0305 08/24/20  0957   TROPONINI 0.031* 0.030*     TSH:   Recent Labs   Lab 03/03/20  1538   TSH 1.166     Urine Studies: No results for input(s): COLORU, APPEARANCEUA, PHUR, SPECGRAV, PROTEINUA, GLUCUA, KETONESU, BILIRUBINUA, OCCULTUA, NITRITE, UROBILINOGEN, LEUKOCYTESUR, RBCUA, WBCUA, BACTERIA, SQUAMEPITHEL, HYALINECASTS in the last 48 hours.    Invalid input(s): WRIGHTSUR    Significant Imaging: I have reviewed and interpreted all pertinent imaging results/findings within the past 24 hours.    Assessment/Plan:      Active Diagnoses:    Diagnosis Date Noted POA    PRINCIPAL PROBLEM:  COPD exacerbation [J44.1] 08/24/2020 Yes    Acute hypoxemic respiratory failure [J96.01] 08/24/2020 Yes    Urine retention [R33.9] 01/29/2020 Yes    Stage 2 chronic kidney disease [N18.2] 01/27/2020 Yes    Mixed hyperlipidemia [E78.2] 12/16/2019 Yes    Centrilobular emphysema [J43.2] 12/10/2019 Yes    Schizophrenia [F20.9] 12/10/2019 Yes    Type 2 diabetes mellitus with hyperglycemia, without long-term current use of insulin [E11.65] 12/10/2019 Yes    CAD (coronary artery disease) [I25.10] 11/25/2014 Yes    Tobacco abuse [Z72.0] 11/25/2014 Yes      Problems Resolved During this Admission:     Scheduled Meds:   albuterol-ipratropium  3 mL Nebulization Q6H WAKE    aluminum-magnesium hydroxide-simethicone  30 mL Oral QID (AC & HS)    aspirin  81 mg Oral Daily    azithromycin  250 mg Oral Daily    benztropine  0.5 mg Oral BID    colchicine  0.6 mg Oral Daily    enoxaparin  40 mg Subcutaneous Q24H    finasteride  5 mg Oral Daily     fluticasone furoate-vilanteroL  1 puff Inhalation Daily    insulin aspart U-100  3 Units Subcutaneous TIDWM    insulin detemir U-100  10 Units Subcutaneous QHS    isosorbide mononitrate  30 mg Oral Daily    lisinopriL  20 mg Oral Daily    metoprolol tartrate  25 mg Oral BID    nicotine  1 patch Transdermal Daily    pantoprazole  40 mg Oral Daily    polyethylene glycol  17 g Oral Daily    predniSONE  40 mg Oral Daily    QUEtiapine  300 mg Oral QHS    risperiDONE  2 mg Oral BID    rosuvastatin  40 mg Oral QHS    sodium chloride 0.9%  250 mL Intravenous Once    sucralfate  1 g Oral Q6H    tamsulosin  0.4 mg Oral Daily    traZODone  100 mg Oral QHS     Continuous Infusions:  PRN Meds:.dextrose 50%, dextrose 50%, glucagon (human recombinant), glucose, glucose, insulin aspart U-100, ondansetron, senna-docusate 8.6-50 mg, sodium chloride 0.9%    PLAN:    #COPD exacerbation   Acute on chronic hypoxic respiratory failure   Tobacco abuse      -clinically improved with nebs, solumedrol and transient BIPAP   -currently on 2-3 liter supplemental oxygen with sat 95%  -continue nebs, steroid and azithromycin per COPD pathway   -counseled smoking cessation, nicotine patch now   -no sputum so no resp Cx able   -will require home oxygen, (was on prior but not lately due to insurance issue reportedly) as desats to 87% on RA and improved on 3L- CM working on home oxygen now  -wheezing almost resolved on 8/25, expect to dc likely tomorrow on 5 day course steroid, antibiotics       #HTN, CAD  -stable, no acute issue   -continue home regimen aspirin, beta blocker, statin, ACE-I, imdur      #NIDDM2  -a1c 6.1  -hold glipizide  -cont levemir 10U, novolog 3U TIDWM and SSI  -adjust insulin based on CBG      #Chronic gout  -no acute flare   -continue colchicine daily      #GERD  -on PPI     #BPH  -no acute issue   -continue home dose proscar and flomax      #Schizophrenia   -reports severe lethargy on current regmien, was  taken 1/2 home pills at home for this but then having some uncontrolled worseing of disease symptoms possible he said. Requested titration of meds, psych consulted to assist 8/25.   -denies SI/HI  -continue home dose risperidone, seroquel, benztropine and trazodone   -will need OP f/u with psych    DIspo- likely dc tomorrow. Needs psych f/u and PCP  Discharge Planning   SHAMAR: 8/26/2020     Code Status: Full Code   Is the patient medically ready for discharge?:     Reason for patient still in hospital (select all that apply): Treatment             VTE Risk Mitigation (From admission, onward)         Ordered     enoxaparin injection 40 mg  Every 24 hours      08/24/20 0611     IP VTE HIGH RISK PATIENT  Once      08/24/20 0611     Place sequential compression device  Until discontinued      08/24/20 0611                Orin Hope MD  Department of Hospital Medicine   [facility]
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
## 2020-08-25 NOTE — PLAN OF CARE
BP slightly elevated this morning. Other VSS. Telemetry in use. Pt on 4L O2. Pt resting. No needs at this time. Call bell in reach. Will continue to monitor.

## 2020-08-25 NOTE — ASSESSMENT & PLAN NOTE
- Patient counseled on cessation. Not interested in addictions resources at this time, but advised they would be available in discharge paperwork should he reconsider. Reviewed medical risks of continued use including possibiltiy of stroke, also reviewed psychiatric risks.

## 2020-08-26 VITALS
BODY MASS INDEX: 25.18 KG/M2 | HEIGHT: 69 IN | OXYGEN SATURATION: 93 % | WEIGHT: 170 LBS | RESPIRATION RATE: 18 BRPM | TEMPERATURE: 98 F | SYSTOLIC BLOOD PRESSURE: 135 MMHG | HEART RATE: 80 BPM | DIASTOLIC BLOOD PRESSURE: 73 MMHG

## 2020-08-26 LAB
ANION GAP SERPL CALC-SCNC: 6 MMOL/L (ref 8–16)
BASOPHILS # BLD AUTO: 0.01 K/UL (ref 0–0.2)
BASOPHILS NFR BLD: 0.1 % (ref 0–1.9)
BUN SERPL-MCNC: 23 MG/DL (ref 8–23)
CALCIUM SERPL-MCNC: 8.7 MG/DL (ref 8.7–10.5)
CHLORIDE SERPL-SCNC: 108 MMOL/L (ref 95–110)
CO2 SERPL-SCNC: 27 MMOL/L (ref 23–29)
CREAT SERPL-MCNC: 1.3 MG/DL (ref 0.5–1.4)
DIFFERENTIAL METHOD: ABNORMAL
EOSINOPHIL # BLD AUTO: 0 K/UL (ref 0–0.5)
EOSINOPHIL NFR BLD: 0 % (ref 0–8)
ERYTHROCYTE [DISTWIDTH] IN BLOOD BY AUTOMATED COUNT: 14.3 % (ref 11.5–14.5)
EST. GFR  (AFRICAN AMERICAN): >60 ML/MIN/1.73 M^2
EST. GFR  (NON AFRICAN AMERICAN): 57.3 ML/MIN/1.73 M^2
GLUCOSE SERPL-MCNC: 248 MG/DL (ref 70–110)
HCT VFR BLD AUTO: 42.2 % (ref 40–54)
HGB BLD-MCNC: 13.3 G/DL (ref 14–18)
IMM GRANULOCYTES # BLD AUTO: 0.07 K/UL (ref 0–0.04)
IMM GRANULOCYTES NFR BLD AUTO: 0.5 % (ref 0–0.5)
LYMPHOCYTES # BLD AUTO: 1.6 K/UL (ref 1–4.8)
LYMPHOCYTES NFR BLD: 11.7 % (ref 18–48)
MAGNESIUM SERPL-MCNC: 2.8 MG/DL (ref 1.6–2.6)
MCH RBC QN AUTO: 29.2 PG (ref 27–31)
MCHC RBC AUTO-ENTMCNC: 31.5 G/DL (ref 32–36)
MCV RBC AUTO: 93 FL (ref 82–98)
MONOCYTES # BLD AUTO: 0.9 K/UL (ref 0.3–1)
MONOCYTES NFR BLD: 6.5 % (ref 4–15)
NEUTROPHILS # BLD AUTO: 10.9 K/UL (ref 1.8–7.7)
NEUTROPHILS NFR BLD: 81.2 % (ref 38–73)
NRBC BLD-RTO: 0 /100 WBC
PHOSPHATE SERPL-MCNC: 2.7 MG/DL (ref 2.7–4.5)
PLATELET # BLD AUTO: 243 K/UL (ref 150–350)
PMV BLD AUTO: 10.8 FL (ref 9.2–12.9)
POCT GLUCOSE: 113 MG/DL (ref 70–110)
POCT GLUCOSE: 150 MG/DL (ref 70–110)
POTASSIUM SERPL-SCNC: 4.3 MMOL/L (ref 3.5–5.1)
RBC # BLD AUTO: 4.55 M/UL (ref 4.6–6.2)
SODIUM SERPL-SCNC: 141 MMOL/L (ref 136–145)
WBC # BLD AUTO: 13.4 K/UL (ref 3.9–12.7)

## 2020-08-26 PROCEDURE — 99900035 HC TECH TIME PER 15 MIN (STAT)

## 2020-08-26 PROCEDURE — 25000003 PHARM REV CODE 250: Performed by: HOSPITALIST

## 2020-08-26 PROCEDURE — 85025 COMPLETE CBC W/AUTO DIFF WBC: CPT

## 2020-08-26 PROCEDURE — S4991 NICOTINE PATCH NONLEGEND: HCPCS | Performed by: HOSPITALIST

## 2020-08-26 PROCEDURE — 94640 AIRWAY INHALATION TREATMENT: CPT

## 2020-08-26 PROCEDURE — 63700000 PHARM REV CODE 250 ALT 637 W/O HCPCS: Performed by: HOSPITALIST

## 2020-08-26 PROCEDURE — 63600175 PHARM REV CODE 636 W HCPCS: Performed by: HOSPITALIST

## 2020-08-26 PROCEDURE — 84100 ASSAY OF PHOSPHORUS: CPT

## 2020-08-26 PROCEDURE — 36415 COLL VENOUS BLD VENIPUNCTURE: CPT

## 2020-08-26 PROCEDURE — 99217 PR OBSERVATION CARE DISCHARGE: ICD-10-PCS | Mod: ,,, | Performed by: HOSPITALIST

## 2020-08-26 PROCEDURE — 25000242 PHARM REV CODE 250 ALT 637 W/ HCPCS: Performed by: HOSPITALIST

## 2020-08-26 PROCEDURE — 80048 BASIC METABOLIC PNL TOTAL CA: CPT

## 2020-08-26 PROCEDURE — 83735 ASSAY OF MAGNESIUM: CPT

## 2020-08-26 PROCEDURE — 96372 THER/PROPH/DIAG INJ SC/IM: CPT

## 2020-08-26 PROCEDURE — 27000221 HC OXYGEN, UP TO 24 HOURS

## 2020-08-26 PROCEDURE — 99217 PR OBSERVATION CARE DISCHARGE: CPT | Mod: ,,, | Performed by: HOSPITALIST

## 2020-08-26 PROCEDURE — G0378 HOSPITAL OBSERVATION PER HR: HCPCS

## 2020-08-26 PROCEDURE — 94761 N-INVAS EAR/PLS OXIMETRY MLT: CPT

## 2020-08-26 RX ORDER — PREDNISONE 20 MG/1
40 TABLET ORAL DAILY
Qty: 4 TABLET | Refills: 0 | Status: SHIPPED | OUTPATIENT
Start: 2020-08-26 | End: 2020-08-28

## 2020-08-26 RX ORDER — IBUPROFEN 200 MG
1 TABLET ORAL DAILY
Qty: 28 PATCH | Refills: 0 | Status: SHIPPED | OUTPATIENT
Start: 2020-08-27

## 2020-08-26 RX ORDER — AZITHROMYCIN 250 MG/1
250 TABLET, FILM COATED ORAL DAILY
Qty: 2 TABLET | Refills: 0 | Status: SHIPPED | OUTPATIENT
Start: 2020-08-26 | End: 2020-08-28

## 2020-08-26 RX ORDER — INSULIN ASPART 100 [IU]/ML
8 INJECTION, SOLUTION INTRAVENOUS; SUBCUTANEOUS
Status: DISCONTINUED | OUTPATIENT
Start: 2020-08-26 | End: 2020-08-26 | Stop reason: HOSPADM

## 2020-08-26 RX ORDER — ALBUTEROL SULFATE 90 UG/1
2 AEROSOL, METERED RESPIRATORY (INHALATION) EVERY 6 HOURS PRN
Qty: 8.5 G | Refills: 2 | Status: SHIPPED | OUTPATIENT
Start: 2020-08-26 | End: 2020-10-27 | Stop reason: SDUPTHER

## 2020-08-26 RX ORDER — RISPERIDONE 2 MG/1
2 TABLET ORAL 2 TIMES DAILY
COMMUNITY

## 2020-08-26 RX ORDER — FLUTICASONE FUROATE AND VILANTEROL 100; 25 UG/1; UG/1
1 POWDER RESPIRATORY (INHALATION) DAILY
Qty: 60 EACH | Refills: 3 | Status: SHIPPED | OUTPATIENT
Start: 2020-08-26

## 2020-08-26 RX ADMIN — AZITHROMYCIN 250 MG: 250 TABLET, FILM COATED ORAL at 08:08

## 2020-08-26 RX ADMIN — FINASTERIDE 5 MG: 5 TABLET, FILM COATED ORAL at 08:08

## 2020-08-26 RX ADMIN — LISINOPRIL 20 MG: 20 TABLET ORAL at 08:08

## 2020-08-26 RX ADMIN — SUCRALFATE 1 G: 1 SUSPENSION ORAL at 05:08

## 2020-08-26 RX ADMIN — PANTOPRAZOLE SODIUM 40 MG: 40 TABLET, DELAYED RELEASE ORAL at 08:08

## 2020-08-26 RX ADMIN — METOPROLOL TARTRATE 25 MG: 25 TABLET, FILM COATED ORAL at 08:08

## 2020-08-26 RX ADMIN — ISOSORBIDE MONONITRATE 30 MG: 30 TABLET, EXTENDED RELEASE ORAL at 08:08

## 2020-08-26 RX ADMIN — TAMSULOSIN HYDROCHLORIDE 0.4 MG: 0.4 CAPSULE ORAL at 08:08

## 2020-08-26 RX ADMIN — FLUTICASONE FUROATE AND VILANTEROL TRIFENATATE 1 PUFF: 100; 25 POWDER RESPIRATORY (INHALATION) at 09:08

## 2020-08-26 RX ADMIN — ASPIRIN 81 MG: 81 TABLET, COATED ORAL at 08:08

## 2020-08-26 RX ADMIN — NICOTINE 1 PATCH: 21 PATCH, EXTENDED RELEASE TRANSDERMAL at 08:08

## 2020-08-26 RX ADMIN — INSULIN ASPART 8 UNITS: 100 INJECTION, SOLUTION INTRAVENOUS; SUBCUTANEOUS at 08:08

## 2020-08-26 RX ADMIN — PREDNISONE 40 MG: 20 TABLET ORAL at 08:08

## 2020-08-26 RX ADMIN — ALUMINUM HYDROXIDE, MAGNESIUM HYDROXIDE, AND SIMETHICONE 30 ML: 200; 200; 20 SUSPENSION ORAL at 05:08

## 2020-08-26 RX ADMIN — IPRATROPIUM BROMIDE AND ALBUTEROL SULFATE 3 ML: .5; 2.5 SOLUTION RESPIRATORY (INHALATION) at 09:08

## 2020-08-26 RX ADMIN — SUCRALFATE 1 G: 1 SUSPENSION ORAL at 12:08

## 2020-08-26 RX ADMIN — COLCHICINE 0.6 MG: 0.6 TABLET, FILM COATED ORAL at 08:08

## 2020-08-26 RX ADMIN — BENZTROPINE MESYLATE 0.5 MG: 0.5 TABLET ORAL at 08:08

## 2020-08-26 RX ADMIN — RISPERIDONE 2 MG: 1 TABLET ORAL at 08:08

## 2020-08-26 NOTE — DISCHARGE SUMMARY
DISCHARGE SUMMARY  Hospital Medicine    Team: [facility] HOSP MED K    Patient Name: Paresh Senior  YOB: 1955    Admit Date: 8/24/2020    Discharge Date: 08/26/2020    Discharge Attending Physician: oscar wheeler md    Principal Diagnoses:  Active Hospital Problems    Diagnosis  POA    *COPD exacerbation [J44.1]  Yes    Cocaine use disorder [F14.10]  Unknown    Acute hypoxemic respiratory failure [J96.01]  Yes    Urine retention [R33.9]  Yes    Stage 2 chronic kidney disease [N18.2]  Yes    Mixed hyperlipidemia [E78.2]  Yes    Centrilobular emphysema [J43.2]  Yes    Schizophrenia [F20.9]  Yes    Type 2 diabetes mellitus with hyperglycemia, without long-term current use of insulin [E11.65]  Yes    CAD (coronary artery disease) [I25.10]  Yes    Tobacco abuse [Z72.0]  Yes      Resolved Hospital Problems   No resolved problems to display.       Discharged Condition: improved     Interval history: wheezing improved, oxygen delivered to room. Can minh his albuterol September 5 and breo tomorrow, provided script for him and has it at [facility] as well. Psych updated his recs for home meds and hes aware of this. Other meds for copd exacerabtion sent to phar and bedside deliver. He has a ride for home for dc today.    Temp:  [96.8 °F (36 °C)-98.7 °F (37.1 °C)]   Pulse:  [77-99]   Resp:  [17-20]   BP: (135-177)/(73-99)   SpO2:  [93 %-99 %]      Physical Exam  Constitutional:       General: He is in acute distress.      Comments: Speaking comfortably. On oxygen   HENT:      Head: Normocephalic.      Mouth/Throat:      Mouth: Mucous membranes are moist.   Eyes:      Pupils: Pupils are equal, round, and reactive to light.   Neck:      Musculoskeletal: Normal range of motion.   Cardiovascular:      Rate and Rhythm: Normal rate and regular rhythm.      Pulses: Normal pulses.      Heart sounds: Normal heart sounds.   Pulmonary:      Effort: No respiratory distress.      Breath sounds: Wheeezing  resolved.  Abdominal:      General: Bowel sounds are normal. There is no distension.      Palpations: Abdomen is soft.      Tenderness: There is no abdominal tenderness.      Comments: Obese centraly   Musculoskeletal: Normal range of motion.   Skin:     General: Skin is warm.   Neurological:      General: No focal deficit present.      Mental Status: He is alert.   Psychiatric:         Mood and Affect: Mood normal.     HOSPITAL COURSE:      Initial Presentation:    Mr. Senior is a 65 year old male with PMH of HTN, CAD, NIDDM2, CKD II, schizophrenia, gout, tobacco abuse, COPD presents to ED for evaluation of worsening SOB for last 2-3 days. Patient also endorses associated wheezing and productive cough of yellow sputum. Patient states he tried using nebulizer at home without improvement. This evening he had hard time catching his breath and called EMS. Upon EMS arrival he was wheezing with O2 sat 91%. He received supplemental oxygen, duonebs and solumedrol in route. He was transiently placed on BIPAP in ED with significant clinical improvement and transitioned to 2L supplemental oxygen with sat 95%.      Patient states he was on 2L home oxygen until one year ago when new insurance company did not approve it. He denies fever, chills, chest pain, palpitation, sick contact or recent travel. He endorses smoking 1 PPD. States he is compliant with his medications.     Course of Principle Problem for Admission:    COPD exacerbation   Acute on chronic hypoxic respiratory failure   Tobacco abuse      -clinically improved with nebs, solumedrol and transient BIPAP   -currently on 2-3 liter supplemental oxygen with sat 95%  -continue nebs, steroid and azithromycin per COPD pathway   -counseled smoking cessation, nicotine patch now   -no sputum so no resp Cx able   -will require home oxygen, (was on prior but not lately due to insurance issue reportedly) as desats to 87% on RA and improved on 3L- CM working on home oxygen  now  -wheezing resolved on dc  -home oxygen set up for dc  -cont antibiotics and steroids for 5 days total on dc.  -albuterol neb refilled, can minh alb inhaler September 5 and breo tomorrow per pharm too soon now. Scripts provided      Other Medical Problems Addressed in the Hospital:    HTN, CAD  -stable, no acute issue   -continue home regimen aspirin, beta blocker, statin, ACE-I, imdur      #NIDDM2  -a1c 6.1  -hold glipizide  -resume on dc  -on insulin in house, steroids likely higher dose here     #Chronic gout  -no acute flare   -continue colchicine daily      #GERD  -on PPI     #BPH  -no acute issue   -continue home dose proscar and flomax      #Schizophrenia   -reports severe lethargy on current regmien, was taken 1/2 home pills at home for this but then having some uncontrolled worseing of disease symptoms possible he said. Requested titration of meds, psych consulted to assist 8/25.   -denies SI/HI  -continue home dose  Benztropine, stop trazadone, serouel use bendaryl at night PRn Sleep per psych consult   -will need OP f/u with psych      Consults: psych    Last CBC/BMP:    CBC/Anemia Labs: Coags:    Recent Labs   Lab 08/24/20 0305 08/25/20 0417 08/26/20  0402   WBC 10.25 17.54* 13.40*   HGB 14.6 13.5* 13.3*   HCT 44.1 41.0 42.2    245 243   MCV 90 89 93   RDW 14.1 14.4 14.3    No results for input(s): PT, INR, APTT in the last 168 hours.     Chemistries:   Recent Labs   Lab 08/24/20  0305 08/25/20  0417 08/26/20  0402    140 141   K 3.9 5.1 4.3   * 109 108   CO2 24 25 27   BUN 10 27* 23   CREATININE 1.1 1.4 1.3   CALCIUM 8.5* 8.5* 8.7   PROT 6.3  --   --    BILITOT 0.4  --   --    ALKPHOS 73  --   --    ALT 12  --   --    AST 17  --   --    MG  --  2.8* 2.8*   PHOS  --  2.8 2.7              Special Treatments/Procedures:   * No surgery found *     Disposition: Home or Self Care      Future Scheduled Appointments:  Future Appointments   Date Time Provider Department Center   9/18/2020  10:15 AM Ella Garrett NP [facility]OLOGY [facility]   9/22/2020 11:30 AM Fernando Carmona MD [facility]       Follow-up Plans from This Hospitalization:      Discharge Medication List:       Paresh Senior   Home Medication Instructions LAM:[id number]    Printed on:08/26/20 7761   Medication Information                      acetaminophen (TYLENOL) 500 MG tablet  Take 2 tablets (1,000 mg total) by mouth every 8 (eight) hours as needed for Pain.             albuterol (PROAIR HFA) 90 mcg/actuation inhaler  Inhale 2 puffs into the lungs every 6 (six) hours as needed for Wheezing. Rescue             albuterol (PROVENTIL) 2.5 mg /3 mL (0.083 %) nebulizer solution  Take 3 mLs (2.5 mg total) by nebulization every 6 (six) hours as needed for Wheezing or Shortness of Breath. Rescue             aspirin (ECOTRIN) 81 MG EC tablet  Take 1 tablet (81 mg total) by mouth once daily.             azithromycin (Z-NEREIDA) 250 MG tablet  Take 1 tablet (250 mg total) by mouth once daily. for 2 days             benztropine (COGENTIN) 0.5 MG tablet  Take 1 tablet (0.5 mg total) by mouth 2 (two) times daily.             blood sugar diagnostic Strp  To check BG 3 times daily, to use with insurance preferred meter             blood-glucose meter kit  To check BG 3 times daily, to use with insurance preferred meter             cholecalciferol, vitamin D3, (VITAMIN D3) 25 mcg (1,000 unit) capsule  Take 1 capsule (1,000 Units total) by mouth once daily.             colchicine (COLCRYS) 0.6 mg tablet  Take half a tablet every day             diclofenac sodium (VOLTAREN) 1 % Gel  Apply 4 g topically 4 (four) times daily.             diphenhydrAMINE (BENADRYL) 25 mg capsule  Take 1 capsule (25 mg total) by mouth nightly as needed for Insomnia.             finasteride (PROSCAR) 5 mg tablet  Take 1 tablet (5 mg total) by mouth once daily.             fluticasone furoate-vilanteroL (BREO) 100-25 mcg/dose diskus inhaler  Inhale 1  "puff into the lungs once daily. Controller             glipiZIDE (GLUCOTROL) 5 MG tablet  Take 1 tablet (5 mg total) by mouth 2 (two) times daily with meals.             isosorbide mononitrate (IMDUR) 30 MG 24 hr tablet  Take 1 tablet (30 mg total) by mouth once daily.             lancets Misc  To check BG 3 times daily, to use with insurance preferred meter             lisinopriL 10 MG tablet  Take 1 tablet (10 mg total) by mouth once daily.             mag hydrox/aluminum hyd/simeth (ANTACID ORAL)  Take by mouth.             metoprolol tartrate (LOPRESSOR) 25 MG tablet  Take 1 tablet (25 mg total) by mouth 2 (two) times daily.             nicotine (NICODERM CQ) 21 mg/24 hr  Place 1 patch onto the skin once daily.             omeprazole (PRILOSEC) 20 MG capsule  Take 1 capsule (20 mg total) by mouth once daily.             polyethylene glycol (GLYCOLAX) 17 gram/dose powder  Mix 1 capful (17 g) with liquid and take by mouth 2 (two) times daily as needed (constipation).             predniSONE (DELTASONE) 20 MG tablet  Take 2 tablets (40 mg total) by mouth once daily. for 2 days             risperiDONE (RISPERDAL) 2 MG tablet  Take 2 mg by mouth 2 (two) times daily.             rosuvastatin (CRESTOR) 40 MG Tab  Take 1 tablet (40 mg total) by mouth every evening.             tamsulosin (FLOMAX) 0.4 mg Cap  Take 1 capsule (0.4 mg total) by mouth once daily.                 Patient Instructions:  Discharge Procedure Orders   OXYGEN FOR HOME USE     Order Specific Question Answer Comments   Liter Flow 3    Duration Continuous    Qualifying SpO2: 87% on rom air    Testing done at: Exercise/Activity    Device home concentrator with portable unit    Length of need (in months): 99 mos    Height: 5' 9" (1.753 m)    Weight: 77.1 kg (170 lb)    Does patient have medical equipment at home? rollator    Alternative treatment measures have been tried or considered and deemed clinically ineffective. Yes    Vendor: [facility]  "   Expected Date of Delivery: 8/26/2020      Activity as tolerated       At the time of discharge patient was told to take all medications as prescribed, to keep all followup appointments, and to call their primary care physician or return to the emergency room if they have any worsening or concerning symptoms.    Signing Physician:  Orin Hope MD

## 2020-08-26 NOTE — PLAN OF CARE
Problem: Adult Inpatient Plan of Care  Goal: Plan of Care Review  Outcome: Met     Discussed discharge planning, gave written/verbal discharge instruction, pt verbalized understanding, pt home 02 delivered, pt has transport 02 w/ him, pt on 2LNC, pt denies pain/needs at this time, pt IV removed x2, pt shows no s/s of distressed, pt stated he will  his meds downstairs per pt request, pt leaving unit via wheelchair w/ transport services to private vehicle, gave pt instruction on home oxygen, advised not to smoke w/ home oxygen tank, pt verbalized understanding

## 2020-08-26 NOTE — PLAN OF CARE
Future Appointments   Date Time Provider Department Center   9/18/2020 10:15 AM Ella Garrett NP [facility] UROLOGY [facility]   9/22/2020 11:30 AM Fernando Carmona MD [facility]           08/26/20 1503   Final Note   Assessment Type Final Discharge Note   Anticipated Discharge Disposition Home   What phone number can be called within the next 1-3 days to see how you are doing after discharge? [phone]   Hospital Follow Up  Appt(s) scheduled? Yes   Right Care Referral Info   Post Acute Recommendation No Care   Post-Acute Status   Post-Acute Authorization Other   Other Status No Post-Acute Service Needs   Discharge Delays None known at this time

## 2020-08-26 NOTE — PLAN OF CARE
No acute changes overnight. VSS. Telemetry in use. Pt on 3L O2. Pt resting. No needs at this time. Call bell in reach. Will continue to monitor.

## 2020-09-06 ENCOUNTER — HOSPITAL ENCOUNTER (EMERGENCY)
Facility: HOSPITAL | Age: 65
Discharge: HOME | End: 2020-09-06
Attending: EMERGENCY MEDICINE
Payer: MEDICARE

## 2020-09-06 VITALS
TEMPERATURE: 99 F | WEIGHT: 180.31 LBS | OXYGEN SATURATION: 97 % | DIASTOLIC BLOOD PRESSURE: 71 MMHG | HEIGHT: 69 IN | RESPIRATION RATE: 20 BRPM | BODY MASS INDEX: 26.71 KG/M2 | HEART RATE: 68 BPM | SYSTOLIC BLOOD PRESSURE: 128 MMHG

## 2020-09-06 DIAGNOSIS — M54.9 BACK PAIN, UNSPECIFIED BACK LOCATION, UNSPECIFIED BACK PAIN LATERALITY, UNSPECIFIED CHRONICITY: Primary | ICD-10-CM

## 2020-09-06 LAB
ALBUMIN SERPL BCP-MCNC: 3.1 G/DL (ref 3.5–5.2)
ALP SERPL-CCNC: 74 U/L (ref 55–135)
ALT SERPL W/O P-5'-P-CCNC: 18 U/L (ref 10–44)
ANION GAP SERPL CALC-SCNC: 7 MMOL/L (ref 8–16)
AST SERPL-CCNC: 21 U/L (ref 10–40)
BACTERIA #/AREA URNS AUTO: NORMAL /HPF
BASOPHILS # BLD AUTO: 0.04 K/UL (ref 0–0.2)
BASOPHILS NFR BLD: 0.3 % (ref 0–1.9)
BILIRUB SERPL-MCNC: 0.4 MG/DL (ref 0.1–1)
BILIRUB UR QL STRIP: NEGATIVE
BUN SERPL-MCNC: 12 MG/DL (ref 6–30)
BUN SERPL-MCNC: 13 MG/DL (ref 8–23)
CALCIUM SERPL-MCNC: 8.5 MG/DL (ref 8.7–10.5)
CHLORIDE SERPL-SCNC: 106 MMOL/L (ref 95–110)
CHLORIDE SERPL-SCNC: 110 MMOL/L (ref 95–110)
CLARITY UR REFRACT.AUTO: CLEAR
CO2 SERPL-SCNC: 23 MMOL/L (ref 23–29)
COLOR UR AUTO: YELLOW
CREAT SERPL-MCNC: 1.4 MG/DL (ref 0.5–1.4)
CREAT SERPL-MCNC: 1.4 MG/DL (ref 0.5–1.4)
CRP SERPL-MCNC: 1.9 MG/L (ref 0–8.2)
DIFFERENTIAL METHOD: ABNORMAL
EOSINOPHIL # BLD AUTO: 0.2 K/UL (ref 0–0.5)
EOSINOPHIL NFR BLD: 1.6 % (ref 0–8)
ERYTHROCYTE [DISTWIDTH] IN BLOOD BY AUTOMATED COUNT: 14.1 % (ref 11.5–14.5)
ERYTHROCYTE [SEDIMENTATION RATE] IN BLOOD BY WESTERGREN METHOD: 18 MM/HR (ref 0–23)
EST. GFR  (AFRICAN AMERICAN): >60 ML/MIN/1.73 M^2
EST. GFR  (NON AFRICAN AMERICAN): 52.4 ML/MIN/1.73 M^2
GLUCOSE SERPL-MCNC: 55 MG/DL (ref 70–110)
GLUCOSE SERPL-MCNC: 58 MG/DL (ref 70–110)
GLUCOSE UR QL STRIP: NEGATIVE
HCT VFR BLD AUTO: 45 % (ref 40–54)
HCT VFR BLD CALC: 45 %PCV (ref 36–54)
HGB BLD-MCNC: 14.9 G/DL (ref 14–18)
HGB UR QL STRIP: NEGATIVE
HYALINE CASTS UR QL AUTO: 0 /LPF
IMM GRANULOCYTES # BLD AUTO: 0.05 K/UL (ref 0–0.04)
IMM GRANULOCYTES NFR BLD AUTO: 0.4 % (ref 0–0.5)
KETONES UR QL STRIP: NEGATIVE
LACTATE SERPL-SCNC: 1 MMOL/L (ref 0.5–2.2)
LEUKOCYTE ESTERASE UR QL STRIP: NEGATIVE
LYMPHOCYTES # BLD AUTO: 2 K/UL (ref 1–4.8)
LYMPHOCYTES NFR BLD: 16.1 % (ref 18–48)
MCH RBC QN AUTO: 29.4 PG (ref 27–31)
MCHC RBC AUTO-ENTMCNC: 33.1 G/DL (ref 32–36)
MCV RBC AUTO: 89 FL (ref 82–98)
MICROSCOPIC COMMENT: NORMAL
MONOCYTES # BLD AUTO: 1 K/UL (ref 0.3–1)
MONOCYTES NFR BLD: 7.8 % (ref 4–15)
NEUTROPHILS # BLD AUTO: 9.2 K/UL (ref 1.8–7.7)
NEUTROPHILS NFR BLD: 73.8 % (ref 38–73)
NITRITE UR QL STRIP: NEGATIVE
NRBC BLD-RTO: 0 /100 WBC
PH UR STRIP: 7 [PH] (ref 5–8)
PLATELET # BLD AUTO: 274 K/UL (ref 150–350)
PMV BLD AUTO: 10.6 FL (ref 9.2–12.9)
POC IONIZED CALCIUM: 1.12 MMOL/L (ref 1.06–1.42)
POC TCO2 (MEASURED): 23 MMOL/L (ref 23–29)
POCT GLUCOSE: 59 MG/DL (ref 70–110)
POTASSIUM BLD-SCNC: 4.3 MMOL/L (ref 3.5–5.1)
POTASSIUM SERPL-SCNC: 4.5 MMOL/L (ref 3.5–5.1)
PROT SERPL-MCNC: 6.1 G/DL (ref 6–8.4)
PROT UR QL STRIP: ABNORMAL
RBC # BLD AUTO: 5.06 M/UL (ref 4.6–6.2)
RBC #/AREA URNS AUTO: 4 /HPF (ref 0–4)
SAMPLE: ABNORMAL
SARS-COV-2 RDRP RESP QL NAA+PROBE: NEGATIVE
SODIUM BLD-SCNC: 141 MMOL/L (ref 136–145)
SODIUM SERPL-SCNC: 140 MMOL/L (ref 136–145)
SP GR UR STRIP: 1 (ref 1–1.03)
URN SPEC COLLECT METH UR: ABNORMAL
WBC # BLD AUTO: 12.43 K/UL (ref 3.9–12.7)
WBC #/AREA URNS AUTO: 1 /HPF (ref 0–5)

## 2020-09-06 PROCEDURE — 85652 RBC SED RATE AUTOMATED: CPT

## 2020-09-06 PROCEDURE — 86140 C-REACTIVE PROTEIN: CPT

## 2020-09-06 PROCEDURE — 25000003 PHARM REV CODE 250: Performed by: EMERGENCY MEDICINE

## 2020-09-06 PROCEDURE — 99284 EMERGENCY DEPT VISIT MOD MDM: CPT | Mod: ,,, | Performed by: EMERGENCY MEDICINE

## 2020-09-06 PROCEDURE — 81001 URINALYSIS AUTO W/SCOPE: CPT

## 2020-09-06 PROCEDURE — 99284 PR EMERGENCY DEPT VISIT,LEVEL IV: ICD-10-PCS | Mod: ,,, | Performed by: EMERGENCY MEDICINE

## 2020-09-06 PROCEDURE — U0002 COVID-19 LAB TEST NON-CDC: HCPCS

## 2020-09-06 PROCEDURE — 87040 BLOOD CULTURE FOR BACTERIA: CPT | Mod: 59

## 2020-09-06 PROCEDURE — 85025 COMPLETE CBC W/AUTO DIFF WBC: CPT

## 2020-09-06 PROCEDURE — 83605 ASSAY OF LACTIC ACID: CPT

## 2020-09-06 PROCEDURE — 80053 COMPREHEN METABOLIC PANEL: CPT

## 2020-09-06 PROCEDURE — 99284 EMERGENCY DEPT VISIT MOD MDM: CPT | Mod: 25

## 2020-09-06 RX ORDER — MORPHINE SULFATE 4 MG/ML
4 INJECTION, SOLUTION INTRAMUSCULAR; INTRAVENOUS
Status: DISCONTINUED | OUTPATIENT
Start: 2020-09-06 | End: 2020-09-06 | Stop reason: HOSPADM

## 2020-09-06 RX ORDER — ACETAMINOPHEN 500 MG
1000 TABLET ORAL
Status: COMPLETED | OUTPATIENT
Start: 2020-09-06 | End: 2020-09-06

## 2020-09-06 RX ADMIN — ACETAMINOPHEN 1000 MG: 500 TABLET ORAL at 03:09

## 2020-09-06 NOTE — ED TRIAGE NOTES
Pt brought from home by EMS with c/o generalized lower back pain that has been severe over the last couple of days. States that its worse on the right side.

## 2020-09-06 NOTE — ED PROVIDER NOTES
"Encounter Date: 9/6/2020       History     Chief Complaint   Patient presents with    Back Pain     Reports bilateral back pain that radiates to the front. Onset 4 hours ago. Patient presents via EMS from home on 3L of oxygen. Patient normally wears oxygen at home at 3L via NC.     64 yo m, h/o DM, HTN, CAD, COPD (3L NC at baseline), schizophrenia, gout, c/o back pain x 4 hours.  Onset while pt was seated, sudden, initially to left lower back but now has moved to right lower back.  Pain sharp and moderate.  No h/o similar pain.  Denies abd pain.  Denies urinary sx, no LE weakness/numbness, no cp/sob/cough/fever/chills.      The history is provided by the patient and the EMS personnel. The history is limited by the absence of a caregiver.     Review of patient's allergies indicates:   Allergen Reactions    Trelegy ellipta [fluticasone-umeclidin-vilanter] Other (See Comments)     Possible urinary retention      Past Medical History:   Diagnosis Date    Allergy     Colon polyp 2013    COPD (chronic obstructive pulmonary disease)     Coronary artery disease     Disorder of kidney and ureter     Hypercholesterolemia     Hypertension     Urinary tract infection      Past Surgical History:   Procedure Laterality Date    COLONOSCOPY  02/2016    Advised repeat in 3 years    COLONOSCOPY N/A 7/22/2020    Procedure: COLONOSCOPY [facility];  Surgeon: Saul Velez MD;  Location: [facility];  Service: Endoscopy;  Laterality: N/A;    CORONARY STENT PLACEMENT  2013     Family History   Problem Relation Age of Onset    No Known Problems Mother     Mental illness Brother     Diabetes Grandchild      Social History     Tobacco Use    Smoking status: Current Every Day Smoker     Packs/day: 0.75     Years: 50.00     Pack years: 37.50     Types: Cigars    Smokeless tobacco: Never Used   Substance Use Topics    Alcohol use: Yes     Comment: 2-3beers/day    Drug use: Yes     Types: Cocaine, "Crack" cocaine     Comment: " 3-4 marijuana joints per day     Review of Systems   Constitutional: Negative for chills, diaphoresis, fatigue and fever.   Respiratory: Negative for cough and shortness of breath.    Cardiovascular: Negative for chest pain, palpitations and leg swelling.   Gastrointestinal: Negative for abdominal pain.   Genitourinary: Negative for difficulty urinating, dysuria and hematuria.   Musculoskeletal: Positive for back pain. Negative for gait problem, joint swelling, myalgias and neck pain.   Neurological: Negative for weakness, light-headedness, numbness and headaches.   All other systems reviewed and are negative.      Physical Exam     Initial Vitals [09/06/20 1417]   BP Pulse Resp Temp SpO2   136/86 83 20 99.8 °F (37.7 °C) 100 %      MAP       --         Physical Exam    Nursing note and vitals reviewed.  Constitutional: Vital signs are normal. He appears well-developed and well-nourished. He is not diaphoretic.  Non-toxic appearance. He does not appear ill. No distress.   HENT:   Head: Normocephalic and atraumatic.   Mouth/Throat: Oropharynx is clear and moist and mucous membranes are normal. Mucous membranes are not dry.   Eyes: Conjunctivae, EOM and lids are normal.   Neck: Normal range of motion. Neck supple.   Cardiovascular: Normal rate and regular rhythm.   Pulmonary/Chest: Breath sounds normal. No respiratory distress.   Abdominal: Soft. He exhibits no distension. There is no abdominal tenderness. There is no rebound and no guarding.   Musculoskeletal: Normal range of motion. No edema.   Neurological: He is alert and oriented to person, place, and time. He has normal strength. No sensory deficit.   Skin: Skin is warm, dry and intact. No pallor.   Psychiatric: He has a normal mood and affect. His speech is normal and behavior is normal.         ED Course   Procedures  Labs Reviewed   COMPREHENSIVE METABOLIC PANEL - Abnormal; Notable for the following components:       Result Value    Glucose 55 (*)     Calcium  8.5 (*)     Albumin 3.1 (*)     Anion Gap 7 (*)     eGFR if non  52.4 (*)     All other components within normal limits   CBC W/ AUTO DIFFERENTIAL - Abnormal; Notable for the following components:    Gran # (ANC) 9.2 (*)     Immature Grans (Abs) 0.05 (*)     Gran% 73.8 (*)     Lymph% 16.1 (*)     All other components within normal limits   URINALYSIS, REFLEX TO URINE CULTURE - Abnormal; Notable for the following components:    Protein, UA 2+ (*)     All other components within normal limits    Narrative:     Specimen Source->Urine   ISTAT PROCEDURE - Abnormal; Notable for the following components:    POC Glucose 58 (*)     All other components within normal limits   POCT GLUCOSE - Abnormal; Notable for the following components:    POCT Glucose 59 (*)     All other components within normal limits   CULTURE, BLOOD   CULTURE, BLOOD   LACTIC ACID, PLASMA   SEDIMENTATION RATE   C-REACTIVE PROTEIN   SARS-COV-2 RNA AMPLIFICATION, QUAL   URINALYSIS MICROSCOPIC    Narrative:     Specimen Source->Urine          Imaging Results          CT Renal Stone Study ABD Pelvis WO (Final result)  Result time 09/06/20 17:12:27    Final result by Roger Henry MD (09/06/20 17:12:27)                 Impression:      1. Bilateral perinephric fat stranding, nonspecific, no findings to suggest obstructive uropathy.  Correlation with urinalysis recommended to exclude sequela of infection.  2. Low attenuating lesions within the kidneys bilaterally, some of which measure higher attenuation than would be expected for simple cysts.  In comparison to ultrasound examination 01/29/2020, these lesions correlated with simple cysts on that exam.  Follow-up ultrasound to confirm cystic nature as warranted.  3. Prostatomegaly noting prominent urinary bladder likely related to outlet obstruction.  4. Several additional findings above.      Electronically signed by: Roger Henry MD  Date:    09/06/2020  Time:    17:12              Narrative:    EXAMINATION:  CT RENAL STONE STUDY ABD PELVIS WO    CLINICAL HISTORY:  Flank pain, kidney stone suspected;    TECHNIQUE:  Low dose axial images, sagittal and coronal reformations were obtained from the lung bases to the pubic symphysis.  Contrast was not administered.    COMPARISON:  None    FINDINGS:  Images of the lower thorax are remarkable for bilateral dependent atelectasis.    The liver, spleen, pancreas, gallbladder and adrenal glands have a grossly unremarkable noncontrast appearance.  There is no biliary dilation or ascites.  The stomach is decompressed without significant wall thickening.  No significant abdominal lymphadenopathy noting a few scattered kiko hepatic lymph nodes.    There is bilateral perinephric fat stranding.  There is a low attenuating lesion arising from the interpolar region of the right kidney measuring 2.8 cm, attenuation of which is higher than would be expected for a simple cyst and could reflect high protein content or hemorrhagic content.  There is a low attenuating lesion within the upper pole of the right kidney measuring 2.3 cm, attenuation of which suggests cyst.  There is a subcentimeter low attenuating lesion within the interpolar region of the left kidney, attenuation suggests cyst.  There is no hydronephrosis.  There is right renal vascular calcification.  The bilateral ureters are unremarkable without calculi seen.  The urinary bladder is unremarkable.  The prostate is enlarged.    There are a few scattered colonic diverticula without surrounding inflammation to suggest diverticulitis.  The terminal ileum and appendix are unremarkable.  The small bowel is grossly unremarkable noting slow flow through a few distal small bowel loops.  There are several scattered shotty periaortic and paracaval lymph nodes.  There is atherosclerotic calcification of the aorta and its branches.  No focal organized pelvic fluid collection.  There are mild bilateral fat  containing inguinal hernias.    Degenerative changes are noted of the spine.  No significant inguinal lymphadenopathy.                                 Medical Decision Making:   History:   Old Medical Records: I decided to obtain old medical records.  Initial Assessment:   64 yo m, complex PMH as above, here with acute LBP    Pt with low grade temp on arrival, though denies recent infectious sx/fever/chills  No reproducible tenderness to back  abd exam benign    Bedside sono done to assess for AAA - aorta normal size/appearance, no free fluid  Differential Diagnosis:   Renal colic  Musculoskeletal back pain  Dissection  Viral syndrome/COVID  Pyelonephritis  Rheumatologic issue - pt RF positive recently, also with gout flare  Clinical Tests:   Lab Tests: Ordered and Reviewed  Radiological Study: Reviewed and Ordered  ED Management:  Labs  CT abd/pelvis  Morphine/tylenol  reassess                   ED Course as of Sep 06 2004   Sun Sep 06, 2020   1743 Labs and CT scan reviewed.  No significant abnormalities other than mild hypoglycemia - though pt reports he didn't eat today, always awake and alert, given juice and food. No signs of stone on CT scan.  ESR/CRP normal, suggesting very low likelihood of infectious process.  At this point, seems likely that pt had musculoskeletal back pain.  He only has mild pressure in his back, feels comfortable going home.  Will d/c    [AS]      ED Course User Index  [AS] Yenni Johnson MD                Clinical Impression:       ICD-10-CM ICD-9-CM   1. Back pain, unspecified back location, unspecified back pain laterality, unspecified chronicity  M54.9 724.5             ED Disposition Condition    Discharge Stable        ED Prescriptions     None        Follow-up Information     Follow up With Specialties Details Why Contact Info    Fernando Carmona MD Family Medicine Call   [address]  [address]  [facility] [address] [phone]                                        Yenni Johnson MD  09/06/20 2004

## 2020-09-09 ENCOUNTER — TELEPHONE (OUTPATIENT)
Dept: FAMILY MEDICINE | Facility: CLINIC | Age: 65
End: 2020-09-09

## 2020-09-09 NOTE — TELEPHONE ENCOUNTER
----- Message from Radha Maria sent at 9/9/2020  2:52 PM CDT -----  Contact: [phone] self  Pt is requesting to speak with you re: medications. Please advise   .

## 2020-09-11 LAB
BACTERIA BLD CULT: NORMAL
BACTERIA BLD CULT: NORMAL

## 2020-09-22 ENCOUNTER — OFFICE VISIT (OUTPATIENT)
Dept: UROLOGY | Facility: CLINIC | Age: 65
End: 2020-09-22
Payer: MEDICARE

## 2020-09-22 ENCOUNTER — OFFICE VISIT (OUTPATIENT)
Dept: FAMILY MEDICINE | Facility: CLINIC | Age: 65
End: 2020-09-22
Payer: MEDICARE

## 2020-09-22 VITALS
HEART RATE: 83 BPM | BODY MASS INDEX: 26.66 KG/M2 | SYSTOLIC BLOOD PRESSURE: 154 MMHG | HEIGHT: 69 IN | WEIGHT: 180 LBS | DIASTOLIC BLOOD PRESSURE: 91 MMHG | TEMPERATURE: 98 F

## 2020-09-22 VITALS
HEART RATE: 71 BPM | DIASTOLIC BLOOD PRESSURE: 95 MMHG | OXYGEN SATURATION: 98 % | WEIGHT: 173.31 LBS | HEIGHT: 69 IN | BODY MASS INDEX: 25.67 KG/M2 | SYSTOLIC BLOOD PRESSURE: 160 MMHG

## 2020-09-22 DIAGNOSIS — R35.0 URINARY FREQUENCY: ICD-10-CM

## 2020-09-22 DIAGNOSIS — N13.8 BPH WITH OBSTRUCTION/LOWER URINARY TRACT SYMPTOMS: Primary | ICD-10-CM

## 2020-09-22 DIAGNOSIS — H04.209 EXCESSIVE TEAR PRODUCTION, UNSPECIFIED LATERALITY: ICD-10-CM

## 2020-09-22 DIAGNOSIS — J43.2 CENTRILOBULAR EMPHYSEMA: Primary | ICD-10-CM

## 2020-09-22 DIAGNOSIS — E11.69 HYPERLIPIDEMIA ASSOCIATED WITH TYPE 2 DIABETES MELLITUS: ICD-10-CM

## 2020-09-22 DIAGNOSIS — F20.9 SCHIZOPHRENIA, UNSPECIFIED TYPE: ICD-10-CM

## 2020-09-22 DIAGNOSIS — I15.2 HYPERTENSION ASSOCIATED WITH TYPE 2 DIABETES MELLITUS: ICD-10-CM

## 2020-09-22 DIAGNOSIS — Z72.0 TOBACCO ABUSE: ICD-10-CM

## 2020-09-22 DIAGNOSIS — N40.1 BPH WITH OBSTRUCTION/LOWER URINARY TRACT SYMPTOMS: Primary | ICD-10-CM

## 2020-09-22 DIAGNOSIS — E78.5 HYPERLIPIDEMIA ASSOCIATED WITH TYPE 2 DIABETES MELLITUS: ICD-10-CM

## 2020-09-22 DIAGNOSIS — E11.59 HYPERTENSION ASSOCIATED WITH TYPE 2 DIABETES MELLITUS: ICD-10-CM

## 2020-09-22 DIAGNOSIS — I25.10 CORONARY ARTERY DISEASE INVOLVING NATIVE HEART WITHOUT ANGINA PECTORIS, UNSPECIFIED VESSEL OR LESION TYPE: ICD-10-CM

## 2020-09-22 DIAGNOSIS — E11.65 TYPE 2 DIABETES MELLITUS WITH HYPERGLYCEMIA, WITHOUT LONG-TERM CURRENT USE OF INSULIN: ICD-10-CM

## 2020-09-22 DIAGNOSIS — I42.9 CARDIOMYOPATHY, UNSPECIFIED TYPE: ICD-10-CM

## 2020-09-22 DIAGNOSIS — I50.9 CONGESTIVE HEART FAILURE, UNSPECIFIED HF CHRONICITY, UNSPECIFIED HEART FAILURE TYPE: ICD-10-CM

## 2020-09-22 PROBLEM — N28.9 RENAL INSUFFICIENCY: Status: RESOLVED | Noted: 2020-02-26 | Resolved: 2020-09-22

## 2020-09-22 PROBLEM — N18.2 STAGE 2 CHRONIC KIDNEY DISEASE: Status: RESOLVED | Noted: 2020-01-27 | Resolved: 2020-09-22

## 2020-09-22 PROCEDURE — 3080F PR MOST RECENT DIASTOLIC BLOOD PRESSURE >= 90 MM HG: ICD-10-PCS | Mod: CPTII,S$GLB,, | Performed by: FAMILY MEDICINE

## 2020-09-22 PROCEDURE — 3077F PR MOST RECENT SYSTOLIC BLOOD PRESSURE >= 140 MM HG: ICD-10-PCS | Mod: CPTII,S$GLB,, | Performed by: FAMILY MEDICINE

## 2020-09-22 PROCEDURE — G0009 ADMIN PNEUMOCOCCAL VACCINE: HCPCS | Mod: S$GLB,,, | Performed by: FAMILY MEDICINE

## 2020-09-22 PROCEDURE — 3077F SYST BP >= 140 MM HG: CPT | Mod: CPTII,S$GLB,, | Performed by: NURSE PRACTITIONER

## 2020-09-22 PROCEDURE — 99999 PR PBB SHADOW E&M-EST. PATIENT-LVL V: ICD-10-PCS | Mod: PBBFAC,,, | Performed by: NURSE PRACTITIONER

## 2020-09-22 PROCEDURE — 3044F HG A1C LEVEL LT 7.0%: CPT | Mod: CPTII,S$GLB,, | Performed by: FAMILY MEDICINE

## 2020-09-22 PROCEDURE — 90670 PCV13 VACCINE IM: CPT | Mod: S$GLB,,, | Performed by: FAMILY MEDICINE

## 2020-09-22 PROCEDURE — 99999 PR PBB SHADOW E&M-EST. PATIENT-LVL V: CPT | Mod: PBBFAC,,, | Performed by: NURSE PRACTITIONER

## 2020-09-22 PROCEDURE — 3077F SYST BP >= 140 MM HG: CPT | Mod: CPTII,S$GLB,, | Performed by: FAMILY MEDICINE

## 2020-09-22 PROCEDURE — 99213 OFFICE O/P EST LOW 20 MIN: CPT | Mod: S$GLB,,, | Performed by: NURSE PRACTITIONER

## 2020-09-22 PROCEDURE — 90694 VACC AIIV4 NO PRSRV 0.5ML IM: CPT | Mod: S$GLB,,, | Performed by: FAMILY MEDICINE

## 2020-09-22 PROCEDURE — 3080F DIAST BP >= 90 MM HG: CPT | Mod: CPTII,S$GLB,, | Performed by: FAMILY MEDICINE

## 2020-09-22 PROCEDURE — G0008 FLU VACCINE - QUADRIVALENT - ADJUVANTED: ICD-10-PCS | Mod: S$GLB,,, | Performed by: FAMILY MEDICINE

## 2020-09-22 PROCEDURE — 3008F PR BODY MASS INDEX (BMI) DOCUMENTED: ICD-10-PCS | Mod: CPTII,S$GLB,, | Performed by: FAMILY MEDICINE

## 2020-09-22 PROCEDURE — G0008 ADMIN INFLUENZA VIRUS VAC: HCPCS | Mod: S$GLB,,, | Performed by: FAMILY MEDICINE

## 2020-09-22 PROCEDURE — 3080F DIAST BP >= 90 MM HG: CPT | Mod: CPTII,S$GLB,, | Performed by: NURSE PRACTITIONER

## 2020-09-22 PROCEDURE — 1101F PR PT FALLS ASSESS DOC 0-1 FALLS W/OUT INJ PAST YR: ICD-10-PCS | Mod: CPTII,S$GLB,, | Performed by: NURSE PRACTITIONER

## 2020-09-22 PROCEDURE — 90670 PNEUMOCOCCAL CONJUGATE VACCINE 13-VALENT LESS THAN 5YO & GREATER THAN: ICD-10-PCS | Mod: S$GLB,,, | Performed by: FAMILY MEDICINE

## 2020-09-22 PROCEDURE — 3008F BODY MASS INDEX DOCD: CPT | Mod: CPTII,S$GLB,, | Performed by: NURSE PRACTITIONER

## 2020-09-22 PROCEDURE — 51798 US URINE CAPACITY MEASURE: CPT | Mod: S$GLB,,, | Performed by: NURSE PRACTITIONER

## 2020-09-22 PROCEDURE — 1101F PR PT FALLS ASSESS DOC 0-1 FALLS W/OUT INJ PAST YR: ICD-10-PCS | Mod: CPTII,S$GLB,, | Performed by: FAMILY MEDICINE

## 2020-09-22 PROCEDURE — 99499 UNLISTED E&M SERVICE: CPT | Mod: S$GLB,,, | Performed by: FAMILY MEDICINE

## 2020-09-22 PROCEDURE — 99499 RISK ADDL DX/OHS AUDIT: ICD-10-PCS | Mod: S$GLB,,, | Performed by: FAMILY MEDICINE

## 2020-09-22 PROCEDURE — G0009 PNEUMOCOCCAL CONJUGATE VACCINE 13-VALENT LESS THAN 5YO & GREATER THAN: ICD-10-PCS | Mod: S$GLB,,, | Performed by: FAMILY MEDICINE

## 2020-09-22 PROCEDURE — 99213 PR OFFICE/OUTPT VISIT, EST, LEVL III, 20-29 MIN: ICD-10-PCS | Mod: S$GLB,,, | Performed by: NURSE PRACTITIONER

## 2020-09-22 PROCEDURE — 51798 PR MEAS,POST-VOID RES,US,NON-IMAGING: ICD-10-PCS | Mod: S$GLB,,, | Performed by: NURSE PRACTITIONER

## 2020-09-22 PROCEDURE — 99999 PR PBB SHADOW E&M-EST. PATIENT-LVL V: ICD-10-PCS | Mod: PBBFAC,,, | Performed by: FAMILY MEDICINE

## 2020-09-22 PROCEDURE — 1101F PT FALLS ASSESS-DOCD LE1/YR: CPT | Mod: CPTII,S$GLB,, | Performed by: NURSE PRACTITIONER

## 2020-09-22 PROCEDURE — 99999 PR PBB SHADOW E&M-EST. PATIENT-LVL V: CPT | Mod: PBBFAC,,, | Performed by: FAMILY MEDICINE

## 2020-09-22 PROCEDURE — 3080F PR MOST RECENT DIASTOLIC BLOOD PRESSURE >= 90 MM HG: ICD-10-PCS | Mod: CPTII,S$GLB,, | Performed by: NURSE PRACTITIONER

## 2020-09-22 PROCEDURE — 3044F PR MOST RECENT HEMOGLOBIN A1C LEVEL <7.0%: ICD-10-PCS | Mod: CPTII,S$GLB,, | Performed by: FAMILY MEDICINE

## 2020-09-22 PROCEDURE — 99214 OFFICE O/P EST MOD 30 MIN: CPT | Mod: 25,S$GLB,, | Performed by: FAMILY MEDICINE

## 2020-09-22 PROCEDURE — 99214 PR OFFICE/OUTPT VISIT, EST, LEVL IV, 30-39 MIN: ICD-10-PCS | Mod: 25,S$GLB,, | Performed by: FAMILY MEDICINE

## 2020-09-22 PROCEDURE — 90694 FLU VACCINE - QUADRIVALENT - ADJUVANTED: ICD-10-PCS | Mod: S$GLB,,, | Performed by: FAMILY MEDICINE

## 2020-09-22 PROCEDURE — 3008F PR BODY MASS INDEX (BMI) DOCUMENTED: ICD-10-PCS | Mod: CPTII,S$GLB,, | Performed by: NURSE PRACTITIONER

## 2020-09-22 PROCEDURE — 1101F PT FALLS ASSESS-DOCD LE1/YR: CPT | Mod: CPTII,S$GLB,, | Performed by: FAMILY MEDICINE

## 2020-09-22 PROCEDURE — 3008F BODY MASS INDEX DOCD: CPT | Mod: CPTII,S$GLB,, | Performed by: FAMILY MEDICINE

## 2020-09-22 PROCEDURE — 3077F PR MOST RECENT SYSTOLIC BLOOD PRESSURE >= 140 MM HG: ICD-10-PCS | Mod: CPTII,S$GLB,, | Performed by: NURSE PRACTITIONER

## 2020-09-22 NOTE — PROGRESS NOTES
Subjective:       Patient ID: Paresh Senior is a 65 y.o. male.    Chief Complaint: Hospital Follow Up and Medication Refill (Tylenol, ProAir, Breo)    64 yo M, established pt of mine, with PMH significant for CAD, CHF, Cardiomyopathy, COPD on supplemental home O2 at 3L NC, HTN, HLD, DM-2, Schizophrenia,GERD, Tobacco abuse, Marijuana abuse, and Crack/Cocaine abuse.  He presents for f/u visit.    Noted that he was hospitalized 8/24-8/26/2020 for COPD exacerbation. He clinically improved with nebs, solumedrol, and transient BIPAP. Documented that he would require home oxygen as he desats to 87% on RA and improved on 3L. Pt wit sats of 95% on 2-3 liter supplemental oxygen. He was discharged on azithromycin and oral steroids x5 days. Instructed to continue Breo daily and albuterol p.r.n.    Patient states that last tobacco use was 08/25/2020.  He is requesting a portable oxygen machine as it is difficult to manipulate his wheelchair with an oxygen tank.  He has not been evaluated by pulmonology since March 2020.  Reports shortness of breath with minimal activity such as taking off his shoes.    Today patient is also requesting a psychiatry referral for management of his schizophrenia.    Lastly he is requesting a referral to an eye specialist.  Patient is describing pressure and tearing to bilateral eyes.  No associated eye itching, loss of vision, eye pain.  No associated headaches.    His BP is 160/95 in the office today.  He not sure of his current antihypertensive regimen.  Currently prescribed Imdur 30 mg daily; lisinopril 10 mg daily; metoprolol 25 mg b.i.d. Denies CP, HA, blurry vision, LE edema, or palpitations.          Review of Systems   Constitutional: Negative for activity change, appetite change, chills, fever and unexpected weight change.   HENT: Negative for congestion, sinus pressure, sinus pain, sneezing and sore throat.    Eyes: Negative for redness, itching and visual disturbance.   Respiratory:  Positive for shortness of breath. Negative for cough and wheezing.    Cardiovascular: Negative for chest pain, palpitations and leg swelling.   Gastrointestinal: Negative for abdominal pain, constipation, diarrhea, nausea and vomiting.   Genitourinary: Negative for difficulty urinating, discharge, dysuria, frequency and urgency.   Musculoskeletal: Negative for arthralgias.   Skin: Negative for rash.   Neurological: Negative for dizziness, syncope, weakness and headaches.   Psychiatric/Behavioral: Negative for dysphoric mood and suicidal ideas. The patient is not nervous/anxious.          Past Medical History:   Diagnosis Date    Allergy     Colon polyp 2013    COPD (chronic obstructive pulmonary disease)     Coronary artery disease     Disorder of kidney and ureter     Hypercholesterolemia     Hypertension     Urinary tract infection        Patient Active Problem List   Diagnosis    Marijuana abuse    Tobacco abuse    CAD (coronary artery disease)    HTN (hypertension)    Cardiomyopathy    Congestive heart failure    Centrilobular emphysema    Type 2 diabetes mellitus with hyperglycemia, without long-term current use of insulin    Gastroesophageal reflux disease without esophagitis    Schizophrenia    Mixed hyperlipidemia    Chronic obstructive pulmonary disease    On home oxygen therapy    Crack cocaine use    Bilateral knee pain    Urine retention    Vitamin D deficiency    Acute gout of foot    Lung nodule < 6cm on CT    Aortic atherosclerosis    Personal history of colonic polyps    COPD exacerbation    Acute hypoxemic respiratory failure    Cocaine use disorder       Past Surgical History:   Procedure Laterality Date    COLONOSCOPY  02/2016    Advised repeat in 3 years    COLONOSCOPY N/A 7/22/2020    Procedure: COLONOSCOPY [place];  Surgeon: Saul Velez MD;  Location: [facility];  Service: Endoscopy;  Laterality: N/A;    CORONARY STENT PLACEMENT  2013       Family History  "  Problem Relation Age of Onset    No Known Problems Mother     Mental illness Brother     Diabetes Grandchild        Social History     Tobacco Use   Smoking Status Current Every Day Smoker    Packs/day: 0.75    Years: 50.00    Pack years: 37.50    Types: Cigars   Smokeless Tobacco Never Used       Social History     Social History Narrative    Tobacco Use: Currently smoking 1 cigar every other day. Former cigarette use; initiated at age 8yo, last use 11/2019 (age 63 yo), using 0.5ppd       Medications have been reviewed and reconciled.     Review of patient's allergies indicates:   Allergen Reactions    Trelegy ellipta [fluticasone-umeclidin-vilanter] Other (See Comments)     Possible urinary retention     Vardenafil Rash        Objective:        Vitals:    09/22/20 1139   BP: (!) 160/95   Pulse: 71   SpO2: 98%   Weight: 78.6 kg (173 lb 4.5 oz)   Height: 5' 9" (1.753 m)         Physical Exam   Constitutional: He is oriented to person, place, and time. He appears well-developed and well-nourished. No distress.   Pt with supplemental O2 today at 3L NC  HENT:   Head: Normocephalic and atraumatic.   Right Ear: External ear normal.   Left Ear: External ear normal.   Mouth/Throat: Not examined  Eyes: Conjunctivae and EOM are normal. No scleral icterus.   Neck: Normal range of motion.   Cardiovascular: Normal rate, regular rhythm and normal heart sounds. Exam reveals no gallop and no friction rub.   No murmur heard.  Pulmonary/Chest: Effort normal and breath sounds normal. No respiratory distress. He has no wheezes. He has no rales.   Musculoskeletal: Normal range of motion. He exhibits no edema, tenderness or deformity.   Neurological: He is alert and oriented to person, place, and time. No cranial nerve deficit. Gait normal.   Skin: Skin is warm and dry. No rash noted. No erythema.   Psychiatric: He has a normal mood and affect. His behavior is normal.     Assessment:       1. Centrilobular emphysema    2. " Schizophrenia, unspecified type    3. Excessive tear production, unspecified laterality    4. Hypertension associated with type 2 diabetes mellitus    5. Hyperlipidemia associated with type 2 diabetes mellitus    6. Type 2 diabetes mellitus with hyperglycemia, without long-term current use of insulin    7. Coronary artery disease involving native heart without angina pectoris, unspecified vessel or lesion type    8. Congestive heart failure, unspecified HF chronicity, unspecified heart failure type    9. Cardiomyopathy, unspecified type    10. Tobacco abuse        Plan:       Paersh was seen today for hospital follow up and medication refill.    Diagnoses and all orders for this visit:    Centrilobular emphysema  -     OXYGEN FOR HOME USE  -     Influenza (FLUAD) - Quadrivalent (Adjuvanted) *Preferred* (65+) (PF)  -     (In Office Administered) Pneumococcal Conjugate Vaccine (13 Valent) (IM)  -     Ambulatory referral/consult to Pulmonology; Future    Schizophrenia, unspecified type  -     Ambulatory referral/consult to Psychiatry; Future    Excessive tear production, unspecified laterality  -     Ambulatory referral/consult to Ophthalmology; Future    Hypertension associated with type 2 diabetes mellitus    Hyperlipidemia associated with type 2 diabetes mellitus    Type 2 diabetes mellitus with hyperglycemia, without long-term current use of insulin    Coronary artery disease involving native heart without angina pectoris, unspecified vessel or lesion type    Congestive heart failure, unspecified HF chronicity, unspecified heart failure type    Cardiomyopathy, unspecified type    Tobacco abuse      COPD    --Adherent with supplemental home O2 at 3L NC  --Has O2 tank; prefers portable home O2 with concentrator. Rx written; though patient was informed that this may need to be authorized by pulmonology team  --Established care with [facility] pulmonology 12/17/2019; last visit 03/20/2020  --CT chest 2/2020:   Centrilobular emphysema; 5mm Noncalcified left lower lobe subpleural nodule .  Recommend correlation with any prior outside similar cross-sectional imaging to assess for stability.  Otherwise, optional CT at 12 months could be performed.; Mild anterior pericardial effusion versus thickening; Aortic arch and coronary calcific atherosclerosis.  --Continue Breo 1 puff daily  --Declined pulmonary rehab  --advised f/u in 3 months with PFTs. Will refer patient for follow-up visit    Schizophrenia  --Referred to psychiatry once again today 09/22/2020. Advised to call to schedule intake/appt  --Continue quetiapine dose 300 mg hs + condition 0.5 mg b.i.d. + risperidone 2 mg b.i.d. + trazodone 100 mg HS    Eye drainage  --refer to ophthalmology (Dr. Safia Larsen) per pt request    HTN  --BP previously well-controlled with lisinopril hydrochlorothiazide 20/25 mg daily   Elevated today; asymptomatic  -- Currently prescribed Imdur 30 mg daily; lisinopril 10 mg daily; metoprolol 25 mg b.i.d.   --BP f/u in 4 weeks. Will adjust regimen if necessary    HLD  , , HDL 35, .8 12/13/2019  Adherent with rosuvastatin 40 mg hs.    DM-2  Last A1c 6.1 8/25/2020  Adherent with glipizide 5 mg BID    Foot exam performed  01/20/2020 notable for mild onychomycosis bilaterally, otherwise within normal limits  Optho referral placed today 9/22/2020  Flu vaccine today 9/22/2020  Pneumovax-23 administered 7/28/2016; PCV-13 today 9/22/2020  Continue ACEI, Statin, ASA    Renal Insufficiency   eGFR 52 12/13/2019    eGFR >60 9/6/2020  Resolve dx    Urinary Retention  Resolved after trelegy discontinued. Continue Flomax 0.4 mg daily +  finasteride 5 mg daily.   Continue f/u with urology; last visit was earlier today    CAD/CHF/Cardiomyopathy  --Med reconciliation on f/u visit   --2D echo 12/31/2019:   · Normal left ventricular systolic function. The estimated ejection fraction is 55%  · Mild eccentric left ventricular  hypertrophy.  · Normal LV diastolic function.  · Mild mitral regurgitation.  · Local segmental wall motion abnormalities.  · Normal right ventricular systolic function.  · Normal central venous pressure (3 mm Hg).  Established care with [facility] cardiology 12/16/2019, has not returned since this time.   Adherent with crestor 40 mg hs; Metoprolol 25 mg BID; lisinopril 10 mg daily; and imdur 30 mg daily   - Lifestyle modification  - Refer to smoking cessation program.     Tobacco abuse  Pt previously smoking 0.5ppd x 58 years;   Quit cigarette use 8/25/2020  Discuss referral to tobacco cessation program on f/u visit     HM   Flu vaccine and PCV-13 today 9/22/2020  Colonoscopy Referral Placed 1/20/2020.  Most recent was to 2016; 3 polyps resected.  Advised repeat colonoscopy in 3 years (2019). Discuss on f/u visit  Diabetic eye exam due. Ophthalmology referral placed today 9/22/2020  Evaluated by Dr. Skelton at [facility] on 01/16/2018 for hep B surface antigen positive. All hep B serologies including DNA viral load negative 12/13/2019. Will start Hep B vaccine series today 1/20/2020. Hep B #2 due   PSA 3.2--normal 12/13/19  TSH 2.140 (N)  12/13/19  HIV NR  12/13/19  Hep C NR  12/13/19  CBC wnl  12/13/19  Needs tdap and shingrix from pharmacy    Follow up in about 4 weeks (around 10/20/2020) for HTN management.    Previous Visit(s)  =============  Bilateral Hip Pain   --Likely OA   --Refer for x-rays of the hip  --Tx with Voltaren 1% gel QID; acetaminophen 1g q6 hrs    Vitamin D deficiency   Vit D 27 12/13/2019  --Advised OTC Vitamin D3 1000 IU daily     GERD  --Tx'd with omeprazole 20 mg daily     Substance abuse  --Marijuana, Smoking Crack   --reports substance use secondary to chronic bilateral hip pain. Will need to evaluate over subsequent visits       Dermatitis; RLE  --Likely 2/2 dry skin   --Tx with TAC 0.1% ointment BID x 2 weeks

## 2020-09-22 NOTE — PROGRESS NOTES
Subjective:       Patient ID: Paresh Senior is a 65 y.o. male.    Chief Complaint: BPH follow-up    This is a 64 y.o.  male patient that is an established patient of mine.  The patient is referred to me by Dr. Carmona for urinary retention. Hx of CKD2, CHF, 2DM, GERD and HTN. The patient was hospitalized on 1/27/2020 for hypoglycemia. During admission, patient had difficulty urinating, a bladder scan was completed and showed almost 1L in bladder. A dillon was placed and Flomax was started. Renal ultrasound revealed simple cysts and prostatomegaly.  A voiding trial was attempted but patient was unable to urinate, bladder scan revealed 385mL and dillon was reinserted. Patient was discharged with dillon.  Patient subsequently failed 2 voiding trials. Pulmonary reached out to let me know that patient had been started on Trelegy that might be contributing towards urinary retention. Trelegy was discontinued by pulmonary and patient was able to successfully pass voiding trial in clinic.      7/17/2020  Patient here today for follow-up. He has been urinating without difficulty. Patient's biggest complaint is urinary frequency. It is mostly bothersome during the day. He goes every hour. Night time is only 1-2x. Stream is weak at times. No dysuria or hematuria. Patient has not been taking flomax or finasteride. He drinks coffee, tea and cold drinks throughout the day. Still smoking. No constipation. Urine dip: + protein. Uroflow vol: 89ml, qmax: 7.5ml/s  qavg: 3.7ml/s PVR: 119ml.     9/22/20  Here for follow-up. Urinating without difficulty. Good FOS. Complains of some baseline frequency. During the day more than at night. Denies dysuria or hematuria. PVR: 108ml     LAST PSA  Lab Results   Component Value Date    PSA 3.2 12/13/2019       Lab Results   Component Value Date    CREATININE 1.4 09/06/2020       ---  Past Medical History:   Diagnosis Date    Allergy     Colon polyp 2013    COPD (chronic obstructive pulmonary  "disease)     Coronary artery disease     Disorder of kidney and ureter     Hypercholesterolemia     Hypertension     Urinary tract infection        Past Surgical History:   Procedure Laterality Date    COLONOSCOPY  02/2016    Advised repeat in 3 years    COLONOSCOPY N/A 7/22/2020    Procedure: COLONOSCOPY [place];  Surgeon: Saul Velez MD;  Location: [facility];  Service: Endoscopy;  Laterality: N/A;    CORONARY STENT PLACEMENT  2013       Family History   Problem Relation Age of Onset    No Known Problems Mother     Mental illness Brother     Diabetes Grandchild        Social History     Tobacco Use    Smoking status: Current Every Day Smoker     Packs/day: 0.75     Years: 50.00     Pack years: 37.50     Types: Cigars    Smokeless tobacco: Never Used   Substance Use Topics    Alcohol use: Yes     Comment: 2-3beers/day    Drug use: Yes     Types: Cocaine, "Crack" cocaine     Comment: 3-4 marijuana joints per day       Current Outpatient Medications on File Prior to Visit   Medication Sig Dispense Refill    acetaminophen (TYLENOL) 500 MG tablet Take 2 tablets (1,000 mg total) by mouth every 8 (eight) hours as needed for Pain. 60 tablet 2    albuterol (PROAIR HFA) 90 mcg/actuation inhaler Inhale 2 puffs into the lungs every 6 (six) hours as needed for Wheezing. Rescue 8.5 g 2    albuterol (PROVENTIL) 2.5 mg /3 mL (0.083 %) nebulizer solution Take 3 mLs (2.5 mg total) by nebulization every 6 (six) hours as needed for Wheezing or Shortness of Breath. Rescue 180 mL 3    aspirin (ECOTRIN) 81 MG EC tablet Take 1 tablet (81 mg total) by mouth once daily. 90 tablet 3    benztropine (COGENTIN) 0.5 MG tablet Take 1 tablet (0.5 mg total) by mouth 2 (two) times daily. 180 tablet 1    blood sugar diagnostic Strp To check BG 3 times daily, to use with insurance preferred meter 100 each 5    blood-glucose meter kit To check BG 3 times daily, to use with insurance preferred meter 1 each 0    " cholecalciferol, vitamin D3, (VITAMIN D3) 25 mcg (1,000 unit) capsule Take 1 capsule (1,000 Units total) by mouth once daily.  0    colchicine (COLCRYS) 0.6 mg tablet Take half a tablet every day 30 tablet 11    diclofenac sodium (VOLTAREN) 1 % Gel Apply 4 g topically 4 (four) times daily. 100 g 0    diphenhydrAMINE (BENADRYL) 25 mg capsule Take 1 capsule (25 mg total) by mouth nightly as needed for Insomnia. 30 capsule 2    finasteride (PROSCAR) 5 mg tablet Take 1 tablet (5 mg total) by mouth once daily. 90 tablet 3    fluticasone furoate-vilanteroL (BREO) 100-25 mcg/dose diskus inhaler Inhale 1 puff into the lungs once daily. Controller 60 each 3    glipiZIDE (GLUCOTROL) 5 MG tablet Take 1 tablet (5 mg total) by mouth 2 (two) times daily with meals. 180 tablet 3    isosorbide mononitrate (IMDUR) 30 MG 24 hr tablet Take 1 tablet (30 mg total) by mouth once daily. 90 tablet 3    lancets Misc To check BG 3 times daily, to use with insurance preferred meter 100 each 5    lisinopriL 10 MG tablet Take 1 tablet (10 mg total) by mouth once daily. 90 tablet 1    mag hydrox/aluminum hyd/simeth (ANTACID ORAL) Take by mouth.      metoprolol tartrate (LOPRESSOR) 25 MG tablet Take 1 tablet (25 mg total) by mouth 2 (two) times daily. 60 tablet 11    nicotine (NICODERM CQ) 21 mg/24 hr Place 1 patch onto the skin once daily. 28 patch 0    omeprazole (PRILOSEC) 20 MG capsule Take 1 capsule (20 mg total) by mouth once daily. 90 capsule 1    polyethylene glycol (GLYCOLAX) 17 gram/dose powder Mix 1 capful (17 g) with liquid and take by mouth 2 (two) times daily as needed (constipation). 510 g 1    risperiDONE (RISPERDAL) 2 MG tablet Take 2 mg by mouth 2 (two) times daily.      rosuvastatin (CRESTOR) 40 MG Tab Take 1 tablet (40 mg total) by mouth every evening. 90 tablet 3    tamsulosin (FLOMAX) 0.4 mg Cap Take 1 capsule (0.4 mg total) by mouth once daily. 90 capsule 3     No current facility-administered medications  on file prior to visit.        Review of patient's allergies indicates:   Allergen Reactions    Trelegy ellipta [fluticasone-umeclidin-vilanter] Other (See Comments)     Possible urinary retention     Vardenafil Rash       Review of Systems   Constitutional: Negative for activity change and fever.   Eyes: Negative for visual disturbance.   Respiratory: Negative for shortness of breath.    Cardiovascular: Negative for chest pain.   Gastrointestinal: Negative for abdominal distention.   Genitourinary: Negative for difficulty urinating.   Musculoskeletal: Negative for gait problem.   Skin: Negative for color change.   Neurological: Negative for facial asymmetry.   Psychiatric/Behavioral: Negative for agitation and confusion.       Objective:      Physical Exam  Constitutional:       Appearance: He is well-developed.   HENT:      Head: Normocephalic and atraumatic.   Neck:      Musculoskeletal: Normal range of motion and neck supple.   Pulmonary:      Effort: Pulmonary effort is normal.      Comments: O2 in place  Abdominal:      General: There is no distension.   Musculoskeletal: Normal range of motion.   Skin:     General: Skin is warm and dry.   Neurological:      Mental Status: He is alert.   Psychiatric:         Mood and Affect: Mood normal.         Assessment:       1. BPH with obstruction/lower urinary tract symptoms    2. Urinary frequency        Plan:         - PVR stable, slight better. Continue flomax and finasteride  - Still frequenting a lot of bladder irritants. Avoid bladder irritants including but not limited to caffeine, alcohol, smoking, spicy foods, acidic foods, tomato-based products, citrus, artificial sweeteners, chocolate, coffee or tea. Limit fluids 3 hours before bed.  - Follow-up in 3 months for uroflow/pvr. Due for PSA at that visit.       BPH with obstruction/lower urinary tract symptoms    Urinary frequency

## 2020-09-22 NOTE — PATIENT INSTRUCTIONS
- Avoid bladder irritants including but not limited to caffeine, alcohol, smoking, spicy foods, acidic foods, tomato-based products, citrus, artificial sweeteners, chocolate, coffee or tea. Limit fluids 3 hours before bed.  - Work on double peeing to try to completely empty bladder  - Follow-up in 3 months, will mail out appt. Call sooner for any concerns

## 2020-09-25 ENCOUNTER — TELEPHONE (OUTPATIENT)
Dept: RHEUMATOLOGY | Facility: CLINIC | Age: 65
End: 2020-09-25

## 2020-09-27 ENCOUNTER — PATIENT OUTREACH (OUTPATIENT)
Dept: ADMINISTRATIVE | Facility: OTHER | Age: 65
End: 2020-09-27

## 2020-09-27 NOTE — PROGRESS NOTES
Care Everywhere: updated  Immunization: updated  Health Maintenance: updated  Media Review: review for outside eye exam report   Legacy Review:   Order placed:   Upcoming appts  Referral to ophthalmology 9/22/2020

## 2020-09-28 ENCOUNTER — OFFICE VISIT (OUTPATIENT)
Dept: RHEUMATOLOGY | Facility: CLINIC | Age: 65
End: 2020-09-28
Payer: MEDICARE

## 2020-09-28 ENCOUNTER — LAB VISIT (OUTPATIENT)
Dept: LAB | Facility: HOSPITAL | Age: 65
End: 2020-09-28
Attending: INTERNAL MEDICINE
Payer: MEDICARE

## 2020-09-28 VITALS
DIASTOLIC BLOOD PRESSURE: 86 MMHG | HEIGHT: 69 IN | SYSTOLIC BLOOD PRESSURE: 155 MMHG | HEART RATE: 85 BPM | WEIGHT: 179.69 LBS | BODY MASS INDEX: 26.62 KG/M2

## 2020-09-28 DIAGNOSIS — M1A.09X0 IDIOPATHIC CHRONIC GOUT OF MULTIPLE SITES WITHOUT TOPHUS: Primary | ICD-10-CM

## 2020-09-28 DIAGNOSIS — M16.0 PRIMARY OSTEOARTHRITIS OF BOTH HIPS: ICD-10-CM

## 2020-09-28 DIAGNOSIS — M1A.09X0 IDIOPATHIC CHRONIC GOUT OF MULTIPLE SITES WITHOUT TOPHUS: ICD-10-CM

## 2020-09-28 DIAGNOSIS — M17.0 PRIMARY OSTEOARTHRITIS OF BOTH KNEES: ICD-10-CM

## 2020-09-28 LAB — URATE SERPL-MCNC: 7.1 MG/DL (ref 3.4–7)

## 2020-09-28 PROCEDURE — 84550 ASSAY OF BLOOD/URIC ACID: CPT

## 2020-09-28 PROCEDURE — 1101F PT FALLS ASSESS-DOCD LE1/YR: CPT | Mod: CPTII,S$GLB,, | Performed by: INTERNAL MEDICINE

## 2020-09-28 PROCEDURE — 99999 PR PBB SHADOW E&M-EST. PATIENT-LVL IV: ICD-10-PCS | Mod: PBBFAC,,, | Performed by: INTERNAL MEDICINE

## 2020-09-28 PROCEDURE — 3079F PR MOST RECENT DIASTOLIC BLOOD PRESSURE 80-89 MM HG: ICD-10-PCS | Mod: CPTII,S$GLB,, | Performed by: INTERNAL MEDICINE

## 2020-09-28 PROCEDURE — 36415 COLL VENOUS BLD VENIPUNCTURE: CPT

## 2020-09-28 PROCEDURE — 99499 UNLISTED E&M SERVICE: CPT | Mod: S$GLB,,, | Performed by: INTERNAL MEDICINE

## 2020-09-28 PROCEDURE — 99214 PR OFFICE/OUTPT VISIT, EST, LEVL IV, 30-39 MIN: ICD-10-PCS | Mod: S$GLB,,, | Performed by: INTERNAL MEDICINE

## 2020-09-28 PROCEDURE — 1101F PR PT FALLS ASSESS DOC 0-1 FALLS W/OUT INJ PAST YR: ICD-10-PCS | Mod: CPTII,S$GLB,, | Performed by: INTERNAL MEDICINE

## 2020-09-28 PROCEDURE — 81372 HLA I TYPING COMPLETE LR: CPT | Mod: PO

## 2020-09-28 PROCEDURE — 99999 PR PBB SHADOW E&M-EST. PATIENT-LVL IV: CPT | Mod: PBBFAC,,, | Performed by: INTERNAL MEDICINE

## 2020-09-28 PROCEDURE — 3077F PR MOST RECENT SYSTOLIC BLOOD PRESSURE >= 140 MM HG: ICD-10-PCS | Mod: CPTII,S$GLB,, | Performed by: INTERNAL MEDICINE

## 2020-09-28 PROCEDURE — 99214 OFFICE O/P EST MOD 30 MIN: CPT | Mod: S$GLB,,, | Performed by: INTERNAL MEDICINE

## 2020-09-28 PROCEDURE — 3077F SYST BP >= 140 MM HG: CPT | Mod: CPTII,S$GLB,, | Performed by: INTERNAL MEDICINE

## 2020-09-28 PROCEDURE — 99499 RISK ADDL DX/OHS AUDIT: ICD-10-PCS | Mod: S$GLB,,, | Performed by: INTERNAL MEDICINE

## 2020-09-28 PROCEDURE — 3008F PR BODY MASS INDEX (BMI) DOCUMENTED: ICD-10-PCS | Mod: CPTII,S$GLB,, | Performed by: INTERNAL MEDICINE

## 2020-09-28 PROCEDURE — 3008F BODY MASS INDEX DOCD: CPT | Mod: CPTII,S$GLB,, | Performed by: INTERNAL MEDICINE

## 2020-09-28 PROCEDURE — 3079F DIAST BP 80-89 MM HG: CPT | Mod: CPTII,S$GLB,, | Performed by: INTERNAL MEDICINE

## 2020-09-28 RX ORDER — COLCHICINE 0.6 MG/1
TABLET ORAL
Qty: 15 TABLET | Refills: 6 | Status: SHIPPED | OUTPATIENT
Start: 2020-09-28 | End: 2020-09-30 | Stop reason: SDUPTHER

## 2020-09-28 ASSESSMENT — ROUTINE ASSESSMENT OF PATIENT INDEX DATA (RAPID3)
TOTAL RAPID3 SCORE: 0.44
AM STIFFNESS SCORE: 1, YES
PSYCHOLOGICAL DISTRESS SCORE: 2.2
PATIENT GLOBAL ASSESSMENT SCORE: 0
FATIGUE SCORE: 0
MDHAQ FUNCTION SCORE: 0.4
PAIN SCORE: 0

## 2020-09-28 NOTE — PROGRESS NOTES
"Subjective:       Patient ID: Paresh Senior is a 65 y.o. male.    Chief Complaint: No chief complaint on file. Gout     HPI   65 yo male with past medical history of T2DM, HTN, CAD, COPD, schizophrenia, Urine retention due to prostatomegaly on flomax and finasteride, CKD here for follow up for gout and OA.     Initial history from hospitalization from 3/4/2020:  : 65 yo male with past medical history of T2DM, HTN, CAD, COPD, and schizophrenia who presented to [facility] [place] [facility] on 3/1 for urinary catheter pain. He was recently diagnosed with urinary retention on hospitalization on 1/27. At that time imaging findings with evidence of prostatomegaly and he was started on flomax. He has been seen by urology on 2/13 and 2/21 for voiding trials which he continues to fail. Indwelling dillon was re-inserted on 2/21 and he is currently taking flomax+finasteride. Since then the patient has had worsening pain around catheter site and dysuria. On arrival here, he was afebrile with VSS. Labs were significant for leukocytosis(17K), stable H/(11.8/25.5), CHELE with BUN/sCr 62/3.4(baseline around 1.7), UTI with UA showing 2+ protein, >100 RBCs, >100 WBCs, negative nitrite. He was admitted for CHELE + UTI and started on Ceftriaxone with dillon exchanged. Since admission urine culture is growing MSSA and antibiotics were de-escalated to Augmentin on 3/3. He has had improvement in his leukocytosis which has trended down to 12K and his sCr is closer to baseline at 1.8.      Rheumatology is being consulted because since admission the patient has been complaining of pain in his right foot, back of left knee, and right hip. The pain in his right foot started about 2 days ago after he was admitted, whereas the pain in hip and knee he has experienced before. It appears patient had PRN Arpin on board q6 and he has only taken 2 doses since admission. Inflammatory markers obtained sow ESR 68 and .2, RF 25. XR was obtained of right hip which showed "no " "acute fracture or dislocation and mild degenerative arthrosis" and the right foot which showed mildly DJD affecting 1st metatarsophalangeal and interphalangeal joint with no fracture or dislocation or bony destruction. The patient says that he has been dealing with pain in his hip and knee joints for years. He says the pain is worse with movement and generally comes and goes/lets up. He denies any morning stiffness and denies any swelling, warmth or erythema associated with his joint pain. He says the pain was worse a few days ago and is starting to get better.  He says that the PRN Norco, tylenol has helped the pain some when he does ask for them. He is able to walk without issue. He denies prior diagnosis of rheumatoid arthritis and denies any family history of RA or other autoimmune disorders. Of note, he has had arthrocenteses done on bilateral knees 1/30/2020 for pain patient had which had only 95 WBCs and not suggestive of septic or gouty arthritis.     He also has more acute on chronic pain in his right hip and posterior left knee. His inflammatory markers including ESR and CRP are elevated at 68 and 158 respectively and he has an elevated RF at 25. Uric acid level is elevated at 9. On exam, there is swelling, erythema, tenderness and warmth of his lateral right foot. His Knee exhibits no swelling and no palpable cyst on posterior aspect. XR of right hip and foot suggestive of OA. Given evidence of synovitis of right foot and positive uric acid, recommending to treat this as acute gout. Low suspicion for rheumatoid arthritis at this time given lack of other areas of synovitis, deformities, morning stiffness although it does remain on differential. Other joint pains likely consistent with OA.    We had recommended treatment of acute gout with prednisone 20mg BID for 5 days. Avoid colchicine and NSAIDs given kidney function.     Interval history:  After the prednisone, things improved. No swollen joints. That " "was his first episode. He does have pain in the b/l hips, on the outside on the buttock area. Pain is worse when he lies down on it. Pain is worse with movement. Takes tylenol for pain which helps some. He has not done physical therapy for the hips. Only walks outside when need groceries or pharmacy. Does house work to other physical activity. No other painful/swollen joints.       Labs: RF 25, CCP negative, ARDEN negative, uric acid 9.0  CBC: showing mild normocytic anemia, normal wbc and plt count   CMP showing Cr 1.8, GFR 45, normal LFTs     Review of Systems   Constitutional: Negative for chills and fever.   HENT: Negative for mouth sores.    Eyes: Negative for pain.   Respiratory: Negative for shortness of breath.    Cardiovascular: Negative for chest pain.   Gastrointestinal: Negative for abdominal pain, constipation and diarrhea.   Genitourinary: Negative for dysuria.   Musculoskeletal: Positive for arthralgias. Negative for joint swelling.   Skin: Negative for rash.   Neurological: Negative for headaches.   Hematological: Negative for adenopathy.   Psychiatric/Behavioral: Negative for agitation.         Objective:   BP (!) 155/86   Pulse 85   Ht 5' 9" (1.753 m)   Wt 81.5 kg (179 lb 10.8 oz)   BMI 26.53 kg/m²      Physical Exam      NO TOPHI AND NO SYNOVITIS     No data to display   Lab Results   Component Value Date    WBC 11.08 03/05/2020    HGB 11.8 (L) 03/05/2020    HCT 36.3 (L) 03/05/2020    MCV 92 03/05/2020     03/05/2020       CMP  Sodium   Date Value Ref Range Status   09/06/2020 140 136 - 145 mmol/L Final     Potassium   Date Value Ref Range Status   09/06/2020 4.5 3.5 - 5.1 mmol/L Final     Chloride   Date Value Ref Range Status   09/06/2020 110 95 - 110 mmol/L Final     CO2   Date Value Ref Range Status   09/06/2020 23 23 - 29 mmol/L Final     Glucose   Date Value Ref Range Status   09/06/2020 55 (L) 70 - 110 mg/dL Final     BUN, Bld   Date Value Ref Range Status   09/06/2020 13 8 - 23 " mg/dL Final     Creatinine   Date Value Ref Range Status   09/06/2020 1.4 0.5 - 1.4 mg/dL Final     Calcium   Date Value Ref Range Status   09/06/2020 8.5 (L) 8.7 - 10.5 mg/dL Final     Total Protein   Date Value Ref Range Status   09/06/2020 6.1 6.0 - 8.4 g/dL Final     Albumin   Date Value Ref Range Status   09/06/2020 3.1 (L) 3.5 - 5.2 g/dL Final     Total Bilirubin   Date Value Ref Range Status   09/06/2020 0.4 0.1 - 1.0 mg/dL Final     Comment:     For infants and newborns, interpretation of results should be based  on gestational age, weight and in agreement with clinical  observations.  Premature Infant recommended reference ranges:  Up to 24 hours.............<8.0 mg/dL  Up to 48 hours............<12.0 mg/dL  3-5 days..................<15.0 mg/dL  6-29 days.................<15.0 mg/dL       Alkaline Phosphatase   Date Value Ref Range Status   09/06/2020 74 55 - 135 U/L Final     AST   Date Value Ref Range Status   09/06/2020 21 10 - 40 U/L Final     ALT   Date Value Ref Range Status   09/06/2020 18 10 - 44 U/L Final     Anion Gap   Date Value Ref Range Status   09/06/2020 7 (L) 8 - 16 mmol/L Final     eGFR if    Date Value Ref Range Status   09/06/2020 >60.0 >60 mL/min/1.73 m^2 Final     eGFR if non    Date Value Ref Range Status   09/06/2020 52.4 (A) >60 mL/min/1.73 m^2 Final     Comment:     Calculation used to obtain the estimated glomerular filtration  rate (eGFR) is the CKD-EPI equation.        Lab Results   Component Value Date    URICACID 7.5 (H) 07/27/2020          Assessment:       1. Idiopathic chronic gout of multiple sites without tophus    2. Primary osteoarthritis of both hips    3. Primary osteoarthritis of both knees          66 yo male with past medical history of T2DM, HTN, CAD, COPD, schizophrenia, Urine retention due to prostatomegaly on flomax and finasteride, CKD here for follow up for gout and OA. Patient had his first episode of gout in March 2020 with  right lateral foot pain, erythema and swelling. Uric Acid elevated at 9.0/7.5. Due to CKD with start on ULT.   He has not had any gout attacks since  Only complaint today is left ankle pain with excessive walking  No swelling   No stiffness       Plan:       #GOUT, non tophaceous     -will get [id number]  -he is on colchicine 0.3 mg daily,dose adjusted due to CKD  GFR is now 60  -will start allopurinol after we have results of [id number]  -goal will be Uric acid less than 6   -cbc/cmp/uric acid monthly; with adjustment of allopurinol accordingly   -will consider arthritis survey at next visit     #Osteoarthritis hips and knees  -No NSAIDS due to CKD   -pt takes tylenol   -PHYSICAL THERAPY    -due to multiple psych medications, will not add any gabapentin or cymbalta    #Positive RF  -no swollen or painful joints in RA distrubution   -will monitor     Problem List Items Addressed This Visit     None      Visit Diagnoses     Idiopathic chronic gout of multiple sites without tophus    -  Primary    Relevant Orders    HLA Class I Disease Association    Uric acid    Primary osteoarthritis of both hips        Relevant Orders    Ambulatory referral/consult to Physical/Occupational Therapy    Primary osteoarthritis of both knees        Relevant Orders    Ambulatory referral/consult to Physical/Occupational Therapy             RTC in 3 mos

## 2020-09-30 NOTE — TELEPHONE ENCOUNTER
----- Message from Helen Thrasher sent at 9/30/2020  2:53 PM CDT -----  Regarding: refill  Contact: self  Pt states he need his prescription for colchicine (COLCRYS) 0.6 mg tablet to be send to [facility] - [address] [phone] (Phone) [phone] (Fax)  Pt ask for a  call      Contact [facility]   [phone] (home)

## 2020-10-01 RX ORDER — COLCHICINE 0.6 MG/1
TABLET ORAL
Qty: 15 TABLET | Refills: 6 | Status: SHIPPED | OUTPATIENT
Start: 2020-10-01

## 2020-10-07 ENCOUNTER — CLINICAL SUPPORT (OUTPATIENT)
Dept: REHABILITATION | Facility: HOSPITAL | Age: 65
End: 2020-10-07
Payer: MEDICARE

## 2020-10-07 ENCOUNTER — TELEPHONE (OUTPATIENT)
Dept: FAMILY MEDICINE | Facility: CLINIC | Age: 65
End: 2020-10-07

## 2020-10-07 DIAGNOSIS — M25.552 CHRONIC PAIN OF BOTH HIPS: Primary | ICD-10-CM

## 2020-10-07 DIAGNOSIS — M25.562 CHRONIC PAIN OF BOTH KNEES: ICD-10-CM

## 2020-10-07 DIAGNOSIS — R29.898 DECREASED STRENGTH OF LOWER EXTREMITY: ICD-10-CM

## 2020-10-07 DIAGNOSIS — G89.29 CHRONIC PAIN OF BOTH HIPS: Primary | ICD-10-CM

## 2020-10-07 DIAGNOSIS — Z74.09 DECREASED FUNCTIONAL MOBILITY AND ENDURANCE: ICD-10-CM

## 2020-10-07 DIAGNOSIS — M25.551 CHRONIC PAIN OF BOTH HIPS: Primary | ICD-10-CM

## 2020-10-07 DIAGNOSIS — M25.561 CHRONIC PAIN OF BOTH KNEES: ICD-10-CM

## 2020-10-07 DIAGNOSIS — G89.29 CHRONIC PAIN OF BOTH KNEES: ICD-10-CM

## 2020-10-07 LAB
C1DA TESTING DATE: NORMAL
C1DAQ INTERPRETATION: NORMAL
C1DAQ TESTING DATE: NORMAL
HLA-A 1 SERO. EQUIV: NORMAL
HLA-A 1: NORMAL
HLA-A 2 SERO. EQUIV: NORMAL
HLA-A 2: NORMAL
HLA-B 1 SERO. EQUIV: 7
HLA-B 1: NORMAL
HLA-B 2 SERO. EQUIV: 57
HLA-B 2: NORMAL
HLA-BW 1 SERO. EQUIV: 6
HLA-BW 2 SERO. EQUIV: 4
HLA-C 1: NORMAL
HLA-C 2: NORMAL
HLA-CW 1 SERO. EQUIV: NORMAL
HLA-CW 2 SERO. EQUIV: NORMAL

## 2020-10-07 PROCEDURE — 97162 PT EVAL MOD COMPLEX 30 MIN: CPT | Mod: PN

## 2020-10-07 PROCEDURE — 97110 THERAPEUTIC EXERCISES: CPT | Mod: PN

## 2020-10-07 PROCEDURE — 97530 THERAPEUTIC ACTIVITIES: CPT | Mod: PN

## 2020-10-07 NOTE — PLAN OF CARE
[facility]  Physical Therapy Initial Evaluation    Name: Paresh Senior  Clinic Number: [id number]    Therapy Diagnosis:   Encounter Diagnoses   Name Primary?    Chronic pain of both hips Yes    Chronic pain of both knees     Decreased functional mobility and endurance     Decreased strength of lower extremity      Physician: Mario Hunt*    Physician Orders: PT Eval and Treat: OA of hips and knees for PT   Medical Diagnosis from Referral: M16.0 (ICD-10-CM) - Primary osteoarthritis of both hips M17.0 (ICD-10-CM) - Primary osteoarthritis of both knees   Evaluation Date: 10/7/2020  Authorization Period Expiration: 12/07/2020   Plan of Care Expiration: 10/7/2020 to 11/20/2020  Visit # / Visits authorized: 1/12    Time In: 1215  Time Out: 1300  Total Billable Time: 45 minutes    Precautions: Standard; hypertension; COPD; CAD    Subjective     Date of onset: 1 year    History of current condition - Paresh reports: Chronic insidious B posterolateral hip, joint line knee, and medial ankle pain. Patient reports 2 falls in the last year, with the most recent occurring 7 months ago.      Medical History:   Past Medical History:   Diagnosis Date    Allergy     Colon polyp 2013    COPD (chronic obstructive pulmonary disease)     Coronary artery disease     Disorder of kidney and ureter     Hypercholesterolemia     Hypertension     Urinary tract infection      Surgical History:   Paresh Senior  has a past surgical history that includes Coronary stent placement (2013); Colonoscopy (02/2016); and Colonoscopy (N/A, 7/22/2020).    Medications:   Paresh has a current medication list which includes the following prescription(s): acetaminophen, albuterol, albuterol, aspirin, benztropine, blood sugar diagnostic, blood-glucose meter, cholecalciferol (vitamin d3), colchicine, diclofenac sodium, diphenhydramine, finasteride, fluticasone furoate-vilanterol, glipizide, isosorbide  mononitrate, lancets, lisinopril, mag hydrox/aluminum hyd/simeth, metoprolol tartrate, nicotine, omeprazole, polyethylene glycol, risperidone, rosuvastatin, and tamsulosin.    Allergies:   Review of patient's allergies indicates:   Allergen Reactions    Trelegy ellipta [fluticasone-umeclidin-vilanter] Other (See Comments)     Possible urinary retention     Vardenafil Rash      Imaging: X-Ray Hips 3/2020: There is no evidence of acute fracture or dislocation. The bilateral femoral heads appear well seated within the acetabula with mild degenerative arthrosis. The ilioischial and iliopectineal lines appear maintained.There is no significant pubic symphyseal or sacroiliac joint diastasis.The sacrum is partially obscured by overlying bowel gas.   X-Ray Knees 1/2020: Right knee: No fracture.  Preserved tibiofemoral and patellofemoral articulations.  Minimal spurring about tibial spines and anterior superior patella.  No suprapatellar bursal effusion.  Vascular calcification noted. Left knee: No fracture.  Preserved tibiofemoral and patellofemoral articulations.  Minimal spurring about the anterior superior patella.  No suprapatellar bursal effusion.  Vascular calcification noted.    Prior Therapy: At [facility] outpatient clinic 1 year ago; patient reports his insurance did not cover therapy limiting visits to ~3.  Social History: Patient lives alone in single story house. Patient reports some difficulty walking to bathroom secondary to shortness of breath. Patient reports he sits and lies down 50% of each day. The remaining 50% is devoted to household cleaning and showering. Patient reports he wakes up at 2:30 AM and begins to wind down at 3:30 PM.  Occupation: Patient retired and has been on disability since 1992. Patient relates this to medical problems  Prior Level of Function: Modified independent with rollator for 1.5 years. Patient has had supplemental oxygen (currnetly 3 L) for 2 years however is unsure how  "often he should use it.  Current Level of Function: Modified independent with rollator; decreased functional mobility    Pain:  Current 6/10, worst 10/10, best 0/10   Location: B posterolateral hip  Description: Ache   Aggravating Factors: Walking to mailbox (~1/2 block), standing >/= 15 minutes, stair ascent; less so with squatting. Pain is greater in the morning  Easing Factors: Immobility, sleeping, sitting    Pts goals: Walk 2-6 blocks without pain; move normally with minimal problems    Objective     WNL=within normal limits  WFL=within functional limits  NT=not tested  !=pain    Posture: Standing: Forward trunk lean  Palpation: Tenderness to R PSIS, L proximal gluteus medius, B hamstring tendons and tarsal tunnel    Hip Right  Left  Pain/Dysfunction with Movement    AROM PROM AROM PROM    Flexion 120 130 110 130    Extension 50% NT 50% NT    Abduction WFL NT WFL NT    Adduction WNL NT WNL NT    Internal rotation 75% WNL 50% 75%    External rotation WNL NT WNL NT       Knee Right  Left  Pain/Dysfunction with Movement    AROM PROM AROM PROM    Flexion WNL NT WNL NT    Extension WNL NT WNL NT       Ankle Right  Left  Pain/Dysfunction with Movement    AROM PROM AROM PROM    Dorsiflexion WNL NT WNL NT    Plantarflexion WNL NT WNL NT    Inversion WNL NT WNL NT    Eversion WNL NT WNL NT      LE MMT Right Left Pain/Dysfunction with Movement   Hip flexion 5/5 5/5    Hip extension 4/5 4-/5    Hip abduction 4/5 4/5    Hip internal rotation 5/5 5/5    Hip external rotation 5/5 5/5    Knee flexion 4+/5 5/5    Knee extension 4+/5 5/5    Ankle dorsiflexion 5/5 5/5    Ankle inversion 5/5 4+/5! Medial L ankle pain   Ankle eversion 5/5 5/5      Joint Mobility:   - Lumbar: No pain with grade II-III posterior to anterior mobilization at L3-5. Moderate hypomobility  - Hip: Hypomobility suspected     Gait Analysis: Modified independence with rollator; decreased jaylin and hip and knee flexion in swing    Special tests: 30" " sit<>stand: 7  6 minute walk: Out of time, perform next     CMS Impairment/Limitation/Restriction for FOTO Hip Survey    Therapist reviewed FOTO scores for Paresh Senior on 10/7/2020.   FOTO documents entered into [employer] - see Media section.    Limitation Score: 45%  Category: Mobility     TREATMENT     Treatment Time In: 1235  Treatment Time Out: 1300  Total Treatment time separate from Evaluation: 25 minutes    Paresh received therapeutic exercises to develop strength for 15 minutes including:    Standing:  Double leg heel raises: x10  Hip abduction: x10 B  Hip extension: x10 B  Sit<>stands: Reviewed for HEP    Paresh participated in dynamic functional therapeutic activities to improve functional performance for 10 minutes, including:  Attempted to tighten brake on R wheel of rollator    Home Exercises and Patient Education Provided:    Education provided:   - Findings; prognosis and plan of care (POC)  - Home exercise program (HEP)  - Therapist contact information  - Visit DME store that patient purchased rollator from to fix brake on R wheel    Written Home Exercises Provided: yes.  Exercises were reviewed and Paresh was able to demonstrate them prior to the end of the session.  Paresh demonstrated good  understanding of the education provided.     See EMR under Notes-Patient Instructions for exercises provided 10/7/2020.    Assessment     Paresh is a 65 y.o. male referred to outpatient Physical Therapy with a medical diagnosis of B hip and knee osteoarthritis. Pt presents to initial evaluation with chronic B hip, knee, ankle pain. Shortness of breath and fatigue with manual muscle testing and sit<>stands. Suspect COPD plays a role in decreased endurance and pain with activities employing endurance.     Pt prognosis is Good.   Pt will benefit from skilled outpatient Physical Therapy to address the deficits stated above and in the chart below, provide pt/family education, and to maximize pt's level of  independence.     Plan of care discussed with patient: Yes  Pt's spiritual, cultural and educational needs considered and pt agreeable to plan of care and goals as stated below: None    Anticipated Barriers for therapy: Sedentary lifestyle; COPD    Medical Necessity is demonstrated by the following  History  Co-morbidities and personal factors that may impact the plan of care Co-morbidities:   Arthritis, Back pain, Chronic Obstructive Pulmonary Disease,  Depression, Diabetes Type I or II, High Blood Pressure, Kidney, Bladder, Prostate or Urination Problems, Sleep dysfunction,  Stroke or TIA, Visual Impairment    Personal Factors:   lifestyle-exercise seldom or never  Coping style     high   Examination  Body Structures and Functions, activity limitations and participation restrictions that may impact the plan of care Body Regions:   lower extremities   Low back    Body Systems:    ROM  strength  balance  gait  transfers  transitions    Participation Restrictions:   Community ambulation    Activity limitations:   Learning and applying knowledge  no deficits    General Tasks and Commands  no deficits    Communication  no deficits    Mobility  walking    Self care  no deficits    Domestic Life  shopping  cooking  doing house work (cleaning house, washing dishes, laundry)    Interactions/Relationships  no deficits    Life Areas  no deficits    Community and Social Life  community life  recreation and leisure         moderate   Clinical Presentation evolving clinical presentation with changing clinical characteristics moderate   Decision Making/ Complexity Score: moderate     Short Term Goals (3 Weeks):  1. Pt will be compliant with HEP to supplement PT in restoring pain free function.  2. Pt will improve impaired LE MMTs to >/= 4/5 to improve dynamic hip and knee support for functional tasks.  3. Pt will perform 9 sit<>stands in 30 seconds to demonstrate increased functional strength.  4. Pt will complete 6 minute walk  test with RPE </=5/10 to promote community ambulation.  Long Term Goals (6 Weeks):  1. Pt will improve FOTO score to </= 43% limited to decrease perceived limitation with mobility.   2. Pt will improve impaired LE MMTs to >/= 4+/5 to improve dynamic hip and knee support for functional tasks.  3. Pt will perform 11 sit<>stands in 30 seconds to demonstrate increased functional strength.  4. Pt will complete 6 minute walk test with RPE </=2/10 to promote community ambulation.    Plan     Plan of care Certification: 10/7/2020 to 11/20/2020.    Outpatient Physical Therapy 2 times weekly for 7 weeks to include the following interventions: Gait Training, Manual Therapy, Moist Heat/ Ice, Neuromuscular Re-ed, Patient Education, Self Care, Therapeutic Activites and Therapeutic Exercise.   Modalities, Kinesiotape, and Functional Dry Needling as needed.    Violet Love, PT, PT, DPT

## 2020-10-12 ENCOUNTER — CLINICAL SUPPORT (OUTPATIENT)
Dept: REHABILITATION | Facility: HOSPITAL | Age: 65
End: 2020-10-12
Payer: MEDICARE

## 2020-10-12 DIAGNOSIS — G89.29 CHRONIC PAIN OF BOTH KNEES: ICD-10-CM

## 2020-10-12 DIAGNOSIS — M25.551 CHRONIC PAIN OF BOTH HIPS: ICD-10-CM

## 2020-10-12 DIAGNOSIS — M25.562 CHRONIC PAIN OF BOTH KNEES: ICD-10-CM

## 2020-10-12 DIAGNOSIS — M25.552 CHRONIC PAIN OF BOTH HIPS: ICD-10-CM

## 2020-10-12 DIAGNOSIS — M25.561 CHRONIC PAIN OF BOTH KNEES: ICD-10-CM

## 2020-10-12 DIAGNOSIS — G89.29 CHRONIC PAIN OF BOTH HIPS: ICD-10-CM

## 2020-10-12 DIAGNOSIS — Z74.09 DECREASED FUNCTIONAL MOBILITY AND ENDURANCE: ICD-10-CM

## 2020-10-12 DIAGNOSIS — R29.898 DECREASED STRENGTH OF LOWER EXTREMITY: ICD-10-CM

## 2020-10-12 PROCEDURE — 97110 THERAPEUTIC EXERCISES: CPT | Mod: PN,CQ

## 2020-10-12 NOTE — PATIENT INSTRUCTIONS
Knee  (LAQ)        Sit up tall, tighten abdominals. Straighten one leg and then relax it. Repeat with other leg.  Repeat 10 times. Do 2 sessions per day.     [email or web address]     Seated Marching    Sit, both feet flat, tighten abdominals. Lift right knee toward ceiling. Lower and lift left knee toward the ceiling. Repeat, alternating sides.  Complete 1 sets of 10 repetitions. Perform 2 sessions per day.      Hamstring Curl: Resisted (Sitting)        Facing anchor with tubing on right ankle, leg straight out, bend knee.  Repeat 10 times per set. Do 1 sets per session. Do 2 sessions per day.      [email or web address]        FLEXION: Standing - Stable (Active)        Stand, both feet flat. Lift right knee toward ceiling.   Complete 1 sets of 10 repetitions. Perform 2 sessions per day.     [email or web address]        Copyright © [employer]. All rights reserved.      Functional Quadriceps: Chair Squat        Keeping feet flat on floor, shoulder width apart, squat as low as is comfortable. Use support as necessary.  Repeat 10 times per set. Do 1 sets per session. Do 2 sessions per day.     [email or web address]     Copyright © [employer]. All rights reserved.

## 2020-10-12 NOTE — PROGRESS NOTES
Physical Therapy Treatment Note     Name: Paresh Senior  Clinic Number: [id number]    Therapy Diagnosis:   Encounter Diagnoses   Name Primary?    Chronic pain of both hips     Chronic pain of both knees     Decreased functional mobility and endurance     Decreased strength of lower extremity      Physician: Mario Hunt*    Visit Date: 10/12/2020    Physician Orders: PT Eval and Treat: OA of hips and knees for PT   Medical Diagnosis from Referral: M16.0 (ICD-10-CM) - Primary osteoarthritis of both hips M17.0 (ICD-10-CM) - Primary osteoarthritis of both knees   Evaluation Date: 10/7/2020  Authorization Period Expiration: 12/07/2020   Plan of Care Expiration: 10/7/2020 to 11/20/2020  Visit # / Visits authorized: 2/12    Time In: 8:15 am  Time Out: 9:00 am  Total Billable Time: 45 minutes TE - 3    Precautions: Standard; hypertension; COPD; CAD    Subjective     Pt reports: he hurts all over.  He was compliant with home exercise program.  Response to previous treatment: a little stiff.  Functional change: not at this time.    Pain: 5/10  Location: bilateral back and hips      Objective     Paresh received therapeutic exercises to develop strength for 45 minutes including:    Nu Step: 5' L1  Seated:  LAQ's: x15  Hip flexion: x10  HS curls: x15 RTB  Sit<>stands: x10  Standing:  Double leg heel raises: x15  Hip abduction: x15 B  Hip extension: x15 B  March: x10  Mini squats: x10       Home Exercises Provided and Patient Education Provided     Education provided:   - rest between exercises  - keep exercises in a pain free range    Written Home Exercises Provided: yes.  Exercises were reviewed and Paresh was able to demonstrate them prior to the end of the session.  Paresh demonstrated good  understanding of the education provided.     See EMR under Patient Instructions for exercises provided 10/12/2020.    Assessment     Patient ambulated into the clinic with a rollator and portable O2.  Patient required  regular cues to rest between exercises to catch his breath.  Patient has difficulty lying supine.  Paresh is progressing well towards his goals.   Pt prognosis is Good.     Pt will continue to benefit from skilled outpatient physical therapy to address the deficits listed in the problem list box on initial evaluation, provide pt/family education and to maximize pt's level of independence in the home and community environment.     Pt's spiritual, cultural and educational needs considered and pt agreeable to plan of care and goals.     Anticipated barriers to physical therapy: Sedentary lifestyle; COPD    Goals:   Short Term Goals (3 Weeks):  1. Pt will be compliant with HEP to supplement PT in restoring pain free function.  2. Pt will improve impaired LE MMTs to >/= 4/5 to improve dynamic hip and knee support for functional tasks.  3. Pt will perform 9 sit<>stands in 30 seconds to demonstrate increased functional strength.  4. Pt will complete 6 minute walk test with RPE </=5/10 to promote community ambulation.  Long Term Goals (6 Weeks):  1. Pt will improve FOTO score to </= 43% limited to decrease perceived limitation with mobility.   2. Pt will improve impaired LE MMTs to >/= 4+/5 to improve dynamic hip and knee support for functional tasks.  3. Pt will perform 11 sit<>stands in 30 seconds to demonstrate increased functional strength.  4. Pt will complete 6 minute walk test with RPE </=2/10 to promote community ambulation.    Plan     Continue with Plan Of Care and progress toward PT goals.    Phil Phillips, PTA

## 2020-10-14 RX ORDER — ALBUTEROL SULFATE 0.83 MG/ML
2.5 SOLUTION RESPIRATORY (INHALATION) EVERY 6 HOURS PRN
Qty: 180 ML | Refills: 3 | Status: SHIPPED | OUTPATIENT
Start: 2020-10-14

## 2020-10-14 NOTE — TELEPHONE ENCOUNTER
----- Message from Ann Kwan sent at 10/14/2020  8:50 AM CDT -----  Regarding: Prescription Refill  albuterol (PROVENTIL) 2.5 mg /3 mL (0.083 %) nebulizer solution      [facility] - [place] - [address] [facility]

## 2020-10-15 ENCOUNTER — HOSPITAL ENCOUNTER (EMERGENCY)
Facility: HOSPITAL | Age: 65
Discharge: PSYCHIATRIC OR SUBSTANCE USE FACILITY | End: 2020-10-16
Attending: EMERGENCY MEDICINE
Payer: MEDICARE

## 2020-10-15 VITALS
BODY MASS INDEX: 26.51 KG/M2 | SYSTOLIC BLOOD PRESSURE: 190 MMHG | TEMPERATURE: 98 F | HEIGHT: 69 IN | OXYGEN SATURATION: 96 % | HEART RATE: 91 BPM | WEIGHT: 179 LBS | RESPIRATION RATE: 25 BRPM | DIASTOLIC BLOOD PRESSURE: 110 MMHG

## 2020-10-15 DIAGNOSIS — J96.11 CHRONIC RESPIRATORY FAILURE WITH HYPOXIA: ICD-10-CM

## 2020-10-15 DIAGNOSIS — F20.9 SCHIZOPHRENIA, UNSPECIFIED TYPE: Primary | ICD-10-CM

## 2020-10-15 DIAGNOSIS — R06.02 SHORTNESS OF BREATH: ICD-10-CM

## 2020-10-15 DIAGNOSIS — R00.0 TACHYCARDIA: ICD-10-CM

## 2020-10-15 LAB
ALBUMIN SERPL BCP-MCNC: 3.2 G/DL (ref 3.5–5.2)
ALLENS TEST: ABNORMAL
ALP SERPL-CCNC: 64 U/L (ref 55–135)
ALT SERPL W/O P-5'-P-CCNC: 18 U/L (ref 10–44)
AMPHET+METHAMPHET UR QL: NEGATIVE
ANION GAP SERPL CALC-SCNC: 7 MMOL/L (ref 8–16)
AST SERPL-CCNC: 20 U/L (ref 10–40)
BACTERIA #/AREA URNS AUTO: NORMAL /HPF
BARBITURATES UR QL SCN>200 NG/ML: NEGATIVE
BASOPHILS # BLD AUTO: 0.06 K/UL (ref 0–0.2)
BASOPHILS NFR BLD: 0.7 % (ref 0–1.9)
BENZODIAZ UR QL SCN>200 NG/ML: NEGATIVE
BILIRUB SERPL-MCNC: 0.3 MG/DL (ref 0.1–1)
BILIRUB UR QL STRIP: NEGATIVE
BUN SERPL-MCNC: 16 MG/DL (ref 8–23)
BZE UR QL SCN: NEGATIVE
CALCIUM SERPL-MCNC: 8.9 MG/DL (ref 8.7–10.5)
CANNABINOIDS UR QL SCN: NEGATIVE
CHLORIDE SERPL-SCNC: 112 MMOL/L (ref 95–110)
CLARITY UR REFRACT.AUTO: CLEAR
CO2 SERPL-SCNC: 23 MMOL/L (ref 23–29)
COLOR UR AUTO: ABNORMAL
CREAT SERPL-MCNC: 1.3 MG/DL (ref 0.5–1.4)
CREAT UR-MCNC: 64 MG/DL (ref 23–375)
CTP QC/QA: YES
DIFFERENTIAL METHOD: ABNORMAL
EOSINOPHIL # BLD AUTO: 0.3 K/UL (ref 0–0.5)
EOSINOPHIL NFR BLD: 3.8 % (ref 0–8)
ERYTHROCYTE [DISTWIDTH] IN BLOOD BY AUTOMATED COUNT: 14.6 % (ref 11.5–14.5)
EST. GFR  (AFRICAN AMERICAN): >60 ML/MIN/1.73 M^2
EST. GFR  (NON AFRICAN AMERICAN): 57.3 ML/MIN/1.73 M^2
ETHANOL SERPL-MCNC: <10 MG/DL
GLUCOSE SERPL-MCNC: 121 MG/DL (ref 70–110)
GLUCOSE UR QL STRIP: NEGATIVE
HCO3 UR-SCNC: 25.2 MMOL/L (ref 24–28)
HCT VFR BLD AUTO: 42.9 % (ref 40–54)
HGB BLD-MCNC: 14.3 G/DL (ref 14–18)
HGB UR QL STRIP: ABNORMAL
HYALINE CASTS UR QL AUTO: 1 /LPF
IMM GRANULOCYTES # BLD AUTO: 0.03 K/UL (ref 0–0.04)
IMM GRANULOCYTES NFR BLD AUTO: 0.3 % (ref 0–0.5)
KETONES UR QL STRIP: NEGATIVE
LEUKOCYTE ESTERASE UR QL STRIP: NEGATIVE
LYMPHOCYTES # BLD AUTO: 2.3 K/UL (ref 1–4.8)
LYMPHOCYTES NFR BLD: 25.4 % (ref 18–48)
MCH RBC QN AUTO: 29.3 PG (ref 27–31)
MCHC RBC AUTO-ENTMCNC: 33.3 G/DL (ref 32–36)
MCV RBC AUTO: 88 FL (ref 82–98)
METHADONE UR QL SCN>300 NG/ML: NEGATIVE
MICROSCOPIC COMMENT: NORMAL
MONOCYTES # BLD AUTO: 1 K/UL (ref 0.3–1)
MONOCYTES NFR BLD: 11.2 % (ref 4–15)
NEUTROPHILS # BLD AUTO: 5.2 K/UL (ref 1.8–7.7)
NEUTROPHILS NFR BLD: 58.6 % (ref 38–73)
NITRITE UR QL STRIP: NEGATIVE
NRBC BLD-RTO: 0 /100 WBC
OPIATES UR QL SCN: NEGATIVE
PCO2 BLDA: 45.3 MMHG (ref 35–45)
PCP UR QL SCN>25 NG/ML: NEGATIVE
PH SMN: 7.35 [PH] (ref 7.35–7.45)
PH UR STRIP: 6 [PH] (ref 5–8)
PLATELET # BLD AUTO: 235 K/UL (ref 150–350)
PMV BLD AUTO: 10.1 FL (ref 9.2–12.9)
PO2 BLDA: 50 MMHG (ref 40–60)
POC BE: 0 MMOL/L
POC SATURATED O2: 83 % (ref 95–100)
POC TCO2: 27 MMOL/L (ref 24–29)
POTASSIUM SERPL-SCNC: 4.1 MMOL/L (ref 3.5–5.1)
PROT SERPL-MCNC: 6.2 G/DL (ref 6–8.4)
PROT UR QL STRIP: ABNORMAL
RBC # BLD AUTO: 4.88 M/UL (ref 4.6–6.2)
RBC #/AREA URNS AUTO: 4 /HPF (ref 0–4)
SAMPLE: ABNORMAL
SARS-COV-2 RDRP RESP QL NAA+PROBE: NEGATIVE
SITE: ABNORMAL
SODIUM SERPL-SCNC: 142 MMOL/L (ref 136–145)
SP GR UR STRIP: 1.01 (ref 1–1.03)
SQUAMOUS #/AREA URNS AUTO: 0 /HPF
TOXICOLOGY INFORMATION: NORMAL
TSH SERPL DL<=0.005 MIU/L-ACNC: 2.24 UIU/ML (ref 0.4–4)
URN SPEC COLLECT METH UR: ABNORMAL
WBC # BLD AUTO: 8.89 K/UL (ref 3.9–12.7)
WBC #/AREA URNS AUTO: 1 /HPF (ref 0–5)

## 2020-10-15 PROCEDURE — 81001 URINALYSIS AUTO W/SCOPE: CPT

## 2020-10-15 PROCEDURE — 25000003 PHARM REV CODE 250: Performed by: EMERGENCY MEDICINE

## 2020-10-15 PROCEDURE — 82800 BLOOD PH: CPT

## 2020-10-15 PROCEDURE — U0002 COVID-19 LAB TEST NON-CDC: HCPCS | Performed by: EMERGENCY MEDICINE

## 2020-10-15 PROCEDURE — 84443 ASSAY THYROID STIM HORMONE: CPT

## 2020-10-15 PROCEDURE — 80307 DRUG TEST PRSMV CHEM ANLYZR: CPT

## 2020-10-15 PROCEDURE — 99285 EMERGENCY DEPT VISIT HI MDM: CPT | Mod: 25

## 2020-10-15 PROCEDURE — 80053 COMPREHEN METABOLIC PANEL: CPT

## 2020-10-15 PROCEDURE — 93010 EKG 12-LEAD: ICD-10-PCS | Mod: ,,, | Performed by: INTERNAL MEDICINE

## 2020-10-15 PROCEDURE — 85025 COMPLETE CBC W/AUTO DIFF WBC: CPT

## 2020-10-15 PROCEDURE — 93010 ELECTROCARDIOGRAM REPORT: CPT | Mod: ,,, | Performed by: INTERNAL MEDICINE

## 2020-10-15 PROCEDURE — 99285 EMERGENCY DEPT VISIT HI MDM: CPT | Mod: ,,, | Performed by: EMERGENCY MEDICINE

## 2020-10-15 PROCEDURE — 93005 ELECTROCARDIOGRAM TRACING: CPT

## 2020-10-15 PROCEDURE — 99900035 HC TECH TIME PER 15 MIN (STAT)

## 2020-10-15 PROCEDURE — 99285 PR EMERGENCY DEPT VISIT,LEVEL V: ICD-10-PCS | Mod: ,,, | Performed by: EMERGENCY MEDICINE

## 2020-10-15 PROCEDURE — 80320 DRUG SCREEN QUANTALCOHOLS: CPT

## 2020-10-15 PROCEDURE — 82803 BLOOD GASES ANY COMBINATION: CPT

## 2020-10-15 RX ORDER — ALBUTEROL SULFATE 90 UG/1
2 AEROSOL, METERED RESPIRATORY (INHALATION) EVERY 6 HOURS PRN
Status: DISCONTINUED | OUTPATIENT
Start: 2020-10-15 | End: 2020-10-16 | Stop reason: HOSPADM

## 2020-10-15 RX ORDER — INSULIN ASPART 100 [IU]/ML
1-10 INJECTION, SOLUTION INTRAVENOUS; SUBCUTANEOUS
Status: DISCONTINUED | OUTPATIENT
Start: 2020-10-15 | End: 2020-10-16 | Stop reason: HOSPADM

## 2020-10-15 RX ORDER — IBUPROFEN 200 MG
16 TABLET ORAL
Status: DISCONTINUED | OUTPATIENT
Start: 2020-10-15 | End: 2020-10-16 | Stop reason: HOSPADM

## 2020-10-15 RX ORDER — RISPERIDONE 1 MG/1
2 TABLET ORAL 2 TIMES DAILY
Status: DISCONTINUED | OUTPATIENT
Start: 2020-10-15 | End: 2020-10-16 | Stop reason: HOSPADM

## 2020-10-15 RX ORDER — LISINOPRIL 10 MG/1
10 TABLET ORAL DAILY
Status: DISCONTINUED | OUTPATIENT
Start: 2020-10-15 | End: 2020-10-16 | Stop reason: HOSPADM

## 2020-10-15 RX ORDER — METOPROLOL TARTRATE 25 MG/1
25 TABLET, FILM COATED ORAL 2 TIMES DAILY
Status: DISCONTINUED | OUTPATIENT
Start: 2020-10-15 | End: 2020-10-16 | Stop reason: HOSPADM

## 2020-10-15 RX ORDER — ISOSORBIDE DINITRATE 5 MG/1
20 TABLET ORAL 3 TIMES DAILY
Status: DISCONTINUED | OUTPATIENT
Start: 2020-10-15 | End: 2020-10-16 | Stop reason: HOSPADM

## 2020-10-15 RX ORDER — IBUPROFEN 200 MG
24 TABLET ORAL
Status: DISCONTINUED | OUTPATIENT
Start: 2020-10-15 | End: 2020-10-16 | Stop reason: HOSPADM

## 2020-10-15 RX ORDER — GLUCAGON 1 MG
1 KIT INJECTION
Status: DISCONTINUED | OUTPATIENT
Start: 2020-10-15 | End: 2020-10-16 | Stop reason: HOSPADM

## 2020-10-15 RX ORDER — BENZTROPINE MESYLATE 0.5 MG/1
0.5 TABLET ORAL 2 TIMES DAILY
Status: DISCONTINUED | OUTPATIENT
Start: 2020-10-15 | End: 2020-10-16 | Stop reason: HOSPADM

## 2020-10-15 RX ORDER — FINASTERIDE 5 MG/1
5 TABLET, FILM COATED ORAL DAILY
Status: DISCONTINUED | OUTPATIENT
Start: 2020-10-15 | End: 2020-10-16 | Stop reason: HOSPADM

## 2020-10-15 RX ADMIN — METOPROLOL TARTRATE 25 MG: 25 TABLET, FILM COATED ORAL at 11:10

## 2020-10-15 RX ADMIN — FINASTERIDE 5 MG: 5 TABLET, FILM COATED ORAL at 11:10

## 2020-10-15 RX ADMIN — RISPERIDONE 2 MG: 1 TABLET ORAL at 11:10

## 2020-10-15 RX ADMIN — LISINOPRIL 10 MG: 10 TABLET ORAL at 11:10

## 2020-10-15 RX ADMIN — ISOSORBIDE DINITRATE 20 MG: 5 TABLET ORAL at 11:10

## 2020-10-15 NOTE — ED PROVIDER NOTES
Encounter Date: 10/15/2020       History     Chief Complaint   Patient presents with    Shortness of Breath     History of COPD. Albuterol given in route     HPI   Mr. Senior is a 65 y.o. male with paranoid schizophrenia, COPD on 3 L NC at baseline, CAD (prior stent), HTN here today with two main complaints, mental health issues and shortness of breath. Reports 3 days ago he ran out of his albuterol MDI and has had worsening SOB since then. However, he has had his albuterol nebulizer, which he has been using. Per EMS, pt had O2 sats 98% on home 3 L NC on arrival. They gave 2 puffs albuterol MDI and 125 mg solumedrol, and brought him here for evaluation. Patient reports he has not been taking his Risperdal over the past week or so because it makes him sleepy. Over this time, he has had worsening thoughts of wanting to hurt himself and hurt others, without discrete plan or intent. Never tried to hurt himself before. States it has been about 15 years since he was admitted for psychiatric illness. Has AH w/o commands. Of note, he states his brother looks in on him at least weekly and supplies him with food and daily needs. Denies VH, fevers, chills, n/v, abd pain, chest pain, palpitation, numbness, tingling, weakness.  Denies EtOH or other substance use.     Review of patient's allergies indicates:   Allergen Reactions    Trelegy ellipta [fluticasone-umeclidin-vilanter] Other (See Comments)     Possible urinary retention     Vardenafil Rash     Past Medical History:   Diagnosis Date    Allergy     Colon polyp 2013    COPD (chronic obstructive pulmonary disease)     Coronary artery disease     Disorder of kidney and ureter     Hypercholesterolemia     Hypertension     Urinary tract infection      Past Surgical History:   Procedure Laterality Date    COLONOSCOPY  02/2016    Advised repeat in 3 years    COLONOSCOPY N/A 7/22/2020    Procedure: COLONOSCOPY [place];  Surgeon: Saul Velez MD;  Location:  "[facility] END[facility];  Service: Endoscopy;  Laterality: N/A;    CORONARY STENT PLACEMENT  2013     Family History   Problem Relation Age of Onset    No Known Problems Mother     Mental illness Brother     Diabetes Grandchild      Social History     Tobacco Use    Smoking status: Current Every Day Smoker     Packs/day: 0.75     Years: 50.00     Pack years: 37.50     Types: Cigars    Smokeless tobacco: Never Used   Substance Use Topics    Alcohol use: Yes     Comment: 2-3beers/day    Drug use: Yes     Types: Cocaine, "Crack" cocaine     Comment: 3-4 marijuana joints per day     Review of Systems   Constitutional: Negative for fever.   HENT: Negative for sore throat.    Respiratory: Positive for shortness of breath.    Cardiovascular: Negative for chest pain.   Gastrointestinal: Negative for abdominal distention, abdominal pain, nausea and vomiting.   Genitourinary: Negative for dysuria.   Musculoskeletal: Negative for back pain.   Skin: Negative for rash.   Neurological: Negative for weakness.   Hematological: Does not bruise/bleed easily.   Psychiatric/Behavioral: Positive for hallucinations and suicidal ideas.       Physical Exam     Initial Vitals [10/15/20 0508]   BP Pulse Resp Temp SpO2   (!) 160/100 90 (!) 28 98.4 °F (36.9 °C) 98 %      MAP       --         Physical Exam    Nursing note and vitals reviewed.  Constitutional: He appears well-developed and well-nourished. He is not diaphoretic. No distress.   HENT:   Head: Normocephalic and atraumatic.   Right Ear: External ear normal.   Left Ear: External ear normal.   Neck: Neck supple.   Cardiovascular: Regular rhythm, normal heart sounds and intact distal pulses.   tachycardic   Pulmonary/Chest: Breath sounds normal. No respiratory distress. He has no wheezes. He has no rhonchi. He has no rales.   Abdominal: Soft. He exhibits no distension. There is no abdominal tenderness. There is no rebound and no guarding.   Neurological: He is alert and oriented to person, " place, and time. GCS score is 15. GCS eye subscore is 4. GCS verbal subscore is 5. GCS motor subscore is 6.   Skin: Skin is warm. Capillary refill takes less than 2 seconds. No rash noted.   Psychiatric: Cognition and memory are normal.   Flat affect. States he wants to hurt self and others, but has no discrete plan. Good insight.         ED Course   Procedures  Labs Reviewed   CBC W/ AUTO DIFFERENTIAL - Abnormal; Notable for the following components:       Result Value    RDW 14.6 (*)     All other components within normal limits   ISTAT PROCEDURE - Abnormal; Notable for the following components:    POC PCO2 45.3 (*)     POC SATURATED O2 83 (*)     All other components within normal limits   COMPREHENSIVE METABOLIC PANEL   TSH   URINALYSIS, REFLEX TO URINE CULTURE   DRUG SCREEN PANEL, URINE EMERGENCY   ALCOHOL,MEDICAL (ETHANOL)   SARS-COV-2 RDRP GENE           Imaging Results    None          Medical Decision Making:   History:   Old Medical Records: I decided to obtain old medical records.  Old Records Summarized: other records.       <> Summary of Records: Seen in the ED in the past, but not for psychiatric visits.  Independently Interpreted Test(s):   I have ordered and independently interpreted EKG Reading(s) - see summary below       <> Summary of EKG Reading(s): normal sinus rhythm rate 98.  Normal intervals.  No ischemic changes.  No STEMI.  Clinical Tests:   Lab Tests: Ordered and Reviewed  Radiological Study: Ordered and Reviewed  Medical Tests: Ordered and Reviewed  ED Management:  Tachycardic.  Afebrile.  Here with shortness of breath and acute psychiatric illness.  Patient has not been taking his Risperdal in a few days has become tired.  Since then he has had intrusive thoughts of wanting to hurt himself and others.  His insight full on exam but also has flat affect.  I cannot quite tell if he is gravely disabled and so will take precautions.  Also clinically does not appear to be in a COPD  exacerbation but will obtain labs and CXR to help rule this out.  CBC, CMP, rapid COVID-19, EtOH level, TSH, VBG, UA, UDS, EKG, CXR obtained.  Clinically without need for additional nebs at this time.  Satting well on his 3 L home nasal cannula.  No wheezing on exam; has great air movement. Clinically not c/w ACS or other emergent pathology. Discussed with psychiatry who will evaluate patient.     VBG normal.  COVID negative.    Signed out to Dr. Pérez at shift change to follow-up outstanding labs, imaging, psych dispo and overall disposition.  A PEC was completed in case psych recommends inpatient psychiatric treatment for grave disability.  Other:   I have discussed this case with another health care provider.       <> Summary of the Discussion: Oncoming ED physician                             Clinical Impression:     ICD-10-CM ICD-9-CM   1. Schizophrenia, unspecified type  F20.9 295.90   2. Tachycardia  R00.0 785.0   3. Chronic respiratory failure with hypoxia  J96.11 518.83     799.02   4. Shortness of breath  R06.02 786.05                                               Mark Dorado MD  10/15/20 0616

## 2020-10-15 NOTE — ED PROVIDER NOTES
Case signed out to me at 6:00 a.m..  Patient with history of COPD presents with shortness of breath and psychiatric complaints.  Plan was to do a psychiatric consult and follow-up is labs.  Clinical suspicion was that this was a COPD exacerbation and not a cardiac or pulmonary embolus issue.    I evaluated the patient at 8:30 a.m..  He states that he called the ambulance because he was very worried about his mental health.  He is worried about harming himself.  He thinks his schizophrenia is getting out of control.  He also felt short of breath because he ran out of his nebulizer and albuterol MDI.  He states that his shortness of breath has resolved after receiving a neb in the ambulance.  Currently he denies any shortness of breath.  He is on home oxygen.  He uses 3 L.  There has been no increase in his oxygen use.  He denies any chest discomfort, fever, cough.  He states that talking to the psychiatrist helped him.    I reviewed the psychiatric evaluation.  They recommended admission to a psychiatric hospital and doing a pec.    I reviewed his labs.  No significant abnormalities.  His EKG which showed sinus rhythm with no ischemic changes.  His chest x-ray was stable with no deterioration.    He is currently medically clear and stable for transfer to psychiatric hospital.  He will need periodic meter dose inhalers and his home oxygen.     Naun Pérez MD  10/15/20 [id number]

## 2020-10-15 NOTE — ASSESSMENT & PLAN NOTE
Paresh Senior is a 65 y.o. male with a past psychiatric history of paranoid schizophrenia, currently presenting with auditory hallucinations and thoughts of self harm and harming others.  Emergency Psychiatry was originally consulted to address the patient's symptoms of AH.      Patient has history of psychotic disorder and appears to have been stable for some time on his regimen of risperdal, seroquel and trazodone. He has not taken the seroquel or trazodone in roughly a month and appears to have been slowly decompensating over this time, as he reports increased persecutory/paranoid auditory hallucinations as well as thoughts of self harm and harming others. Patient reports amount of distress from these thoughts and desires medication adjustments. Heis in agreement that inpatient hospital stay would be best means by which patient can have medication adjustments in a safe and therapeutic environment which can manage his needs and also establish follow-up with appropriate outpatient mental health providers.      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 Scheduled Medication(s):  Defer to outpatient psychiatry      2. PRN Medication(s):  Zyprexa 5mg PO vs IM q6hrs for non-redirectable agitation/psychosis      3. Legal Status/Precaution(s):     · Dispo/Legal Status: Cont PEC at this time as the patient is currently gravely disabled. Seek inpatient [facility] bed for patient safety and stabilization when/if medically cleared by the ER MD. Continue to observe patient's behavior while in the ER and reassess the patient daily until placement is found.  · Defer any non-psych meds to the ER MD.  · Precautions: routine per unit  · To-Do: Seek inpatient [facility]psychiatry bed. Please allow patient to call his brother to inform him that he will be going to inpatient unit for safety/stabilization          Case discussed with emergency psychiatry staff: Dr. Knight, MD Ross Wiedemann , MD  [facility], PGY-2  Pager Number [phone]  [facility]

## 2020-10-15 NOTE — HPI
PSYCH TELEMED DISCLAIMER  Virtual Visit Note: This Psychiatric evaluation of the pt in the emergency department was performed via audiovisual using a HIPAA-compliant telemedicine application, in concert with a tele-presenter in the room. A face to face evaluation was available to the patient had it been deemed necessary by the Psychiatry Department staff, the patient, or providers, including myself.        Emergency Psychiatry Consult Note     10/15/2020 5:43 AM  Paresh Senior  MRN: [id number]     Chief Complaint / Reason for Consult: auditory hallucinations      SUBJECTIVE      History of Present Illness:   Paresh Senior is a 65 y.o. male with a past psychiatric history of schizophrenia, currently presenting with shortness of breath. Emergency Psychiatry was originally consulted to address the patient's symptoms of suicidal and homicidal ideation.      Per ED RN(s):  N/A     Per ED MD:  Mr. Senior is a 65 y.o. male with paranoid schizophrenia, COPD on 3 L NC at baseline, CAD (prior stent), HTN here today with two main complaints, mental health issues and shortness of breath. Reports 3 days ago he ran out of his albuterol MDI and has had worsening SOB since then. However, he has had his albuterol nebulizer, which he has been using. Per EMS, pt had O2 sats 98% on home 3 L NC on arrival. They gave 2 puffs albuterol MDI and 125 mg solumedrol, and brought him here for evaluation. Patient reports he has not been taking his Risperdal over the past week or so because it makes him sleepy. Over this time, he has had worsening thoughts of wanting to hurt himself and hurt others, without discrete plan or intent. Never tried to hurt himself before. States it has been about 15 years since he was admitted for psychiatric illness. Has AH w/o commands. Of note, he states his brother looks in on him at least weekly and supplies him with food and daily needs. Denies VH, fevers, chills, n/v, abd pain, chest pain, palpitation,  "numbness, tingling, weakness.  Denies EtOH or other substance use.     Per ED Psych MD:  Upon initiation of interview, pt was laying in bed. Patient erports that for the last few days he has been "feeling strange and hearing voices." He states that he felt himself getting angry and "going crazy" and did not want to feel this way. He states he was "cursing angry" but he did not do anything to harm his cousins.      He believes that it might be related to his medication. He states he has been taking his risperdal, seroquel and trazodone for many years. He states he stopped taking his trazodone and seroquel a few nights ago because it makes him feel too tired during the day. He has not taken the seroquel or trazodone in a month. He has continued to take risperdal since this time, he is unsure of the dose. He was taking seroquel 300mg nightly, 100mg trazodone nightly.      Patient reports that he believes he would need a change in medications. He states he has been hearing voices for the past 3-4 days. The voices tell him that people have been talking about him and "they had messed me up." He states he was talking to his cousin and having thoughts about hurting his cousin and this is why he he came here. He is scared that he might have hurt his cousin. He reports he had "started cussing him out" but denies that the altercation became physical. He also reports thinking about harming himself, but denies plan to harm himself or others.     Patient reports significant amount of distress from AH as well as thoughts of self harm and harming others. He is in agreement with being admitted to inpatient psychiatry for medication adjustments in a safe and therapeutic environment and establishing care with outpatient mental health provider.         Psychiatric Review of Systems:  sleep: yes, reports difficulty sleeping recently   appetite: no  weight: no  energy/anergy: yes, reports decreased energy recently " "  interest/pleasure/anhedonia: yes  somatic symptoms: no  guilty/hopelessness: no  concentration: no  S.I.B.s/risky behavior: yes, smoking crack   SI/SA:  yes, reports SI without a plan     anxiety/panic: no  Agoraphobia:  yes  Social phobia:  yes, does not enjoy large crowds  Recurrent nightmares:  no  hyper startle response:  no  Avoidance: no  Recurrent thoughts:  no  Recurrent behaviors:  no     Irritability: yes  Racing thoughts: yes  Impulsive behaviors: no  Pressured speech:  no     Paranoia:yes, as a result of AVH  Delusions: no  AVH:yes, voices saying that people are talking about him     Medical Review Of Systems:  Pertinent items noted in HPI     Psychiatric History:  Diagnose(s): Yes - paranoid schizophrenic - since 17 years old  Previous Medication Trials: Yes - seroquel, trazodone, prozac  Previous Psychiatric Hospitalizations: Yes - last admission roughly 20 years ago - before [facility]  Family Psychiatric History: Yes - reports mental illness one of his brothers  Outpatient Psychiatrist: No, his primary care MD prescribes him the risperdal, seroquel, and trazodone     Suicide/Violence Risk Assessment:  Current/active suicidal ideation/plan/intent: Yes - no plan  Previous suicide attempts: Yes - 40 years ago  Current/active homicidal ideation/plan/intent: No  History of threats/arrests associated with violent conduct - Yes -   Access to firearms/lethal weapons - No     Social History:  Marital Status:   Children: 8   Employment Status: on disability  Education: some college  Special Ed: yes  Housing Status: lives on [address] in [place] by himself, his brother (not mentally ill) bring him to grocery store.   Developmental History: No  History of Abuse: No     Substance Abuse History:  Recreational Drugs: reports using crack-cocaine a few days ago "it helps me breathe deeper."   Use of Alcohol: minimal use  Rehab History: Yes - most recent in 2009 - for cocaine   Tobacco Use: Yes - every now and " "then  Use of Caffeine: Yes   Use of OTC: No  Is the patient aware of the biomedical complications associated with substance abuse and mental illness? No  Legal consequences of chemical use: No     Legal History:  Past Charges/Incarcerations: No  Pending Charges: No     Psychosocial Factors:  Stressors: denies .   Functioning Relationships: good support from brother   Maladaptive or problem behaviors: Yes - crack use "to help me breathe"   Peer group, social, ethic, cultural, emotional, and health factors: No  Living situation, family constellation, family circumstances/home: No  Recovery environment: No  Community resources used by patient: No  Treatment acceptance/motivation for change: Yes - wants new medication      Collateral:   None      Scheduled Meds:         Psychotherapeutics (From admission, onward)     None          PRN Meds:     Home Meds:          Prior to Admission medications    Medication Sig Start Date End Date Taking? Authorizing Provider   acetaminophen (TYLENOL) 500 MG tablet Take 2 tablets (1,000 mg total) by mouth every 8 (eight) hours as needed for Pain. 2/18/20     Fernando Carmona MD   albuterol (PROAIR HFA) 90 mcg/actuation inhaler Inhale 2 puffs into the lungs every 6 (six) hours as needed for Wheezing. Rescue 8/26/20 8/26/21   Orin Hope MD   albuterol (PROVENTIL) 2.5 mg /3 mL (0.083 %) nebulizer solution Take 3 mLs (2.5 mg total) by nebulization every 6 (six) hours as needed for Wheezing or Shortness of Breath. Rescue 10/14/20     Nadine Raza NP   aspirin (ECOTRIN) 81 MG EC tablet Take 1 tablet (81 mg total) by mouth once daily. 12/16/19 12/15/20   Gibran Thrasher MD   benztropine (COGENTIN) 0.5 MG tablet Take 1 tablet (0.5 mg total) by mouth 2 (two) times daily. 3/26/20     Fernando Carmona MD   blood sugar diagnostic Strp To check BG 3 times daily, to use with insurance preferred meter 2/18/20     Fernando Carmona MD   blood-glucose meter kit To check BG 3 " times daily, to use with insurance preferred meter 2/18/20 2/17/21   Fernando Carmona MD   cholecalciferol, vitamin D3, (VITAMIN D3) 25 mcg (1,000 unit) capsule Take 1 capsule (1,000 Units total) by mouth once daily. 1/20/20     Fernando Carmona MD   colchicine (COLCRYS) 0.6 mg tablet Take half a tablet every day 10/1/20     Mario Hunt MD   diclofenac sodium (VOLTAREN) 1 % Gel Apply 4 g topically 4 (four) times daily. 3/26/20     Fernando Carmona MD   diphenhydrAMINE (BENADRYL) 25 mg capsule Take 1 capsule (25 mg total) by mouth nightly as needed for Insomnia. 8/25/20     Orin Hope MD   finasteride (PROSCAR) 5 mg tablet Take 1 tablet (5 mg total) by mouth once daily. 7/17/20 7/17/21   Ella Garrett, NP   fluticasone furoate-vilanteroL (BREO) 100-25 mcg/dose diskus inhaler Inhale 1 puff into the lungs once daily. Controller 8/26/20     Orin Hope MD   glipiZIDE (GLUCOTROL) 5 MG tablet Take 1 tablet (5 mg total) by mouth 2 (two) times daily with meals. 3/26/20     Fernando Carmona MD   isosorbide mononitrate (IMDUR) 30 MG 24 hr tablet Take 1 tablet (30 mg total) by mouth once daily. 3/26/20 3/26/21   Fernando Carmona MD   lancets [facility] To check BG 3 times daily, to use with insurance preferred meter 2/18/20     Fernando Carmona MD   lisinopriL 10 MG tablet Take 1 tablet (10 mg total) by mouth once daily. 3/26/20     Fernando Carmona MD   mag hydrox/aluminum hyd/simeth (ANTACID ORAL) Take by mouth.       Historical Provider   metoprolol tartrate (LOPRESSOR) 25 MG tablet Take 1 tablet (25 mg total) by mouth 2 (two) times daily. 8/25/20 8/25/21   Orin Hope MD   nicotine (NICODERM CQ) 21 mg/24 hr Place 1 patch onto the skin once daily. 8/27/20     Orin Hope MD   omeprazole (PRILOSEC) 20 MG capsule Take 1 capsule (20 mg total) by mouth once daily. 3/26/20     Fernando Carmona MD   polyethylene glycol (GLYCOLAX) 17 gram/dose powder Mix 1  capful (17 g) with liquid and take by mouth 2 (two) times daily as needed (constipation). 1/29/20     Renetta Davenport MD   risperiDONE (RISPERDAL) 2 MG tablet Take 2 mg by mouth 2 (two) times daily.       Historical Provider   rosuvastatin (CRESTOR) 40 MG Tab Take 1 tablet (40 mg total) by mouth every evening. 3/26/20 3/26/21   Fernando Carmona MD   tamsulosin (FLOMAX) 0.4 mg Cap Take 1 capsule (0.4 mg total) by mouth once daily. 7/17/20 7/17/21   Ella Garrett NP          Psychotherapeutics (From admission, onward)     None          Allergies:  Trelegy ellipta [fluticasone-umeclidin-vilanter] and Vardenafil  Past Medical/Surgical History:       Past Medical History:   Diagnosis Date    Allergy      Colon polyp 2013    COPD (chronic obstructive pulmonary disease)      Coronary artery disease      Disorder of kidney and ureter      Hypercholesterolemia      Hypertension      Urinary tract infection              Past Surgical History:   Procedure Laterality Date    COLONOSCOPY   02/2016     Advised repeat in 3 years    COLONOSCOPY N/A 7/22/2020     Procedure: COLONOSCOPY [place];  Surgeon: Saul Velez MD;  Location: [facility];  Service: Endoscopy;  Laterality: N/A;    CORONARY STENT PLACEMENT

## 2020-10-15 NOTE — SUBJECTIVE & OBJECTIVE
"Vital Signs:  Temp:  [98.4 °F (36.9 °C)]   Pulse:  [90]   Resp:  [28]   BP: (160)/(100)   SpO2:  [98 %]      Mental Status Exam:  Appearance: unremarkable, age appropriate, well nourished  Level of Consciousness: alert, awake  Behavior/Cooperation: normal, cooperative  Psychomotor: unremarkable   Speech: normal tone, normal rate, normal pitch, normal volume  Language: english, fluid  Orientation: place, situation, time/date, month of year, year  Attention Span/Concentration: able to recite days of week in reverse   Memory: Registers 3/3 and recalls 1/3 after 5 minutes   Mood: "pretty good"  Affect: constricted, appropriate   Thought Process: goal directed, mostly linear   Associations: concrete  Thought Content: reports thoughts of self harm and harming others but denies desire to do so, also report AH  Fund of Knowledge: Aware of current events  Abstraction: proverbs were abstract, similarities were abstract  Insight: fair  Judgment: fair     Laboratory Data:  Recent Results         Recent Results (from the past 48 hour(s))   ISTAT PROCEDURE     Collection Time: 10/15/20  5:32 AM   Result Value Ref Range     POC PH 7.353 7.35 - 7.45     POC PCO2 45.3 (H) 35 - 45 mmHg     POC PO2 50 40 - 60 mmHg     POC HCO3 25.2 24 - 28 mmol/L     POC BE 0 -2 to 2 mmol/L     POC SATURATED O2 83 (L) 95 - 100 %     POC TCO2 27 24 - 29 mmol/L     Sample VENOUS       Site Other       Allens Test N/A           No results found for: PHENYTOIN, PHENOBARB, VALPROATE, CBMZ  Imaging:  Imaging Results    None    "

## 2020-10-15 NOTE — CONSULTS
[facility]  Psychiatry  Consult Note    Patient Name: Paresh Senior  MRN: [id number]   Code Status: Prior  Admission Date: 10/15/2020  Hospital Length of Stay: 0 days  Attending Physician: Naun Pérez MD  Primary Care Provider: Fernando Carmona MD    Current Legal Status: Physician's Emergency Certificate (PEC)    Patient information was obtained from patient and ER records.   Inpatient consult to Psychiatry  Consult performed by: Ross Wiedemann, MD  Consult ordered by: Mark Dorado MD        Subjective:     Principal Problem:<principal problem not specified>    Chief Complaint:  Auditory Hallucinations     HPI:   PSYCH TELEMED DISCLAIMER  Virtual Visit Note: This Psychiatric evaluation of the pt in the emergency department was performed via audiovisual using a HIPAA-compliant telemedicine application, in concert with a tele-presenter in the room. A face to face evaluation was available to the patient had it been deemed necessary by the Psychiatry Department staff, the patient, or providers, including myself.        Emergency Psychiatry Consult Note     10/15/2020 5:43 AM  Paresh Senior  MRN: [id number]     Chief Complaint / Reason for Consult: auditory hallucinations      SUBJECTIVE      History of Present Illness:   Paresh Senior is a 65 y.o. male with a past psychiatric history of schizophrenia, currently presenting with shortness of breath. Emergency Psychiatry was originally consulted to address the patient's symptoms of suicidal and homicidal ideation.      Per ED RN(s):  N/A     Per ED MD:  Mr. Senior is a 65 y.o. male with paranoid schizophrenia, COPD on 3 L NC at baseline, CAD (prior stent), HTN here today with two main complaints, mental health issues and shortness of breath. Reports 3 days ago he ran out of his albuterol MDI and has had worsening SOB since then. However, he has had his albuterol nebulizer, which he has been using. Per EMS, pt had O2 sats 98% on home 3 L  "NC on arrival. They gave 2 puffs albuterol MDI and 125 mg solumedrol, and brought him here for evaluation. Patient reports he has not been taking his Risperdal over the past week or so because it makes him sleepy. Over this time, he has had worsening thoughts of wanting to hurt himself and hurt others, without discrete plan or intent. Never tried to hurt himself before. States it has been about 15 years since he was admitted for psychiatric illness. Has AH w/o commands. Of note, he states his brother looks in on him at least weekly and supplies him with food and daily needs. Denies VH, fevers, chills, n/v, abd pain, chest pain, palpitation, numbness, tingling, weakness.  Denies EtOH or other substance use.     Per ED Psych MD:  Upon initiation of interview, pt was laying in bed. Patient erports that for the last few days he has been "feeling strange and hearing voices." He states that he felt himself getting angry and "going crazy" and did not want to feel this way. He states he was "cursing angry" but he did not do anything to harm his cousins.      He believes that it might be related to his medication. He states he has been taking his risperdal, seroquel and trazodone for many years. He states he stopped taking his trazodone and seroquel a few nights ago because it makes him feel too tired during the day. He has not taken the seroquel or trazodone in a month. He has continued to take risperdal since this time, he is unsure of the dose. He was taking seroquel 300mg nightly, 100mg trazodone nightly.      Patient reports that he believes he would need a change in medications. He states he has been hearing voices for the past 3-4 days. The voices tell him that people have been talking about him and "they had messed me up." He states he was talking to his cousin and having thoughts about hurting his cousin and this is why he he came here. He is scared that he might have hurt his cousin. He reports he had "started " "cussing him out" but denies that the altercation became physical. He also reports thinking about harming himself, but denies plan to harm himself or others.     Patient reports significant amount of distress from AH as well as thoughts of self harm and harming others. He is in agreement with being admitted to inpatient psychiatry for medication adjustments in a safe and therapeutic environment and establishing care with outpatient mental health provider.         Psychiatric Review of Systems:  sleep: yes, reports difficulty sleeping recently   appetite: no  weight: no  energy/anergy: yes, reports decreased energy recently   interest/pleasure/anhedonia: yes  somatic symptoms: no  guilty/hopelessness: no  concentration: no  S.I.B.s/risky behavior: yes, smoking crack   SI/SA:  yes, reports SI without a plan     anxiety/panic: no  Agoraphobia:  yes  Social phobia:  yes, does not enjoy large crowds  Recurrent nightmares:  no  hyper startle response:  no  Avoidance: no  Recurrent thoughts:  no  Recurrent behaviors:  no     Irritability: yes  Racing thoughts: yes  Impulsive behaviors: no  Pressured speech:  no     Paranoia:yes, as a result of AVH  Delusions: no  AVH:yes, voices saying that people are talking about him     Medical Review Of Systems:  Pertinent items noted in HPI     Psychiatric History:  Diagnose(s): Yes - paranoid schizophrenic - since 17 years old  Previous Medication Trials: Yes - seroquel, trazodone, prozac  Previous Psychiatric Hospitalizations: Yes - last admission roughly 20 years ago - before [facility]  Family Psychiatric History: Yes - reports mental illness one of his brothers  Outpatient Psychiatrist: No, his primary care MD prescribes him the risperdal, seroquel, and trazodone     Suicide/Violence Risk Assessment:  Current/active suicidal ideation/plan/intent: Yes - no plan  Previous suicide attempts: Yes - 40 years ago  Current/active homicidal ideation/plan/intent: No  History of threats/arrests " "associated with violent conduct - Yes -   Access to firearms/lethal weapons - No     Social History:  Marital Status:   Children: 8   Employment Status: on disability  Education: some college  Special Ed: yes  Housing Status: lives on [address] in [place] by himself, his brother (not mentally ill) bring him to grocery store.   Developmental History: No  History of Abuse: No     Substance Abuse History:  Recreational Drugs: reports using crack-cocaine a few days ago "it helps me breathe deeper."   Use of Alcohol: minimal use  Rehab History: Yes - most recent in 2009 - for cocaine   Tobacco Use: Yes - every now and then  Use of Caffeine: Yes   Use of OTC: No  Is the patient aware of the biomedical complications associated with substance abuse and mental illness? No  Legal consequences of chemical use: No     Legal History:  Past Charges/Incarcerations: No  Pending Charges: No     Psychosocial Factors:  Stressors: denies .   Functioning Relationships: good support from brother   Maladaptive or problem behaviors: Yes - crack use "to help me breathe"   Peer group, social, ethic, cultural, emotional, and health factors: No  Living situation, family constellation, family circumstances/home: No  Recovery environment: No  Community resources used by patient: No  Treatment acceptance/motivation for change: Yes - wants new medication      Collateral:   None      Scheduled Meds:         Psychotherapeutics (From admission, onward)     None          PRN Meds:     Home Meds:          Prior to Admission medications    Medication Sig Start Date End Date Taking? Authorizing Provider   acetaminophen (TYLENOL) 500 MG tablet Take 2 tablets (1,000 mg total) by mouth every 8 (eight) hours as needed for Pain. 2/18/20     Fernando Carmona MD   albuterol (PROAIR HFA) 90 mcg/actuation inhaler Inhale 2 puffs into the lungs every 6 (six) hours as needed for Wheezing. Rescue 8/26/20 8/26/21   Orin Hope MD   albuterol " (PROVENTIL) 2.5 mg /3 mL (0.083 %) nebulizer solution Take 3 mLs (2.5 mg total) by nebulization every 6 (six) hours as needed for Wheezing or Shortness of Breath. Rescue 10/14/20     Nadine Raza NP   aspirin (ECOTRIN) 81 MG EC tablet Take 1 tablet (81 mg total) by mouth once daily. 12/16/19 12/15/20   Gibran Thrasher MD   benztropine (COGENTIN) 0.5 MG tablet Take 1 tablet (0.5 mg total) by mouth 2 (two) times daily. 3/26/20     Fernando Carmona MD   blood sugar diagnostic Strp To check BG 3 times daily, to use with insurance preferred meter 2/18/20     Fernando Carmona MD   blood-glucose meter kit To check BG 3 times daily, to use with insurance preferred meter 2/18/20 2/17/21   Fernando Carmona MD   cholecalciferol, vitamin D3, (VITAMIN D3) 25 mcg (1,000 unit) capsule Take 1 capsule (1,000 Units total) by mouth once daily. 1/20/20     Fernando Carmona MD   colchicine (COLCRYS) 0.6 mg tablet Take half a tablet every day 10/1/20     Mario Hunt MD   diclofenac sodium (VOLTAREN) 1 % Gel Apply 4 g topically 4 (four) times daily. 3/26/20     Fernando Carmona MD   diphenhydrAMINE (BENADRYL) 25 mg capsule Take 1 capsule (25 mg total) by mouth nightly as needed for Insomnia. 8/25/20     Orin Hope MD   finasteride (PROSCAR) 5 mg tablet Take 1 tablet (5 mg total) by mouth once daily. 7/17/20 7/17/21   Ella Garrett NP   fluticasone furoate-vilanteroL (BREO) 100-25 mcg/dose diskus inhaler Inhale 1 puff into the lungs once daily. Controller 8/26/20     Orin Hope MD   glipiZIDE (GLUCOTROL) 5 MG tablet Take 1 tablet (5 mg total) by mouth 2 (two) times daily with meals. 3/26/20     Fernando Carmona MD   isosorbide mononitrate (IMDUR) 30 MG 24 hr tablet Take 1 tablet (30 mg total) by mouth once daily. 3/26/20 3/26/21   Fernando Carmona MD   lancets Misc To check BG 3 times daily, to use with insurance preferred meter 2/18/20     Fernando Carmona MD    lisinopriL 10 MG tablet Take 1 tablet (10 mg total) by mouth once daily. 3/26/20     Fernando Carmona MD   mag hydrox/aluminum hyd/simeth (ANTACID ORAL) Take by mouth.       Historical Provider   metoprolol tartrate (LOPRESSOR) 25 MG tablet Take 1 tablet (25 mg total) by mouth 2 (two) times daily. 8/25/20 8/25/21   Orin Hope MD   nicotine (NICODERM CQ) 21 mg/24 hr Place 1 patch onto the skin once daily. 8/27/20     Orin Hope MD   omeprazole (PRILOSEC) 20 MG capsule Take 1 capsule (20 mg total) by mouth once daily. 3/26/20     Fernando Carmona MD   polyethylene glycol (GLYCOLAX) 17 gram/dose powder Mix 1 capful (17 g) with liquid and take by mouth 2 (two) times daily as needed (constipation). 1/29/20     Renetta Davenport MD   risperiDONE (RISPERDAL) 2 MG tablet Take 2 mg by mouth 2 (two) times daily.       Historical Provider   rosuvastatin (CRESTOR) 40 MG Tab Take 1 tablet (40 mg total) by mouth every evening. 3/26/20 3/26/21   Fernando Carmona MD   tamsulosin (FLOMAX) 0.4 mg Cap Take 1 capsule (0.4 mg total) by mouth once daily. 7/17/20 7/17/21   Ella Garrett NP          Psychotherapeutics (From admission, onward)     None          Allergies:  Trelegy ellipta [fluticasone-umeclidin-vilanter] and Vardenafil  Past Medical/Surgical History:       Past Medical History:   Diagnosis Date    Allergy      Colon polyp 2013    COPD (chronic obstructive pulmonary disease)      Coronary artery disease      Disorder of kidney and ureter      Hypercholesterolemia      Hypertension      Urinary tract infection              Past Surgical History:   Procedure Laterality Date    COLONOSCOPY   02/2016     Advised repeat in 3 years    COLONOSCOPY N/A 7/22/2020     Procedure: COLONOSCOPY [place];  Surgeon: Saul Velez MD;  Location: [facility];  Service: Endoscopy;  Laterality: N/A;    CORONARY STENT PLACEMENT         Hospital Course: No notes on file    Vital Signs:  Temp:  [98.4  "°F (36.9 °C)]   Pulse:  [90]   Resp:  [28]   BP: (160)/(100)   SpO2:  [98 %]      Mental Status Exam:  Appearance: unremarkable, age appropriate, well nourished  Level of Consciousness: alert, awake  Behavior/Cooperation: normal, cooperative  Psychomotor: unremarkable   Speech: normal tone, normal rate, normal pitch, normal volume  Language: english, fluid  Orientation: place, situation, time/date, month of year, year  Attention Span/Concentration: able to recite days of week in reverse   Memory: Registers 3/3 and recalls 1/3 after 5 minutes   Mood: "pretty good"  Affect: constricted, appropriate   Thought Process: goal directed, mostly linear   Associations: concrete  Thought Content: reports thoughts of self harm and harming others but denies desire to do so, also report AH  Fund of Knowledge: Aware of current events  Abstraction: proverbs were abstract, similarities were abstract  Insight: fair  Judgment: fair     Laboratory Data:  Recent Results         Recent Results (from the past 48 hour(s))   ISTAT PROCEDURE     Collection Time: 10/15/20  5:32 AM   Result Value Ref Range     POC PH 7.353 7.35 - 7.45     POC PCO2 45.3 (H) 35 - 45 mmHg     POC PO2 50 40 - 60 mmHg     POC HCO3 25.2 24 - 28 mmol/L     POC BE 0 -2 to 2 mmol/L     POC SATURATED O2 83 (L) 95 - 100 %     POC TCO2 27 24 - 29 mmol/L     Sample VENOUS       Site Other       Allens Test N/A           No results found for: PHENYTOIN, PHENOBARB, VALPROATE, CBMZ  Imaging:  Imaging Results    None      Assessment/Plan:     Schizophrenia  Paresh Senior is a 65 y.o. male with a past psychiatric history of paranoid schizophrenia, currently presenting with auditory hallucinations and thoughts of self harm and harming others.  Emergency Psychiatry was originally consulted to address the patient's symptoms of AH.      Patient has history of psychotic disorder and appears to have been stable for some time on his regimen of risperdal, seroquel and trazodone. He " has not taken the seroquel or trazodone in roughly a month and appears to have been slowly decompensating over this time, as he reports increased persecutory/paranoid auditory hallucinations as well as thoughts of self harm and harming others. Patient reports amount of distress from these thoughts and desires medication adjustments. Heis in agreement that inpatient hospital stay would be best means by which patient can have medication adjustments in a safe and therapeutic environment which can manage his needs and also establish follow-up with appropriate outpatient mental health providers.      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 Scheduled Medication(s):  Defer to outpatient psychiatry      2. PRN Medication(s):  Zyprexa 5mg PO vs IM q6hrs for non-redirectable agitation/psychosis      3. Legal Status/Precaution(s):     · Dispo/Legal Status: Cont PEC at this time as the patient is currently gravely disabled. Seek inpatient [facility]-psych bed for patient safety and stabilization when/if medically cleared by the ER MD. Continue to observe patient's behavior while in the ER and reassess the patient daily until placement is found.  · Defer any non-psych meds to the ER MD.  · Precautions: routine per unit  · To-Do: Seek inpatient [facility]-psychiatry bed. Please allow patient to call his brother to inform him that he will be going to inpatient unit for safety/stabilization         Case discussed with emergency psychiatry staff: Dr. Knight, MD Ross Wiedemann , MD  [facility], PGY-2  Pager Number [phone]  [facility]           Total Time:  45 minutes     Ross Wiedemann, MD   Psychiatry  [facility]

## 2020-10-16 NOTE — ED NOTES
Pt escorted off the floor in a wheelchair by security and [facility] staff. [facility] transportation staff given pt belongings and PEC. Pt on oxygen 3 LPM/ NC when discharged. Pt remained calm and cooperative for the transfer.

## 2020-10-19 ENCOUNTER — TELEPHONE (OUTPATIENT)
Dept: REHABILITATION | Facility: HOSPITAL | Age: 65
End: 2020-10-19

## 2020-10-19 NOTE — TELEPHONE ENCOUNTER
Attempted to call patient at all self numbers listed without ablity to leave voicemail. Patient no showed session today and 10/15. If patient no shows or cancels additional sessions, he will be removed from the schedule and rescheduled on a visit by visit basis.

## 2020-10-20 ENCOUNTER — TELEPHONE (OUTPATIENT)
Dept: PULMONOLOGY | Facility: CLINIC | Age: 65
End: 2020-10-20

## 2020-10-20 NOTE — TELEPHONE ENCOUNTER
Spoke with patient brother  in regards to his brother  scheduled appointment today 10/20/2020 at 3:30 pm with Nadine Elizondo Np.   stated he brother won't be able to make his appointment on today with Nadine Elizondo Np.  Patient is located at [facility].  I informed  I will advise Nadine Elizondo Np.  Patient brother verbalized he understands.

## 2020-10-21 ENCOUNTER — TELEPHONE (OUTPATIENT)
Dept: FAMILY MEDICINE | Facility: CLINIC | Age: 65
End: 2020-10-21

## 2020-10-21 NOTE — TELEPHONE ENCOUNTER
----- Message from Pat Angela sent at 10/21/2020  3:33 PM CDT -----  Regarding: Appointment  Contact: Self/ [phone]  Patient needs to be seen sooner than the next available appointment for a hospital f/u - within 7 days from today. Please call patient and advise.

## 2020-10-21 NOTE — TELEPHONE ENCOUNTER
Called patient to schedule hosp follow up/reschedule appointment on 10/29.  No answer, unable to leave voicemail due to voicemail box being full.

## 2020-10-22 ENCOUNTER — TELEPHONE (OUTPATIENT)
Dept: FAMILY MEDICINE | Facility: CLINIC | Age: 65
End: 2020-10-22

## 2020-10-23 ENCOUNTER — TELEPHONE (OUTPATIENT)
Dept: FAMILY MEDICINE | Facility: CLINIC | Age: 65
End: 2020-10-23

## 2020-10-23 DIAGNOSIS — I50.9 CONGESTIVE HEART FAILURE, UNSPECIFIED HF CHRONICITY, UNSPECIFIED HEART FAILURE TYPE: ICD-10-CM

## 2020-10-23 DIAGNOSIS — E11.65 TYPE 2 DIABETES MELLITUS WITH HYPERGLYCEMIA, WITHOUT LONG-TERM CURRENT USE OF INSULIN: ICD-10-CM

## 2020-10-23 DIAGNOSIS — J44.9 CHRONIC OBSTRUCTIVE PULMONARY DISEASE, UNSPECIFIED COPD TYPE: Primary | ICD-10-CM

## 2020-10-23 DIAGNOSIS — F20.9 SCHIZOPHRENIA, UNSPECIFIED TYPE: ICD-10-CM

## 2020-10-23 DIAGNOSIS — Z99.81 ON HOME OXYGEN THERAPY: ICD-10-CM

## 2020-10-23 NOTE — TELEPHONE ENCOUNTER
----- Message from Melba Holman LPN sent at 10/23/2020  9:34 AM CDT -----  [facility]  Please advise.  ----- Message -----  From: Ramona Rodriguez  Sent: 10/23/2020   9:14 AM CDT  To: Mathew King Staff    Patient is being discharged from [facility]     And need Orders to begin Home health     Phone [phone]     Fax [phone]

## 2020-10-26 RX ORDER — LISINOPRIL 10 MG/1
10 TABLET ORAL DAILY
Qty: 90 TABLET | Refills: 1 | Status: SHIPPED | OUTPATIENT
Start: 2020-10-26 | End: 2020-10-27 | Stop reason: ALTCHOICE

## 2020-10-26 NOTE — TELEPHONE ENCOUNTER
----- Message from Navneet Minaya sent at 10/26/2020  9:28 AM CDT -----  Regarding: MEdication  Type:  RX Refill Request    Who Called:  patient  Refill or New Rx: refill  RX Name and Strength: lisinopriL 10 MG tablet  How is the patient currently taking it? (ex. 1XDay): 1Xday  Is this a 30 day or 90 day RX: 90 day  Preferred Pharmacy with phone number: [facility] - [address]  Local or Mail Order: Mail order  Ordering Provider: Mathew  Would the patient rather a call back or a response via [facility]?  Call back  Best Call Back Number: [phone]  Additional Information:  please send ASAP as he is out currently and needs now

## 2020-10-26 NOTE — TELEPHONE ENCOUNTER
----- Message from Navneet Minaya sent at 10/26/2020  9:28 AM CDT -----  Regarding: MEdication  Type:  RX Refill Request    Who Called:  patient  Refill or New Rx: refill  RX Name and Strength: lisinopriL 10 MG tablet  How is the patient currently taking it? (ex. 1XDay): 1Xday  Is this a 30 day or 90 day RX: 90 day  Preferred Pharmacy with phone number: [facility] - [address]  Local or Mail Order: Mail order  Ordering Provider: Mathew  Would the patient rather a call back or a response via [facility]?  Call back  Best Call Back Number: [phone]  Additional Information:  please send ASAP as he is out currently and needs now

## 2020-10-27 ENCOUNTER — OFFICE VISIT (OUTPATIENT)
Dept: FAMILY MEDICINE | Facility: CLINIC | Age: 65
End: 2020-10-27
Payer: MEDICARE

## 2020-10-27 DIAGNOSIS — I50.9 CONGESTIVE HEART FAILURE, UNSPECIFIED HF CHRONICITY, UNSPECIFIED HEART FAILURE TYPE: ICD-10-CM

## 2020-10-27 DIAGNOSIS — R82.90 ABNORMAL URINE: ICD-10-CM

## 2020-10-27 DIAGNOSIS — E78.2 MIXED HYPERLIPIDEMIA: ICD-10-CM

## 2020-10-27 DIAGNOSIS — J43.2 CENTRILOBULAR EMPHYSEMA: ICD-10-CM

## 2020-10-27 DIAGNOSIS — I25.10 CORONARY ARTERY DISEASE INVOLVING NATIVE HEART WITHOUT ANGINA PECTORIS, UNSPECIFIED VESSEL OR LESION TYPE: ICD-10-CM

## 2020-10-27 DIAGNOSIS — F20.9 SCHIZOPHRENIA, UNSPECIFIED TYPE: Primary | ICD-10-CM

## 2020-10-27 DIAGNOSIS — Z72.0 TOBACCO ABUSE: ICD-10-CM

## 2020-10-27 DIAGNOSIS — Z99.81 ON HOME OXYGEN THERAPY: ICD-10-CM

## 2020-10-27 DIAGNOSIS — J44.9 CHRONIC OBSTRUCTIVE PULMONARY DISEASE, UNSPECIFIED COPD TYPE: ICD-10-CM

## 2020-10-27 DIAGNOSIS — I42.9 CARDIOMYOPATHY, UNSPECIFIED TYPE: ICD-10-CM

## 2020-10-27 DIAGNOSIS — E11.65 TYPE 2 DIABETES MELLITUS WITH HYPERGLYCEMIA, WITHOUT LONG-TERM CURRENT USE OF INSULIN: ICD-10-CM

## 2020-10-27 DIAGNOSIS — I10 ESSENTIAL HYPERTENSION: ICD-10-CM

## 2020-10-27 LAB
BACTERIA #/AREA URNS AUTO: ABNORMAL /HPF
BILIRUB UR QL STRIP: NEGATIVE
CLARITY UR REFRACT.AUTO: CLEAR
COLOR UR AUTO: YELLOW
GLUCOSE UR QL STRIP: ABNORMAL
HGB UR QL STRIP: ABNORMAL
HYALINE CASTS UR QL AUTO: 8 /LPF
KETONES UR QL STRIP: NEGATIVE
LEUKOCYTE ESTERASE UR QL STRIP: NEGATIVE
MICROSCOPIC COMMENT: ABNORMAL
NITRITE UR QL STRIP: NEGATIVE
PH UR STRIP: 7 [PH] (ref 5–8)
PROT UR QL STRIP: ABNORMAL
RBC #/AREA URNS AUTO: 3 /HPF (ref 0–4)
SP GR UR STRIP: 1.01 (ref 1–1.03)
SQUAMOUS #/AREA URNS AUTO: 0 /HPF
URN SPEC COLLECT METH UR: ABNORMAL
WBC #/AREA URNS AUTO: 3 /HPF (ref 0–5)

## 2020-10-27 PROCEDURE — 3008F BODY MASS INDEX DOCD: CPT | Mod: CPTII,S$GLB,, | Performed by: FAMILY MEDICINE

## 2020-10-27 PROCEDURE — 3080F PR MOST RECENT DIASTOLIC BLOOD PRESSURE >= 90 MM HG: ICD-10-PCS | Mod: CPTII,S$GLB,, | Performed by: FAMILY MEDICINE

## 2020-10-27 PROCEDURE — 99214 PR OFFICE/OUTPT VISIT, EST, LEVL IV, 30-39 MIN: ICD-10-PCS | Mod: S$GLB,,, | Performed by: FAMILY MEDICINE

## 2020-10-27 PROCEDURE — 3077F PR MOST RECENT SYSTOLIC BLOOD PRESSURE >= 140 MM HG: ICD-10-PCS | Mod: CPTII,S$GLB,, | Performed by: FAMILY MEDICINE

## 2020-10-27 PROCEDURE — 99499 RISK ADDL DX/OHS AUDIT: ICD-10-PCS | Mod: S$GLB,,, | Performed by: FAMILY MEDICINE

## 2020-10-27 PROCEDURE — 99999 PR PBB SHADOW E&M-EST. PATIENT-LVL III: CPT | Mod: PBBFAC,,, | Performed by: FAMILY MEDICINE

## 2020-10-27 PROCEDURE — 1101F PT FALLS ASSESS-DOCD LE1/YR: CPT | Mod: CPTII,S$GLB,, | Performed by: FAMILY MEDICINE

## 2020-10-27 PROCEDURE — 3077F SYST BP >= 140 MM HG: CPT | Mod: CPTII,S$GLB,, | Performed by: FAMILY MEDICINE

## 2020-10-27 PROCEDURE — 3080F DIAST BP >= 90 MM HG: CPT | Mod: CPTII,S$GLB,, | Performed by: FAMILY MEDICINE

## 2020-10-27 PROCEDURE — 99214 OFFICE O/P EST MOD 30 MIN: CPT | Mod: S$GLB,,, | Performed by: FAMILY MEDICINE

## 2020-10-27 PROCEDURE — 99499 UNLISTED E&M SERVICE: CPT | Mod: S$GLB,,, | Performed by: FAMILY MEDICINE

## 2020-10-27 PROCEDURE — 87086 URINE CULTURE/COLONY COUNT: CPT

## 2020-10-27 PROCEDURE — 3008F PR BODY MASS INDEX (BMI) DOCUMENTED: ICD-10-PCS | Mod: CPTII,S$GLB,, | Performed by: FAMILY MEDICINE

## 2020-10-27 PROCEDURE — 99999 PR PBB SHADOW E&M-EST. PATIENT-LVL III: ICD-10-PCS | Mod: PBBFAC,,, | Performed by: FAMILY MEDICINE

## 2020-10-27 PROCEDURE — 81001 URINALYSIS AUTO W/SCOPE: CPT

## 2020-10-27 PROCEDURE — 1101F PR PT FALLS ASSESS DOC 0-1 FALLS W/OUT INJ PAST YR: ICD-10-PCS | Mod: CPTII,S$GLB,, | Performed by: FAMILY MEDICINE

## 2020-10-27 PROCEDURE — 3044F PR MOST RECENT HEMOGLOBIN A1C LEVEL <7.0%: ICD-10-PCS | Mod: CPTII,S$GLB,, | Performed by: FAMILY MEDICINE

## 2020-10-27 PROCEDURE — 3044F HG A1C LEVEL LT 7.0%: CPT | Mod: CPTII,S$GLB,, | Performed by: FAMILY MEDICINE

## 2020-10-27 RX ORDER — LISINOPRIL AND HYDROCHLOROTHIAZIDE 20; 25 MG/1; MG/1
1 TABLET ORAL DAILY
Qty: 90 TABLET | Refills: 3 | Status: SHIPPED | OUTPATIENT
Start: 2020-10-27 | End: 2021-10-27

## 2020-10-27 RX ORDER — ALBUTEROL SULFATE 90 UG/1
2 AEROSOL, METERED RESPIRATORY (INHALATION) EVERY 6 HOURS PRN
Qty: 18 G | Refills: 2 | OUTPATIENT
Start: 2020-10-27 | End: 2021-10-27

## 2020-10-27 NOTE — PROGRESS NOTES
Subjective:       Patient ID: Paresh Senior is a 65 y.o. male.    Chief Complaint: Follow-up and Medication Refill (Inhaler (requesting for inhaler to be sent to local pharmacy))    64 yo M, established pt of mine, with PMH significant for CAD, CHF, Cardiomyopathy, COPD on supplemental home O2 at 3L NC, HTN, HLD, DM-2, Schizophrenia,GERD, Tobacco abuse, Marijuana abuse, and Crack/Cocaine abuse. He was last evaluated 9/22/2020.  He presents to f/u HTN.    Noted that he was hospitalized at [facility] 10/15-10/22/2020 on PEC for auditory hallucinations associated with uncontrolled schizophrenia and medication non-compliance. Prior to hospitalization patient was managed with quetiapine 300 mg hs + cogentin 0.5 mg b.i.d. + risperidone 2 mg b.i.d. + trazodone 100 mg HS. Per medication discharge list, it looks as if he was discharged on cogentin 0.5 mg BID and risperidone 2 mg hs only. He is scheduled for psychiatric follow-up with at [facility] on 11/11/2020 at 1pm.  Also scheduled with rehab;  given information for [facility] equipment; given info for exercise program; given info on [facility] on aging.   Since discharge he continues to hear voices, but feels this is overall improved.  Voices are not commanding, but they do converse with him. No visual or tactile hallucinations. He was instructed on coping strategies prior to discharge which include music, reading, and watching TV.     His BP is 172/93 in the office today. Previously not sure of his current antihypertensive regimen, but currently prescribed Imdur 30 mg daily; lisinopril 10 mg daily; metoprolol 25 mg b.i.d. per my notes. Per hospital discharge summary, pt had been taking Lisinopril 20 mg daily and isosorbide 30 mg daily only.  Denies CP, HA, blurry vision, LE edema, or palpitations.     He is requesting refill on albuterol; requiring medication q4-6 hrs despite adherence to Breo daily. Unable to tolerate antimuscarinics  "due to urinary retention. He is not scheduled for follow-up with pulmonology.     Lastly complaining the urine appears "foamy". No associated dysuria, hematuria, urinary frequency/urgency. No abdominal pain, nausea, vomiting, fevers, or chills.    Review of Systems   Constitutional: Negative for activity change, appetite change, chills, fatigue and fever.   Respiratory: Positive for shortness of breath. Negative for cough, chest tightness and wheezing.    Cardiovascular: Negative for chest pain, palpitations and leg swelling.   Gastrointestinal: Negative for abdominal pain.   Genitourinary: Negative for discharge, dysuria, frequency, hematuria and urgency.   Psychiatric/Behavioral: Positive for hallucinations (auditory). Negative for behavioral problems, sleep disturbance and suicidal ideas. The patient is not nervous/anxious.          Past Medical History:   Diagnosis Date    Allergy     Colon polyp 2013    COPD (chronic obstructive pulmonary disease)     Coronary artery disease     Disorder of kidney and ureter     Hypercholesterolemia     Hypertension     Urinary tract infection        Patient Active Problem List   Diagnosis    Marijuana abuse    Tobacco abuse    CAD (coronary artery disease)    HTN (hypertension)    Cardiomyopathy    Congestive heart failure    Centrilobular emphysema    Type 2 diabetes mellitus with hyperglycemia, without long-term current use of insulin    Gastroesophageal reflux disease without esophagitis    Schizophrenia    Mixed hyperlipidemia    Chronic obstructive pulmonary disease    On home oxygen therapy    Crack cocaine use    Bilateral knee pain    Urine retention    Vitamin D deficiency    Acute gout of foot    Lung nodule < 6cm on CT    Aortic atherosclerosis    Personal history of colonic polyps    COPD exacerbation    Acute hypoxemic respiratory failure    Cocaine use disorder    Chronic pain of both hips    Chronic pain of both knees    " "Decreased functional mobility and endurance    Decreased strength of lower extremity       Past Surgical History:   Procedure Laterality Date    COLONOSCOPY  02/2016    Advised repeat in 3 years    COLONOSCOPY N/A 7/22/2020    Procedure: COLONOSCOPY Pallavily;  Surgeon: Saul Velez MD;  Location: [facility];  Service: Endoscopy;  Laterality: N/A;    CORONARY STENT PLACEMENT  2013       Family History   Problem Relation Age of Onset    No Known Problems Mother     Mental illness Brother     Diabetes Grandchild        Social History     Tobacco Use   Smoking Status Current Every Day Smoker    Packs/day: 0.75    Years: 50.00    Pack years: 37.50    Types: Cigars   Smokeless Tobacco Never Used       Social History     Social History Narrative    Tobacco Use: Currently smoking 1 cigar every other day. Former cigarette use; initiated at age 8yo, last use 11/2019 (age 63 yo), using 0.5ppd       Medications have been reviewed and reconciled.     Review of patient's allergies indicates:   Allergen Reactions    Trelegy ellipta [fluticasone-umeclidin-vilanter] Other (See Comments)     Possible urinary retention     Vardenafil Rash        Objective:        Vitals:    10/27/20 0952   BP: (!) 172/93   Pulse: (!) 119   SpO2: 96%   Weight: 81.3 kg (179 lb 3.7 oz)   Height: 5' 9" (1.753 m)        Physical Exam   Constitutional: He is oriented to person, place, and time. He appears well-developed and well-nourished. No distress.   Pt with supplemental O2 today at 3L NC  HENT:   Head: Normocephalic and atraumatic.   Right Ear: External ear normal.   Left Ear: External ear normal.   Mouth/Throat: Not examined  Eyes: Conjunctivae and EOM are normal. No scleral icterus.   Neck: Normal range of motion.   Cardiovascular: Normal rate, regular rhythm and normal heart sounds. Exam reveals no gallop and no friction rub.   No murmur heard.  Pulmonary/Chest: Effort normal and breath sounds normal. No respiratory distress. He has " no wheezes. He has no rales.   Musculoskeletal: Normal range of motion. He exhibits no edema, tenderness or deformity.   Neurological: He is alert and oriented to person, place, and time. No cranial nerve deficit. Gait normal.   Skin: Skin is warm and dry. No rash noted. No erythema.   Psychiatric: He has a normal mood and affect. His behavior is normal.      Assessment:       1. Schizophrenia, unspecified type    2. Essential hypertension    3. Abnormal urine    4. Chronic obstructive pulmonary disease, unspecified COPD type    5. Centrilobular emphysema    6. On home oxygen therapy    7. Mixed hyperlipidemia    8. Type 2 diabetes mellitus with hyperglycemia, without long-term current use of insulin    9. Coronary artery disease involving native heart without angina pectoris, unspecified vessel or lesion type    10. Congestive heart failure, unspecified HF chronicity, unspecified heart failure type    11. Cardiomyopathy, unspecified type    12. Tobacco abuse        Plan:       Paresh was seen today for follow-up and medication refill.    Diagnoses and all orders for this visit:    Schizophrenia, unspecified type    Essential hypertension  -     lisinopriL-hydrochlorothiazide (PRINZIDE,ZESTORETIC) 20-25 mg Tab; Take 1 tablet by mouth once daily.    Abnormal urine  -     Urinalysis  -     Urine culture  -     Urinalysis Microscopic    Chronic obstructive pulmonary disease, unspecified COPD type    Centrilobular emphysema  -     albuterol (PROAIR HFA) 90 mcg/actuation inhaler; Inhale 2 puffs into the lungs every 6 (six) hours as needed for Wheezing. Rescue    On home oxygen therapy    Mixed hyperlipidemia    Type 2 diabetes mellitus with hyperglycemia, without long-term current use of insulin    Coronary artery disease involving native heart without angina pectoris, unspecified vessel or lesion type    Congestive heart failure, unspecified HF chronicity, unspecified heart failure type    Cardiomyopathy, unspecified  type    Tobacco abuse      Schizophrenia  --Acutely decompensated (experiencing auditory hallucinations) resulting in hospitalization 10/15/-10/22/2020.   Prior to admission, pt treated with quetiapine 300 mg hs + cogentin 0.5 mg b.i.d. + risperidone 2 mg b.i.d. + trazodone 100 mg HS.    Discharged with cogentin 0.5 mg b.i.d. + risperidone 2 mg hs only   Has  psychiatry appt scheduled for 11/11/2020     HTN  --BP uncontrolled. Asymptomatic  --Per my notes he was currently prescribed Imdur 30 mg daily; lisinopril 10 mg daily; metoprolol 25 mg b.i.d.   --Based on discharge summary from most recent hospitalization, pt taking imdur 30 mg daily + lisinopril 20 mg daily  --Continue imdur 30 mg daily. Start Llisinopri/ Hydrochlorothiazide 20/25 mg daily. Will need to assess if pt taking Metoprolol 25 mg BID on f/u. Need to restart if not.    --BP f/u in 4 weeks.     Abnormal Urine   --Pt c/o foamy urine with no other symptoms.   --Obtain UA and Ucx    COPD/centilobular emphysema    --Not well-controlled  --Adherent with supplemental home O2 at 3L NC  --Established care with [facility] 12/17/2019; last visit 03/20/2020. Advised f/u in 3 months with PFTs. Previously referred patient for follow-up visit. This was never scheduled. States that he is out of insurance covered transportation; working on a deal with his brother who drives for [employer].   --CT chest 2/2020:  Centrilobular emphysema; 5mm Noncalcified left lower lobe subpleural nodule .  Recommend correlation with any prior outside similar cross-sectional imaging to assess for stability.  Otherwise, optional CT at 12 months could be performed.; Mild anterior pericardial effusion versus thickening; Aortic arch and coronary calcific atherosclerosis.  --Continue Breo 1 puff daily. Albuterol refilled.   --Declined pulmonary rehab     HLD  , , HDL 35, .8 12/13/2019  Adherent with rosuvastatin 40 mg hs.    DM-2  Last A1c 6.1 8/25/2020  Adherent with  glipizide 5 mg BID    Foot exam performed  01/20/2020 notable for mild onychomycosis bilaterally, otherwise within normal limits  Optho referral placed 9/22/2020  Flu vaccine given 9/22/2020  Pneumovax-23 administered 7/28/2016; PCV-13 administered 9/22/2020  Continue ACEI, Statin, ASA      CAD/CHF/Cardiomyopathy  --Med reconciliation on f/u visit   --2D echo 12/31/2019:   · Normal left ventricular systolic function. The estimated ejection fraction is 55%  · Mild eccentric left ventricular hypertrophy.  · Normal LV diastolic function.  · Mild mitral regurgitation.  · Local segmental wall motion abnormalities.  · Normal right ventricular systolic function.  · Normal central venous pressure (3 mm Hg).  Established care with [facility] 12/16/2019, has not returned since this time.   Adherent with crestor 40 mg hs; Metoprolol 25 mg BID; lisinopril 10 mg daily; and imdur 30 mg daily   - Lifestyle modification  - Refer to smoking cessation program.     Tobacco abuse  Pt previously smoking 0.5ppd x 58 years;   Quit cigarette use 8/25/2020  Discuss referral to tobacco cessation program on f/u visit     HM   Flu vaccine and PCV-13 administered 9/22/2020  Colonoscopy Referral Placed 1/20/2020.  Most recent was to 2016; 3 polyps resected.  Advised repeat colonoscopy in 3 years (2019). Discuss on f/u visit  Diabetic eye exam due. Ophthalmology referral placed 9/22/2020  Evaluated by Dr. Skelton at [facility] on 01/16/2018 for hep B surface antigen positive. All hep B serologies including DNA viral load negative 12/13/2019. t Hep B vaccine #1 administered 1/20/2020. Hep B #2 due   PSA 3.2--normal 12/13/19  TSH 2.140 (N)  12/13/19  HIV NR  12/13/19  Hep C NR  12/13/19  CBC wnl  12/13/19  Needs tdap and shingrix from pharmacy  AAA screen due--discuss on f/u    Ideally would like follow up in about 4 weeks for HTN management. Pt has exhausted insurance covered transportation for this year. He does have BP  cuff at home. Will have him record BP and call with readings in 2 weeks.    Previous Visit(s)  =============  Bilateral Hip Pain   --Likely OA   --Refer for x-rays of the hip  --Tx with Voltaren 1% gel QID; acetaminophen 1g q6 hrs    Vitamin D deficiency   Vit D 27 12/13/2019  --Advised OTC Vitamin D3 1000 IU daily     GERD  --Tx'd with omeprazole 20 mg daily     Substance abuse  --Marijuana, Smoking Crack   --reports substance use secondary to chronic bilateral hip pain. Will need to evaluate over subsequent visits     Dermatitis; RLE  --Likely 2/2 dry skin   --Tx with TAC 0.1% ointment BID x 2 weeks

## 2020-10-27 NOTE — PATIENT INSTRUCTIONS
Please check your blood pressure once daily at rest. (First thing in the morning or just before bed). Please write these numbers down. Call the office in 2 weeks (11/10/2020) with readings

## 2020-10-28 LAB — BACTERIA UR CULT: NORMAL

## 2020-10-29 ENCOUNTER — TELEPHONE (OUTPATIENT)
Dept: FAMILY MEDICINE | Facility: CLINIC | Age: 65
End: 2020-10-29

## 2020-10-29 VITALS
WEIGHT: 179.25 LBS | DIASTOLIC BLOOD PRESSURE: 93 MMHG | SYSTOLIC BLOOD PRESSURE: 172 MMHG | HEART RATE: 119 BPM | OXYGEN SATURATION: 96 % | BODY MASS INDEX: 26.55 KG/M2 | HEIGHT: 69 IN

## 2020-10-29 NOTE — TELEPHONE ENCOUNTER
Left msg for CB to inform pt that his urine testing showed no signs of a urinary tract infection. He did have protein in the urine which is normally caused by diabetes and elevated blood pressure. His diabetes is relatively well-controlled, but his BP is not. Please remind him to monitor his home blood pressures and call the office in 2 weeks with home readings since transportation to the office may not be an option

## 2020-11-03 ENCOUNTER — DOCUMENTATION ONLY (OUTPATIENT)
Dept: REHABILITATION | Facility: HOSPITAL | Age: 65
End: 2020-11-03

## 2020-11-04 NOTE — PROGRESS NOTES
PT/PTA met face to face to discuss pt's treatment plan and progress towards established goals. Pt will be seen by a physical therapist minimally every 6th visit or every 30 days.    Violet Love, PT     Face to Face PTA Conference performed with Violet Love PT regarding patient's current status, overall progress, and plan of care    Phil W Jacqueline  11/4/2020    Kelsey Hyde, PTA     Curt Nunez, PTA

## 2020-11-20 ENCOUNTER — DOCUMENTATION ONLY (OUTPATIENT)
Dept: REHABILITATION | Facility: HOSPITAL | Age: 65
End: 2020-11-20

## 2020-11-20 DIAGNOSIS — M25.552 CHRONIC PAIN OF BOTH HIPS: Primary | ICD-10-CM

## 2020-11-20 DIAGNOSIS — M25.551 CHRONIC PAIN OF BOTH HIPS: Primary | ICD-10-CM

## 2020-11-20 DIAGNOSIS — G89.29 CHRONIC PAIN OF BOTH KNEES: ICD-10-CM

## 2020-11-20 DIAGNOSIS — G89.29 CHRONIC PAIN OF BOTH HIPS: Primary | ICD-10-CM

## 2020-11-20 DIAGNOSIS — M25.561 CHRONIC PAIN OF BOTH KNEES: ICD-10-CM

## 2020-11-20 DIAGNOSIS — M25.562 CHRONIC PAIN OF BOTH KNEES: ICD-10-CM

## 2020-11-20 DIAGNOSIS — Z74.09 DECREASED FUNCTIONAL MOBILITY AND ENDURANCE: ICD-10-CM

## 2020-11-20 DIAGNOSIS — R29.898 DECREASED STRENGTH OF LOWER EXTREMITY: ICD-10-CM

## 2020-11-20 NOTE — PROGRESS NOTES
Pt was evaluated on 10/7/2020 and was seen 2 times for PT. Pt has not attended PT since 10/12/2020; removed from the schedule secondary to breaching attendance policy. Patient given HEP. Plan of care . Current status is unknown. Pt to be discharged at this time.

## 2020-12-16 DIAGNOSIS — E11.9 TYPE 2 DIABETES MELLITUS WITHOUT COMPLICATION: ICD-10-CM

## 2021-01-15 ENCOUNTER — PATIENT OUTREACH (OUTPATIENT)
Dept: ADMINISTRATIVE | Facility: OTHER | Age: 66
End: 2021-01-15

## 2021-01-31 ENCOUNTER — HOSPITAL ENCOUNTER (EMERGENCY)
Facility: HOSPITAL | Age: 66
Discharge: HOME | End: 2021-02-01
Attending: EMERGENCY MEDICINE
Payer: MEDICARE

## 2021-01-31 DIAGNOSIS — K85.90 ACUTE PANCREATITIS WITHOUT INFECTION OR NECROSIS, UNSPECIFIED PANCREATITIS TYPE: ICD-10-CM

## 2021-01-31 DIAGNOSIS — N17.9 AKI (ACUTE KIDNEY INJURY): ICD-10-CM

## 2021-01-31 DIAGNOSIS — R10.9 LEFT FLANK PAIN: Primary | ICD-10-CM

## 2021-01-31 LAB
ALBUMIN SERPL BCP-MCNC: 3.4 G/DL (ref 3.5–5.2)
ALP SERPL-CCNC: 82 U/L (ref 55–135)
ALT SERPL W/O P-5'-P-CCNC: 22 U/L (ref 10–44)
ANION GAP SERPL CALC-SCNC: 9 MMOL/L (ref 8–16)
AST SERPL-CCNC: 25 U/L (ref 10–40)
BACTERIA #/AREA URNS AUTO: NORMAL /HPF
BASOPHILS # BLD AUTO: 0.04 K/UL (ref 0–0.2)
BASOPHILS NFR BLD: 0.4 % (ref 0–1.9)
BILIRUB SERPL-MCNC: 0.2 MG/DL (ref 0.1–1)
BILIRUB UR QL STRIP: NEGATIVE
BUN SERPL-MCNC: 33 MG/DL (ref 8–23)
CALCIUM SERPL-MCNC: 8.7 MG/DL (ref 8.7–10.5)
CHLORIDE SERPL-SCNC: 105 MMOL/L (ref 95–110)
CLARITY UR REFRACT.AUTO: CLEAR
CO2 SERPL-SCNC: 25 MMOL/L (ref 23–29)
COLOR UR AUTO: ABNORMAL
CREAT SERPL-MCNC: 1.8 MG/DL (ref 0.5–1.4)
DIFFERENTIAL METHOD: ABNORMAL
EOSINOPHIL # BLD AUTO: 0.3 K/UL (ref 0–0.5)
EOSINOPHIL NFR BLD: 2.8 % (ref 0–8)
ERYTHROCYTE [DISTWIDTH] IN BLOOD BY AUTOMATED COUNT: 14.4 % (ref 11.5–14.5)
EST. GFR  (AFRICAN AMERICAN): 44.7 ML/MIN/1.73 M^2
EST. GFR  (NON AFRICAN AMERICAN): 38.6 ML/MIN/1.73 M^2
GLUCOSE SERPL-MCNC: 137 MG/DL (ref 70–110)
GLUCOSE UR QL STRIP: NEGATIVE
HCT VFR BLD AUTO: 39.6 % (ref 40–54)
HGB BLD-MCNC: 13.3 G/DL (ref 14–18)
HGB UR QL STRIP: NEGATIVE
HYALINE CASTS UR QL AUTO: 0 /LPF
IMM GRANULOCYTES # BLD AUTO: 0.02 K/UL (ref 0–0.04)
IMM GRANULOCYTES NFR BLD AUTO: 0.2 % (ref 0–0.5)
KETONES UR QL STRIP: NEGATIVE
LEUKOCYTE ESTERASE UR QL STRIP: NEGATIVE
LIPASE SERPL-CCNC: 111 U/L (ref 4–60)
LYMPHOCYTES # BLD AUTO: 2 K/UL (ref 1–4.8)
LYMPHOCYTES NFR BLD: 22.1 % (ref 18–48)
MCH RBC QN AUTO: 30.6 PG (ref 27–31)
MCHC RBC AUTO-ENTMCNC: 33.6 G/DL (ref 32–36)
MCV RBC AUTO: 91 FL (ref 82–98)
MICROSCOPIC COMMENT: NORMAL
MONOCYTES # BLD AUTO: 0.6 K/UL (ref 0.3–1)
MONOCYTES NFR BLD: 6.5 % (ref 4–15)
NEUTROPHILS # BLD AUTO: 6.1 K/UL (ref 1.8–7.7)
NEUTROPHILS NFR BLD: 68 % (ref 38–73)
NITRITE UR QL STRIP: NEGATIVE
NRBC BLD-RTO: 0 /100 WBC
PH UR STRIP: 6 [PH] (ref 5–8)
PLATELET # BLD AUTO: 204 K/UL (ref 150–350)
PMV BLD AUTO: 10.7 FL (ref 9.2–12.9)
POTASSIUM SERPL-SCNC: 4.6 MMOL/L (ref 3.5–5.1)
PROT SERPL-MCNC: 6.3 G/DL (ref 6–8.4)
PROT UR QL STRIP: ABNORMAL
RBC # BLD AUTO: 4.35 M/UL (ref 4.6–6.2)
RBC #/AREA URNS AUTO: 0 /HPF (ref 0–4)
SODIUM SERPL-SCNC: 139 MMOL/L (ref 136–145)
SP GR UR STRIP: 1.01 (ref 1–1.03)
URN SPEC COLLECT METH UR: ABNORMAL
WBC # BLD AUTO: 9.04 K/UL (ref 3.9–12.7)
WBC #/AREA URNS AUTO: 0 /HPF (ref 0–5)

## 2021-01-31 PROCEDURE — 81001 URINALYSIS AUTO W/SCOPE: CPT

## 2021-01-31 PROCEDURE — 99284 PR EMERGENCY DEPT VISIT,LEVEL IV: ICD-10-PCS | Mod: ,,, | Performed by: EMERGENCY MEDICINE

## 2021-01-31 PROCEDURE — 99284 EMERGENCY DEPT VISIT MOD MDM: CPT | Mod: 25

## 2021-01-31 PROCEDURE — 80053 COMPREHEN METABOLIC PANEL: CPT

## 2021-01-31 PROCEDURE — 85025 COMPLETE CBC W/AUTO DIFF WBC: CPT

## 2021-01-31 PROCEDURE — 83690 ASSAY OF LIPASE: CPT

## 2021-01-31 PROCEDURE — 99284 EMERGENCY DEPT VISIT MOD MDM: CPT | Mod: ,,, | Performed by: EMERGENCY MEDICINE

## 2021-02-01 VITALS
BODY MASS INDEX: 29.53 KG/M2 | OXYGEN SATURATION: 95 % | HEART RATE: 76 BPM | DIASTOLIC BLOOD PRESSURE: 70 MMHG | RESPIRATION RATE: 21 BRPM | TEMPERATURE: 98 F | WEIGHT: 200 LBS | SYSTOLIC BLOOD PRESSURE: 128 MMHG

## 2021-02-03 DIAGNOSIS — N18.9 CHRONIC KIDNEY DISEASE, UNSPECIFIED CKD STAGE: ICD-10-CM

## 2021-09-05 ENCOUNTER — HOSPITAL ENCOUNTER (OUTPATIENT)
Facility: HOSPITAL | Age: 66
Discharge: HOME | End: 2021-09-07
Attending: EMERGENCY MEDICINE | Admitting: INTERNAL MEDICINE
Payer: MEDICARE

## 2021-09-05 DIAGNOSIS — L89.152 PRESSURE INJURY OF SACRAL REGION, STAGE 2: Chronic | ICD-10-CM

## 2021-09-05 DIAGNOSIS — N17.9 AKI (ACUTE KIDNEY INJURY): Primary | ICD-10-CM

## 2021-09-05 DIAGNOSIS — J96.01 ACUTE HYPOXEMIC RESPIRATORY FAILURE: ICD-10-CM

## 2021-09-05 DIAGNOSIS — J44.9 CHRONIC OBSTRUCTIVE PULMONARY DISEASE, UNSPECIFIED COPD TYPE: ICD-10-CM

## 2021-09-05 DIAGNOSIS — R06.02 SOB (SHORTNESS OF BREATH): ICD-10-CM

## 2021-09-05 DIAGNOSIS — E87.5 HYPERKALEMIA: ICD-10-CM

## 2021-09-05 DIAGNOSIS — R06.02 SHORTNESS OF BREATH: ICD-10-CM

## 2021-09-05 DIAGNOSIS — R07.9 CHEST PAIN: ICD-10-CM

## 2021-09-05 DIAGNOSIS — Z99.81 ON HOME OXYGEN THERAPY: ICD-10-CM

## 2021-09-05 PROBLEM — J96.21 ACUTE ON CHRONIC RESPIRATORY FAILURE WITH HYPOXIA: Status: ACTIVE | Noted: 2021-09-05

## 2021-09-05 PROBLEM — J96.11 CHRONIC RESPIRATORY FAILURE WITH HYPOXIA: Status: ACTIVE | Noted: 2021-09-05

## 2021-09-05 PROBLEM — J96.20 ACUTE ON CHRONIC RESPIRATORY FAILURE: Status: ACTIVE | Noted: 2021-09-05

## 2021-09-05 LAB
ALBUMIN SERPL BCP-MCNC: 3.4 G/DL (ref 3.5–5.2)
ALLENS TEST: ABNORMAL
ALP SERPL-CCNC: 54 U/L (ref 55–135)
ALT SERPL W/O P-5'-P-CCNC: 21 U/L (ref 10–44)
ANION GAP SERPL CALC-SCNC: 12 MMOL/L (ref 8–16)
AST SERPL-CCNC: 21 U/L (ref 10–40)
BACTERIA #/AREA URNS AUTO: ABNORMAL /HPF
BASOPHILS # BLD AUTO: 0.02 K/UL (ref 0–0.2)
BASOPHILS NFR BLD: 0.2 % (ref 0–1.9)
BILIRUB SERPL-MCNC: 0.5 MG/DL (ref 0.1–1)
BILIRUB UR QL STRIP: NEGATIVE
BNP SERPL-MCNC: <10 PG/ML (ref 0–99)
BUN SERPL-MCNC: 54 MG/DL (ref 8–23)
CALCIUM SERPL-MCNC: 10.2 MG/DL (ref 8.7–10.5)
CHLORIDE SERPL-SCNC: 101 MMOL/L (ref 95–110)
CK SERPL-CCNC: 127 U/L (ref 20–200)
CLARITY UR REFRACT.AUTO: ABNORMAL
CO2 SERPL-SCNC: 24 MMOL/L (ref 23–29)
COLOR UR AUTO: YELLOW
CREAT SERPL-MCNC: 2.8 MG/DL (ref 0.5–1.4)
CTP QC/QA: YES
DIFFERENTIAL METHOD: ABNORMAL
EOSINOPHIL # BLD AUTO: 0 K/UL (ref 0–0.5)
EOSINOPHIL NFR BLD: 0.1 % (ref 0–8)
ERYTHROCYTE [DISTWIDTH] IN BLOOD BY AUTOMATED COUNT: 13.5 % (ref 11.5–14.5)
EST. GFR  (AFRICAN AMERICAN): 26 ML/MIN/1.73 M^2
EST. GFR  (NON AFRICAN AMERICAN): 22.5 ML/MIN/1.73 M^2
GLUCOSE SERPL-MCNC: 268 MG/DL (ref 70–110)
GLUCOSE UR QL STRIP: ABNORMAL
HCO3 UR-SCNC: 28.6 MMOL/L (ref 24–28)
HCT VFR BLD AUTO: 35.3 % (ref 40–54)
HGB BLD-MCNC: 11.8 G/DL (ref 14–18)
HGB UR QL STRIP: NEGATIVE
HYALINE CASTS UR QL AUTO: 5 /LPF
IMM GRANULOCYTES # BLD AUTO: 0.05 K/UL (ref 0–0.04)
IMM GRANULOCYTES NFR BLD AUTO: 0.5 % (ref 0–0.5)
KETONES UR QL STRIP: NEGATIVE
LEUKOCYTE ESTERASE UR QL STRIP: NEGATIVE
LYMPHOCYTES # BLD AUTO: 0.6 K/UL (ref 1–4.8)
LYMPHOCYTES NFR BLD: 6 % (ref 18–48)
MAGNESIUM SERPL-MCNC: 2.3 MG/DL (ref 1.6–2.6)
MCH RBC QN AUTO: 30.8 PG (ref 27–31)
MCHC RBC AUTO-ENTMCNC: 33.4 G/DL (ref 32–36)
MCV RBC AUTO: 92 FL (ref 82–98)
MICROSCOPIC COMMENT: ABNORMAL
MONOCYTES # BLD AUTO: 0.2 K/UL (ref 0.3–1)
MONOCYTES NFR BLD: 2.6 % (ref 4–15)
NEUTROPHILS # BLD AUTO: 8.4 K/UL (ref 1.8–7.7)
NEUTROPHILS NFR BLD: 90.6 % (ref 38–73)
NITRITE UR QL STRIP: NEGATIVE
NRBC BLD-RTO: 0 /100 WBC
PCO2 BLDA: 46.2 MMHG (ref 35–45)
PH SMN: 7.4 [PH] (ref 7.35–7.45)
PH UR STRIP: 5 [PH] (ref 5–8)
PLATELET # BLD AUTO: 237 K/UL (ref 150–450)
PMV BLD AUTO: 10.1 FL (ref 9.2–12.9)
PO2 BLDA: 82 MMHG (ref 40–60)
POC BE: 4 MMOL/L
POC SATURATED O2: 96 % (ref 95–100)
POC TCO2: 30 MMOL/L (ref 24–29)
POCT GLUCOSE: 282 MG/DL (ref 70–110)
POCT GLUCOSE: 345 MG/DL (ref 70–110)
POTASSIUM SERPL-SCNC: 5.9 MMOL/L (ref 3.5–5.1)
PROT SERPL-MCNC: 6.6 G/DL (ref 6–8.4)
PROT UR QL STRIP: NEGATIVE
RBC # BLD AUTO: 3.83 M/UL (ref 4.6–6.2)
RBC #/AREA URNS AUTO: 2 /HPF (ref 0–4)
SAMPLE: ABNORMAL
SARS-COV-2 RDRP RESP QL NAA+PROBE: NEGATIVE
SITE: ABNORMAL
SODIUM SERPL-SCNC: 137 MMOL/L (ref 136–145)
SP GR UR STRIP: 1.01 (ref 1–1.03)
TROPONIN I SERPL DL<=0.01 NG/ML-MCNC: 0.02 NG/ML (ref 0–0.03)
URN SPEC COLLECT METH UR: ABNORMAL
WBC # BLD AUTO: 9.28 K/UL (ref 3.9–12.7)
WBC #/AREA URNS AUTO: 1 /HPF (ref 0–5)
YEAST UR QL AUTO: ABNORMAL

## 2021-09-05 PROCEDURE — 96372 THER/PROPH/DIAG INJ SC/IM: CPT | Mod: 59

## 2021-09-05 PROCEDURE — 99900035 HC TECH TIME PER 15 MIN (STAT)

## 2021-09-05 PROCEDURE — 27000221 HC OXYGEN, UP TO 24 HOURS

## 2021-09-05 PROCEDURE — 83735 ASSAY OF MAGNESIUM: CPT | Performed by: EMERGENCY MEDICINE

## 2021-09-05 PROCEDURE — 83880 ASSAY OF NATRIURETIC PEPTIDE: CPT | Performed by: EMERGENCY MEDICINE

## 2021-09-05 PROCEDURE — 25000003 PHARM REV CODE 250: Performed by: PHYSICIAN ASSISTANT

## 2021-09-05 PROCEDURE — 82550 ASSAY OF CK (CPK): CPT | Performed by: EMERGENCY MEDICINE

## 2021-09-05 PROCEDURE — 25000242 PHARM REV CODE 250 ALT 637 W/ HCPCS: Performed by: EMERGENCY MEDICINE

## 2021-09-05 PROCEDURE — 80053 COMPREHEN METABOLIC PANEL: CPT | Performed by: EMERGENCY MEDICINE

## 2021-09-05 PROCEDURE — 94640 AIRWAY INHALATION TREATMENT: CPT

## 2021-09-05 PROCEDURE — G0378 HOSPITAL OBSERVATION PER HR: HCPCS

## 2021-09-05 PROCEDURE — 99220 PR INITIAL OBSERVATION CARE,LEVL III: CPT | Mod: GW,,, | Performed by: PHYSICIAN ASSISTANT

## 2021-09-05 PROCEDURE — 93005 ELECTROCARDIOGRAM TRACING: CPT

## 2021-09-05 PROCEDURE — U0002 COVID-19 LAB TEST NON-CDC: HCPCS | Performed by: EMERGENCY MEDICINE

## 2021-09-05 PROCEDURE — 84484 ASSAY OF TROPONIN QUANT: CPT | Performed by: EMERGENCY MEDICINE

## 2021-09-05 PROCEDURE — 94640 AIRWAY INHALATION TREATMENT: CPT | Mod: 59

## 2021-09-05 PROCEDURE — P9612 CATHETERIZE FOR URINE SPEC: HCPCS

## 2021-09-05 PROCEDURE — 25000003 PHARM REV CODE 250: Performed by: EMERGENCY MEDICINE

## 2021-09-05 PROCEDURE — A4216 STERILE WATER/SALINE, 10 ML: HCPCS | Performed by: PHYSICIAN ASSISTANT

## 2021-09-05 PROCEDURE — 99285 EMERGENCY DEPT VISIT HI MDM: CPT | Mod: GC,CS,, | Performed by: EMERGENCY MEDICINE

## 2021-09-05 PROCEDURE — 85025 COMPLETE CBC W/AUTO DIFF WBC: CPT | Performed by: EMERGENCY MEDICINE

## 2021-09-05 PROCEDURE — 25000242 PHARM REV CODE 250 ALT 637 W/ HCPCS: Performed by: INTERNAL MEDICINE

## 2021-09-05 PROCEDURE — 82803 BLOOD GASES ANY COMBINATION: CPT

## 2021-09-05 PROCEDURE — 99285 PR EMERGENCY DEPT VISIT,LEVEL V: ICD-10-PCS | Mod: GC,CS,, | Performed by: EMERGENCY MEDICINE

## 2021-09-05 PROCEDURE — 99220 PR INITIAL OBSERVATION CARE,LEVL III: ICD-10-PCS | Mod: GW,,, | Performed by: PHYSICIAN ASSISTANT

## 2021-09-05 PROCEDURE — 99285 EMERGENCY DEPT VISIT HI MDM: CPT | Mod: 25

## 2021-09-05 PROCEDURE — 51798 US URINE CAPACITY MEASURE: CPT

## 2021-09-05 PROCEDURE — 96375 TX/PRO/DX INJ NEW DRUG ADDON: CPT

## 2021-09-05 PROCEDURE — 96374 THER/PROPH/DIAG INJ IV PUSH: CPT

## 2021-09-05 PROCEDURE — 93010 EKG 12-LEAD: ICD-10-PCS | Mod: ,,, | Performed by: INTERNAL MEDICINE

## 2021-09-05 PROCEDURE — 63600175 PHARM REV CODE 636 W HCPCS: Performed by: INTERNAL MEDICINE

## 2021-09-05 PROCEDURE — 63600175 PHARM REV CODE 636 W HCPCS: Performed by: PHYSICIAN ASSISTANT

## 2021-09-05 PROCEDURE — 93010 ELECTROCARDIOGRAM REPORT: CPT | Mod: ,,, | Performed by: INTERNAL MEDICINE

## 2021-09-05 PROCEDURE — 81001 URINALYSIS AUTO W/SCOPE: CPT | Performed by: PHYSICIAN ASSISTANT

## 2021-09-05 PROCEDURE — 94761 N-INVAS EAR/PLS OXIMETRY MLT: CPT

## 2021-09-05 PROCEDURE — 25000003 PHARM REV CODE 250: Performed by: INTERNAL MEDICINE

## 2021-09-05 PROCEDURE — 63600175 PHARM REV CODE 636 W HCPCS: Performed by: EMERGENCY MEDICINE

## 2021-09-05 RX ORDER — MAG HYDROX/ALUMINUM HYD/SIMETH 200-200-20
30 SUSPENSION, ORAL (FINAL DOSE FORM) ORAL
Status: DISCONTINUED | OUTPATIENT
Start: 2021-09-06 | End: 2021-09-06

## 2021-09-05 RX ORDER — ISOSORBIDE MONONITRATE 30 MG/1
30 TABLET, EXTENDED RELEASE ORAL DAILY
Status: DISCONTINUED | OUTPATIENT
Start: 2021-09-06 | End: 2021-09-07 | Stop reason: HOSPADM

## 2021-09-05 RX ORDER — DIPHENHYDRAMINE HCL 25 MG
25 CAPSULE ORAL NIGHTLY PRN
Status: DISCONTINUED | OUTPATIENT
Start: 2021-09-05 | End: 2021-09-05

## 2021-09-05 RX ORDER — RISPERIDONE 1 MG/1
2 TABLET ORAL 2 TIMES DAILY
Status: DISCONTINUED | OUTPATIENT
Start: 2021-09-05 | End: 2021-09-07 | Stop reason: HOSPADM

## 2021-09-05 RX ORDER — METOPROLOL TARTRATE 25 MG/1
25 TABLET, FILM COATED ORAL 2 TIMES DAILY
Status: DISCONTINUED | OUTPATIENT
Start: 2021-09-05 | End: 2021-09-07 | Stop reason: HOSPADM

## 2021-09-05 RX ORDER — FLUTICASONE FUROATE AND VILANTEROL 100; 25 UG/1; UG/1
1 POWDER RESPIRATORY (INHALATION) DAILY
Status: DISCONTINUED | OUTPATIENT
Start: 2021-09-06 | End: 2021-09-07 | Stop reason: HOSPADM

## 2021-09-05 RX ORDER — IPRATROPIUM BROMIDE AND ALBUTEROL SULFATE 2.5; .5 MG/3ML; MG/3ML
3 SOLUTION RESPIRATORY (INHALATION) EVERY 6 HOURS PRN
Status: DISCONTINUED | OUTPATIENT
Start: 2021-09-05 | End: 2021-09-06

## 2021-09-05 RX ORDER — ONDANSETRON 2 MG/ML
4 INJECTION INTRAMUSCULAR; INTRAVENOUS EVERY 8 HOURS PRN
Status: DISCONTINUED | OUTPATIENT
Start: 2021-09-05 | End: 2021-09-07 | Stop reason: HOSPADM

## 2021-09-05 RX ORDER — CHOLECALCIFEROL (VITAMIN D3) 25 MCG
1000 TABLET ORAL DAILY
Status: DISCONTINUED | OUTPATIENT
Start: 2021-09-06 | End: 2021-09-06

## 2021-09-05 RX ORDER — PROMETHAZINE HYDROCHLORIDE 25 MG/1
25 TABLET ORAL EVERY 6 HOURS PRN
Status: DISCONTINUED | OUTPATIENT
Start: 2021-09-05 | End: 2021-09-07 | Stop reason: HOSPADM

## 2021-09-05 RX ORDER — BENZTROPINE MESYLATE 0.5 MG/1
0.5 TABLET ORAL 2 TIMES DAILY
Status: DISCONTINUED | OUTPATIENT
Start: 2021-09-05 | End: 2021-09-07 | Stop reason: HOSPADM

## 2021-09-05 RX ORDER — ATORVASTATIN CALCIUM 40 MG/1
40 TABLET, FILM COATED ORAL DAILY
Status: DISCONTINUED | OUTPATIENT
Start: 2021-09-06 | End: 2021-09-07 | Stop reason: HOSPADM

## 2021-09-05 RX ORDER — CALCIUM CHLORIDE INJECTION 100 MG/ML
1 INJECTION, SOLUTION INTRAVENOUS
Status: DISCONTINUED | OUTPATIENT
Start: 2021-09-05 | End: 2021-09-05

## 2021-09-05 RX ORDER — INSULIN ASPART 100 [IU]/ML
0-5 INJECTION, SOLUTION INTRAVENOUS; SUBCUTANEOUS
Status: DISCONTINUED | OUTPATIENT
Start: 2021-09-05 | End: 2021-09-07 | Stop reason: HOSPADM

## 2021-09-05 RX ORDER — ASPIRIN 81 MG/1
81 TABLET ORAL DAILY
Status: DISCONTINUED | OUTPATIENT
Start: 2021-09-06 | End: 2021-09-07 | Stop reason: HOSPADM

## 2021-09-05 RX ORDER — SODIUM CHLORIDE 0.9 % (FLUSH) 0.9 %
10 SYRINGE (ML) INJECTION EVERY 8 HOURS
Status: DISCONTINUED | OUTPATIENT
Start: 2021-09-05 | End: 2021-09-06

## 2021-09-05 RX ORDER — FINASTERIDE 5 MG/1
5 TABLET, FILM COATED ORAL DAILY
Status: DISCONTINUED | OUTPATIENT
Start: 2021-09-06 | End: 2021-09-07 | Stop reason: HOSPADM

## 2021-09-05 RX ORDER — METHYLPREDNISOLONE SOD SUCC 125 MG
125 VIAL (EA) INJECTION
Status: COMPLETED | OUTPATIENT
Start: 2021-09-05 | End: 2021-09-05

## 2021-09-05 RX ORDER — TALC
6 POWDER (GRAM) TOPICAL NIGHTLY PRN
Status: DISCONTINUED | OUTPATIENT
Start: 2021-09-05 | End: 2021-09-07 | Stop reason: HOSPADM

## 2021-09-05 RX ORDER — TAMSULOSIN HYDROCHLORIDE 0.4 MG/1
0.4 CAPSULE ORAL DAILY
Status: DISCONTINUED | OUTPATIENT
Start: 2021-09-06 | End: 2021-09-07 | Stop reason: HOSPADM

## 2021-09-05 RX ORDER — PANTOPRAZOLE SODIUM 40 MG/1
40 TABLET, DELAYED RELEASE ORAL DAILY
Refills: 1 | Status: DISCONTINUED | OUTPATIENT
Start: 2021-09-06 | End: 2021-09-07 | Stop reason: HOSPADM

## 2021-09-05 RX ORDER — SUCRALFATE 1 G/10ML
1 SUSPENSION ORAL EVERY 6 HOURS
Status: DISCONTINUED | OUTPATIENT
Start: 2021-09-06 | End: 2021-09-06

## 2021-09-05 RX ORDER — ACETAMINOPHEN 325 MG/1
650 TABLET ORAL EVERY 4 HOURS PRN
Status: DISCONTINUED | OUTPATIENT
Start: 2021-09-05 | End: 2021-09-07 | Stop reason: HOSPADM

## 2021-09-05 RX ORDER — IBUPROFEN 200 MG
1 TABLET ORAL DAILY
Status: DISCONTINUED | OUTPATIENT
Start: 2021-09-06 | End: 2021-09-07 | Stop reason: HOSPADM

## 2021-09-05 RX ORDER — ALBUTEROL SULFATE 2.5 MG/.5ML
10 SOLUTION RESPIRATORY (INHALATION)
Status: COMPLETED | OUTPATIENT
Start: 2021-09-05 | End: 2021-09-05

## 2021-09-05 RX ORDER — IBUPROFEN 200 MG
16 TABLET ORAL
Status: DISCONTINUED | OUTPATIENT
Start: 2021-09-05 | End: 2021-09-07 | Stop reason: HOSPADM

## 2021-09-05 RX ORDER — GLUCAGON 1 MG
1 KIT INJECTION
Status: DISCONTINUED | OUTPATIENT
Start: 2021-09-05 | End: 2021-09-07 | Stop reason: HOSPADM

## 2021-09-05 RX ORDER — POLYETHYLENE GLYCOL 3350 17 G/17G
17 POWDER, FOR SOLUTION ORAL DAILY
Status: DISCONTINUED | OUTPATIENT
Start: 2021-09-06 | End: 2021-09-07 | Stop reason: HOSPADM

## 2021-09-05 RX ORDER — IBUPROFEN 200 MG
24 TABLET ORAL
Status: DISCONTINUED | OUTPATIENT
Start: 2021-09-05 | End: 2021-09-07 | Stop reason: HOSPADM

## 2021-09-05 RX ORDER — IPRATROPIUM BROMIDE AND ALBUTEROL SULFATE 2.5; .5 MG/3ML; MG/3ML
3 SOLUTION RESPIRATORY (INHALATION)
Status: COMPLETED | OUTPATIENT
Start: 2021-09-05 | End: 2021-09-05

## 2021-09-05 RX ADMIN — SODIUM CHLORIDE 1000 ML: 0.9 INJECTION, SOLUTION INTRAVENOUS at 07:09

## 2021-09-05 RX ADMIN — IPRATROPIUM BROMIDE AND ALBUTEROL SULFATE 3 ML: .5; 2.5 SOLUTION RESPIRATORY (INHALATION) at 06:09

## 2021-09-05 RX ADMIN — Medication 10 ML: at 10:09

## 2021-09-05 RX ADMIN — DEXTROSE MONOHYDRATE 25 G: 25 INJECTION, SOLUTION INTRAVENOUS at 09:09

## 2021-09-05 RX ADMIN — INSULIN HUMAN 5 UNITS: 100 INJECTION, SOLUTION PARENTERAL at 09:09

## 2021-09-05 RX ADMIN — INSULIN ASPART 2 UNITS: 100 INJECTION, SOLUTION INTRAVENOUS; SUBCUTANEOUS at 10:09

## 2021-09-05 RX ADMIN — ALBUTEROL SULFATE 10 MG: 2.5 SOLUTION RESPIRATORY (INHALATION) at 09:09

## 2021-09-05 RX ADMIN — METHYLPREDNISOLONE SODIUM SUCCINATE 125 MG: 125 INJECTION, POWDER, FOR SOLUTION INTRAMUSCULAR; INTRAVENOUS at 06:09

## 2021-09-05 RX ADMIN — CALCIUM GLUCONATE 1 G: 98 INJECTION, SOLUTION INTRAVENOUS at 09:09

## 2021-09-06 PROBLEM — L89.152 PRESSURE INJURY OF SACRAL REGION, STAGE 2: Chronic | Status: ACTIVE | Noted: 2021-09-06

## 2021-09-06 PROBLEM — N40.1 BENIGN PROSTATIC HYPERPLASIA WITH LOWER URINARY TRACT SYMPTOMS: Status: ACTIVE | Noted: 2020-01-29

## 2021-09-06 LAB
ALBUMIN SERPL BCP-MCNC: 3.1 G/DL (ref 3.5–5.2)
ALBUMIN SERPL BCP-MCNC: 3.1 G/DL (ref 3.5–5.2)
ALP SERPL-CCNC: 51 U/L (ref 55–135)
ALP SERPL-CCNC: 55 U/L (ref 55–135)
ALT SERPL W/O P-5'-P-CCNC: 18 U/L (ref 10–44)
ALT SERPL W/O P-5'-P-CCNC: 23 U/L (ref 10–44)
ANION GAP SERPL CALC-SCNC: 13 MMOL/L (ref 8–16)
ANION GAP SERPL CALC-SCNC: 9 MMOL/L (ref 8–16)
AST SERPL-CCNC: 15 U/L (ref 10–40)
AST SERPL-CCNC: 21 U/L (ref 10–40)
BASOPHILS # BLD AUTO: 0.01 K/UL (ref 0–0.2)
BASOPHILS # BLD AUTO: 0.01 K/UL (ref 0–0.2)
BASOPHILS NFR BLD: 0.1 % (ref 0–1.9)
BASOPHILS NFR BLD: 0.1 % (ref 0–1.9)
BILIRUB SERPL-MCNC: 0.2 MG/DL (ref 0.1–1)
BILIRUB SERPL-MCNC: 0.4 MG/DL (ref 0.1–1)
BUN SERPL-MCNC: 43 MG/DL (ref 8–23)
BUN SERPL-MCNC: 47 MG/DL (ref 8–23)
CALCIUM SERPL-MCNC: 9.2 MG/DL (ref 8.7–10.5)
CALCIUM SERPL-MCNC: 9.8 MG/DL (ref 8.7–10.5)
CHLORIDE SERPL-SCNC: 104 MMOL/L (ref 95–110)
CHLORIDE SERPL-SCNC: 105 MMOL/L (ref 95–110)
CO2 SERPL-SCNC: 21 MMOL/L (ref 23–29)
CO2 SERPL-SCNC: 24 MMOL/L (ref 23–29)
CREAT SERPL-MCNC: 1.8 MG/DL (ref 0.5–1.4)
CREAT SERPL-MCNC: 2.2 MG/DL (ref 0.5–1.4)
DIFFERENTIAL METHOD: ABNORMAL
DIFFERENTIAL METHOD: ABNORMAL
EOSINOPHIL # BLD AUTO: 0 K/UL (ref 0–0.5)
EOSINOPHIL # BLD AUTO: 0 K/UL (ref 0–0.5)
EOSINOPHIL NFR BLD: 0 % (ref 0–8)
EOSINOPHIL NFR BLD: 0 % (ref 0–8)
ERYTHROCYTE [DISTWIDTH] IN BLOOD BY AUTOMATED COUNT: 13.1 % (ref 11.5–14.5)
ERYTHROCYTE [DISTWIDTH] IN BLOOD BY AUTOMATED COUNT: 13.7 % (ref 11.5–14.5)
EST. GFR  (AFRICAN AMERICAN): 34.8 ML/MIN/1.73 M^2
EST. GFR  (AFRICAN AMERICAN): 44.3 ML/MIN/1.73 M^2
EST. GFR  (NON AFRICAN AMERICAN): 30.1 ML/MIN/1.73 M^2
EST. GFR  (NON AFRICAN AMERICAN): 38.4 ML/MIN/1.73 M^2
ESTIMATED AVG GLUCOSE: 186 MG/DL (ref 68–131)
GLUCOSE SERPL-MCNC: 238 MG/DL (ref 70–110)
GLUCOSE SERPL-MCNC: 261 MG/DL (ref 70–110)
HBA1C MFR BLD: 8.1 % (ref 4–5.6)
HCT VFR BLD AUTO: 31.8 % (ref 40–54)
HCT VFR BLD AUTO: 34.5 % (ref 40–54)
HGB BLD-MCNC: 10.9 G/DL (ref 14–18)
HGB BLD-MCNC: 11.5 G/DL (ref 14–18)
IMM GRANULOCYTES # BLD AUTO: 0.07 K/UL (ref 0–0.04)
IMM GRANULOCYTES # BLD AUTO: 0.11 K/UL (ref 0–0.04)
IMM GRANULOCYTES NFR BLD AUTO: 0.8 % (ref 0–0.5)
IMM GRANULOCYTES NFR BLD AUTO: 0.8 % (ref 0–0.5)
LYMPHOCYTES # BLD AUTO: 0.5 K/UL (ref 1–4.8)
LYMPHOCYTES # BLD AUTO: 0.9 K/UL (ref 1–4.8)
LYMPHOCYTES NFR BLD: 5.6 % (ref 18–48)
LYMPHOCYTES NFR BLD: 6.6 % (ref 18–48)
MAGNESIUM SERPL-MCNC: 2.3 MG/DL (ref 1.6–2.6)
MCH RBC QN AUTO: 30.7 PG (ref 27–31)
MCH RBC QN AUTO: 31.4 PG (ref 27–31)
MCHC RBC AUTO-ENTMCNC: 33.3 G/DL (ref 32–36)
MCHC RBC AUTO-ENTMCNC: 34.3 G/DL (ref 32–36)
MCV RBC AUTO: 92 FL (ref 82–98)
MCV RBC AUTO: 92 FL (ref 82–98)
MONOCYTES # BLD AUTO: 0.1 K/UL (ref 0.3–1)
MONOCYTES # BLD AUTO: 0.9 K/UL (ref 0.3–1)
MONOCYTES NFR BLD: 0.9 % (ref 4–15)
MONOCYTES NFR BLD: 7.2 % (ref 4–15)
NEUTROPHILS # BLD AUTO: 11.2 K/UL (ref 1.8–7.7)
NEUTROPHILS # BLD AUTO: 8 K/UL (ref 1.8–7.7)
NEUTROPHILS NFR BLD: 85.3 % (ref 38–73)
NEUTROPHILS NFR BLD: 92.6 % (ref 38–73)
NRBC BLD-RTO: 0 /100 WBC
NRBC BLD-RTO: 0 /100 WBC
PHOSPHATE SERPL-MCNC: 4.6 MG/DL (ref 2.7–4.5)
PLATELET # BLD AUTO: 220 K/UL (ref 150–450)
PLATELET # BLD AUTO: 244 K/UL (ref 150–450)
PMV BLD AUTO: 10.5 FL (ref 9.2–12.9)
PMV BLD AUTO: 11.4 FL (ref 9.2–12.9)
POCT GLUCOSE: 196 MG/DL (ref 70–110)
POCT GLUCOSE: 226 MG/DL (ref 70–110)
POCT GLUCOSE: 277 MG/DL (ref 70–110)
POTASSIUM SERPL-SCNC: 4.2 MMOL/L (ref 3.5–5.1)
POTASSIUM SERPL-SCNC: 5.1 MMOL/L (ref 3.5–5.1)
PROT SERPL-MCNC: 5.9 G/DL (ref 6–8.4)
PROT SERPL-MCNC: 6.1 G/DL (ref 6–8.4)
RBC # BLD AUTO: 3.47 M/UL (ref 4.6–6.2)
RBC # BLD AUTO: 3.74 M/UL (ref 4.6–6.2)
SODIUM SERPL-SCNC: 137 MMOL/L (ref 136–145)
SODIUM SERPL-SCNC: 139 MMOL/L (ref 136–145)
WBC # BLD AUTO: 13.11 K/UL (ref 3.9–12.7)
WBC # BLD AUTO: 8.62 K/UL (ref 3.9–12.7)

## 2021-09-06 PROCEDURE — 83735 ASSAY OF MAGNESIUM: CPT | Performed by: PHYSICIAN ASSISTANT

## 2021-09-06 PROCEDURE — 83036 HEMOGLOBIN GLYCOSYLATED A1C: CPT | Performed by: NURSE PRACTITIONER

## 2021-09-06 PROCEDURE — 99226 PR SUBSEQUENT OBSERVATION CARE,LEVEL III: ICD-10-PCS | Mod: GW,,, | Performed by: NURSE PRACTITIONER

## 2021-09-06 PROCEDURE — 63600175 PHARM REV CODE 636 W HCPCS: Performed by: PHYSICIAN ASSISTANT

## 2021-09-06 PROCEDURE — 25000003 PHARM REV CODE 250: Performed by: PHYSICIAN ASSISTANT

## 2021-09-06 PROCEDURE — 82272 OCCULT BLD FECES 1-3 TESTS: CPT | Performed by: NURSE PRACTITIONER

## 2021-09-06 PROCEDURE — 87427 SHIGA-LIKE TOXIN AG IA: CPT | Mod: 59 | Performed by: NURSE PRACTITIONER

## 2021-09-06 PROCEDURE — 96361 HYDRATE IV INFUSION ADD-ON: CPT

## 2021-09-06 PROCEDURE — G0378 HOSPITAL OBSERVATION PER HR: HCPCS

## 2021-09-06 PROCEDURE — 27000221 HC OXYGEN, UP TO 24 HOURS

## 2021-09-06 PROCEDURE — 25000003 PHARM REV CODE 250: Performed by: NURSE PRACTITIONER

## 2021-09-06 PROCEDURE — 80053 COMPREHEN METABOLIC PANEL: CPT | Performed by: PHYSICIAN ASSISTANT

## 2021-09-06 PROCEDURE — 99226 PR SUBSEQUENT OBSERVATION CARE,LEVEL III: CPT | Mod: GW,,, | Performed by: NURSE PRACTITIONER

## 2021-09-06 PROCEDURE — 25000242 PHARM REV CODE 250 ALT 637 W/ HCPCS: Performed by: PHYSICIAN ASSISTANT

## 2021-09-06 PROCEDURE — 36415 COLL VENOUS BLD VENIPUNCTURE: CPT | Performed by: INTERNAL MEDICINE

## 2021-09-06 PROCEDURE — 87045 FECES CULTURE AEROBIC BACT: CPT | Performed by: NURSE PRACTITIONER

## 2021-09-06 PROCEDURE — 96372 THER/PROPH/DIAG INJ SC/IM: CPT | Mod: 59

## 2021-09-06 PROCEDURE — 87324 CLOSTRIDIUM AG IA: CPT | Performed by: NURSE PRACTITIONER

## 2021-09-06 PROCEDURE — 80053 COMPREHEN METABOLIC PANEL: CPT | Mod: 91 | Performed by: INTERNAL MEDICINE

## 2021-09-06 PROCEDURE — 87449 NOS EACH ORGANISM AG IA: CPT | Performed by: NURSE PRACTITIONER

## 2021-09-06 PROCEDURE — 85025 COMPLETE CBC W/AUTO DIFF WBC: CPT | Mod: 91 | Performed by: NURSE PRACTITIONER

## 2021-09-06 PROCEDURE — S4991 NICOTINE PATCH NONLEGEND: HCPCS | Performed by: PHYSICIAN ASSISTANT

## 2021-09-06 PROCEDURE — 87329 GIARDIA AG IA: CPT | Performed by: NURSE PRACTITIONER

## 2021-09-06 PROCEDURE — 87046 STOOL CULTR AEROBIC BACT EA: CPT | Performed by: NURSE PRACTITIONER

## 2021-09-06 PROCEDURE — 89055 LEUKOCYTE ASSESSMENT FECAL: CPT | Performed by: NURSE PRACTITIONER

## 2021-09-06 PROCEDURE — 99900035 HC TECH TIME PER 15 MIN (STAT)

## 2021-09-06 PROCEDURE — 87177 OVA AND PARASITES SMEARS: CPT | Performed by: NURSE PRACTITIONER

## 2021-09-06 PROCEDURE — 84100 ASSAY OF PHOSPHORUS: CPT | Performed by: PHYSICIAN ASSISTANT

## 2021-09-06 PROCEDURE — 85025 COMPLETE CBC W/AUTO DIFF WBC: CPT | Performed by: PHYSICIAN ASSISTANT

## 2021-09-06 RX ORDER — SODIUM CHLORIDE 450 MG/100ML
INJECTION, SOLUTION INTRAVENOUS CONTINUOUS
Status: DISCONTINUED | OUTPATIENT
Start: 2021-09-06 | End: 2021-09-06

## 2021-09-06 RX ORDER — SODIUM CHLORIDE 0.9 % (FLUSH) 0.9 %
10 SYRINGE (ML) INJECTION
Status: DISCONTINUED | OUTPATIENT
Start: 2021-09-06 | End: 2021-09-07 | Stop reason: HOSPADM

## 2021-09-06 RX ORDER — SODIUM CHLORIDE 9 MG/ML
INJECTION, SOLUTION INTRAVENOUS CONTINUOUS
Status: DISCONTINUED | OUTPATIENT
Start: 2021-09-06 | End: 2021-09-07

## 2021-09-06 RX ORDER — MAG HYDROX/ALUMINUM HYD/SIMETH 200-200-20
30 SUSPENSION, ORAL (FINAL DOSE FORM) ORAL EVERY 6 HOURS PRN
Status: DISCONTINUED | OUTPATIENT
Start: 2021-09-06 | End: 2021-09-07 | Stop reason: HOSPADM

## 2021-09-06 RX ORDER — IPRATROPIUM BROMIDE AND ALBUTEROL SULFATE 2.5; .5 MG/3ML; MG/3ML
3 SOLUTION RESPIRATORY (INHALATION) EVERY 4 HOURS PRN
Status: DISCONTINUED | OUTPATIENT
Start: 2021-09-06 | End: 2021-09-07 | Stop reason: HOSPADM

## 2021-09-06 RX ORDER — HEPARIN SODIUM 5000 [USP'U]/ML
5000 INJECTION, SOLUTION INTRAVENOUS; SUBCUTANEOUS EVERY 8 HOURS
Status: DISCONTINUED | OUTPATIENT
Start: 2021-09-06 | End: 2021-09-07 | Stop reason: HOSPADM

## 2021-09-06 RX ADMIN — BENZTROPINE MESYLATE 0.5 MG: 0.5 TABLET ORAL at 10:09

## 2021-09-06 RX ADMIN — ATORVASTATIN CALCIUM 40 MG: 40 TABLET, FILM COATED ORAL at 08:09

## 2021-09-06 RX ADMIN — TAMSULOSIN HYDROCHLORIDE 0.4 MG: 0.4 CAPSULE ORAL at 08:09

## 2021-09-06 RX ADMIN — SODIUM ZIRCONIUM CYCLOSILICATE 5 G: 5 POWDER, FOR SUSPENSION ORAL at 06:09

## 2021-09-06 RX ADMIN — INSULIN ASPART 2 UNITS: 100 INJECTION, SOLUTION INTRAVENOUS; SUBCUTANEOUS at 08:09

## 2021-09-06 RX ADMIN — SODIUM CHLORIDE 100 ML/HR: 0.45 INJECTION, SOLUTION INTRAVENOUS at 12:09

## 2021-09-06 RX ADMIN — RISPERIDONE 2 MG: 1 TABLET ORAL at 12:09

## 2021-09-06 RX ADMIN — HEPARIN SODIUM 5000 UNITS: 5000 INJECTION INTRAVENOUS; SUBCUTANEOUS at 10:09

## 2021-09-06 RX ADMIN — NICOTINE 1 PATCH: 21 PATCH, EXTENDED RELEASE TRANSDERMAL at 08:09

## 2021-09-06 RX ADMIN — METOPROLOL TARTRATE 25 MG: 25 TABLET, FILM COATED ORAL at 12:09

## 2021-09-06 RX ADMIN — METOPROLOL TARTRATE 25 MG: 25 TABLET, FILM COATED ORAL at 08:09

## 2021-09-06 RX ADMIN — RISPERIDONE 2 MG: 1 TABLET ORAL at 09:09

## 2021-09-06 RX ADMIN — METOPROLOL TARTRATE 25 MG: 25 TABLET, FILM COATED ORAL at 10:09

## 2021-09-06 RX ADMIN — BENZTROPINE MESYLATE 0.5 MG: 0.5 TABLET ORAL at 12:09

## 2021-09-06 RX ADMIN — SODIUM CHLORIDE 125 ML/HR: 0.9 INJECTION, SOLUTION INTRAVENOUS at 09:09

## 2021-09-06 RX ADMIN — RISPERIDONE 2 MG: 1 TABLET ORAL at 10:09

## 2021-09-06 RX ADMIN — HEPARIN SODIUM 5000 UNITS: 5000 INJECTION INTRAVENOUS; SUBCUTANEOUS at 01:09

## 2021-09-06 RX ADMIN — ASPIRIN 81 MG: 81 TABLET, COATED ORAL at 08:09

## 2021-09-06 RX ADMIN — FINASTERIDE 5 MG: 5 TABLET, FILM COATED ORAL at 09:09

## 2021-09-06 RX ADMIN — PANTOPRAZOLE SODIUM 40 MG: 40 TABLET, DELAYED RELEASE ORAL at 08:09

## 2021-09-06 RX ADMIN — HEPARIN SODIUM 5000 UNITS: 5000 INJECTION INTRAVENOUS; SUBCUTANEOUS at 06:09

## 2021-09-06 RX ADMIN — ISOSORBIDE MONONITRATE 30 MG: 30 TABLET, EXTENDED RELEASE ORAL at 09:09

## 2021-09-07 VITALS
HEART RATE: 83 BPM | RESPIRATION RATE: 18 BRPM | OXYGEN SATURATION: 98 % | DIASTOLIC BLOOD PRESSURE: 76 MMHG | SYSTOLIC BLOOD PRESSURE: 132 MMHG | TEMPERATURE: 98 F | HEIGHT: 69 IN | BODY MASS INDEX: 25.67 KG/M2 | WEIGHT: 173.31 LBS

## 2021-09-07 LAB
ALBUMIN SERPL BCP-MCNC: 2.8 G/DL (ref 3.5–5.2)
ALP SERPL-CCNC: 47 U/L (ref 55–135)
ALT SERPL W/O P-5'-P-CCNC: 22 U/L (ref 10–44)
ANION GAP SERPL CALC-SCNC: 7 MMOL/L (ref 8–16)
AST SERPL-CCNC: 19 U/L (ref 10–40)
BASOPHILS # BLD AUTO: 0.01 K/UL (ref 0–0.2)
BASOPHILS NFR BLD: 0.1 % (ref 0–1.9)
BILIRUB SERPL-MCNC: 0.4 MG/DL (ref 0.1–1)
BUN SERPL-MCNC: 32 MG/DL (ref 8–23)
C DIFF GDH STL QL: NEGATIVE
C DIFF TOX A+B STL QL IA: NEGATIVE
CALCIUM SERPL-MCNC: 8.4 MG/DL (ref 8.7–10.5)
CHLORIDE SERPL-SCNC: 108 MMOL/L (ref 95–110)
CO2 SERPL-SCNC: 24 MMOL/L (ref 23–29)
CREAT SERPL-MCNC: 1.3 MG/DL (ref 0.5–1.4)
CRYPTOSP AG STL QL IA: NEGATIVE
DIFFERENTIAL METHOD: ABNORMAL
EOSINOPHIL # BLD AUTO: 0 K/UL (ref 0–0.5)
EOSINOPHIL NFR BLD: 0.4 % (ref 0–8)
ERYTHROCYTE [DISTWIDTH] IN BLOOD BY AUTOMATED COUNT: 13.2 % (ref 11.5–14.5)
EST. GFR  (AFRICAN AMERICAN): >60 ML/MIN/1.73 M^2
EST. GFR  (NON AFRICAN AMERICAN): 56.9 ML/MIN/1.73 M^2
G LAMBLIA AG STL QL IA: NEGATIVE
GLUCOSE SERPL-MCNC: 149 MG/DL (ref 70–110)
HCT VFR BLD AUTO: 32.4 % (ref 40–54)
HGB BLD-MCNC: 10.7 G/DL (ref 14–18)
IMM GRANULOCYTES # BLD AUTO: 0.06 K/UL (ref 0–0.04)
IMM GRANULOCYTES NFR BLD AUTO: 0.5 % (ref 0–0.5)
LYMPHOCYTES # BLD AUTO: 1.6 K/UL (ref 1–4.8)
LYMPHOCYTES NFR BLD: 13.9 % (ref 18–48)
MAGNESIUM SERPL-MCNC: 2 MG/DL (ref 1.6–2.6)
MCH RBC QN AUTO: 30.1 PG (ref 27–31)
MCHC RBC AUTO-ENTMCNC: 33 G/DL (ref 32–36)
MCV RBC AUTO: 91 FL (ref 82–98)
MONOCYTES # BLD AUTO: 0.7 K/UL (ref 0.3–1)
MONOCYTES NFR BLD: 6.1 % (ref 4–15)
NEUTROPHILS # BLD AUTO: 8.9 K/UL (ref 1.8–7.7)
NEUTROPHILS NFR BLD: 79 % (ref 38–73)
NRBC BLD-RTO: 0 /100 WBC
OB PNL STL: NEGATIVE
PHOSPHATE SERPL-MCNC: 2.5 MG/DL (ref 2.7–4.5)
PLATELET # BLD AUTO: 221 K/UL (ref 150–450)
PMV BLD AUTO: 10.6 FL (ref 9.2–12.9)
POCT GLUCOSE: 139 MG/DL (ref 70–110)
POCT GLUCOSE: 152 MG/DL (ref 70–110)
POTASSIUM SERPL-SCNC: 3.6 MMOL/L (ref 3.5–5.1)
PROT SERPL-MCNC: 5.2 G/DL (ref 6–8.4)
RBC # BLD AUTO: 3.55 M/UL (ref 4.6–6.2)
SODIUM SERPL-SCNC: 139 MMOL/L (ref 136–145)
WBC # BLD AUTO: 11.2 K/UL (ref 3.9–12.7)
WBC #/AREA STL HPF: NORMAL /[HPF]

## 2021-09-07 PROCEDURE — 99217 PR OBSERVATION CARE DISCHARGE: ICD-10-PCS | Mod: ,,, | Performed by: NURSE PRACTITIONER

## 2021-09-07 PROCEDURE — 85025 COMPLETE CBC W/AUTO DIFF WBC: CPT | Performed by: PHYSICIAN ASSISTANT

## 2021-09-07 PROCEDURE — 97530 THERAPEUTIC ACTIVITIES: CPT

## 2021-09-07 PROCEDURE — 96372 THER/PROPH/DIAG INJ SC/IM: CPT

## 2021-09-07 PROCEDURE — 97535 SELF CARE MNGMENT TRAINING: CPT | Mod: 59

## 2021-09-07 PROCEDURE — 63600175 PHARM REV CODE 636 W HCPCS: Performed by: PHYSICIAN ASSISTANT

## 2021-09-07 PROCEDURE — S4991 NICOTINE PATCH NONLEGEND: HCPCS | Performed by: PHYSICIAN ASSISTANT

## 2021-09-07 PROCEDURE — 80053 COMPREHEN METABOLIC PANEL: CPT | Performed by: PHYSICIAN ASSISTANT

## 2021-09-07 PROCEDURE — 99217 PR OBSERVATION CARE DISCHARGE: CPT | Mod: ,,, | Performed by: NURSE PRACTITIONER

## 2021-09-07 PROCEDURE — 25000003 PHARM REV CODE 250: Performed by: NURSE PRACTITIONER

## 2021-09-07 PROCEDURE — 36415 COLL VENOUS BLD VENIPUNCTURE: CPT | Performed by: PHYSICIAN ASSISTANT

## 2021-09-07 PROCEDURE — 84100 ASSAY OF PHOSPHORUS: CPT | Performed by: PHYSICIAN ASSISTANT

## 2021-09-07 PROCEDURE — G0378 HOSPITAL OBSERVATION PER HR: HCPCS

## 2021-09-07 PROCEDURE — 83735 ASSAY OF MAGNESIUM: CPT | Performed by: PHYSICIAN ASSISTANT

## 2021-09-07 PROCEDURE — 97165 OT EVAL LOW COMPLEX 30 MIN: CPT

## 2021-09-07 PROCEDURE — 25000003 PHARM REV CODE 250: Performed by: PHYSICIAN ASSISTANT

## 2021-09-07 PROCEDURE — 97161 PT EVAL LOW COMPLEX 20 MIN: CPT

## 2021-09-07 RX ORDER — POTASSIUM CHLORIDE 750 MG/1
40 CAPSULE, EXTENDED RELEASE ORAL ONCE
Status: COMPLETED | OUTPATIENT
Start: 2021-09-07 | End: 2021-09-07

## 2021-09-07 RX ORDER — POLYETHYLENE GLYCOL 3350 17 G/17G
17 POWDER, FOR SOLUTION ORAL 2 TIMES DAILY PRN
Qty: 510 G | Refills: 1 | Status: SHIPPED | OUTPATIENT
Start: 2021-09-07

## 2021-09-07 RX ADMIN — ATORVASTATIN CALCIUM 40 MG: 40 TABLET, FILM COATED ORAL at 10:09

## 2021-09-07 RX ADMIN — RISPERIDONE 2 MG: 1 TABLET ORAL at 10:09

## 2021-09-07 RX ADMIN — HEPARIN SODIUM 5000 UNITS: 5000 INJECTION INTRAVENOUS; SUBCUTANEOUS at 05:09

## 2021-09-07 RX ADMIN — PANTOPRAZOLE SODIUM 40 MG: 40 TABLET, DELAYED RELEASE ORAL at 10:09

## 2021-09-07 RX ADMIN — POLYETHYLENE GLYCOL 3350 17 G: 17 POWDER, FOR SOLUTION ORAL at 10:09

## 2021-09-07 RX ADMIN — TAMSULOSIN HYDROCHLORIDE 0.4 MG: 0.4 CAPSULE ORAL at 10:09

## 2021-09-07 RX ADMIN — ISOSORBIDE MONONITRATE 30 MG: 30 TABLET, EXTENDED RELEASE ORAL at 10:09

## 2021-09-07 RX ADMIN — BENZTROPINE MESYLATE 0.5 MG: 0.5 TABLET ORAL at 10:09

## 2021-09-07 RX ADMIN — ASPIRIN 81 MG: 81 TABLET, COATED ORAL at 10:09

## 2021-09-07 RX ADMIN — NICOTINE 1 PATCH: 21 PATCH, EXTENDED RELEASE TRANSDERMAL at 10:09

## 2021-09-07 RX ADMIN — POTASSIUM CHLORIDE 40 MEQ: 10 CAPSULE, COATED, EXTENDED RELEASE ORAL at 10:09

## 2021-09-07 RX ADMIN — FINASTERIDE 5 MG: 5 TABLET, FILM COATED ORAL at 10:09

## 2021-09-07 RX ADMIN — MELATONIN TAB 3 MG 6 MG: 3 TAB at 01:09

## 2021-09-07 RX ADMIN — METOPROLOL TARTRATE 25 MG: 25 TABLET, FILM COATED ORAL at 10:09

## 2021-09-08 LAB
E COLI SXT1 STL QL IA: NEGATIVE
E COLI SXT2 STL QL IA: NEGATIVE

## 2021-09-09 LAB — O+P STL MICRO: NORMAL

## 2021-09-10 LAB — BACTERIA STL CULT: NORMAL

## 2022-01-14 NOTE — PATIENT INSTRUCTIONS
Understanding Coronary Artery Disease (CAD)    To understand coronary artery disease (CAD), you need to know how your heart works. Your heart is a muscle that pumps blood throughout your body. To work right, your heart needs a steady supply of oxygen. It gets this oxygen from blood supplied by the coronary arteries.     Healthy artery   Healthy artery. When a coronary artery is healthy and has no blockages, blood flows through easily. Healthy arteries can easily supply the oxygen-rich blood your heart needs.     Damaged artery   Damaged artery. Coronary artery disease begins when damage to the artery lining leads to the buildup of fat-like substances and cholesterol along the artery wall. This is called plaque. This damage could be caused by things like high blood pressure or smoking. This plaque buildup begins to narrow the arteries carrying blood to the heart. This is called atherosclerosis,     Narrowed artery   Narrowed artery. As more plaque builds up, your artery has trouble supplying blood to your heart muscle when it needs it most, such as during exercise. You may not feel any symptoms when this happens. Or you may feel angina--pressure, tightness, achiness, or pain in your chest, jaw, neck, back, or arm.     Blocked artery   Blocked artery. A piece of plaque may break off and completely block the artery. Or a blood clot may plug the narrowed artery. When this happens, blood flow is blocked from reaching the heart. Without oxygen-rich blood, part of the heart muscle becomes damaged and stops working. You may feel crushing pressure or pain in or around your chest. This is a heart attack (acute myocardial infarction, or AMI) and is a medical emergency.     Date Last Reviewed: 3/28/2016  © 5285-5723 [employer]. [address] [place] [address], [place] [address]. All rights reserved. This information is not intended as a substitute for professional medical care. Always follow your healthcare  professional's instructions.          Eating Heart-Healthy Foods  Eating has a big impact on your heart health. In fact, eating healthier can improve several of your heart risks at once. For instance, it helps you manage weight, cholesterol, and blood pressure. Here are ideas to help you make heart-healthy changes without giving up all the foods and flavors you love.  Getting started  · Talk with your health care provider about eating plans, such as the DASH or Mediterranean diet. You may also be referred to a dietitian.  · Change a few things at a time. Give yourself time to get used to a few eating changes before adding more.  · Work to create a tasty, healthy eating plan that you can stick to for the rest of your life.    Goals for healthy eating  Below are some tips to improve your eating habits:  · Limit saturated fats and trans fats. Saturated fats raise your levels of cholesterol, so keep these fats to a minimum. They are found in foods such as fatty meats, whole milk, cheese, and palm and coconut oils. Avoid trans fats because they lower good cholesterol as well as raise bad cholesterol. Trans fats are most often found in processed foods.  · Reduce sodium (salt) intake. Eating too much salt may increase your blood pressure. Limit your sodium intake to 2,300 milligrams (mg) per day, or less if your health care provider recommends it. Dining out less often and eating fewer processed foods are two great ways to decrease the amount of salt you consume.  · Managing calories. A calorie is a unit of energy. Your body burns calories for fuel, but if you eat more calories than your body burns, the extras are stored as fat. Your health care provider can help you create a diet plan to manage your calories. This will likely include eating healthier foods as well as exercising regularly. To help you track your progress, keep a diary to record what you eat and how often you exercise.  Choose the right foods  Aim to make  these foods staples of your diet. If you have diabetes, you may have different recommendations than what is listed here:  · Fruits and vegetable provide plenty of nutrients without a lot of calories. At meals, fill half your plate with these foods. Split the other half of your plate between whole grains and lean protein.  · Whole grains are high in fiber and rich in vitamins and nutrients. Good choices include whole-wheat bread, pasta, and brown rice.  · Lean proteins give you nutrition with less fat. Good choices include fish, skinless chicken, and beans.  · Low-fat or nonfat dairy provides nutrients without a lot of fat. Try low-fat or nonfat milk, cheese, or yogurt.  · Healthy fats can be good for you in small amounts. These are unsaturated fats, such as olive oil, nuts, and fish. Try to have at least 2 servings per week of fatty fish such as salmon, sardines, mackerel, rainbow trout, and albacore tuna. These contain omega-3 fatty acids, which are good for your heart. Flaxseed is another source of a heart-healthy fat.  More on heart healthy eating    Read food labels  Healthy eating starts at the grocery store. Be sure to pay attention to food labels on packaged foods. Look for products that are high in fiber and protein, and low in saturated fat, cholesterol, and sodium. Avoid products that contain trans fat. And pay close attention to serving size. For instance, if you plan to eat two servings, double all the numbers on the label.  Prepare food right  A key part of healthy cooking is cutting down on added fat and salt. Look on the internet for lower-fat, lower-sodium recipes. Also, try these tips:  · Remove fat from meat and skin from poultry before cooking.  · Skim fat from the surface of soups and sauces.  · Broil, boil, bake, steam, grill, and microwave food without added fats.  · Choose ingredients that spice up your food without adding calories, fat, or sodium. Try these items: horseradish, hot sauce,  lemon, mustard, nonfat salad dressings, and vinegar. For salt-free herbs and spices, try basil, cilantro, cinnamon, pepper, and rosemary.  Date Last Reviewed: 6/25/2015 © 2000-2017 [employer]. [address] [place] [address]. All rights reserved. This information is not intended as a substitute for professional medical care. Always follow your healthcare professional's instructions.          Aerobic Exercise for a Healthy Heart  Exercise is a lot more than an energy booster and a stress reliever. It also strengthens your heart muscle, lowers your blood pressure and cholesterol, and burns calories. It can also improve your resting muscle tone, and your mood.     Remember, some activity is better than none.    Choose an aerobic activity  Choose an activity that makes your heart and lungs work harder than they do when you rest or walk normally. This aerobic exercise can improve the way your heart and other muscles use oxygen. Make it fun by exercising with a friend and choosing an activity you enjoy. Here are some ideas:  · Walking  · Swimming  · Bicycling  · Stair climbing  · Dancing  · Jogging  · Gardening  Exercise regularly  If you havent been exercising regularly,  get your doctors OK first. Then start slowly.  Here are some tips:  · Begin exercising 3 times a week for 5 to 10 minutes at a time.  · When you feel comfortable, add a few minutes each session.  · Slowly build up to exercising 3 to 4 times each week. Each session should last for 40 minutes, on average, and involve moderate- to vigorous-intensity physical activity.  · If you have been given nitroglycerin, be sure to carry it when you exercise.  · If you get chest pain (angina) when youre exercising, stop what youre doing, take your nitroglycerin, and call your doctor.  Date Last Reviewed: 6/2/2016  © 9371-6262 [employer]. [address] [place] [address]. All rights reserved. This information is not  intended as a substitute for professional medical care. Always follow your healthcare professional's instructions.          How to Quit Smoking  Smoking is one of the hardest habits to break. About half of all people who have ever smoked have been able to quit. Most people who still smoke want to quit. Here are some of the best ways to stop smoking.    Keep trying  Most smokers make many attempts at quitting before they are successful. Its important not to give up.  Go cold turkey  Most former smokers quit cold turkey (all at once). Trying to cut back gradually doesn't seem to work as well, perhaps because it continues the smoking habit. Also, it is possible to inhale more while smoking fewer cigarettes. This results in the same amount of nicotine in your body.  Get support  Support programs can be a big help, especially for heavy smokers. These groups offer lectures, ways to change behavior, and peer support. Here are some ways to find a support program:  · Free national quitline: 800-QUIT-NOW (635-781-8748).  · Hospital quit-smoking programs.  · American Lung Association: (603.902.8331).  · American Cancer Society (373-944-4168).  Support at home is important too. Nonsmokers can offer praise and encouragement. If the smoker in your life finds it hard to quit, encourage them to keep trying.  Over-the-counter medicines  Nicotine replacement therapy may make quitting easier. Certain aids, such as the nicotine patch, gum, and lozenges, are available without a prescription. It is best to use these under a doctors care, though. The skin patch provides a steady supply of nicotine. Nicotine gum and lozenges give temporary bursts of low levels of nicotine. Both methods reduce the craving for cigarettes. Warning: If you have nausea, vomiting, dizziness, weakness, or a fast heartbeat, stop using these products and see your doctor.  Prescription medicines  After reviewing your smoking patterns and past attempts to quit,  "your doctor may offer a prescription medicine such as bupropion, varenicline, a nicotine inhaler, or nasal spray. Each has advantages and side effects. Your doctor can review these with you.  Health benefits of quitting  The benefits of quitting start right away and keep improving the longer you go without smoking. These benefits occur at any age.  So whether you are 17 or 70, quitting is a good decision. Some of the benefits include:  · 20 minutes: Blood pressure and pulse return to normal.  · 8 hours: Oxygen levels return to normal.  · 2 days: Ability to smell and taste begin to improve as damaged nerves regrow.  · 2 to 3 weeks: Circulation and lung function improve.  · 1 to 9 months: Coughing, congestion, and shortness of breath decrease; tiredness decreases.  · 1 year: Risk of heart attack decreases by half.  · 5 years: Risk of lung cancer decreases by half; risk of stroke becomes the same as a nonsmokers.  For more on how to quit smoking, try these online resources:   · Smokefree.gov  · "Clearing the Air" booklet from the [employer] [place]: smokefree.gov/sites/default/files/pdf/clearing-the-air-accessible.pdf  Date Last Reviewed: 3/1/2017  © 6497-6662 [employer]. [address] [place] [address], [place] [address]. All rights reserved. This information is not intended as a substitute for professional medical care. Always follow your healthcare professional's instructions.          " 19

## 2022-01-21 ENCOUNTER — PATIENT OUTREACH (OUTPATIENT)
Dept: ADMINISTRATIVE | Facility: HOSPITAL | Age: 67
End: 2022-01-21
Payer: MEDICARE

## 2022-02-28 ENCOUNTER — PATIENT OUTREACH (OUTPATIENT)
Dept: ADMINISTRATIVE | Facility: HOSPITAL | Age: 67
End: 2022-02-28
Payer: MEDICARE